# Patient Record
Sex: MALE | Race: BLACK OR AFRICAN AMERICAN | NOT HISPANIC OR LATINO | Employment: OTHER | URBAN - NONMETROPOLITAN AREA
[De-identification: names, ages, dates, MRNs, and addresses within clinical notes are randomized per-mention and may not be internally consistent; named-entity substitution may affect disease eponyms.]

---

## 2017-01-09 DIAGNOSIS — M16.12 PRIMARY OSTEOARTHRITIS OF LEFT HIP: ICD-10-CM

## 2017-01-09 DIAGNOSIS — Z96.642 PRESENCE OF LEFT HIP IMPLANT: Primary | ICD-10-CM

## 2017-01-09 DIAGNOSIS — M25.552 LEFT HIP PAIN: ICD-10-CM

## 2017-01-31 ENCOUNTER — OFFICE VISIT (OUTPATIENT)
Dept: ORTHOPEDIC SURGERY | Facility: CLINIC | Age: 72
End: 2017-01-31

## 2017-01-31 VITALS — BODY MASS INDEX: 32.9 KG/M2 | HEIGHT: 71 IN | WEIGHT: 235 LBS

## 2017-01-31 DIAGNOSIS — M16.12 PRIMARY OSTEOARTHRITIS OF LEFT HIP: ICD-10-CM

## 2017-01-31 DIAGNOSIS — Z96.642 PRESENCE OF LEFT HIP IMPLANT: Primary | ICD-10-CM

## 2017-01-31 PROCEDURE — 99024 POSTOP FOLLOW-UP VISIT: CPT | Performed by: ORTHOPAEDIC SURGERY

## 2017-01-31 RX ORDER — CLONIDINE HYDROCHLORIDE 0.1 MG/1
TABLET ORAL
COMMUNITY
Start: 2017-01-30 | End: 2017-05-10 | Stop reason: ALTCHOICE

## 2017-01-31 RX ORDER — AMLODIPINE BESYLATE 10 MG/1
TABLET ORAL
COMMUNITY
Start: 2017-01-30 | End: 2017-03-28

## 2017-01-31 RX ORDER — AMLODIPINE BESYLATE 5 MG/1
TABLET ORAL
COMMUNITY
Start: 2017-01-23 | End: 2017-03-28

## 2017-02-27 ENCOUNTER — LAB (OUTPATIENT)
Dept: LAB | Facility: HOSPITAL | Age: 72
End: 2017-02-27

## 2017-02-27 ENCOUNTER — TRANSCRIBE ORDERS (OUTPATIENT)
Dept: LAB | Facility: HOSPITAL | Age: 72
End: 2017-02-27

## 2017-02-27 DIAGNOSIS — I10 ESSENTIAL HYPERTENSION, BENIGN: Primary | ICD-10-CM

## 2017-02-27 DIAGNOSIS — I10 ESSENTIAL HYPERTENSION, BENIGN: ICD-10-CM

## 2017-02-27 DIAGNOSIS — E78.2 MIXED HYPERLIPIDEMIA: ICD-10-CM

## 2017-02-27 DIAGNOSIS — M1A.00X0 IDIOPATHIC CHRONIC GOUT WITHOUT TOPHUS, UNSPECIFIED SITE: ICD-10-CM

## 2017-02-27 DIAGNOSIS — Z12.5 ENCOUNTER FOR PROSTATE CANCER SCREENING: ICD-10-CM

## 2017-02-27 DIAGNOSIS — I10 ESSENTIAL HYPERTENSION: ICD-10-CM

## 2017-02-27 DIAGNOSIS — Z11.59 NEED FOR HEPATITIS C SCREENING TEST: ICD-10-CM

## 2017-02-27 PROCEDURE — 82088 ASSAY OF ALDOSTERONE: CPT | Performed by: INTERNAL MEDICINE

## 2017-02-27 PROCEDURE — 82570 ASSAY OF URINE CREATININE: CPT | Performed by: INTERNAL MEDICINE

## 2017-02-27 PROCEDURE — 84156 ASSAY OF PROTEIN URINE: CPT | Performed by: INTERNAL MEDICINE

## 2017-02-27 PROCEDURE — 84244 ASSAY OF RENIN: CPT | Performed by: INTERNAL MEDICINE

## 2017-02-27 PROCEDURE — 36415 COLL VENOUS BLD VENIPUNCTURE: CPT

## 2017-03-01 ENCOUNTER — LAB (OUTPATIENT)
Dept: LAB | Facility: HOSPITAL | Age: 72
End: 2017-03-01

## 2017-03-01 DIAGNOSIS — I10 ESSENTIAL HYPERTENSION, BENIGN: ICD-10-CM

## 2017-03-01 LAB
CREAT 24H UR-MCNC: 113.6 MG/DL
PROT UR-MCNC: 8.9 MG/DL
PROT/CREAT UR: 78 MG/G CREAT (ref 0–200)

## 2017-03-01 PROCEDURE — 82384 ASSAY THREE CATECHOLAMINES: CPT | Performed by: INTERNAL MEDICINE

## 2017-03-01 PROCEDURE — 83835 ASSAY OF METANEPHRINES: CPT | Performed by: INTERNAL MEDICINE

## 2017-03-01 PROCEDURE — 81050 URINALYSIS VOLUME MEASURE: CPT | Performed by: INTERNAL MEDICINE

## 2017-03-01 PROCEDURE — 82570 ASSAY OF URINE CREATININE: CPT | Performed by: INTERNAL MEDICINE

## 2017-03-01 PROCEDURE — 84585 ASSAY OF URINE VMA: CPT | Performed by: INTERNAL MEDICINE

## 2017-03-03 LAB
METANEPHS 24H UR-MRATE: 166 UG/24 HR (ref 45–290)
METANEPHS UR-MCNC: 107 UG/L
NORMETANEPHRINE 24H UR-MCNC: 373 UG/L
NORMETANEPHRINE 24H UR-MRATE: 578 UG/24 HR (ref 82–500)
VMA 24H UR-MRATE: 3.7 MG/24 HR (ref 0–7.5)
VMA UR-MCNC: 2.4 MG/L

## 2017-03-05 LAB
DOPAMINE 24H UR-MRATE: 226 UG/24 HR (ref 0–510)
DOPAMINE UR-MCNC: 146 UG/L
EPINEPH 24H UR-MRATE: 3 UG/24 HR (ref 0–20)
EPINEPH UR-MCNC: 2 UG/L
NOREPINEPH 24H UR-MRATE: 56 UG/24 HR (ref 0–135)
NOREPINEPH UR-MCNC: 36 UG/L

## 2017-03-09 LAB
ALDOST SERPL-MCNC: 3.6 NG/DL (ref 0–30)
ALDOST/RENIN PLAS-RTO: 9.6 {RATIO} (ref 0–30)
RENIN PLAS-CCNC: 0.38 NG/ML/HR (ref 0.17–5.38)

## 2017-03-28 ENCOUNTER — OFFICE VISIT (OUTPATIENT)
Dept: ORTHOPEDIC SURGERY | Facility: CLINIC | Age: 72
End: 2017-03-28

## 2017-03-28 VITALS — WEIGHT: 230 LBS | BODY MASS INDEX: 32.2 KG/M2 | HEIGHT: 71 IN

## 2017-03-28 DIAGNOSIS — M16.12 PRIMARY OSTEOARTHRITIS OF LEFT HIP: ICD-10-CM

## 2017-03-28 DIAGNOSIS — Z96.642 PRESENCE OF LEFT HIP IMPLANT: Primary | ICD-10-CM

## 2017-03-28 DIAGNOSIS — M25.552 LEFT HIP PAIN: ICD-10-CM

## 2017-03-28 PROCEDURE — 99213 OFFICE O/P EST LOW 20 MIN: CPT | Performed by: ORTHOPAEDIC SURGERY

## 2017-03-28 RX ORDER — CLINDAMYCIN PHOSPHATE 10 MG/G
GEL TOPICAL 2 TIMES DAILY
COMMUNITY
End: 2018-01-27 | Stop reason: SDUPTHER

## 2017-03-28 NOTE — PROGRESS NOTES
Jerald Bonilla is a 71 y.o. male returns for     Chief Complaint   Patient presents with   • Left Hip - Follow-up     Xray done today in the office       HISTORY OF PRESENT ILLNESS:    Therapy completed at [facility].  Doing well. Pain level 1,  Stiff and sore but not much pain.  No problems.  He has some occasional pain with prolonged sitting.  He is progressively getting better and stronger.       CONCURRENT MEDICAL HISTORY:    Past Medical History:   Diagnosis Date   • Acne conglobata    • Acute maxillary sinusitis    • Benign prostate hyperplasia    • Blurring of visual image    • Carbuncle     of infected sebaceous cyst on back   • Dizziness and giddiness    • Erectile dysfunction    • Essential hypertension     seeing nephro   • Gastroesophageal reflux disease    • Gout    • Hip pain    • Hyperlipidemia    • Hypertension    • Hypertensive disorder    • Low back pain    • Malaise and fatigue    • Need for prophylactic vaccination against Streptococcus pneumoniae (pneumococcus)    • Nuclear senile cataract    • Osteoarthritis    • Otalgia    • Otitis externa    • Pain in lower limb    • Rheumatoid arthritis    • Skin lesion     right calf   • Swelling of knee joint    • Tinnitus    • Upper respiratory infection        No Known Allergies      Current Outpatient Prescriptions:   •  allopurinol (ZYLOPRIM) 100 MG tablet, Take 1 tablet by mouth Daily., Disp: 90 tablet, Rfl: 3  •  B Complex Vitamins (VITAMIN B COMPLEX PO), Take 1 tablet by mouth Daily., Disp: , Rfl:   •  B Complex-C (SUPER B COMPLEX PO), Take  by mouth., Disp: , Rfl:   •  carvedilol (COREG) 25 MG tablet, Take 1 tablet by mouth 2 (Two) Times a Day With Meals., Disp: 180 tablet, Rfl: 3  •  cholecalciferol (VITAMIN D3) 1000 UNITS tablet, Take 1,000 Units by mouth Daily., Disp: , Rfl:   •  clindamycin (CLINDAGEL) 1 % gel, Apply  topically 2 (Two) Times a Day., Disp: , Rfl:   •  CloNIDine (CATAPRES) 0.1 MG tablet, , Disp: , Rfl:   •  fluticasone  "(FLONASE) 50 MCG/ACT nasal spray, 2 sprays into each nostril Daily As Needed for allergies., Disp: 1 each, Rfl: 11  •  hydrALAZINE (APRESOLINE) 100 MG tablet, Take 1 tablet by mouth 3 (Three) Times a Day., Disp: 270 tablet, Rfl: 3  •  Loratadine 10 MG capsule, Take  by mouth., Disp: , Rfl:   •  pravastatin (PRAVACHOL) 80 MG tablet, Take 1 tablet by mouth Daily., Disp: 90 tablet, Rfl: 3  •  valsartan-hydrochlorothiazide (DIOVAN-HCT) 320-12.5 MG per tablet, Take 1 tablet by mouth Daily., Disp: 90 tablet, Rfl: 3  •  Zinc 50 MG tablet, Take 1 tablet by mouth Daily., Disp: , Rfl:   •  Zinc Sulfate (ZINC 15 PO), Take  by mouth., Disp: , Rfl:     Past Surgical History:   Procedure Laterality Date   • COLONOSCOPY  05/18/2009    Colonoscopy, diagnostic (screening) 91307 (1)    diverticuli, roids    • HIP SURGERY Left 12/05/2016   • INJECTION OF MEDICATION  05/01/2015    B12 (3)     • INJECTION OF MEDICATION  10/31/2011    Rocephin (1)      • OTHER SURGICAL HISTORY  04/18/2016    &D, Complicated or Multiple 43189 (3)      • OTHER SURGICAL HISTORY  08/11/2014    Remove Impacted Cerumen 99097 (1)          ROS  No fevers or chills.  No chest pain or shortness of air.  No GI or  disturbances.    PHYSICAL EXAMINATION:       Ht 71\" (180.3 cm)  Wt 230 lb (104 kg)  BMI 32.08 kg/m2    Physical Exam   Constitutional: He is oriented to person, place, and time. He appears well-developed and well-nourished.   Neurological: He is alert and oriented to person, place, and time.   Psychiatric: He has a normal mood and affect. His behavior is normal. Judgment and thought content normal.       GAIT:     [x]  Normal  []  Antalgic    Assistive device: [x]  None  []  Walker     []  Crutches  []  Cane     []  Wheelchair  []  Stretcher    Left Hip Exam     Tenderness   The patient is experiencing no tenderness.         Range of Motion   Flexion: 130   Internal Rotation: normal   External Rotation: normal     Muscle Strength   The patient has " normal left hip strength.     Tests   EPHRAIM: negative  Fadir:  Negative FADIR test    Other   Erythema: absent  Sensation: normal  Pulse: present    Comments:  Stable joint exam              Xr Hip With Or Without Pelvis 2 - 3 View Left    Result Date: 3/28/2017  Narrative: AP of the pelvis with AP and lateral of the left hip shows acceptable position and alignment of a left total hip arthroplasty with no evidence of acute bony abnormality.  No sign of implant loosening or failure.  There does appear to be a slight pelvic obliquity with no identifiable source.  He may have some lumbar degenerative changes or his left leg may be slightly long.  Please correlate with clinical situation.  Comparison views not currently available. 03/28/17 at 6:04 PM by Mitchell Beltrán MD             ASSESSMENT:    Diagnoses and all orders for this visit:    Presence of left hip implant    Left hip pain    Primary osteoarthritis of left hip    Other orders  -     clindamycin (CLINDAGEL) 1 % gel; Apply  topically 2 (Two) Times a Day.          PLAN    Continue with range of motion and strengthening.  Continue conditioning as tolerated.  We discussed slowly progressing his mobility and activity as tolerated.  There are no restrictions.    Return in about 1 year (around 3/28/2018) for Recheck with repeat xrays.    Mitchell Beltrán MD

## 2017-04-19 ENCOUNTER — TRANSCRIBE ORDERS (OUTPATIENT)
Dept: LAB | Facility: HOSPITAL | Age: 72
End: 2017-04-19

## 2017-04-19 DIAGNOSIS — E55.9 UNSPECIFIED VITAMIN D DEFICIENCY: ICD-10-CM

## 2017-04-19 DIAGNOSIS — I10 ESSENTIAL (PRIMARY) HYPERTENSION: Primary | ICD-10-CM

## 2017-05-10 ENCOUNTER — HOSPITAL ENCOUNTER (INPATIENT)
Facility: HOSPITAL | Age: 72
LOS: 2 days | Discharge: HOME | End: 2017-05-12
Attending: EMERGENCY MEDICINE | Admitting: INTERNAL MEDICINE

## 2017-05-10 DIAGNOSIS — Z78.9 IMPAIRED MOBILITY AND ADLS: ICD-10-CM

## 2017-05-10 DIAGNOSIS — Z74.09 IMPAIRED MOBILITY AND ADLS: ICD-10-CM

## 2017-05-10 DIAGNOSIS — J18.9 PNEUMONIA OF RIGHT UPPER LOBE DUE TO INFECTIOUS ORGANISM: Primary | ICD-10-CM

## 2017-05-10 PROBLEM — N17.9 ACUTE KIDNEY INJURY: Status: ACTIVE | Noted: 2017-05-10

## 2017-05-10 LAB
ALBUMIN SERPL-MCNC: 3.9 G/DL (ref 3.4–4.8)
ALBUMIN/GLOB SERPL: 1.2 G/DL (ref 1.1–1.8)
ALP SERPL-CCNC: 49 U/L (ref 38–126)
ALT SERPL W P-5'-P-CCNC: 26 U/L (ref 21–72)
ANION GAP SERPL CALCULATED.3IONS-SCNC: 12 MMOL/L (ref 5–15)
AST SERPL-CCNC: 21 U/L (ref 17–59)
BASOPHILS # BLD AUTO: 0.02 10*3/MM3 (ref 0–0.2)
BASOPHILS NFR BLD AUTO: 0.2 % (ref 0–2)
BILIRUB SERPL-MCNC: 1.2 MG/DL (ref 0.2–1.3)
BUN BLD-MCNC: 37 MG/DL (ref 7–21)
BUN/CREAT SERPL: 23.7 (ref 7–25)
CALCIUM SPEC-SCNC: 9 MG/DL (ref 8.4–10.2)
CHLORIDE SERPL-SCNC: 103 MMOL/L (ref 95–110)
CO2 SERPL-SCNC: 22 MMOL/L (ref 22–31)
CREAT BLD-MCNC: 1.56 MG/DL (ref 0.7–1.3)
DEPRECATED RDW RBC AUTO: 40.3 FL (ref 35.1–43.9)
EOSINOPHIL # BLD AUTO: 0.02 10*3/MM3 (ref 0–0.7)
EOSINOPHIL NFR BLD AUTO: 0.2 % (ref 0–7)
ERYTHROCYTE [DISTWIDTH] IN BLOOD BY AUTOMATED COUNT: 13.6 % (ref 11.5–14.5)
GFR SERPL CREATININE-BSD FRML MDRD: 54 ML/MIN/1.73 (ref 49–113)
GLOBULIN UR ELPH-MCNC: 3.2 GM/DL (ref 2.3–3.5)
GLUCOSE BLD-MCNC: 101 MG/DL (ref 60–100)
HCT VFR BLD AUTO: 41.5 % (ref 39–49)
HGB BLD-MCNC: 14.5 G/DL (ref 13.7–17.3)
HOLD SPECIMEN: NORMAL
HOLD SPECIMEN: NORMAL
IMM GRANULOCYTES # BLD: 0.04 10*3/MM3 (ref 0–0.02)
IMM GRANULOCYTES NFR BLD: 0.5 % (ref 0–0.5)
LYMPHOCYTES # BLD AUTO: 0.85 10*3/MM3 (ref 0.6–4.2)
LYMPHOCYTES NFR BLD AUTO: 10.5 % (ref 10–50)
MCH RBC QN AUTO: 28.2 PG (ref 26.5–34)
MCHC RBC AUTO-ENTMCNC: 34.9 G/DL (ref 31.5–36.3)
MCV RBC AUTO: 80.6 FL (ref 80–98)
MONOCYTES # BLD AUTO: 0.96 10*3/MM3 (ref 0–0.9)
MONOCYTES NFR BLD AUTO: 11.8 % (ref 0–12)
NEUTROPHILS # BLD AUTO: 6.24 10*3/MM3 (ref 2–8.6)
NEUTROPHILS NFR BLD AUTO: 76.8 % (ref 37–80)
PLATELET # BLD AUTO: 174 10*3/MM3 (ref 150–450)
PMV BLD AUTO: 9.5 FL (ref 8–12)
POTASSIUM BLD-SCNC: 3.6 MMOL/L (ref 3.5–5.1)
PROT SERPL-MCNC: 7.1 G/DL (ref 6.3–8.6)
RBC # BLD AUTO: 5.15 10*6/MM3 (ref 4.37–5.74)
SODIUM BLD-SCNC: 137 MMOL/L (ref 137–145)
WBC NRBC COR # BLD: 8.13 10*3/MM3 (ref 3.2–9.8)
WHOLE BLOOD HOLD SPECIMEN: NORMAL
WHOLE BLOOD HOLD SPECIMEN: NORMAL

## 2017-05-10 PROCEDURE — 87040 BLOOD CULTURE FOR BACTERIA: CPT | Performed by: EMERGENCY MEDICINE

## 2017-05-10 PROCEDURE — 80053 COMPREHEN METABOLIC PANEL: CPT | Performed by: EMERGENCY MEDICINE

## 2017-05-10 PROCEDURE — 87205 SMEAR GRAM STAIN: CPT | Performed by: INTERNAL MEDICINE

## 2017-05-10 PROCEDURE — 25010000002 HEPARIN (PORCINE) PER 1000 UNITS: Performed by: INTERNAL MEDICINE

## 2017-05-10 PROCEDURE — 99284 EMERGENCY DEPT VISIT MOD MDM: CPT

## 2017-05-10 PROCEDURE — 25010000002 AZITHROMYCIN: Performed by: EMERGENCY MEDICINE

## 2017-05-10 PROCEDURE — 87070 CULTURE OTHR SPECIMN AEROBIC: CPT | Performed by: INTERNAL MEDICINE

## 2017-05-10 PROCEDURE — 94760 N-INVAS EAR/PLS OXIMETRY 1: CPT

## 2017-05-10 PROCEDURE — 94640 AIRWAY INHALATION TREATMENT: CPT

## 2017-05-10 PROCEDURE — 25010000002 CEFTRIAXONE: Performed by: EMERGENCY MEDICINE

## 2017-05-10 PROCEDURE — 85025 COMPLETE CBC W/AUTO DIFF WBC: CPT | Performed by: EMERGENCY MEDICINE

## 2017-05-10 PROCEDURE — 94799 UNLISTED PULMONARY SVC/PX: CPT

## 2017-05-10 RX ORDER — IPRATROPIUM BROMIDE AND ALBUTEROL SULFATE 2.5; .5 MG/3ML; MG/3ML
3 SOLUTION RESPIRATORY (INHALATION) ONCE
Status: COMPLETED | OUTPATIENT
Start: 2017-05-10 | End: 2017-05-10

## 2017-05-10 RX ORDER — VALSARTAN 160 MG/1
160 TABLET ORAL
Status: DISCONTINUED | OUTPATIENT
Start: 2017-05-10 | End: 2017-05-12 | Stop reason: HOSPADM

## 2017-05-10 RX ORDER — SODIUM CHLORIDE 0.9 % (FLUSH) 0.9 %
10 SYRINGE (ML) INJECTION AS NEEDED
Status: DISCONTINUED | OUTPATIENT
Start: 2017-05-10 | End: 2017-05-10

## 2017-05-10 RX ORDER — ACETAMINOPHEN 325 MG/1
650 TABLET ORAL ONCE
Status: COMPLETED | OUTPATIENT
Start: 2017-05-10 | End: 2017-05-10

## 2017-05-10 RX ORDER — FAMOTIDINE 40 MG/1
40 TABLET, FILM COATED ORAL DAILY
Status: DISCONTINUED | OUTPATIENT
Start: 2017-05-10 | End: 2017-05-12 | Stop reason: HOSPADM

## 2017-05-10 RX ORDER — SODIUM CHLORIDE 0.9 % (FLUSH) 0.9 %
1-10 SYRINGE (ML) INJECTION AS NEEDED
Status: DISCONTINUED | OUTPATIENT
Start: 2017-05-10 | End: 2017-05-12 | Stop reason: HOSPADM

## 2017-05-10 RX ORDER — ALLOPURINOL 100 MG/1
100 TABLET ORAL DAILY
Status: DISCONTINUED | OUTPATIENT
Start: 2017-05-10 | End: 2017-05-12 | Stop reason: HOSPADM

## 2017-05-10 RX ORDER — ZINC SULFATE 50(220)MG
220 CAPSULE ORAL DAILY
Status: DISCONTINUED | OUTPATIENT
Start: 2017-05-10 | End: 2017-05-12 | Stop reason: HOSPADM

## 2017-05-10 RX ORDER — PRAVASTATIN SODIUM 40 MG
80 TABLET ORAL NIGHTLY
Status: DISCONTINUED | OUTPATIENT
Start: 2017-05-10 | End: 2017-05-12 | Stop reason: HOSPADM

## 2017-05-10 RX ORDER — CARVEDILOL 25 MG/1
25 TABLET ORAL 2 TIMES DAILY WITH MEALS
Status: DISCONTINUED | OUTPATIENT
Start: 2017-05-10 | End: 2017-05-12 | Stop reason: HOSPADM

## 2017-05-10 RX ORDER — SODIUM CHLORIDE 9 MG/ML
125 INJECTION, SOLUTION INTRAVENOUS CONTINUOUS
Status: DISCONTINUED | OUTPATIENT
Start: 2017-05-10 | End: 2017-05-10

## 2017-05-10 RX ORDER — MELATONIN
2000 DAILY
Status: DISCONTINUED | OUTPATIENT
Start: 2017-05-10 | End: 2017-05-12 | Stop reason: HOSPADM

## 2017-05-10 RX ORDER — NIFEDIPINE 60 MG/1
120 TABLET, EXTENDED RELEASE ORAL NIGHTLY
Status: DISCONTINUED | OUTPATIENT
Start: 2017-05-10 | End: 2017-05-12 | Stop reason: HOSPADM

## 2017-05-10 RX ORDER — VALSARTAN AND HYDROCHLOROTHIAZIDE 160; 25 MG/1; MG/1
1 TABLET ORAL DAILY
COMMUNITY
End: 2017-05-16 | Stop reason: DRUGHIGH

## 2017-05-10 RX ORDER — SODIUM CHLORIDE 9 MG/ML
100 INJECTION, SOLUTION INTRAVENOUS CONTINUOUS
Status: DISCONTINUED | OUTPATIENT
Start: 2017-05-10 | End: 2017-05-11

## 2017-05-10 RX ORDER — HEPARIN SODIUM 5000 [USP'U]/ML
5000 INJECTION, SOLUTION INTRAVENOUS; SUBCUTANEOUS EVERY 12 HOURS SCHEDULED
Status: DISCONTINUED | OUTPATIENT
Start: 2017-05-10 | End: 2017-05-12 | Stop reason: HOSPADM

## 2017-05-10 RX ADMIN — CEFTRIAXONE 1 G: 1 INJECTION, POWDER, FOR SOLUTION INTRAMUSCULAR; INTRAVENOUS at 15:15

## 2017-05-10 RX ADMIN — AZITHROMYCIN 500 MG: 500 INJECTION, POWDER, LYOPHILIZED, FOR SOLUTION INTRAVENOUS at 16:09

## 2017-05-10 RX ADMIN — HEPARIN SODIUM 5000 UNITS: 5000 INJECTION, SOLUTION INTRAVENOUS; SUBCUTANEOUS at 21:58

## 2017-05-10 RX ADMIN — SODIUM CHLORIDE 125 ML/HR: 900 INJECTION, SOLUTION INTRAVENOUS at 14:30

## 2017-05-10 RX ADMIN — IPRATROPIUM BROMIDE AND ALBUTEROL SULFATE 3 ML: .5; 3 SOLUTION RESPIRATORY (INHALATION) at 14:47

## 2017-05-10 RX ADMIN — ACETAMINOPHEN 650 MG: 325 TABLET ORAL at 14:47

## 2017-05-10 RX ADMIN — CARVEDILOL 25 MG: 25 TABLET, FILM COATED ORAL at 17:51

## 2017-05-10 RX ADMIN — ACETAMINOPHEN 650 MG: 325 TABLET ORAL at 17:51

## 2017-05-10 RX ADMIN — FAMOTIDINE 40 MG: 40 TABLET ORAL at 17:51

## 2017-05-10 RX ADMIN — SODIUM CHLORIDE 100 ML/HR: 9 INJECTION, SOLUTION INTRAVENOUS at 22:36

## 2017-05-10 RX ADMIN — SODIUM CHLORIDE 500 ML: 9 INJECTION, SOLUTION INTRAVENOUS at 16:19

## 2017-05-10 RX ADMIN — SODIUM CHLORIDE 500 ML: 9 INJECTION, SOLUTION INTRAVENOUS at 18:24

## 2017-05-10 RX ADMIN — ZINC SULFATE CAP 220 MG (50 MG ELEMENTAL ZN) 220 MG: 220 (50 ZN) CAP at 17:51

## 2017-05-10 RX ADMIN — VITAMIN D, TAB 1000IU (100/BT) 2000 UNITS: 25 TAB at 17:51

## 2017-05-10 RX ADMIN — IPRATROPIUM BROMIDE AND ALBUTEROL SULFATE 3 ML: 2.5; .5 SOLUTION RESPIRATORY (INHALATION) at 17:26

## 2017-05-10 RX ADMIN — PRAVASTATIN SODIUM 80 MG: 40 TABLET ORAL at 21:59

## 2017-05-11 LAB
ANION GAP SERPL CALCULATED.3IONS-SCNC: 9 MMOL/L (ref 5–15)
BUN BLD-MCNC: 30 MG/DL (ref 7–21)
BUN/CREAT SERPL: 23.1 (ref 7–25)
CALCIUM SPEC-SCNC: 8.1 MG/DL (ref 8.4–10.2)
CHLORIDE SERPL-SCNC: 105 MMOL/L (ref 95–110)
CO2 SERPL-SCNC: 25 MMOL/L (ref 22–31)
CREAT BLD-MCNC: 1.3 MG/DL (ref 0.7–1.3)
DEPRECATED RDW RBC AUTO: 41 FL (ref 35.1–43.9)
ERYTHROCYTE [DISTWIDTH] IN BLOOD BY AUTOMATED COUNT: 13.9 % (ref 11.5–14.5)
GFR SERPL CREATININE-BSD FRML MDRD: 66 ML/MIN/1.73 (ref 49–113)
GLUCOSE BLD-MCNC: 91 MG/DL (ref 60–100)
HCT VFR BLD AUTO: 37.2 % (ref 39–49)
HGB BLD-MCNC: 12.8 G/DL (ref 13.7–17.3)
MCH RBC QN AUTO: 27.7 PG (ref 26.5–34)
MCHC RBC AUTO-ENTMCNC: 34.4 G/DL (ref 31.5–36.3)
MCV RBC AUTO: 80.5 FL (ref 80–98)
PLATELET # BLD AUTO: 160 10*3/MM3 (ref 150–450)
PMV BLD AUTO: 9.5 FL (ref 8–12)
POTASSIUM BLD-SCNC: 3.5 MMOL/L (ref 3.5–5.1)
RBC # BLD AUTO: 4.62 10*6/MM3 (ref 4.37–5.74)
SODIUM BLD-SCNC: 139 MMOL/L (ref 137–145)
WBC NRBC COR # BLD: 8.96 10*3/MM3 (ref 3.2–9.8)

## 2017-05-11 PROCEDURE — G8988 SELF CARE GOAL STATUS: HCPCS

## 2017-05-11 PROCEDURE — 63710000001 PREDNISONE PER 1 MG: Performed by: PHYSICIAN ASSISTANT

## 2017-05-11 PROCEDURE — 97165 OT EVAL LOW COMPLEX 30 MIN: CPT

## 2017-05-11 PROCEDURE — 97535 SELF CARE MNGMENT TRAINING: CPT

## 2017-05-11 PROCEDURE — G8987 SELF CARE CURRENT STATUS: HCPCS

## 2017-05-11 PROCEDURE — 25010000002 HEPARIN (PORCINE) PER 1000 UNITS: Performed by: INTERNAL MEDICINE

## 2017-05-11 PROCEDURE — 25010000002 CEFTRIAXONE: Performed by: INTERNAL MEDICINE

## 2017-05-11 PROCEDURE — 80048 BASIC METABOLIC PNL TOTAL CA: CPT | Performed by: INTERNAL MEDICINE

## 2017-05-11 PROCEDURE — 85027 COMPLETE CBC AUTOMATED: CPT | Performed by: INTERNAL MEDICINE

## 2017-05-11 PROCEDURE — 25010000002 AZITHROMYCIN: Performed by: INTERNAL MEDICINE

## 2017-05-11 RX ORDER — PREDNISONE 20 MG/1
40 TABLET ORAL
Status: DISCONTINUED | OUTPATIENT
Start: 2017-05-11 | End: 2017-05-12 | Stop reason: HOSPADM

## 2017-05-11 RX ADMIN — Medication 10 ML: at 19:10

## 2017-05-11 RX ADMIN — ALLOPURINOL 100 MG: 100 TABLET ORAL at 09:03

## 2017-05-11 RX ADMIN — PRAVASTATIN SODIUM 80 MG: 40 TABLET ORAL at 20:55

## 2017-05-11 RX ADMIN — NIFEDIPINE 120 MG: 60 TABLET, FILM COATED, EXTENDED RELEASE ORAL at 20:55

## 2017-05-11 RX ADMIN — HEPARIN SODIUM 5000 UNITS: 5000 INJECTION, SOLUTION INTRAVENOUS; SUBCUTANEOUS at 09:04

## 2017-05-11 RX ADMIN — HEPARIN SODIUM 5000 UNITS: 5000 INJECTION, SOLUTION INTRAVENOUS; SUBCUTANEOUS at 20:54

## 2017-05-11 RX ADMIN — VALSARTAN 160 MG: 160 TABLET, FILM COATED ORAL at 09:03

## 2017-05-11 RX ADMIN — Medication 10 ML: at 09:06

## 2017-05-11 RX ADMIN — SODIUM CHLORIDE 100 ML/HR: 9 INJECTION, SOLUTION INTRAVENOUS at 09:02

## 2017-05-11 RX ADMIN — ZINC SULFATE CAP 220 MG (50 MG ELEMENTAL ZN) 220 MG: 220 (50 ZN) CAP at 09:04

## 2017-05-11 RX ADMIN — CARVEDILOL 25 MG: 25 TABLET, FILM COATED ORAL at 09:05

## 2017-05-11 RX ADMIN — AZITHROMYCIN 500 MG: 500 INJECTION, POWDER, LYOPHILIZED, FOR SOLUTION INTRAVENOUS at 19:05

## 2017-05-11 RX ADMIN — PREDNISONE 40 MG: 20 TABLET ORAL at 19:06

## 2017-05-11 RX ADMIN — VITAMIN D, TAB 1000IU (100/BT) 2000 UNITS: 25 TAB at 09:05

## 2017-05-11 RX ADMIN — CARVEDILOL 25 MG: 25 TABLET, FILM COATED ORAL at 19:06

## 2017-05-11 RX ADMIN — FAMOTIDINE 40 MG: 40 TABLET ORAL at 09:05

## 2017-05-11 RX ADMIN — CEFTRIAXONE 1 G: 1 INJECTION, POWDER, FOR SOLUTION INTRAMUSCULAR; INTRAVENOUS at 09:04

## 2017-05-12 VITALS
WEIGHT: 239 LBS | OXYGEN SATURATION: 97 % | HEART RATE: 76 BPM | TEMPERATURE: 98 F | SYSTOLIC BLOOD PRESSURE: 138 MMHG | BODY MASS INDEX: 33.46 KG/M2 | RESPIRATION RATE: 18 BRPM | DIASTOLIC BLOOD PRESSURE: 64 MMHG | HEIGHT: 71 IN

## 2017-05-12 PROBLEM — N17.9 ACUTE KIDNEY INJURY: Status: RESOLVED | Noted: 2017-05-10 | Resolved: 2017-05-12

## 2017-05-12 LAB
BACTERIA SPEC RESP CULT: NORMAL
GRAM STN SPEC: NORMAL

## 2017-05-12 PROCEDURE — 97535 SELF CARE MNGMENT TRAINING: CPT

## 2017-05-12 PROCEDURE — 25010000002 HEPARIN (PORCINE) PER 1000 UNITS: Performed by: INTERNAL MEDICINE

## 2017-05-12 PROCEDURE — G8980 MOBILITY D/C STATUS: HCPCS

## 2017-05-12 PROCEDURE — 63710000001 PREDNISONE PER 1 MG: Performed by: PHYSICIAN ASSISTANT

## 2017-05-12 PROCEDURE — G8978 MOBILITY CURRENT STATUS: HCPCS

## 2017-05-12 PROCEDURE — 97162 PT EVAL MOD COMPLEX 30 MIN: CPT

## 2017-05-12 RX ORDER — CEFDINIR 300 MG/1
300 CAPSULE ORAL 2 TIMES DAILY
Qty: 20 CAPSULE | Refills: 0 | Status: SHIPPED | OUTPATIENT
Start: 2017-05-12 | End: 2017-06-16

## 2017-05-12 RX ORDER — PREDNISONE 20 MG/1
TABLET ORAL
Qty: 7 TABLET | Refills: 0 | Status: SHIPPED | OUTPATIENT
Start: 2017-05-12 | End: 2017-06-16

## 2017-05-12 RX ORDER — ALBUTEROL SULFATE 2.5 MG/3ML
2.5 SOLUTION RESPIRATORY (INHALATION) EVERY 4 HOURS PRN
Qty: 360 ML | Refills: 12 | Status: SHIPPED | OUTPATIENT
Start: 2017-05-12 | End: 2017-12-15

## 2017-05-12 RX ORDER — AZITHROMYCIN 500 MG/1
500 TABLET, FILM COATED ORAL DAILY
Qty: 3 TABLET | Refills: 0 | Status: SHIPPED | OUTPATIENT
Start: 2017-05-12 | End: 2017-06-16

## 2017-05-12 RX ADMIN — ZINC SULFATE CAP 220 MG (50 MG ELEMENTAL ZN) 220 MG: 220 (50 ZN) CAP at 09:45

## 2017-05-12 RX ADMIN — CARVEDILOL 25 MG: 25 TABLET, FILM COATED ORAL at 09:47

## 2017-05-12 RX ADMIN — PREDNISONE 40 MG: 20 TABLET ORAL at 09:45

## 2017-05-12 RX ADMIN — VALSARTAN 160 MG: 160 TABLET, FILM COATED ORAL at 09:45

## 2017-05-12 RX ADMIN — Medication 10 ML: at 09:47

## 2017-05-12 RX ADMIN — VITAMIN D, TAB 1000IU (100/BT) 2000 UNITS: 25 TAB at 09:45

## 2017-05-12 RX ADMIN — ALLOPURINOL 100 MG: 100 TABLET ORAL at 09:46

## 2017-05-12 RX ADMIN — HEPARIN SODIUM 5000 UNITS: 5000 INJECTION, SOLUTION INTRAVENOUS; SUBCUTANEOUS at 09:47

## 2017-05-12 RX ADMIN — FAMOTIDINE 40 MG: 40 TABLET ORAL at 09:45

## 2017-05-15 LAB
BACTERIA SPEC AEROBE CULT: NORMAL
BACTERIA SPEC AEROBE CULT: NORMAL

## 2017-05-16 ENCOUNTER — OFFICE VISIT (OUTPATIENT)
Dept: FAMILY MEDICINE CLINIC | Facility: CLINIC | Age: 72
End: 2017-05-16

## 2017-05-16 VITALS
DIASTOLIC BLOOD PRESSURE: 60 MMHG | OXYGEN SATURATION: 99 % | WEIGHT: 237.3 LBS | SYSTOLIC BLOOD PRESSURE: 126 MMHG | HEART RATE: 52 BPM | HEIGHT: 71 IN | BODY MASS INDEX: 33.22 KG/M2

## 2017-05-16 DIAGNOSIS — J18.9 PNEUMONIA OF RIGHT UPPER LOBE DUE TO INFECTIOUS ORGANISM: Primary | ICD-10-CM

## 2017-05-16 DIAGNOSIS — I10 ESSENTIAL HYPERTENSION: Chronic | ICD-10-CM

## 2017-05-16 PROCEDURE — 99213 OFFICE O/P EST LOW 20 MIN: CPT | Performed by: GENERAL PRACTICE

## 2017-05-16 RX ORDER — VALSARTAN AND HYDROCHLOROTHIAZIDE 320; 25 MG/1; MG/1
1 TABLET, FILM COATED ORAL DAILY
COMMUNITY
End: 2019-05-02 | Stop reason: SDUPTHER

## 2017-05-31 ENCOUNTER — TELEPHONE (OUTPATIENT)
Dept: FAMILY MEDICINE CLINIC | Facility: CLINIC | Age: 72
End: 2017-05-31

## 2017-06-01 RX ORDER — CARVEDILOL 25 MG/1
50 TABLET ORAL 2 TIMES DAILY WITH MEALS
Qty: 120 TABLET | Refills: 1 | Status: SHIPPED | OUTPATIENT
Start: 2017-06-01 | End: 2017-10-27 | Stop reason: SDUPTHER

## 2017-06-01 NOTE — TELEPHONE ENCOUNTER
Requested Refills Sent  Patient has been called and ask to contact his Cardiologist in [place] and have his provider send us a copy of the Office Visit.

## 2017-06-13 ENCOUNTER — APPOINTMENT (OUTPATIENT)
Dept: LAB | Facility: HOSPITAL | Age: 72
End: 2017-06-13

## 2017-06-13 LAB
25(OH)D3 SERPL-MCNC: 65.6 NG/ML (ref 30–100)
ALBUMIN SERPL-MCNC: 4 G/DL (ref 3.4–4.8)
ALBUMIN/GLOB SERPL: 1.3 G/DL (ref 1.1–1.8)
ALP SERPL-CCNC: 50 U/L (ref 38–126)
ALT SERPL W P-5'-P-CCNC: 36 U/L (ref 21–72)
ANION GAP SERPL CALCULATED.3IONS-SCNC: 11 MMOL/L (ref 5–15)
ARTICHOKE IGE QN: 73 MG/DL (ref 1–129)
AST SERPL-CCNC: 27 U/L (ref 17–59)
BACTERIA UR QL AUTO: ABNORMAL /HPF
BILIRUB SERPL-MCNC: 0.5 MG/DL (ref 0.2–1.3)
BILIRUB UR QL STRIP: NEGATIVE
BUN BLD-MCNC: 29 MG/DL (ref 7–21)
BUN/CREAT SERPL: 23 (ref 7–25)
CALCIUM SPEC-SCNC: 9 MG/DL (ref 8.4–10.2)
CHLORIDE SERPL-SCNC: 106 MMOL/L (ref 95–110)
CHOLEST SERPL-MCNC: 149 MG/DL (ref 0–199)
CLARITY UR: CLEAR
CO2 SERPL-SCNC: 26 MMOL/L (ref 22–31)
COLOR UR: YELLOW
CREAT BLD-MCNC: 1.26 MG/DL (ref 0.7–1.3)
CREAT UR-MCNC: 111.5 MG/DL
GFR SERPL CREATININE-BSD FRML MDRD: 69 ML/MIN/1.73 (ref 49–113)
GLOBULIN UR ELPH-MCNC: 3.2 GM/DL (ref 2.3–3.5)
GLUCOSE BLD-MCNC: 102 MG/DL (ref 60–100)
GLUCOSE UR STRIP-MCNC: NEGATIVE MG/DL
HDLC SERPL-MCNC: 45 MG/DL (ref 60–200)
HGB UR QL STRIP.AUTO: ABNORMAL
HYALINE CASTS UR QL AUTO: ABNORMAL /LPF
KETONES UR QL STRIP: NEGATIVE
LDLC/HDLC SERPL: 1.92 {RATIO} (ref 0–3.55)
LEUKOCYTE ESTERASE UR QL STRIP.AUTO: NEGATIVE
NITRITE UR QL STRIP: NEGATIVE
PH UR STRIP.AUTO: <=5 [PH] (ref 5–9)
PHOSPHATE SERPL-MCNC: 3.3 MG/DL (ref 2.4–4.4)
POTASSIUM BLD-SCNC: 3.9 MMOL/L (ref 3.5–5.1)
PROT SERPL-MCNC: 7.2 G/DL (ref 6.3–8.6)
PROT UR QL STRIP: NEGATIVE
PROT UR-MCNC: 14.2 MG/DL
PROT/CREAT UR: 127.4 MG/G CREA (ref 0–200)
PSA SERPL-MCNC: 1.99 NG/ML (ref 0–4)
RBC # UR: ABNORMAL /HPF
REF LAB TEST METHOD: ABNORMAL
SODIUM BLD-SCNC: 143 MMOL/L (ref 137–145)
SP GR UR STRIP: 1.02 (ref 1–1.03)
SQUAMOUS #/AREA URNS HPF: ABNORMAL /HPF
TRIGL SERPL-MCNC: 87 MG/DL (ref 20–199)
URATE SERPL-MCNC: 7.3 MG/DL (ref 2.5–8.5)
UROBILINOGEN UR QL STRIP: ABNORMAL
WBC UR QL AUTO: ABNORMAL /HPF

## 2017-06-13 PROCEDURE — 84100 ASSAY OF PHOSPHORUS: CPT | Performed by: INTERNAL MEDICINE

## 2017-06-13 PROCEDURE — 80053 COMPREHEN METABOLIC PANEL: CPT | Performed by: INTERNAL MEDICINE

## 2017-06-13 PROCEDURE — 84156 ASSAY OF PROTEIN URINE: CPT | Performed by: INTERNAL MEDICINE

## 2017-06-13 PROCEDURE — 36415 COLL VENOUS BLD VENIPUNCTURE: CPT

## 2017-06-13 PROCEDURE — 80061 LIPID PANEL: CPT | Performed by: INTERNAL MEDICINE

## 2017-06-13 PROCEDURE — G0103 PSA SCREENING: HCPCS | Performed by: GENERAL PRACTICE

## 2017-06-13 PROCEDURE — 82570 ASSAY OF URINE CREATININE: CPT | Performed by: INTERNAL MEDICINE

## 2017-06-13 PROCEDURE — 84550 ASSAY OF BLOOD/URIC ACID: CPT | Performed by: INTERNAL MEDICINE

## 2017-06-13 PROCEDURE — 82306 VITAMIN D 25 HYDROXY: CPT | Performed by: INTERNAL MEDICINE

## 2017-06-13 PROCEDURE — 86803 HEPATITIS C AB TEST: CPT | Performed by: GENERAL PRACTICE

## 2017-06-13 PROCEDURE — 81001 URINALYSIS AUTO W/SCOPE: CPT | Performed by: INTERNAL MEDICINE

## 2017-06-14 LAB — HCV AB SER DONR QL: NEGATIVE

## 2017-06-16 ENCOUNTER — OFFICE VISIT (OUTPATIENT)
Dept: FAMILY MEDICINE CLINIC | Facility: CLINIC | Age: 72
End: 2017-06-16

## 2017-06-16 ENCOUNTER — TELEPHONE (OUTPATIENT)
Dept: FAMILY MEDICINE CLINIC | Facility: CLINIC | Age: 72
End: 2017-06-16

## 2017-06-16 VITALS
SYSTOLIC BLOOD PRESSURE: 118 MMHG | BODY MASS INDEX: 34.47 KG/M2 | DIASTOLIC BLOOD PRESSURE: 70 MMHG | HEIGHT: 71 IN | WEIGHT: 246.2 LBS | OXYGEN SATURATION: 98 % | HEART RATE: 54 BPM

## 2017-06-16 DIAGNOSIS — E55.9 VITAMIN D DEFICIENCY: Chronic | ICD-10-CM

## 2017-06-16 DIAGNOSIS — E78.2 MIXED HYPERLIPIDEMIA: Chronic | ICD-10-CM

## 2017-06-16 DIAGNOSIS — I10 ESSENTIAL HYPERTENSION: Primary | Chronic | ICD-10-CM

## 2017-06-16 PROCEDURE — G0438 PPPS, INITIAL VISIT: HCPCS | Performed by: GENERAL PRACTICE

## 2017-06-16 PROCEDURE — 99213 OFFICE O/P EST LOW 20 MIN: CPT | Performed by: GENERAL PRACTICE

## 2017-06-16 NOTE — TELEPHONE ENCOUNTER
MARIAM CINTRON SAID HE WAS SEEN THIS MORNING BUT DR GRAHAM DID NOT TELL HIM ANYTHING ABOUT HIS XRYS ON THE PNEUMONIA..PLEASE CALL AND LEAVE MESSAGE  [phone] ANSWERING MACHINE

## 2017-06-16 NOTE — PROGRESS NOTES
QUICK REFERENCE INFORMATION:  The ABCs of the Annual Wellness Visit    Initial Medicare Wellness Visit    HEALTH RISK ASSESSMENT    1945    Recent Hospitalizations:  Recently treated at the following:  [facility].    Current Medical Providers:  Patient Care Team:  Annie Penny MD as PCP - General    Smoking Status:  History   Smoking Status   • Never Smoker   Smokeless Tobacco   • Never Used     Alcohol Consumption:  History   Alcohol Use   • Yes     Comment: 1-13 per month     Depression Screen:   PHQ-9 Depression Screening 6/16/2017   Little interest or pleasure in doing things 0   Feeling down, depressed, or hopeless 0   Trouble falling or staying asleep, or sleeping too much 0   Feeling tired or having little energy 0   Poor appetite or overeating 0   Feeling bad about yourself - or that you are a failure or have let yourself or your family down 0   Trouble concentrating on things, such as reading the newspaper or watching television 0   Moving or speaking so slowly that other people could have noticed. Or the opposite - being so fidgety or restless that you have been moving around a lot more than usual 0   Thoughts that you would be better off dead, or of hurting yourself in some way 0   PHQ-9 Total Score 0   If you checked off any problems, how difficult have these problems made it for you to do your work, take care of things at home, or get along with other people? -       Health Habits and Functional and Cognitive Screening:  Functional & Cognitive Status 6/16/2017   Do you have difficulty preparing food and eating? No   Do you have difficulty bathing yourself? No   Do you have difficulty getting dressed? No   Do you have difficulty using the toilet? No   Do you have difficulty moving around from place to place? No   In the past year have you fallen or experienced a near fall? No   Do you need help using the phone?  No   Are you deaf or do you have serious difficulty hearing?  Yes    Do you need help with transportation? No   Do you need help shopping? No   Do you need help preparing meals?  No   Do you need help with housework?  No   Do you need help with laundry? No   Do you need help taking your medications? No   Do you need help managing money? No   Do you have difficulty concentrating, remembering or making decisions? No       Health Habits  Current Diet: Well Balanced Diet  Dental Exam: Up to date  Eye Exam: Up to date  Exercise (times per week): 0 times per week  Current Exercise Activities Include: None    Does the patient have evidence of cognitive impairment? No    Asiprin use counseling: Does not need ASA (and currently is not on it)    Recent Lab Results:    Visual Acuity:  No exam data present    Age-appropriate Screening Schedule:  Refer to the list below for future screening recommendations based on patient's age, sex and/or medical conditions. Orders for these recommended tests are listed in the plan section. The patient has been provided with a written plan.    Health Maintenance   Topic Date Due   • INFLUENZA VACCINE  08/01/2017   • LIPID PANEL  06/13/2018   • COLONOSCOPY  05/18/2019   • TDAP/TD VACCINES (2 - Td) 05/16/2027   • PNEUMOCOCCAL VACCINES (65+ LOW/MEDIUM RISK)  Completed   • ZOSTER VACCINE  Completed        Subjective   History of Present Illness    Jerald Bonilla is a 71 y.o. male who presents for an Annual Wellness Visit.    The following portions of the patient's history were reviewed and updated as appropriate: allergies, current medications, past family history, past medical history, past social history, past surgical history and problem list.    Outpatient Medications Prior to Visit   Medication Sig Dispense Refill   • albuterol (PROVENTIL) (2.5 MG/3ML) 0.083% nebulizer solution Take 2.5 mg by nebulization Every 4 (Four) Hours As Needed for Wheezing. 360 mL 12   • B Complex Vitamins (VITAMIN B COMPLEX PO) Take 1 tablet by mouth Daily.     • carvedilol  (COREG) 25 MG tablet Take 2 tablets by mouth 2 (Two) Times a Day With Meals. 120 tablet 1   • cholecalciferol (VITAMIN D3) 1000 UNITS tablet Take 2,000 Units by mouth Daily.     • clindamycin (CLINDAGEL) 1 % gel Apply  topically 2 (Two) Times a Day.     • pravastatin (PRAVACHOL) 80 MG tablet Take 1 tablet by mouth Daily. 90 tablet 3   • valsartan-hydrochlorothiazide (DIOVAN-HCT) 320-25 MG per tablet Take 1 tablet by mouth Daily.     • Zinc 50 MG tablet Take 1 tablet by mouth Every Night.     • allopurinol (ZYLOPRIM) 100 MG tablet Take 1 tablet by mouth Daily. 90 tablet 3   • NIFEdipine XL (PROCARDIA XL) 60 MG 24 hr tablet Take 120 mg by mouth Every Night.     • azithromycin (ZITHROMAX) 500 MG tablet Take 1 tablet by mouth Daily. 3 tablet 0   • carvedilol (COREG) 25 MG tablet Take 1 tablet by mouth 2 (Two) Times a Day With Meals. (Patient taking differently: Take 50 mg by mouth 2 (Two) Times a Day With Meals.) 180 tablet 3   • cefdinir (OMNICEF) 300 MG capsule Take 1 capsule by mouth 2 (Two) Times a Day. 20 capsule 0   • fluticasone (FLONASE) 50 MCG/ACT nasal spray 2 sprays into each nostril Daily As Needed for allergies. 1 each 11   • predniSONE (DELTASONE) 20 MG tablet Take 2 tabs PO daily x2 days; take 1 tab PO daily x2 days; take 1/2 (half) tab PO daily x2 days, then STOP. 7 tablet 0     No facility-administered medications prior to visit.        Patient Active Problem List   Diagnosis   • Hypertension   • Hypertension   • Rheumatoid arthritis   • Gout   • Left hip pain   • Primary osteoarthritis of left hip   • Presence of left hip implant   • Pneumonia of right upper lobe due to infectious organism       Advance Care Planning:  has NO advance directive - not interested in additional information    Identification of Risk Factors:  Risk factors include: weight , unhealthy diet, cardiovascular risk and hearing limitations.    Review of Systems   Constitutional: Negative.  Negative for activity change, appetite  change, chills, fatigue, fever and unexpected weight change.   HENT: Negative.  Negative for congestion, ear pain, hearing loss, nosebleeds, rhinorrhea, sinus pressure, sneezing, sore throat, tinnitus and trouble swallowing.    Eyes: Negative.  Negative for pain, discharge, redness, itching and visual disturbance.   Respiratory: Negative.  Negative for apnea, cough, chest tightness, shortness of breath and wheezing.    Cardiovascular: Negative.  Negative for chest pain, palpitations and leg swelling.   Gastrointestinal: Negative.  Negative for abdominal distention, abdominal pain, constipation, diarrhea, nausea and vomiting.   Endocrine: Negative.    Genitourinary: Negative.  Negative for dysuria, frequency and urgency.   Musculoskeletal: Negative.  Negative for arthralgias, back pain, gait problem, joint swelling, myalgias, neck pain and neck stiffness.   Skin: Negative.  Negative for color change and rash.   Allergic/Immunologic: Negative.    Neurological: Negative.  Negative for dizziness, weakness, light-headedness, numbness and headaches.   Hematological: Negative.  Negative for adenopathy.   Psychiatric/Behavioral: Negative.  Negative for dysphoric mood and sleep disturbance. The patient is not nervous/anxious.        Compared to one year ago, the patient feels his physical health is better.  Compared to one year ago, the patient feels his mental health is the same.    Objective     Physical Exam   Constitutional: He is oriented to person, place, and time. He appears well-developed and well-nourished. No distress.   HENT:   Head: Normocephalic.   Nose: Nose normal.   Mouth/Throat: Oropharynx is clear and moist.   Eyes: Conjunctivae and EOM are normal. Pupils are equal, round, and reactive to light. Right eye exhibits no discharge. Left eye exhibits no discharge.   Neck: No thyromegaly present.   Cardiovascular: Normal rate, regular rhythm, normal heart sounds and intact distal pulses.    No murmur  "heard.  Pulmonary/Chest: Effort normal and breath sounds normal.   Musculoskeletal: He exhibits no edema.   Lymphadenopathy:     He has no cervical adenopathy.   Neurological: He is alert and oriented to person, place, and time.   Skin: Skin is warm and dry.   Psychiatric: He has a normal mood and affect.   Nursing note and vitals reviewed.      Vitals:    06/16/17 1125   BP: 118/70   Pulse: 54   SpO2: 98%   Weight: 246 lb 3.2 oz (112 kg)   Height: 70.5\" (179.1 cm)   PainSc: 0-No pain       Body mass index is 34.83 kg/(m^2).  Discussed the patient's BMI with him. The BMI is above average; BMI management plan is completed.    Assessment/Plan   Patient Self-Management and Personalized Health Advice  The patient has been provided with information about: diet, exercise, weight management, prevention of cardiac or vascular disease and fall prevention and preventive services including:   · Exercise counseling provided, Fall Risk assessment done.    Visit Diagnoses:    ICD-10-CM ICD-9-CM   1. Essential hypertension I10 401.9   2. Mixed hyperlipidemia E78.2 272.2   3. Vitamin D deficiency E55.9 268.9       No orders of the defined types were placed in this encounter.      Outpatient Encounter Prescriptions as of 6/16/2017   Medication Sig Dispense Refill   • albuterol (PROVENTIL) (2.5 MG/3ML) 0.083% nebulizer solution Take 2.5 mg by nebulization Every 4 (Four) Hours As Needed for Wheezing. 360 mL 12   • B Complex Vitamins (VITAMIN B COMPLEX PO) Take 1 tablet by mouth Daily.     • carvedilol (COREG) 25 MG tablet Take 2 tablets by mouth 2 (Two) Times a Day With Meals. 120 tablet 1   • cholecalciferol (VITAMIN D3) 1000 UNITS tablet Take 2,000 Units by mouth Daily.     • clindamycin (CLINDAGEL) 1 % gel Apply  topically 2 (Two) Times a Day.     • pravastatin (PRAVACHOL) 80 MG tablet Take 1 tablet by mouth Daily. 90 tablet 3   • valsartan-hydrochlorothiazide (DIOVAN-HCT) 320-25 MG per tablet Take 1 tablet by mouth Daily.     • " Zinc 50 MG tablet Take 1 tablet by mouth Every Night.     • allopurinol (ZYLOPRIM) 100 MG tablet Take 1 tablet by mouth Daily. 90 tablet 3   • NIFEdipine XL (PROCARDIA XL) 60 MG 24 hr tablet Take 120 mg by mouth Every Night.     • [DISCONTINUED] azithromycin (ZITHROMAX) 500 MG tablet Take 1 tablet by mouth Daily. 3 tablet 0   • [DISCONTINUED] carvedilol (COREG) 25 MG tablet Take 1 tablet by mouth 2 (Two) Times a Day With Meals. (Patient taking differently: Take 50 mg by mouth 2 (Two) Times a Day With Meals.) 180 tablet 3   • [DISCONTINUED] cefdinir (OMNICEF) 300 MG capsule Take 1 capsule by mouth 2 (Two) Times a Day. 20 capsule 0   • [DISCONTINUED] fluticasone (FLONASE) 50 MCG/ACT nasal spray 2 sprays into each nostril Daily As Needed for allergies. 1 each 11   • [DISCONTINUED] predniSONE (DELTASONE) 20 MG tablet Take 2 tabs PO daily x2 days; take 1 tab PO daily x2 days; take 1/2 (half) tab PO daily x2 days, then STOP. 7 tablet 0     No facility-administered encounter medications on file as of 6/16/2017.        Reviewed use of high risk medication in the elderly: not applicable  Reviewed for potential of harmful drug interactions in the elderly: not applicable    Follow Up:  Return in about 6 months (around 12/16/2017) for Recheck.     An After Visit Summary and PPPS with all of these plans were given to the patient.   Information has been scanned into chart.  Discussed importance of taking medications as prescribed. Encouraged healthy eating habits with low fat, low salt choices and working towards maintaining a healthy weight. Recommended regular exercise if able as well as care to prevent falls.

## 2017-06-16 NOTE — PATIENT INSTRUCTIONS
Medicare Wellness  Personal Prevention Plan of Service     Date of Office Visit:  2017  Encounter Provider:  Annie Penny MD  Place of Service:  [facility]  Patient Name: Jerald Bonilla  :  1945    As part of the Medicare Wellness portion of your visit today, we are providing you with this personalized preventive plan of services (PPPS). This plan is based upon recommendations of the [place] Preventive Services Task Force (USPSTF) and the Advisory Committee on Immunization Practices (ACIP).    This lists the preventive care services that should be considered, and provides dates of when you are due. Items listed as completed are up-to-date and do not require any further intervention.    Health Maintenance   Topic Date Due   • INFLUENZA VACCINE  2017   • LIPID PANEL  2018   • MEDICARE ANNUAL WELLNESS  2018   • COLONOSCOPY  2019   • TDAP/TD VACCINES (2 - Td) 2027   • HEPATITIS C SCREENING  Completed   • PNEUMOCOCCAL VACCINES (65+ LOW/MEDIUM RISK)  Completed   • ZOSTER VACCINE  Completed       No orders of the defined types were placed in this encounter.      Return in about 6 months (around 2017) for Recheck.

## 2017-06-16 NOTE — PROGRESS NOTES
Subjective   Jerald Bonilla is a 71 y.o. male.     Chief Complaint   Patient presents with   • Annual Exam     medicare wellness   • Hypertension   • Med Refill     For review and evaluation of management of chronic medical problems. Labs reviewed.   Hypertension   This is a chronic problem. The current episode started more than 1 year ago. The problem is unchanged. The problem is controlled. Pertinent negatives include no chest pain, headaches, neck pain, palpitations or shortness of breath. There are no associated agents to hypertension. Past treatments include beta blockers, angiotensin blockers, diuretics and calcium channel blockers. The current treatment provides significant improvement. There are no compliance problems.    Hyperlipidemia   This is a chronic problem. The current episode started more than 1 year ago. The problem is controlled. Recent lipid tests were reviewed and are normal. There are no known factors aggravating his hyperlipidemia. Pertinent negatives include no chest pain, myalgias or shortness of breath. Current antihyperlipidemic treatment includes statins. The current treatment provides significant improvement of lipids. There are no compliance problems.       The following portions of the patient's history were reviewed and updated as appropriate: allergies, current medications, past family and social history and problem list.      Current Outpatient Prescriptions:   •  albuterol (PROVENTIL) (2.5 MG/3ML) 0.083% nebulizer solution, Take 2.5 mg by nebulization Every 4 (Four) Hours As Needed for Wheezing., Disp: 360 mL, Rfl: 12  •  B Complex Vitamins (VITAMIN B COMPLEX PO), Take 1 tablet by mouth Daily., Disp: , Rfl:   •  carvedilol (COREG) 25 MG tablet, Take 2 tablets by mouth 2 (Two) Times a Day With Meals., Disp: 120 tablet, Rfl: 1  •  cholecalciferol (VITAMIN D3) 1000 UNITS tablet, Take 2,000 Units by mouth Daily., Disp: , Rfl:   •  clindamycin (CLINDAGEL) 1 % gel, Apply  topically  "2 (Two) Times a Day., Disp: , Rfl:   •  pravastatin (PRAVACHOL) 80 MG tablet, Take 1 tablet by mouth Daily., Disp: 90 tablet, Rfl: 3  •  valsartan-hydrochlorothiazide (DIOVAN-HCT) 320-25 MG per tablet, Take 1 tablet by mouth Daily., Disp: , Rfl:   •  Zinc 50 MG tablet, Take 1 tablet by mouth Every Night., Disp: , Rfl:   •  allopurinol (ZYLOPRIM) 100 MG tablet, Take 1 tablet by mouth Daily., Disp: 90 tablet, Rfl: 3  •  NIFEdipine XL (PROCARDIA XL) 60 MG 24 hr tablet, Take 120 mg by mouth Every Night., Disp: , Rfl:     Review of Systems   Constitutional: Negative.  Negative for activity change, appetite change, chills, fatigue, fever and unexpected weight change.   HENT: Negative.  Negative for congestion, ear pain, hearing loss, nosebleeds, rhinorrhea, sinus pressure, sneezing, sore throat, tinnitus and trouble swallowing.    Eyes: Negative.  Negative for pain, discharge, redness, itching and visual disturbance.   Respiratory: Negative.  Negative for apnea, cough, chest tightness, shortness of breath and wheezing.    Cardiovascular: Negative.  Negative for chest pain, palpitations and leg swelling.   Gastrointestinal: Negative.  Negative for abdominal distention, abdominal pain, constipation, diarrhea, nausea and vomiting.   Endocrine: Negative.    Genitourinary: Negative.  Negative for dysuria, frequency and urgency.   Musculoskeletal: Negative.  Negative for arthralgias, back pain, gait problem, joint swelling, myalgias, neck pain and neck stiffness.   Skin: Negative.  Negative for color change and rash.   Allergic/Immunologic: Negative.    Neurological: Negative.  Negative for dizziness, weakness, light-headedness, numbness and headaches.   Hematological: Negative.  Negative for adenopathy.   Psychiatric/Behavioral: Negative.  Negative for dysphoric mood and sleep disturbance. The patient is not nervous/anxious.      Objective     Visit Vitals   • /70   • Pulse 54   • Ht 70.5\" (179.1 cm)   • Wt 246 lb 3.2 oz " (112 kg)   • SpO2 98%   • BMI 34.83 kg/m2     Physical Exam   Constitutional: He is oriented to person, place, and time. He appears well-developed and well-nourished. No distress.   HENT:   Head: Normocephalic and atraumatic.   Nose: Nose normal.   Mouth/Throat: Oropharynx is clear and moist.   Eyes: Conjunctivae and EOM are normal. Pupils are equal, round, and reactive to light. Right eye exhibits no discharge. Left eye exhibits no discharge.   Neck: Normal range of motion. No thyromegaly present.   Cardiovascular: Normal rate, regular rhythm, normal heart sounds and intact distal pulses.    No murmur heard.  Pulmonary/Chest: Effort normal and breath sounds normal. No respiratory distress. He has no wheezes. He has no rales. He exhibits no tenderness.   Abdominal: Soft. Bowel sounds are normal. He exhibits no distension and no mass. There is no tenderness. No hernia.   Musculoskeletal: Normal range of motion. He exhibits no deformity.   Lymphadenopathy:     He has no cervical adenopathy.   Neurological: He is alert and oriented to person, place, and time. He has normal reflexes.   Skin: Skin is warm and dry. No rash noted. No pallor.   Psychiatric: He has a normal mood and affect. His behavior is normal. Judgment and thought content normal.     Assessment/Plan   Problem List Items Addressed This Visit        Cardiovascular and Mediastinum    Hypertension - Primary      Other Visit Diagnoses     Mixed hyperlipidemia  (Chronic)       Vitamin D deficiency  (Chronic)           Continue current treatment.     No orders of the defined types were placed in this encounter.    Return in about 6 months (around 12/16/2017) for Recheck.

## 2017-06-16 NOTE — TELEPHONE ENCOUNTER
His phone number is [phone]  Left [facility] for pt to call me back.    Answering Machine does not indicate who I have called    The last CXR in May was Negative.

## 2017-06-19 ENCOUNTER — TELEPHONE (OUTPATIENT)
Dept: FAMILY MEDICINE CLINIC | Facility: CLINIC | Age: 72
End: 2017-06-19

## 2017-07-03 RX ORDER — LORATADINE 10 MG/1
TABLET ORAL
Qty: 90 TABLET | Refills: 4 | Status: SHIPPED | OUTPATIENT
Start: 2017-07-03 | End: 2018-03-27

## 2017-07-19 ENCOUNTER — OFFICE VISIT (OUTPATIENT)
Dept: SLEEP MEDICINE | Facility: HOSPITAL | Age: 72
End: 2017-07-19

## 2017-07-19 VITALS
OXYGEN SATURATION: 98 % | HEIGHT: 71 IN | DIASTOLIC BLOOD PRESSURE: 60 MMHG | BODY MASS INDEX: 34.44 KG/M2 | SYSTOLIC BLOOD PRESSURE: 120 MMHG | WEIGHT: 246 LBS | HEART RATE: 61 BPM

## 2017-07-19 DIAGNOSIS — G47.33 OBSTRUCTIVE SLEEP APNEA, ADULT: Primary | ICD-10-CM

## 2017-07-19 PROBLEM — Z99.89 OSA ON CPAP: Status: ACTIVE | Noted: 2017-07-19

## 2017-07-19 PROCEDURE — 99213 OFFICE O/P EST LOW 20 MIN: CPT | Performed by: INTERNAL MEDICINE

## 2017-07-19 NOTE — PROGRESS NOTES
Sleep Clinic Follow Up    Date: 7/19/2017  Primary Care Physician: Annie Penny MD    CHIEF COMPLAINT: Follow up CHRIS    Interim History:  Since the last visit on 07/15/2016, patient has remained compliant with CPAP. His newest machine hummed,so he switched back to old machine    PAP Data:  Time frame: 05/11/2017 -07/18/2017   Compliance 100 %  PAP range : ? cm H2O  Average 90% pressure: ? cmH2O  Leak: ? hours  Average AHI ? events/hr  Mask type: nasal pillows  DME: [facility]    Bed time: 8610-2600  Sleep latency: 10 minutes  Number of times awakens during the night: none  Wake time: 0600  Estimated total sleep time at night: 6-8 hours  Caffeine intake:  2 cups of coffee, 1 soda or tea every 3 days  Alcohol intake: <  1 per day  Nap time: none  Driving: rare sleepiness  Mask and machine issues: none    He denies abnormal dreams, sleep paralysis, hypnagogic hallucinations, cataplexy symptoms, no RLS    PMHx, FH, SH reviewed and pertinent changes are none    REVIEW OF SYSTEMS:   Negative for chest pain, fever, chills, SOA, abdominal pain.      Exam:  Vitals:    07/19/17 1012   BP: 120/60   Pulse: 61   SpO2: 98%     Body mass index is 34.8 kg/(m^2).    Gen:  No distress, appears stated age, alert, oriented to person, place, and time  Heent:   NC/AT, PERRLA, EOMI, anicteric sclera, external ears normal, OP clear, Mallamati 4, nares patent, (+) torah  NECK:  Supple, midline trachea, no palpable goiter  LUNGS: Clear breath sounds bilaterally, nonlabored breathing  CV:  Normal S1/S2  ABD:  Non tender, non distended, bowel sounds not appreciated  EXT:  No cyanosis or clubbing    Past Medical History:   Diagnosis Date   • Acne conglobata    • Acute maxillary sinusitis    • Benign prostate hyperplasia    • Blurring of visual image    • Carbuncle     of infected sebaceous cyst on back   • Dizziness and giddiness    • Erectile dysfunction    • Essential hypertension     seeing nephro   • Gastroesophageal reflux disease     • Gout    • Hip pain    • Hyperlipidemia    • Hypertension    • Hypertensive disorder    • Low back pain    • Malaise and fatigue    • Need for prophylactic vaccination against Streptococcus pneumoniae (pneumococcus)    • Nuclear senile cataract    • Osteoarthritis    • Otalgia    • Otitis externa    • Pain in lower limb    • Rheumatoid arthritis    • Skin lesion     right calf   • Swelling of knee joint    • Tinnitus    • Upper respiratory infection        Current Outpatient Prescriptions:   •  albuterol (PROVENTIL) (2.5 MG/3ML) 0.083% nebulizer solution, Take 2.5 mg by nebulization Every 4 (Four) Hours As Needed for Wheezing., Disp: 360 mL, Rfl: 12  •  allopurinol (ZYLOPRIM) 100 MG tablet, Take 1 tablet by mouth Daily., Disp: 90 tablet, Rfl: 3  •  B Complex Vitamins (VITAMIN B COMPLEX PO), Take 1 tablet by mouth Daily., Disp: , Rfl:   •  carvedilol (COREG) 25 MG tablet, Take 2 tablets by mouth 2 (Two) Times a Day With Meals., Disp: 120 tablet, Rfl: 1  •  cholecalciferol (VITAMIN D3) 1000 UNITS tablet, Take 2,000 Units by mouth Daily., Disp: , Rfl:   •  clindamycin (CLINDAGEL) 1 % gel, Apply  topically 2 (Two) Times a Day., Disp: , Rfl:   •  loratadine (CLARITIN) 10 MG tablet, TAKE ONE TABLET BY MOUTH ONCE DAILY, Disp: 90 tablet, Rfl: 4  •  NIFEdipine XL (PROCARDIA XL) 60 MG 24 hr tablet, Take 120 mg by mouth Every Night., Disp: , Rfl:   •  pravastatin (PRAVACHOL) 80 MG tablet, Take 1 tablet by mouth Daily., Disp: 90 tablet, Rfl: 3  •  valsartan-hydrochlorothiazide (DIOVAN-HCT) 320-25 MG per tablet, Take 1 tablet by mouth Daily., Disp: , Rfl:   •  Zinc 50 MG tablet, Take 1 tablet by mouth Every Night., Disp: , Rfl:     ASSESSMENT:  1. Obstructive sleep apnea (PSG on 08/29/2007, AHI of 34, on CPAP of 11 cm H2O), supine AHI was 100    PLAN:  1. Continue CPAP as prescribed. RTC in 1 year with compliance check unless sx        Santo Nunes MD        CC: Annie Penny MD          No ref. provider found

## 2017-10-27 RX ORDER — CARVEDILOL 25 MG/1
TABLET ORAL
Qty: 120 TABLET | Refills: 1 | Status: SHIPPED | OUTPATIENT
Start: 2017-10-27 | End: 2019-05-02 | Stop reason: SDUPTHER

## 2017-12-04 DIAGNOSIS — Z96.642 PRESENCE OF LEFT HIP IMPLANT: Primary | ICD-10-CM

## 2017-12-05 ENCOUNTER — OFFICE VISIT (OUTPATIENT)
Dept: ORTHOPEDIC SURGERY | Facility: CLINIC | Age: 72
End: 2017-12-05

## 2017-12-05 VITALS — WEIGHT: 248 LBS | BODY MASS INDEX: 34.72 KG/M2 | HEIGHT: 71 IN

## 2017-12-05 DIAGNOSIS — Z96.642 PRESENCE OF LEFT HIP IMPLANT: Primary | ICD-10-CM

## 2017-12-05 DIAGNOSIS — M25.552 LEFT HIP PAIN: ICD-10-CM

## 2017-12-05 PROCEDURE — 99213 OFFICE O/P EST LOW 20 MIN: CPT | Performed by: ORTHOPAEDIC SURGERY

## 2017-12-05 RX ORDER — PREDNISOLONE ACETATE 10 MG/ML
SUSPENSION/ DROPS OPHTHALMIC
COMMUNITY
Start: 2017-11-20 | End: 2018-06-18

## 2017-12-05 NOTE — PROGRESS NOTES
Jerald Bonilla is a 71 y.o. male returns for     Chief Complaint   Patient presents with   • Left Hip - Follow-up   • Results     xrays done today       HISTORY OF PRESENT ILLNESS: Patient is one year S/P left total hip. He is doing well with no problems or complications.        CONCURRENT MEDICAL HISTORY:    Past Medical History:   Diagnosis Date   • Acne conglobata    • Acute maxillary sinusitis    • Benign prostate hyperplasia    • Blurring of visual image    • Carbuncle     of infected sebaceous cyst on back   • Dizziness and giddiness    • Erectile dysfunction    • Essential hypertension     seeing nephro   • Gastroesophageal reflux disease    • Gout    • Hip pain    • Hyperlipidemia    • Hypertension    • Hypertensive disorder    • Low back pain    • Malaise and fatigue    • Need for prophylactic vaccination against Streptococcus pneumoniae (pneumococcus)    • Nuclear senile cataract    • Osteoarthritis    • Otalgia    • Otitis externa    • Pain in lower limb    • Rheumatoid arthritis    • Skin lesion     right calf   • Swelling of knee joint    • Tinnitus    • Upper respiratory infection        Allergies   Allergen Reactions   • Lisinopril      Other reaction(s): cough/itching         Current Outpatient Prescriptions:   •  albuterol (PROVENTIL) (2.5 MG/3ML) 0.083% nebulizer solution, Take 2.5 mg by nebulization Every 4 (Four) Hours As Needed for Wheezing., Disp: 360 mL, Rfl: 12  •  allopurinol (ZYLOPRIM) 100 MG tablet, Take 1 tablet by mouth Daily., Disp: 90 tablet, Rfl: 3  •  B Complex Vitamins (VITAMIN B COMPLEX PO), Take 1 tablet by mouth Daily., Disp: , Rfl:   •  carvedilol (COREG) 25 MG tablet, TAKE TWO TABLETS BY MOUTH TWICE DAILY WITH MEALS, Disp: 120 tablet, Rfl: 1  •  cholecalciferol (VITAMIN D3) 1000 UNITS tablet, Take 2,000 Units by mouth Daily., Disp: , Rfl:   •  NIFEdipine XL (PROCARDIA XL) 60 MG 24 hr tablet, Take 120 mg by mouth Every Night., Disp: , Rfl:   •  pravastatin (PRAVACHOL) 80 MG  "tablet, Take 1 tablet by mouth Daily., Disp: 90 tablet, Rfl: 3  •  valsartan-hydrochlorothiazide (DIOVAN-HCT) 320-25 MG per tablet, Take 1 tablet by mouth Daily., Disp: , Rfl:   •  Zinc 50 MG tablet, Take 1 tablet by mouth Every Night., Disp: , Rfl:   •  clindamycin (CLINDAGEL) 1 % gel, Apply  topically 2 (Two) Times a Day., Disp: , Rfl:   •  loratadine (CLARITIN) 10 MG tablet, TAKE ONE TABLET BY MOUTH ONCE DAILY, Disp: 90 tablet, Rfl: 4  •  prednisoLONE acetate (PRED FORTE) 1 % ophthalmic suspension, , Disp: , Rfl:     Past Surgical History:   Procedure Laterality Date   • COLONOSCOPY  05/18/2009    Colonoscopy, diagnostic (screening) 25942 (1)    diverticuli, roids    • HIP SURGERY Left 12/05/2016   • INJECTION OF MEDICATION  05/01/2015    B12 (3)     • INJECTION OF MEDICATION  10/31/2011    Rocephin (1)      • OTHER SURGICAL HISTORY  04/18/2016    &D, Complicated or Multiple 11141 (3)      • OTHER SURGICAL HISTORY  08/11/2014    Remove Impacted Cerumen 46485 (1)          ROS  No fevers or chills.  No chest pain or shortness of air.  No GI or  disturbances.    PHYSICAL EXAMINATION:       Ht 179.1 cm (70.5\")  Wt 112 kg (248 lb)  BMI 35.08 kg/m2    Physical Exam   Constitutional: He is oriented to person, place, and time. He appears well-developed and well-nourished.   Neurological: He is alert and oriented to person, place, and time.   Psychiatric: He has a normal mood and affect. His behavior is normal. Judgment and thought content normal.       GAIT:     [x]  Normal  []  Antalgic    Assistive device: [x]  None  []  Walker     []  Crutches  []  Cane     []  Wheelchair  []  Stretcher    Left Hip Exam     Tenderness   The patient is experiencing no tenderness.         Range of Motion   Flexion: 110     Muscle Strength   Flexion: 4/5     Tests   EPHRAIM: negative  Fadir:  Negative FADIR test    Other   Erythema: absent  Scars: present  Sensation: normal              Xr Hip With Or Without Pelvis 2 - 3 View " Left    Result Date: 12/5/2017  Narrative: AP pelvis with AP and lateral of the left hip show acceptable position and alignment of the pelvis with no sign of acute bony abnormality.  Slight pelvic obliquity is noted suggesting either mild lumbar degenerative scoliosis or possible slightly lengthened left lower extremity.  The left hip shows a total hip arthroplasty that is in acceptable position and alignment with no sign of implant loosening or failure.  The right hip shows mild arthritic change with good maintenance of joint space and intact femoral head sphericity. 12/05/17 at 5:55 PM by Mitchell Beltrán MD              ASSESSMENT:    Diagnoses and all orders for this visit:    Presence of left hip implant    Left hip pain    Other orders  -     prednisoLONE acetate (PRED FORTE) 1 % ophthalmic suspension;           PLAN    He will continue with activity as tolerated.  Continue strengthening as tolerated.  No restrictions and follow up on an as-needed basis.    Return if symptoms worsen or fail to improve, for recheck.    Mitchell Beltrán MD

## 2017-12-15 ENCOUNTER — OFFICE VISIT (OUTPATIENT)
Dept: FAMILY MEDICINE CLINIC | Facility: CLINIC | Age: 72
End: 2017-12-15

## 2017-12-15 VITALS
HEART RATE: 58 BPM | WEIGHT: 246 LBS | SYSTOLIC BLOOD PRESSURE: 130 MMHG | HEIGHT: 71 IN | DIASTOLIC BLOOD PRESSURE: 70 MMHG | BODY MASS INDEX: 34.44 KG/M2

## 2017-12-15 DIAGNOSIS — E78.2 MIXED HYPERLIPIDEMIA: ICD-10-CM

## 2017-12-15 DIAGNOSIS — E79.0 ELEVATED URIC ACID IN BLOOD: ICD-10-CM

## 2017-12-15 DIAGNOSIS — I10 ESSENTIAL HYPERTENSION: Primary | Chronic | ICD-10-CM

## 2017-12-15 DIAGNOSIS — Z12.5 ENCOUNTER FOR PROSTATE CANCER SCREENING: ICD-10-CM

## 2017-12-15 PROCEDURE — 99212 OFFICE O/P EST SF 10 MIN: CPT | Performed by: GENERAL PRACTICE

## 2017-12-15 RX ORDER — NEOMYCIN SULFATE, POLYMYXIN B SULFATE, AND DEXAMETHASONE 3.5; 10000; 1 MG/G; [USP'U]/G; MG/G
1 OINTMENT OPHTHALMIC
COMMUNITY
End: 2018-06-18

## 2017-12-15 RX ORDER — NIFEDIPINE 60 MG/1
180 TABLET, EXTENDED RELEASE ORAL NIGHTLY
COMMUNITY
End: 2017-12-15 | Stop reason: DRUGHIGH

## 2017-12-15 RX ORDER — FLUTICASONE PROPIONATE 50 MCG
2 SPRAY, SUSPENSION (ML) NASAL DAILY
Qty: 1 BOTTLE | Refills: 5 | Status: SHIPPED | OUTPATIENT
Start: 2017-12-15 | End: 2018-12-18 | Stop reason: SDUPTHER

## 2017-12-15 NOTE — PROGRESS NOTES
Subjective   Jerald Bonilla is a 72 y.o. male.   Chief Complaint   Patient presents with   • Hypertension     For review and evaluation of management of chronic medical problems.   Hypertension   This is a chronic problem. The current episode started more than 1 year ago. The problem is unchanged. The problem is controlled. Pertinent negatives include no chest pain, headaches, neck pain, palpitations or shortness of breath. There are no associated agents to hypertension. Past treatments include beta blockers, angiotensin blockers, diuretics and calcium channel blockers. The current treatment provides significant improvement. There are no compliance problems.       The following portions of the patient's history were reviewed and updated as appropriate: allergies, current medications, past social history and problem list.    Outpatient Medications Prior to Visit   Medication Sig Dispense Refill   • allopurinol (ZYLOPRIM) 100 MG tablet Take 1 tablet by mouth Daily. 90 tablet 3   • B Complex Vitamins (VITAMIN B COMPLEX PO) Take 1 tablet by mouth Daily.     • carvedilol (COREG) 25 MG tablet TAKE TWO TABLETS BY MOUTH TWICE DAILY WITH MEALS 120 tablet 1   • cholecalciferol (VITAMIN D3) 1000 UNITS tablet Take 2,000 Units by mouth Daily.     • clindamycin (CLINDAGEL) 1 % gel Apply  topically 2 (Two) Times a Day.     • loratadine (CLARITIN) 10 MG tablet TAKE ONE TABLET BY MOUTH ONCE DAILY 90 tablet 4   • NIFEdipine XL (PROCARDIA XL) 60 MG 24 hr tablet Take 180 mg by mouth Every Night.     • pravastatin (PRAVACHOL) 80 MG tablet Take 1 tablet by mouth Daily. 90 tablet 3   • prednisoLONE acetate (PRED FORTE) 1 % ophthalmic suspension      • valsartan-hydrochlorothiazide (DIOVAN-HCT) 320-25 MG per tablet Take 1 tablet by mouth Daily.     • albuterol (PROVENTIL) (2.5 MG/3ML) 0.083% nebulizer solution Take 2.5 mg by nebulization Every 4 (Four) Hours As Needed for Wheezing. 360 mL 12   • Zinc 50 MG tablet Take 1 tablet by  "mouth Every Night.       No facility-administered medications prior to visit.        Review of Systems   Constitutional: Negative.  Negative for chills, fatigue, fever and unexpected weight change.   HENT: Negative.  Negative for congestion, ear pain, hearing loss, nosebleeds, rhinorrhea, sneezing, sore throat and tinnitus.    Eyes: Negative.  Negative for discharge.   Respiratory: Negative.  Negative for cough, shortness of breath and wheezing.    Cardiovascular: Negative.  Negative for chest pain and palpitations.   Gastrointestinal: Negative.  Negative for abdominal pain, constipation, diarrhea, nausea and vomiting.   Endocrine: Negative.    Genitourinary: Negative.  Negative for dysuria, frequency and urgency.   Musculoskeletal: Negative.  Negative for arthralgias, back pain, joint swelling, myalgias and neck pain.   Skin: Negative.  Negative for rash.   Allergic/Immunologic: Negative.    Neurological: Negative.  Negative for dizziness, weakness, numbness and headaches.   Hematological: Negative.  Negative for adenopathy.   Psychiatric/Behavioral: Negative.  Negative for dysphoric mood and sleep disturbance. The patient is not nervous/anxious.        Objective   Visit Vitals   • /70   • Pulse 58   • Ht 179.1 cm (70.5\")   • Wt 112 kg (246 lb)   • PF 98 L/min   • BMI 34.8 kg/m2     Physical Exam   Constitutional: He is oriented to person, place, and time. He appears well-developed and well-nourished. No distress.   HENT:   Head: Normocephalic.   Nose: Nose normal.   Mouth/Throat: Oropharynx is clear and moist.   Eyes: Conjunctivae and EOM are normal. Pupils are equal, round, and reactive to light. Right eye exhibits no discharge. Left eye exhibits no discharge.   Neck: No thyromegaly present.   Cardiovascular: Normal rate, regular rhythm, normal heart sounds and intact distal pulses.    No murmur heard.  Pulmonary/Chest: Effort normal and breath sounds normal.   Musculoskeletal: He exhibits no edema. "   Lymphadenopathy:     He has no cervical adenopathy.   Neurological: He is alert and oriented to person, place, and time.   Skin: Skin is warm and dry.   Psychiatric: He has a normal mood and affect.   Nursing note and vitals reviewed.    Assessment/Plan   Problem List Items Addressed This Visit        Cardiovascular and Mediastinum    Hypertension - Primary    Relevant Orders    Comprehensive Metabolic Panel    Lipid Panel    Urinalysis With Microscopic - Urine, Clean Catch      Other Visit Diagnoses     Elevated uric acid in blood        Relevant Orders    Uric acid    Mixed hyperlipidemia        Relevant Orders    Comprehensive Metabolic Panel    Lipid Panel    Urinalysis With Microscopic - Urine, Clean Catch    Encounter for prostate cancer screening        Relevant Orders    PSA Screen          Continue current treatment.     New Medications Ordered This Visit   Medications   • neomycin-polymyxin-dexamethamethasone (POLYDEX) 3.5-13587-8.1 ointment ophthalmic ointment     Sig: Administer 1 application into the left eye Every 2 (Two) Hours.   • sodium chloride (APARNA 128) 2 % ophthalmic solution     Sig: Administer 1 drop into the left eye 3 (Three) Times a Day.   • fluticasone (FLONASE) 50 MCG/ACT nasal spray     Si sprays into each nostril Daily.     Dispense:  1 bottle     Refill:  5     Return in about 6 months (around 6/15/2018) for Annual physical, medicare wellness visit.

## 2018-01-24 RX ORDER — ALLOPURINOL 100 MG/1
TABLET ORAL
Qty: 90 TABLET | Refills: 3 | Status: SHIPPED | OUTPATIENT
Start: 2018-01-24 | End: 2018-05-11 | Stop reason: SDUPTHER

## 2018-01-24 RX ORDER — PRAVASTATIN SODIUM 80 MG/1
TABLET ORAL
Qty: 90 TABLET | Refills: 3 | Status: SHIPPED | OUTPATIENT
Start: 2018-01-24 | End: 2018-05-11 | Stop reason: SDUPTHER

## 2018-01-29 ENCOUNTER — TELEPHONE (OUTPATIENT)
Dept: FAMILY MEDICINE CLINIC | Facility: CLINIC | Age: 73
End: 2018-01-29

## 2018-01-29 RX ORDER — CLINDAMYCIN PHOSPHATE 10 MG/G
GEL TOPICAL
Qty: 1 TUBE | Refills: 1 | Status: SHIPPED | OUTPATIENT
Start: 2018-01-29 | End: 2018-11-05 | Stop reason: ALTCHOICE

## 2018-01-31 ENCOUNTER — TRANSCRIBE ORDERS (OUTPATIENT)
Dept: LAB | Facility: HOSPITAL | Age: 73
End: 2018-01-31

## 2018-01-31 DIAGNOSIS — I10 ESSENTIAL HYPERTENSION: Primary | ICD-10-CM

## 2018-02-01 ENCOUNTER — LAB (OUTPATIENT)
Dept: LAB | Facility: HOSPITAL | Age: 73
End: 2018-02-01

## 2018-02-01 DIAGNOSIS — I10 ESSENTIAL HYPERTENSION: ICD-10-CM

## 2018-02-01 DIAGNOSIS — E79.0 ELEVATED URIC ACID IN BLOOD: ICD-10-CM

## 2018-02-01 DIAGNOSIS — E78.2 MIXED HYPERLIPIDEMIA: ICD-10-CM

## 2018-02-01 DIAGNOSIS — Z12.5 ENCOUNTER FOR PROSTATE CANCER SCREENING: ICD-10-CM

## 2018-02-01 LAB
ALBUMIN SERPL-MCNC: 4.3 G/DL (ref 3.4–4.8)
ALBUMIN/GLOB SERPL: 1.2 G/DL (ref 1.1–1.8)
ALP SERPL-CCNC: 45 U/L (ref 38–126)
ALT SERPL W P-5'-P-CCNC: 40 U/L (ref 21–72)
ANION GAP SERPL CALCULATED.3IONS-SCNC: 12 MMOL/L (ref 5–15)
ARTICHOKE IGE QN: 67 MG/DL (ref 1–129)
AST SERPL-CCNC: 35 U/L (ref 17–59)
BACTERIA UR QL AUTO: ABNORMAL /HPF
BILIRUB SERPL-MCNC: 0.6 MG/DL (ref 0.2–1.3)
BILIRUB UR QL STRIP: NEGATIVE
BUN BLD-MCNC: 25 MG/DL (ref 7–21)
BUN/CREAT SERPL: 16.8 (ref 7–25)
CALCIUM SPEC-SCNC: 9.4 MG/DL (ref 8.4–10.2)
CHLORIDE SERPL-SCNC: 103 MMOL/L (ref 95–110)
CHOLEST SERPL-MCNC: 149 MG/DL (ref 0–199)
CLARITY UR: CLEAR
CO2 SERPL-SCNC: 29 MMOL/L (ref 22–31)
COLOR UR: YELLOW
CREAT BLD-MCNC: 1.49 MG/DL (ref 0.7–1.3)
CREAT UR-MCNC: 104.8 MG/DL
GFR SERPL CREATININE-BSD FRML MDRD: 57 ML/MIN/1.73 (ref 42–98)
GLOBULIN UR ELPH-MCNC: 3.6 GM/DL (ref 2.3–3.5)
GLUCOSE BLD-MCNC: 110 MG/DL (ref 60–100)
GLUCOSE UR STRIP-MCNC: NEGATIVE MG/DL
HDLC SERPL-MCNC: 44 MG/DL (ref 60–200)
HGB UR QL STRIP.AUTO: NEGATIVE
HYALINE CASTS UR QL AUTO: ABNORMAL /LPF
KETONES UR QL STRIP: NEGATIVE
LDLC/HDLC SERPL: 2.06 {RATIO} (ref 0–3.55)
LEUKOCYTE ESTERASE UR QL STRIP.AUTO: NEGATIVE
NITRITE UR QL STRIP: NEGATIVE
PH UR STRIP.AUTO: 6 [PH] (ref 5–9)
PHOSPHATE SERPL-MCNC: 3.4 MG/DL (ref 2.4–4.4)
POTASSIUM BLD-SCNC: 3.8 MMOL/L (ref 3.5–5.1)
PROT SERPL-MCNC: 7.9 G/DL (ref 6.3–8.6)
PROT UR QL STRIP: NEGATIVE
PROT UR-MCNC: 14.5 MG/DL
PROT/CREAT UR: 138.4 MG/G CREA (ref 0–200)
PSA SERPL-MCNC: 1.49 NG/ML (ref 0–4)
RBC # UR: ABNORMAL /HPF
REF LAB TEST METHOD: ABNORMAL
SODIUM BLD-SCNC: 144 MMOL/L (ref 137–145)
SP GR UR STRIP: 1.02 (ref 1–1.03)
SQUAMOUS #/AREA URNS HPF: ABNORMAL /HPF
TRIGL SERPL-MCNC: 72 MG/DL (ref 20–199)
URATE SERPL-MCNC: 8.2 MG/DL (ref 2.5–8.5)
UROBILINOGEN UR QL STRIP: NORMAL
WBC UR QL AUTO: ABNORMAL /HPF

## 2018-02-01 PROCEDURE — 80053 COMPREHEN METABOLIC PANEL: CPT

## 2018-02-01 PROCEDURE — 82570 ASSAY OF URINE CREATININE: CPT

## 2018-02-01 PROCEDURE — 36415 COLL VENOUS BLD VENIPUNCTURE: CPT

## 2018-02-01 PROCEDURE — 84156 ASSAY OF PROTEIN URINE: CPT

## 2018-02-01 PROCEDURE — 84100 ASSAY OF PHOSPHORUS: CPT

## 2018-02-01 PROCEDURE — 84550 ASSAY OF BLOOD/URIC ACID: CPT

## 2018-02-01 PROCEDURE — 80061 LIPID PANEL: CPT

## 2018-02-01 PROCEDURE — 81001 URINALYSIS AUTO W/SCOPE: CPT

## 2018-02-01 PROCEDURE — G0103 PSA SCREENING: HCPCS

## 2018-02-05 ENCOUNTER — TRANSCRIBE ORDERS (OUTPATIENT)
Dept: LAB | Facility: HOSPITAL | Age: 73
End: 2018-02-05

## 2018-02-05 DIAGNOSIS — R94.4 ABNORMAL RESULTS OF KIDNEY FUNCTION STUDIES: Primary | ICD-10-CM

## 2018-02-09 ENCOUNTER — TELEPHONE (OUTPATIENT)
Dept: FAMILY MEDICINE CLINIC | Facility: CLINIC | Age: 73
End: 2018-02-09

## 2018-02-09 NOTE — TELEPHONE ENCOUNTER
Pr Dr. Penny, Mr. Bonilla has been called with his recent lab results & recommendations.  Continue his current medications and follow-up as planned or sooner if any problems.      ----- Message from Annie Penny MD sent at 2/7/2018  3:45 PM CST -----  Call and tell labs look good except creatinine is up a bit, Dr. Keyes discussed this with him I think and will follow it up

## 2018-02-09 NOTE — PROGRESS NOTES
Pr Dr. Penny, Mr. Bonilla has been called with his recent lab results & recommendations.  Continue his current medications and follow-up as planned or sooner if any problems.

## 2018-04-02 ENCOUNTER — LAB (OUTPATIENT)
Dept: LAB | Facility: HOSPITAL | Age: 73
End: 2018-04-02

## 2018-04-02 DIAGNOSIS — R94.4 ABNORMAL RESULTS OF KIDNEY FUNCTION STUDIES: ICD-10-CM

## 2018-04-02 LAB
ALBUMIN SERPL-MCNC: 4.3 G/DL (ref 3.4–4.8)
ANION GAP SERPL CALCULATED.3IONS-SCNC: 16 MMOL/L (ref 5–15)
BUN BLD-MCNC: 32 MG/DL (ref 7–21)
BUN/CREAT SERPL: 22.4 (ref 7–25)
CALCIUM SPEC-SCNC: 9.6 MG/DL (ref 8.4–10.2)
CHLORIDE SERPL-SCNC: 102 MMOL/L (ref 95–110)
CO2 SERPL-SCNC: 27 MMOL/L (ref 22–31)
CREAT BLD-MCNC: 1.43 MG/DL (ref 0.7–1.3)
CREAT UR-MCNC: 111.6 MG/DL
GFR SERPL CREATININE-BSD FRML MDRD: 59 ML/MIN/1.73 (ref 42–98)
GLUCOSE BLD-MCNC: 118 MG/DL (ref 60–100)
PHOSPHATE SERPL-MCNC: 4.3 MG/DL (ref 2.4–4.4)
POTASSIUM BLD-SCNC: 3.9 MMOL/L (ref 3.5–5.1)
PROT UR-MCNC: 12.3 MG/DL
PROT/CREAT UR: 110.2 MG/G CREA (ref 0–200)
SODIUM BLD-SCNC: 145 MMOL/L (ref 137–145)

## 2018-04-02 PROCEDURE — 80069 RENAL FUNCTION PANEL: CPT

## 2018-04-02 PROCEDURE — 82570 ASSAY OF URINE CREATININE: CPT

## 2018-04-02 PROCEDURE — 84156 ASSAY OF PROTEIN URINE: CPT

## 2018-04-02 PROCEDURE — 36415 COLL VENOUS BLD VENIPUNCTURE: CPT

## 2018-05-11 RX ORDER — ALLOPURINOL 100 MG/1
100 TABLET ORAL DAILY
Qty: 90 TABLET | Refills: 3 | Status: SHIPPED | OUTPATIENT
Start: 2018-05-11 | End: 2018-11-29 | Stop reason: SDUPTHER

## 2018-05-11 RX ORDER — PRAVASTATIN SODIUM 80 MG/1
80 TABLET ORAL NIGHTLY
Qty: 90 TABLET | Refills: 3 | Status: SHIPPED | OUTPATIENT
Start: 2018-05-11 | End: 2019-04-01 | Stop reason: SDUPTHER

## 2018-06-13 ENCOUNTER — TELEPHONE (OUTPATIENT)
Dept: FAMILY MEDICINE CLINIC | Facility: CLINIC | Age: 73
End: 2018-06-13

## 2018-06-14 ENCOUNTER — LAB (OUTPATIENT)
Dept: LAB | Facility: HOSPITAL | Age: 73
End: 2018-06-14

## 2018-06-14 ENCOUNTER — TELEPHONE (OUTPATIENT)
Dept: FAMILY MEDICINE CLINIC | Facility: CLINIC | Age: 73
End: 2018-06-14

## 2018-06-14 DIAGNOSIS — I10 ESSENTIAL HYPERTENSION: ICD-10-CM

## 2018-06-14 DIAGNOSIS — I10 ESSENTIAL HYPERTENSION: Primary | ICD-10-CM

## 2018-06-14 LAB
ALBUMIN SERPL-MCNC: 4.4 G/DL (ref 3.4–4.8)
ALBUMIN/GLOB SERPL: 1.3 G/DL (ref 1.1–1.8)
ALP SERPL-CCNC: 41 U/L (ref 38–126)
ALT SERPL W P-5'-P-CCNC: 26 U/L (ref 21–72)
ANION GAP SERPL CALCULATED.3IONS-SCNC: 11 MMOL/L (ref 5–15)
AST SERPL-CCNC: 39 U/L (ref 17–59)
BACTERIA UR QL AUTO: NORMAL /HPF
BASOPHILS # BLD AUTO: 0.04 10*3/MM3 (ref 0–0.2)
BASOPHILS NFR BLD AUTO: 0.9 % (ref 0–2)
BILIRUB SERPL-MCNC: 0.6 MG/DL (ref 0.2–1.3)
BILIRUB UR QL STRIP: NEGATIVE
BUN BLD-MCNC: 25 MG/DL (ref 7–21)
BUN/CREAT SERPL: 18.7 (ref 7–25)
CALCIUM SPEC-SCNC: 8.9 MG/DL (ref 8.4–10.2)
CHLORIDE SERPL-SCNC: 105 MMOL/L (ref 95–110)
CLARITY UR: CLEAR
CO2 SERPL-SCNC: 26 MMOL/L (ref 22–31)
COLOR UR: YELLOW
CREAT BLD-MCNC: 1.34 MG/DL (ref 0.7–1.3)
DEPRECATED RDW RBC AUTO: 43 FL (ref 35.1–43.9)
EOSINOPHIL # BLD AUTO: 0.18 10*3/MM3 (ref 0–0.7)
EOSINOPHIL NFR BLD AUTO: 4.1 % (ref 0–7)
ERYTHROCYTE [DISTWIDTH] IN BLOOD BY AUTOMATED COUNT: 14.1 % (ref 11.5–14.5)
GFR SERPL CREATININE-BSD FRML MDRD: 64 ML/MIN/1.73 (ref 42–98)
GLOBULIN UR ELPH-MCNC: 3.5 GM/DL (ref 2.3–3.5)
GLUCOSE BLD-MCNC: 111 MG/DL (ref 60–100)
GLUCOSE UR STRIP-MCNC: NEGATIVE MG/DL
HCT VFR BLD AUTO: 45.7 % (ref 39–49)
HGB BLD-MCNC: 15.9 G/DL (ref 13.7–17.3)
HGB UR QL STRIP.AUTO: NEGATIVE
HYALINE CASTS UR QL AUTO: NORMAL /LPF
IMM GRANULOCYTES # BLD: 0.01 10*3/MM3 (ref 0–0.02)
IMM GRANULOCYTES NFR BLD: 0.2 % (ref 0–0.5)
KETONES UR QL STRIP: NEGATIVE
LEUKOCYTE ESTERASE UR QL STRIP.AUTO: NEGATIVE
LYMPHOCYTES # BLD AUTO: 1.4 10*3/MM3 (ref 0.6–4.2)
LYMPHOCYTES NFR BLD AUTO: 32.1 % (ref 10–50)
MCH RBC QN AUTO: 29 PG (ref 26.5–34)
MCHC RBC AUTO-ENTMCNC: 34.8 G/DL (ref 31.5–36.3)
MCV RBC AUTO: 83.4 FL (ref 80–98)
MONOCYTES # BLD AUTO: 0.46 10*3/MM3 (ref 0–0.9)
MONOCYTES NFR BLD AUTO: 10.6 % (ref 0–12)
NEUTROPHILS # BLD AUTO: 2.27 10*3/MM3 (ref 2–8.6)
NEUTROPHILS NFR BLD AUTO: 52.1 % (ref 37–80)
NITRITE UR QL STRIP: NEGATIVE
PH UR STRIP.AUTO: 6 [PH] (ref 5–9)
PLATELET # BLD AUTO: 177 10*3/MM3 (ref 150–450)
PMV BLD AUTO: 9.8 FL (ref 8–12)
POTASSIUM BLD-SCNC: 3.7 MMOL/L (ref 3.5–5.1)
PROT SERPL-MCNC: 7.9 G/DL (ref 6.3–8.6)
PROT UR QL STRIP: ABNORMAL
RBC # BLD AUTO: 5.48 10*6/MM3 (ref 4.37–5.74)
RBC # UR: NORMAL /HPF
REF LAB TEST METHOD: NORMAL
SODIUM BLD-SCNC: 142 MMOL/L (ref 137–145)
SP GR UR STRIP: 1.02 (ref 1–1.03)
SQUAMOUS #/AREA URNS HPF: NORMAL /HPF
UROBILINOGEN UR QL STRIP: ABNORMAL
WBC NRBC COR # BLD: 4.36 10*3/MM3 (ref 3.2–9.8)
WBC UR QL AUTO: NORMAL /HPF

## 2018-06-14 PROCEDURE — 80053 COMPREHEN METABOLIC PANEL: CPT

## 2018-06-14 PROCEDURE — 81001 URINALYSIS AUTO W/SCOPE: CPT

## 2018-06-14 PROCEDURE — 36415 COLL VENOUS BLD VENIPUNCTURE: CPT

## 2018-06-14 PROCEDURE — 85025 COMPLETE CBC W/AUTO DIFF WBC: CPT

## 2018-06-18 ENCOUNTER — OFFICE VISIT (OUTPATIENT)
Dept: FAMILY MEDICINE CLINIC | Facility: CLINIC | Age: 73
End: 2018-06-18

## 2018-06-18 VITALS
HEIGHT: 70 IN | WEIGHT: 257.9 LBS | SYSTOLIC BLOOD PRESSURE: 136 MMHG | OXYGEN SATURATION: 95 % | HEART RATE: 57 BPM | BODY MASS INDEX: 36.92 KG/M2 | DIASTOLIC BLOOD PRESSURE: 74 MMHG

## 2018-06-18 DIAGNOSIS — I10 ESSENTIAL HYPERTENSION: Chronic | ICD-10-CM

## 2018-06-18 DIAGNOSIS — J30.1 CHRONIC SEASONAL ALLERGIC RHINITIS DUE TO POLLEN: ICD-10-CM

## 2018-06-18 DIAGNOSIS — Z00.00 MEDICARE ANNUAL WELLNESS VISIT, SUBSEQUENT: Primary | ICD-10-CM

## 2018-06-18 DIAGNOSIS — E78.2 MIXED HYPERLIPIDEMIA: Chronic | ICD-10-CM

## 2018-06-18 DIAGNOSIS — N28.9 RENAL INSUFFICIENCY, MILD: Chronic | ICD-10-CM

## 2018-06-18 PROCEDURE — G0439 PPPS, SUBSEQ VISIT: HCPCS | Performed by: GENERAL PRACTICE

## 2018-06-18 PROCEDURE — 99214 OFFICE O/P EST MOD 30 MIN: CPT | Performed by: GENERAL PRACTICE

## 2018-06-18 RX ORDER — LORATADINE 10 MG/1
10 TABLET ORAL DAILY
Qty: 90 TABLET | Refills: 3 | Status: SHIPPED | OUTPATIENT
Start: 2018-06-18 | End: 2018-10-09 | Stop reason: SDUPTHER

## 2018-06-18 NOTE — PROGRESS NOTES
Subjective   Jerald Bonilla is a 72 y.o. male.     Chief Complaint   Patient presents with   • Annual Exam     medicare wellness labs   • Hypertension     For review and evaluation of management of chronic medical problems. Labs reviewed. Having a little more acid reflux, has a history of esophageal stricture. He has not had any actual regurgitation.  Usually if he takes Tums his symptoms are better.  Hypertension   This is a chronic problem. The current episode started more than 1 year ago. The problem is unchanged. The problem is controlled. Pertinent negatives include no chest pain, headaches, neck pain, palpitations or shortness of breath. There are no associated agents to hypertension. Current antihypertension treatment includes beta blockers, angiotensin blockers, diuretics and calcium channel blockers. The current treatment provides significant improvement. There are no compliance problems.    Hyperlipidemia   This is a chronic problem. The current episode started more than 1 year ago. The problem is controlled. Recent lipid tests were reviewed and are normal. There are no known factors aggravating his hyperlipidemia. Pertinent negatives include no chest pain, myalgias or shortness of breath. Current antihyperlipidemic treatment includes statins. The current treatment provides significant improvement of lipids. There are no compliance problems.       The following portions of the patient's history were reviewed and updated as appropriate: allergies, current medications, past family and social history and problem list.    Outpatient Medications Prior to Visit   Medication Sig Dispense Refill   • allopurinol (ZYLOPRIM) 100 MG tablet Take 1 tablet by mouth Daily. 90 tablet 3   • B Complex Vitamins (VITAMIN B COMPLEX PO) Take 1 tablet by mouth Daily.     • carvedilol (COREG) 25 MG tablet TAKE TWO TABLETS BY MOUTH TWICE DAILY WITH MEALS 120 tablet 1   • cholecalciferol (VITAMIN D3) 1000 UNITS tablet Take  2,000 Units by mouth Daily.     • clindamycin (CLINDAGEL) 1 % gel APPLY   TOPICALLY TO AFFECTED AREA ONCE DAILY AS NEEDED 1 tube 1   • fluticasone (FLONASE) 50 MCG/ACT nasal spray 2 sprays into each nostril Daily. 1 bottle 5   • NIFEdipine XL (PROCARDIA XL) 60 MG 24 hr tablet Take 180 mg by mouth Every Night.     • pravastatin (PRAVACHOL) 80 MG tablet Take 1 tablet by mouth Every Night. 90 tablet 3   • valsartan-hydrochlorothiazide (DIOVAN-HCT) 320-25 MG per tablet Take 1 tablet by mouth Daily.     • neomycin-polymyxin-dexamethamethasone (POLYDEX) 3.5-82856-8.1 ointment ophthalmic ointment Administer 1 application into the left eye Every 2 (Two) Hours.     • prednisoLONE acetate (PRED FORTE) 1 % ophthalmic suspension      • sodium chloride (APARNA 128) 2 % ophthalmic solution Administer 1 drop into the left eye 3 (Three) Times a Day.       No facility-administered medications prior to visit.        Review of Systems   Constitutional: Negative.  Negative for chills, fatigue, fever and unexpected weight change.   HENT: Negative.  Negative for congestion, ear pain, hearing loss, nosebleeds, rhinorrhea, sneezing, sore throat and tinnitus.    Eyes: Negative.  Negative for discharge.   Respiratory: Negative.  Negative for cough, shortness of breath and wheezing.    Cardiovascular: Negative.  Negative for chest pain and palpitations.   Gastrointestinal: Negative.  Negative for abdominal pain, constipation, diarrhea, nausea and vomiting.   Endocrine: Negative.    Genitourinary: Negative.  Negative for dysuria, frequency and urgency.   Musculoskeletal: Negative.  Negative for arthralgias, back pain, joint swelling, myalgias and neck pain.   Skin: Negative.  Negative for rash.   Allergic/Immunologic: Negative.    Neurological: Negative.  Negative for dizziness, weakness, numbness and headaches.   Hematological: Negative.  Negative for adenopathy.   Psychiatric/Behavioral: Negative.  Negative for dysphoric mood and sleep  "disturbance. The patient is not nervous/anxious.      Objective     Visit Vitals  /74   Pulse 57   Ht 177.8 cm (70\")   Wt 117 kg (257 lb 14.4 oz)   SpO2 95%   BMI 37.00 kg/m²     Physical Exam   Constitutional: He is oriented to person, place, and time. He appears well-developed and well-nourished. No distress.   HENT:   Head: Normocephalic and atraumatic.   Nose: Nose normal.   Mouth/Throat: Oropharynx is clear and moist.   Eyes: Conjunctivae and EOM are normal. Pupils are equal, round, and reactive to light. Right eye exhibits no discharge. Left eye exhibits no discharge.   Neck: Normal range of motion. No thyromegaly present.   Cardiovascular: Normal rate, regular rhythm, normal heart sounds and intact distal pulses.    No murmur heard.  Pulmonary/Chest: Effort normal and breath sounds normal. No respiratory distress. He has no wheezes. He has no rales. He exhibits no tenderness.   Abdominal: Soft. Bowel sounds are normal. He exhibits no distension and no mass. There is no tenderness. No hernia.   Musculoskeletal: Normal range of motion. He exhibits no deformity.   Lymphadenopathy:     He has no cervical adenopathy.   Neurological: He is alert and oriented to person, place, and time. He has normal reflexes.   Skin: Skin is warm and dry. No rash noted. No pallor.   Psychiatric: He has a normal mood and affect. His behavior is normal. Judgment and thought content normal.     Results for orders placed or performed in visit on 06/14/18   Comprehensive Metabolic Panel   Result Value Ref Range    Glucose 111 (H) 60 - 100 mg/dL    BUN 25 (H) 7 - 21 mg/dL    Creatinine 1.34 (H) 0.70 - 1.30 mg/dL    Sodium 142 137 - 145 mmol/L    Potassium 3.7 3.5 - 5.1 mmol/L    Chloride 105 95 - 110 mmol/L    CO2 26.0 22.0 - 31.0 mmol/L    Calcium 8.9 8.4 - 10.2 mg/dL    Total Protein 7.9 6.3 - 8.6 g/dL    Albumin 4.40 3.40 - 4.80 g/dL    ALT (SGPT) 26 21 - 72 U/L    AST (SGOT) 39 17 - 59 U/L    Alkaline Phosphatase 41 38 - 126 " U/L    Total Bilirubin 0.6 0.2 - 1.3 mg/dL    eGFR  African Amer 64 42 - 98 mL/min/1.73    Globulin 3.5 2.3 - 3.5 gm/dL    A/G Ratio 1.3 1.1 - 1.8 g/dL    BUN/Creatinine Ratio 18.7 7.0 - 25.0    Anion Gap 11.0 5.0 - 15.0 mmol/L   CBC Auto Differential   Result Value Ref Range    WBC 4.36 3.20 - 9.80 10*3/mm3    RBC 5.48 4.37 - 5.74 10*6/mm3    Hemoglobin 15.9 13.7 - 17.3 g/dL    Hematocrit 45.7 39.0 - 49.0 %    MCV 83.4 80.0 - 98.0 fL    MCH 29.0 26.5 - 34.0 pg    MCHC 34.8 31.5 - 36.3 g/dL    RDW 14.1 11.5 - 14.5 %    RDW-SD 43.0 35.1 - 43.9 fl    MPV 9.8 8.0 - 12.0 fL    Platelets 177 150 - 450 10*3/mm3    Neutrophil % 52.1 37.0 - 80.0 %    Lymphocyte % 32.1 10.0 - 50.0 %    Monocyte % 10.6 0.0 - 12.0 %    Eosinophil % 4.1 0.0 - 7.0 %    Basophil % 0.9 0.0 - 2.0 %    Immature Grans % 0.2 0.0 - 0.5 %    Neutrophils, Absolute 2.27 2.00 - 8.60 10*3/mm3    Lymphocytes, Absolute 1.40 0.60 - 4.20 10*3/mm3    Monocytes, Absolute 0.46 0.00 - 0.90 10*3/mm3    Eosinophils, Absolute 0.18 0.00 - 0.70 10*3/mm3    Basophils, Absolute 0.04 0.00 - 0.20 10*3/mm3    Immature Grans, Absolute 0.01 0.00 - 0.02 10*3/mm3   Urinalysis - Urine, Clean Catch   Result Value Ref Range    Color, UA Yellow Yellow, Straw, Dark Yellow, Dolores    Appearance, UA Clear Clear    pH, UA 6.0 5.0 - 9.0    Specific Gravity, UA 1.017 1.003 - 1.030    Glucose, UA Negative Negative    Ketones, UA Negative Negative    Bilirubin, UA Negative Negative    Blood, UA Negative Negative    Protein, UA Trace (A) Negative    Leuk Esterase, UA Negative Negative    Nitrite, UA Negative Negative    Urobilinogen, UA 1.0 E.U./dL 0.2 - 1.0 E.U./dL   Urinalysis, Microscopic Only - Urine, Clean Catch   Result Value Ref Range    RBC, UA None Seen None Seen /HPF    WBC, UA 0-2 None Seen, 0-2, 3-5 /HPF    Bacteria, UA None Seen None Seen /HPF    Squamous Epithelial Cells, UA 0-2 None Seen, 0-2 /HPF    Hyaline Casts, UA None Seen None Seen /LPF    Methodology Manual Light  Microscopy       Assessment/Plan   Problem List Items Addressed This Visit        Cardiovascular and Mediastinum    Essential hypertension    Mixed hyperlipidemia      Other Visit Diagnoses     Medicare annual wellness visit, subsequent    -  Primary    Chronic seasonal allergic rhinitis due to pollen        Renal insufficiency, mild  (Chronic)            Continue current treatment. Check at pharmacy on Shingrix shingles vaccine     New Medications Ordered This Visit   Medications   • loratadine (CLARITIN) 10 MG tablet     Sig: Take 1 tablet by mouth Daily.     Dispense:  90 tablet     Refill:  3     Return in about 6 months (around 12/18/2018) for Recheck.

## 2018-06-18 NOTE — PROGRESS NOTES
QUICK REFERENCE INFORMATION:  The ABCs of the Annual Wellness Visit    Subsequent Medicare Wellness Visit    HEALTH RISK ASSESSMENT    1945    Recent Hospitalizations:  No hospitalization(s) within the last year..        Current Medical Providers:  Patient Care Team:  Annie Penny MD as PCP - General  Wali Servin MD as PCP - Claims Attributed        Smoking Status:  History   Smoking Status   • Never Smoker   Smokeless Tobacco   • Never Used       Alcohol Consumption:  History   Alcohol Use   • Yes     Comment: 1-13 per month       Depression Screen:   PHQ-2/PHQ-9 Depression Screening 6/18/2018   Little interest or pleasure in doing things 0   Feeling down, depressed, or hopeless 0   Trouble falling or staying asleep, or sleeping too much 0   Feeling tired or having little energy 1   Poor appetite or overeating 0   Feeling bad about yourself - or that you are a failure or have let yourself or your family down 0   Trouble concentrating on things, such as reading the newspaper or watching television 0   Moving or speaking so slowly that other people could have noticed. Or the opposite - being so fidgety or restless that you have been moving around a lot more than usual 0   Thoughts that you would be better off dead, or of hurting yourself in some way 0   Total Score 1   If you checked off any problems, how difficult have these problems made it for you to do your work, take care of things at home, or get along with other people? -       Health Habits and Functional and Cognitive Screening:  Functional & Cognitive Status 6/18/2018   Do you have difficulty preparing food and eating? No   Do you have difficulty bathing yourself, getting dressed or grooming yourself? No   Do you have difficulty using the toilet? No   Do you have difficulty moving around from place to place? No   Do you have trouble with steps or getting out of a bed or a chair? No   In the past year have you fallen or experienced a near  fall? Yes   Current Diet Well Balanced Diet   Dental Exam Up to date   Eye Exam Up to date   Exercise (times per week) 0 times per week   Current Exercise Activities Include None   Do you need help using the phone?  No   Are you deaf or do you have serious difficulty hearing?  Yes   Do you need help with transportation? No   Do you need help shopping? No   Do you need help preparing meals?  No   Do you need help with housework?  No   Do you need help with laundry? No   Do you need help taking your medications? No   Do you need help managing money? No   Do you ever drive or ride in a car without wearing a seat belt? No   Have you felt unusual stress, anger or loneliness in the last month? No   Who do you live with? Spouse   If you need help, do you have trouble finding someone available to you? No   Have you been bothered in the last four weeks by sexual problems? Yes   Do you have difficulty concentrating, remembering or making decisions? No           Does the patient have evidence of cognitive impairment? No    Aspirin use counseling: Taking ASA appropriately as indicated      Recent Lab Results:  CMP:  Lab Results   Component Value Date    BUN 25 (H) 06/14/2018    CREATININE 1.34 (H) 06/14/2018    EGFRIFAFRI 64 06/14/2018    BCR 18.7 06/14/2018     06/14/2018    K 3.7 06/14/2018    CO2 26.0 06/14/2018    CALCIUM 8.9 06/14/2018    ALBUMIN 4.40 06/14/2018    LABIL2 1.3 06/14/2018    BILITOT 0.6 06/14/2018    ALKPHOS 41 06/14/2018    AST 39 06/14/2018    ALT 26 06/14/2018     Lipid Panel:  Lab Results   Component Value Date    CHOL 149 02/01/2018    TRIG 72 02/01/2018    HDL 44 (L) 02/01/2018    LDLHDL 2.06 02/01/2018     HbA1c:       Visual Acuity:  No exam data present    Age-appropriate Screening Schedule:  Refer to the list below for future screening recommendations based on patient's age, sex and/or medical conditions. Orders for these recommended tests are listed in the plan section. The patient has been  provided with a written plan.    Health Maintenance   Topic Date Due   • ZOSTER VACCINE (2 of 3) 02/24/2015   • INFLUENZA VACCINE  08/01/2018   • LIPID PANEL  02/01/2019   • COLONOSCOPY  05/18/2019   • TDAP/TD VACCINES (2 - Td) 05/16/2027   • PNEUMOCOCCAL VACCINES (65+ LOW/MEDIUM RISK)  Completed        Subjective   History of Present Illness    Jerald Bonilla is a 72 y.o. male who presents for an Subsequent Wellness Visit.    The following portions of the patient's history were reviewed and updated as appropriate: allergies, current medications, past family history, past medical history, past social history, past surgical history and problem list.    Outpatient Medications Prior to Visit   Medication Sig Dispense Refill   • allopurinol (ZYLOPRIM) 100 MG tablet Take 1 tablet by mouth Daily. 90 tablet 3   • B Complex Vitamins (VITAMIN B COMPLEX PO) Take 1 tablet by mouth Daily.     • carvedilol (COREG) 25 MG tablet TAKE TWO TABLETS BY MOUTH TWICE DAILY WITH MEALS 120 tablet 1   • cholecalciferol (VITAMIN D3) 1000 UNITS tablet Take 2,000 Units by mouth Daily.     • clindamycin (CLINDAGEL) 1 % gel APPLY   TOPICALLY TO AFFECTED AREA ONCE DAILY AS NEEDED 1 tube 1   • fluticasone (FLONASE) 50 MCG/ACT nasal spray 2 sprays into each nostril Daily. 1 bottle 5   • NIFEdipine XL (PROCARDIA XL) 60 MG 24 hr tablet Take 180 mg by mouth Every Night.     • pravastatin (PRAVACHOL) 80 MG tablet Take 1 tablet by mouth Every Night. 90 tablet 3   • valsartan-hydrochlorothiazide (DIOVAN-HCT) 320-25 MG per tablet Take 1 tablet by mouth Daily.     • neomycin-polymyxin-dexamethamethasone (POLYDEX) 3.5-81838-5.1 ointment ophthalmic ointment Administer 1 application into the left eye Every 2 (Two) Hours.     • prednisoLONE acetate (PRED FORTE) 1 % ophthalmic suspension      • sodium chloride (APARNA 128) 2 % ophthalmic solution Administer 1 drop into the left eye 3 (Three) Times a Day.       No facility-administered medications prior to  "visit.        Patient Active Problem List   Diagnosis   • Hypertension   • Essential hypertension   • Rheumatoid arthritis   • Gout   • Left hip pain   • Presence of left hip implant   • Pneumonia of right upper lobe due to infectious organism   • CHRIS on CPAP   • Mixed hyperlipidemia       Advance Care Planning:  has NO advance directive - information provided to the patient today    Identification of Risk Factors:  Risk factors include: weight , unhealthy diet and increased fall risk.    Review of Systems    Compared to one year ago, the patient feels his physical health is the same.  Compared to one year ago, the patient feels his mental health is the same.    Objective     Physical Exam   Constitutional: He is oriented to person, place, and time. He appears well-developed and well-nourished. No distress.   HENT:   Head: Normocephalic.   Nose: Nose normal.   Mouth/Throat: Oropharynx is clear and moist.   Eyes: Conjunctivae and EOM are normal. Pupils are equal, round, and reactive to light. Right eye exhibits no discharge. Left eye exhibits no discharge.   Neck: No thyromegaly present.   Cardiovascular: Normal rate, regular rhythm, normal heart sounds and intact distal pulses.    No murmur heard.  Pulmonary/Chest: Effort normal and breath sounds normal.   Musculoskeletal: He exhibits no edema.   Lymphadenopathy:     He has no cervical adenopathy.   Neurological: He is alert and oriented to person, place, and time.   Skin: Skin is warm and dry.   Psychiatric: He has a normal mood and affect.   Nursing note and vitals reviewed.      Vitals:    06/18/18 1007   BP: 136/74   Pulse: 57   SpO2: 95%   Weight: 117 kg (257 lb 14.4 oz)   Height: 177.8 cm (70\")   PainSc: 0-No pain       Patient's Body mass index is 37 kg/m². BMI is above normal parameters. Recommendations include: exercise counseling and nutrition counseling.      Assessment/Plan   Patient Self-Management and Personalized Health Advice  The patient has been " provided with information about: diet, exercise, weight management and fall prevention and preventive services including:   · Advance directive, Exercise counseling provided, Fall Risk assessment done, Fall Risk plan of care done, Zostavax vaccine (Herpes Zoster).    Visit Diagnoses:    ICD-10-CM ICD-9-CM   1. Medicare annual wellness visit, subsequent Z00.00 V70.0   2. Essential hypertension I10 401.9   3. Mixed hyperlipidemia E78.2 272.2   4. Chronic seasonal allergic rhinitis due to pollen J30.1 477.0   5. Renal insufficiency, mild N28.9 593.9       No orders of the defined types were placed in this encounter.      Outpatient Encounter Prescriptions as of 6/18/2018   Medication Sig Dispense Refill   • allopurinol (ZYLOPRIM) 100 MG tablet Take 1 tablet by mouth Daily. 90 tablet 3   • B Complex Vitamins (VITAMIN B COMPLEX PO) Take 1 tablet by mouth Daily.     • carvedilol (COREG) 25 MG tablet TAKE TWO TABLETS BY MOUTH TWICE DAILY WITH MEALS 120 tablet 1   • cholecalciferol (VITAMIN D3) 1000 UNITS tablet Take 2,000 Units by mouth Daily.     • clindamycin (CLINDAGEL) 1 % gel APPLY   TOPICALLY TO AFFECTED AREA ONCE DAILY AS NEEDED 1 tube 1   • fluticasone (FLONASE) 50 MCG/ACT nasal spray 2 sprays into each nostril Daily. 1 bottle 5   • NIFEdipine XL (PROCARDIA XL) 60 MG 24 hr tablet Take 180 mg by mouth Every Night.     • pravastatin (PRAVACHOL) 80 MG tablet Take 1 tablet by mouth Every Night. 90 tablet 3   • valsartan-hydrochlorothiazide (DIOVAN-HCT) 320-25 MG per tablet Take 1 tablet by mouth Daily.     • loratadine (CLARITIN) 10 MG tablet Take 1 tablet by mouth Daily. 90 tablet 3   • [DISCONTINUED] neomycin-polymyxin-dexamethamethasone (POLYDEX) 3.5-93979-0.1 ointment ophthalmic ointment Administer 1 application into the left eye Every 2 (Two) Hours.     • [DISCONTINUED] prednisoLONE acetate (PRED FORTE) 1 % ophthalmic suspension      • [DISCONTINUED] sodium chloride (APARNA 128) 2 % ophthalmic solution Administer 1  drop into the left eye 3 (Three) Times a Day.       No facility-administered encounter medications on file as of 6/18/2018.        Reviewed use of high risk medication in the elderly: yes  Reviewed for potential of harmful drug interactions in the elderly: not applicable    Follow Up:  Return in about 6 months (around 12/18/2018) for Recheck.     An After Visit Summary and PPPS with all of these plans were given to the patient.    Information has been scanned into chart.  Discussed importance of taking medications as prescribed. Encouraged healthy eating habits with low fat, low salt choices and working towards maintaining a healthy weight. Recommended regular exercise if able as well as care to prevent falls.

## 2018-06-18 NOTE — PATIENT INSTRUCTIONS
Check at pharmacy on Shingrix shingles vaccine    Medicare Wellness  Personal Prevention Plan of Service     Date of Office Visit:  2018  Encounter Provider:  Annie Penny MD  Place of Service:  [facility]  Patient Name: Jerald Bonilla  :  1945    As part of the Medicare Wellness portion of your visit today, we are providing you with this personalized preventive plan of services (PPPS). This plan is based upon recommendations of the [place] Preventive Services Task Force (USPSTF) and the Advisory Committee on Immunization Practices (ACIP).    This lists the preventive care services that should be considered, and provides dates of when you are due. Items listed as completed are up-to-date and do not require any further intervention.    Health Maintenance   Topic Date Due   • ZOSTER VACCINE (2 of 3) 2015   • INFLUENZA VACCINE  2018   • LIPID PANEL  2019   • COLONOSCOPY  2019   • MEDICARE ANNUAL WELLNESS  2019   • TDAP/TD VACCINES (2 - Td) 2027   • HEPATITIS C SCREENING  Completed   • PNEUMOCOCCAL VACCINES (65+ LOW/MEDIUM RISK)  Completed       No orders of the defined types were placed in this encounter.      Return in about 6 months (around 2018) for Recheck.

## 2018-07-10 RX ORDER — LORATADINE 10 MG/1
TABLET ORAL
Qty: 90 TABLET | Refills: 4 | Status: SHIPPED | OUTPATIENT
Start: 2018-07-10 | End: 2018-10-09 | Stop reason: SDUPTHER

## 2018-07-26 ENCOUNTER — OFFICE VISIT (OUTPATIENT)
Dept: SLEEP MEDICINE | Facility: HOSPITAL | Age: 73
End: 2018-07-26

## 2018-07-26 VITALS
WEIGHT: 255 LBS | DIASTOLIC BLOOD PRESSURE: 74 MMHG | OXYGEN SATURATION: 98 % | HEART RATE: 55 BPM | SYSTOLIC BLOOD PRESSURE: 136 MMHG | HEIGHT: 70 IN | BODY MASS INDEX: 36.51 KG/M2

## 2018-07-26 DIAGNOSIS — G47.33 OBSTRUCTIVE SLEEP APNEA, ADULT: Primary | ICD-10-CM

## 2018-07-26 DIAGNOSIS — G47.33 OSA (OBSTRUCTIVE SLEEP APNEA): ICD-10-CM

## 2018-07-26 PROCEDURE — 99213 OFFICE O/P EST LOW 20 MIN: CPT | Performed by: INTERNAL MEDICINE

## 2018-07-26 NOTE — PROGRESS NOTES
Sleep Clinic Follow Up    Date: 7/26/2018  Primary Care Physician: Annie Penny MD      Interim History (1/3):  Since the last visit on 7/19/2017, patient has:      1)  CHRIS - Has remained compliant with CPAP. He denies mask issues, dry mouth, headaches, pressures intolerance, or non-compliance. He denies abnormal dreams, sleep paralysis, nasal congestion, URI sx. He admits that his machine is malfunctioning and he is having trouble finding parts to keep it running. It has tape and is positional dependent to work properly.    PAP Data:    Time frame: 7/28/2017 - 7/25/2018  Compliance 99.4 %  Average use on days used: 7hrs 6 min  Percent of days with usage greater than or equal to 4 hours: 97.5%  Mask type: Nasal Pillows  DME: [facility]      Bed time: 2300  Sleep latency: < 5 minutes  Number of times awakens during the night: 1  Wake time: 0800  Estimated total sleep time at night: 8 hours  Caffeine intake: 16oz of coffee, 0oz of tea, and 0oz of soda  Alcohol intake: 1-2 drinks per week  Nap time: Occasional nap around 2-3 PM   Sleepiness with Driving: None      [place] - 5    2) Patient denies RLS symptoms.     PMHx, FH, SH reviewed and pertinent changes are: unchanged from last office visit on 7/19/2017      REVIEW OF SYSTEMS:   Negative for chest pain, fever, chills, SOA, abdominal pain. Smoking: Former SMoker      Exam (6-11/12):    Vitals:    07/26/18 1146   BP: 136/74   Pulse: 55   SpO2: 98%           Body mass index is 36.59 kg/m². Patient's Body mass index is 36.59 kg/m². BMI is above normal parameters. Recommendations include: referral to primary care.      Gen:  No distress, conversant, pleasant, appears stated age, alert, oriented  Eyes:   Anicteric sclera, moist conjunctiva, no lid lag     PERRLA, EOMI   Heent:   NC/AT    Oropharynx clear, Mallampati IV    normal hearing  Lungs:  Normal effort, non-labored breathing    Clear to auscultation    CV:  Normal S1/S2, no murmur    no lower extremity  edema  ABD:  Soft, normal bowel sounds    Psych:  Appropriate affect  Neuro:  CN 2-12 intact    Past Medical History:   Diagnosis Date   • Acne conglobata    • Acute maxillary sinusitis    • Benign prostate hyperplasia    • Blurring of visual image    • Carbuncle     of infected sebaceous cyst on back   • Dizziness and giddiness    • Erectile dysfunction    • Essential hypertension     seeing nephro   • Gastroesophageal reflux disease    • Gout    • Hip pain    • Hyperlipidemia    • Hypertension    • Hypertensive disorder    • Low back pain    • Malaise and fatigue    • Need for prophylactic vaccination against Streptococcus pneumoniae (pneumococcus)    • Nuclear senile cataract    • Osteoarthritis    • Otalgia    • Otitis externa    • Pain in lower limb    • Rheumatoid arthritis (CMS/HCC)    • Skin lesion     right calf   • Swelling of knee joint    • Tinnitus    • Upper respiratory infection        Current Outpatient Prescriptions:   •  allopurinol (ZYLOPRIM) 100 MG tablet, Take 1 tablet by mouth Daily., Disp: 90 tablet, Rfl: 3  •  B Complex Vitamins (VITAMIN B COMPLEX PO), Take 1 tablet by mouth Daily., Disp: , Rfl:   •  carvedilol (COREG) 25 MG tablet, TAKE TWO TABLETS BY MOUTH TWICE DAILY WITH MEALS, Disp: 120 tablet, Rfl: 1  •  cholecalciferol (VITAMIN D3) 1000 UNITS tablet, Take 2,000 Units by mouth Daily., Disp: , Rfl:   •  clindamycin (CLINDAGEL) 1 % gel, APPLY   TOPICALLY TO AFFECTED AREA ONCE DAILY AS NEEDED, Disp: 1 tube, Rfl: 1  •  fluticasone (FLONASE) 50 MCG/ACT nasal spray, 2 sprays into each nostril Daily., Disp: 1 bottle, Rfl: 5  •  loratadine (CLARITIN) 10 MG tablet, Take 1 tablet by mouth Daily., Disp: 90 tablet, Rfl: 3  •  loratadine (CLARITIN) 10 MG tablet, TAKE ONE TABLET BY MOUTH ONCE DAILY, Disp: 90 tablet, Rfl: 4  •  NIFEdipine XL (PROCARDIA XL) 60 MG 24 hr tablet, Take 180 mg by mouth Every Night., Disp: , Rfl:   •  pravastatin (PRAVACHOL) 80 MG tablet, Take 1 tablet by mouth Every Night.,  Disp: 90 tablet, Rfl: 3  •  valsartan-hydrochlorothiazide (DIOVAN-HCT) 320-25 MG per tablet, Take 1 tablet by mouth Daily., Disp: , Rfl:       ASSESSMENT / PLAN:     1. Obstructive sleep apnea  1. PSG on 08/29/2007, AHI of 34  2. Currently on 11 cm H2O  3. Continue PAP as prescribed.   4. Script for PAP supplies and new autocpa 10-20 cm H2O  5. Return to clinic in 31 -90 days with compliance check unless sx change in the interim period.    Total time 15 min, more than half spent in face to face counseling and coordination of care.    Patient to follow up in 31-90 days with compliance report     This document has been electronically signed by Jerald Rucker Jr, MD on July 26, 2018         CC: Annie Penny MD          No ref. provider found

## 2018-08-07 ENCOUNTER — TRANSCRIBE ORDERS (OUTPATIENT)
Dept: LAB | Facility: HOSPITAL | Age: 73
End: 2018-08-07

## 2018-08-07 DIAGNOSIS — I10 ESSENTIAL (PRIMARY) HYPERTENSION: Primary | ICD-10-CM

## 2018-08-10 ENCOUNTER — LAB (OUTPATIENT)
Dept: LAB | Facility: HOSPITAL | Age: 73
End: 2018-08-10

## 2018-08-10 DIAGNOSIS — I10 ESSENTIAL (PRIMARY) HYPERTENSION: ICD-10-CM

## 2018-08-10 LAB
ALBUMIN SERPL-MCNC: 4 G/DL (ref 3.4–4.8)
ANION GAP SERPL CALCULATED.3IONS-SCNC: 6 MMOL/L (ref 5–15)
BUN BLD-MCNC: 25 MG/DL (ref 7–21)
BUN/CREAT SERPL: 18.1 (ref 7–25)
CALCIUM SPEC-SCNC: 9.1 MG/DL (ref 8.4–10.2)
CHLORIDE SERPL-SCNC: 106 MMOL/L (ref 95–110)
CO2 SERPL-SCNC: 29 MMOL/L (ref 22–31)
CREAT BLD-MCNC: 1.38 MG/DL (ref 0.7–1.3)
CREAT UR-MCNC: 152 MG/DL
GFR SERPL CREATININE-BSD FRML MDRD: 62 ML/MIN/1.73 (ref 42–98)
GLUCOSE BLD-MCNC: 84 MG/DL (ref 60–100)
PHOSPHATE SERPL-MCNC: 3 MG/DL (ref 2.4–4.4)
POTASSIUM BLD-SCNC: 3.8 MMOL/L (ref 3.5–5.1)
PROT UR-MCNC: 6.9 MG/DL
PROT/CREAT UR: 45.4 MG/G CREA (ref 0–200)
SODIUM BLD-SCNC: 141 MMOL/L (ref 137–145)

## 2018-08-10 PROCEDURE — 82570 ASSAY OF URINE CREATININE: CPT

## 2018-08-10 PROCEDURE — 36415 COLL VENOUS BLD VENIPUNCTURE: CPT

## 2018-08-10 PROCEDURE — 84156 ASSAY OF PROTEIN URINE: CPT

## 2018-08-10 PROCEDURE — 80069 RENAL FUNCTION PANEL: CPT

## 2018-09-11 ENCOUNTER — LAB (OUTPATIENT)
Dept: LAB | Facility: HOSPITAL | Age: 73
End: 2018-09-11

## 2018-09-11 ENCOUNTER — OFFICE VISIT (OUTPATIENT)
Dept: FAMILY MEDICINE CLINIC | Facility: CLINIC | Age: 73
End: 2018-09-11

## 2018-09-11 VITALS
HEIGHT: 70 IN | DIASTOLIC BLOOD PRESSURE: 70 MMHG | HEART RATE: 53 BPM | WEIGHT: 254.1 LBS | OXYGEN SATURATION: 96 % | BODY MASS INDEX: 36.38 KG/M2 | SYSTOLIC BLOOD PRESSURE: 120 MMHG

## 2018-09-11 DIAGNOSIS — M79.671 RIGHT FOOT PAIN: ICD-10-CM

## 2018-09-11 DIAGNOSIS — M10.371 ACUTE GOUT DUE TO RENAL IMPAIRMENT INVOLVING TOE OF RIGHT FOOT: Primary | ICD-10-CM

## 2018-09-11 LAB
ANION GAP SERPL CALCULATED.3IONS-SCNC: 10 MMOL/L (ref 5–15)
BASOPHILS # BLD AUTO: 0.03 10*3/MM3 (ref 0–0.2)
BASOPHILS NFR BLD AUTO: 0.7 % (ref 0–2)
BUN BLD-MCNC: 25 MG/DL (ref 7–21)
BUN/CREAT SERPL: 16.6 (ref 7–25)
CALCIUM SPEC-SCNC: 9.1 MG/DL (ref 8.4–10.2)
CHLORIDE SERPL-SCNC: 105 MMOL/L (ref 95–110)
CO2 SERPL-SCNC: 24 MMOL/L (ref 22–31)
CREAT BLD-MCNC: 1.51 MG/DL (ref 0.7–1.3)
DEPRECATED RDW RBC AUTO: 43.6 FL (ref 35.1–43.9)
EOSINOPHIL # BLD AUTO: 0.13 10*3/MM3 (ref 0–0.7)
EOSINOPHIL NFR BLD AUTO: 3 % (ref 0–7)
ERYTHROCYTE [DISTWIDTH] IN BLOOD BY AUTOMATED COUNT: 14.1 % (ref 11.5–14.5)
ERYTHROCYTE [SEDIMENTATION RATE] IN BLOOD: 18 MM/HR (ref 0–15)
GFR SERPL CREATININE-BSD FRML MDRD: 56 ML/MIN/1.73 (ref 42–98)
GLUCOSE BLD-MCNC: 114 MG/DL (ref 60–100)
HCT VFR BLD AUTO: 45.6 % (ref 39–49)
HGB BLD-MCNC: 15.7 G/DL (ref 13.7–17.3)
IMM GRANULOCYTES # BLD: 0.02 10*3/MM3 (ref 0–0.02)
IMM GRANULOCYTES NFR BLD: 0.5 % (ref 0–0.5)
LYMPHOCYTES # BLD AUTO: 1.5 10*3/MM3 (ref 0.6–4.2)
LYMPHOCYTES NFR BLD AUTO: 34.6 % (ref 10–50)
MCH RBC QN AUTO: 28.9 PG (ref 26.5–34)
MCHC RBC AUTO-ENTMCNC: 34.4 G/DL (ref 31.5–36.3)
MCV RBC AUTO: 83.8 FL (ref 80–98)
MONOCYTES # BLD AUTO: 0.52 10*3/MM3 (ref 0–0.9)
MONOCYTES NFR BLD AUTO: 12 % (ref 0–12)
NEUTROPHILS # BLD AUTO: 2.14 10*3/MM3 (ref 2–8.6)
NEUTROPHILS NFR BLD AUTO: 49.2 % (ref 37–80)
NRBC BLD MANUAL-RTO: 0 /100 WBC (ref 0–0)
PLATELET # BLD AUTO: 190 10*3/MM3 (ref 150–450)
PMV BLD AUTO: 9.5 FL (ref 8–12)
POTASSIUM BLD-SCNC: 3.8 MMOL/L (ref 3.5–5.1)
RBC # BLD AUTO: 5.44 10*6/MM3 (ref 4.37–5.74)
SODIUM BLD-SCNC: 139 MMOL/L (ref 137–145)
URATE SERPL-MCNC: 7.5 MG/DL (ref 2.5–8.5)
WBC NRBC COR # BLD: 4.34 10*3/MM3 (ref 3.2–9.8)

## 2018-09-11 PROCEDURE — 85651 RBC SED RATE NONAUTOMATED: CPT | Performed by: GENERAL PRACTICE

## 2018-09-11 PROCEDURE — 84550 ASSAY OF BLOOD/URIC ACID: CPT

## 2018-09-11 PROCEDURE — 99213 OFFICE O/P EST LOW 20 MIN: CPT | Performed by: GENERAL PRACTICE

## 2018-09-11 PROCEDURE — 85025 COMPLETE CBC W/AUTO DIFF WBC: CPT | Performed by: GENERAL PRACTICE

## 2018-09-11 PROCEDURE — 96372 THER/PROPH/DIAG INJ SC/IM: CPT | Performed by: GENERAL PRACTICE

## 2018-09-11 PROCEDURE — 36415 COLL VENOUS BLD VENIPUNCTURE: CPT | Performed by: GENERAL PRACTICE

## 2018-09-11 PROCEDURE — 80048 BASIC METABOLIC PNL TOTAL CA: CPT | Performed by: GENERAL PRACTICE

## 2018-09-11 RX ORDER — TRIAMCINOLONE ACETONIDE 40 MG/ML
80 INJECTION, SUSPENSION INTRA-ARTICULAR; INTRAMUSCULAR ONCE
Status: COMPLETED | OUTPATIENT
Start: 2018-09-11 | End: 2018-09-11

## 2018-09-11 RX ADMIN — TRIAMCINOLONE ACETONIDE 80 MG: 40 INJECTION, SUSPENSION INTRA-ARTICULAR; INTRAMUSCULAR at 08:51

## 2018-09-11 NOTE — PROGRESS NOTES
Subjective   Jerald Bonilla is a 72 y.o. male.   Chief Complaint   Patient presents with   • Foot Pain     right     Lower Extremity Issue   This is a new problem. The current episode started 1 to 4 weeks ago. The problem occurs intermittently. The problem has been gradually improving. Associated symptoms include arthralgias. Pertinent negatives include no abdominal pain, chest pain, chills, congestion, coughing, fatigue, fever, headaches, joint swelling, myalgias, nausea, neck pain, numbness, rash, sore throat, vomiting or weakness. The symptoms are aggravated by walking. He has tried ice for the symptoms. The treatment provided moderate relief.   Has a history of gout, is on allopurinol 100 mg daily.      The following portions of the patient's history were reviewed and updated as appropriate: allergies, current medications, past social history and problem list.    Outpatient Medications Prior to Visit   Medication Sig Dispense Refill   • allopurinol (ZYLOPRIM) 100 MG tablet Take 1 tablet by mouth Daily. 90 tablet 3   • B Complex Vitamins (VITAMIN B COMPLEX PO) Take 1 tablet by mouth Daily.     • carvedilol (COREG) 25 MG tablet TAKE TWO TABLETS BY MOUTH TWICE DAILY WITH MEALS 120 tablet 1   • cholecalciferol (VITAMIN D3) 1000 UNITS tablet Take 2,000 Units by mouth Daily.     • clindamycin (CLINDAGEL) 1 % gel APPLY   TOPICALLY TO AFFECTED AREA ONCE DAILY AS NEEDED 1 tube 1   • fluticasone (FLONASE) 50 MCG/ACT nasal spray 2 sprays into each nostril Daily. 1 bottle 5   • loratadine (CLARITIN) 10 MG tablet Take 1 tablet by mouth Daily. 90 tablet 3   • loratadine (CLARITIN) 10 MG tablet TAKE ONE TABLET BY MOUTH ONCE DAILY 90 tablet 4   • NIFEdipine XL (PROCARDIA XL) 60 MG 24 hr tablet Take 180 mg by mouth Every Night.     • pravastatin (PRAVACHOL) 80 MG tablet Take 1 tablet by mouth Every Night. 90 tablet 3   • valsartan-hydrochlorothiazide (DIOVAN-HCT) 320-25 MG per tablet Take 1 tablet by mouth Daily.       No  "facility-administered medications prior to visit.        Review of Systems   Constitutional: Negative.  Negative for chills, fatigue, fever and unexpected weight change.   HENT: Negative.  Negative for congestion, ear pain, hearing loss, nosebleeds, rhinorrhea, sneezing, sore throat and tinnitus.    Eyes: Negative.  Negative for discharge.   Respiratory: Negative.  Negative for cough, shortness of breath and wheezing.    Cardiovascular: Negative.  Negative for chest pain and palpitations.   Gastrointestinal: Negative.  Negative for abdominal pain, constipation, diarrhea, nausea and vomiting.   Endocrine: Negative.    Genitourinary: Negative.  Negative for dysuria, frequency and urgency.   Musculoskeletal: Positive for arthralgias. Negative for back pain, joint swelling, myalgias and neck pain.   Skin: Negative.  Negative for rash.   Allergic/Immunologic: Negative.    Neurological: Negative.  Negative for dizziness, weakness, numbness and headaches.   Hematological: Negative.  Negative for adenopathy.   Psychiatric/Behavioral: Negative.  Negative for dysphoric mood and sleep disturbance. The patient is not nervous/anxious.        Objective   Visit Vitals  /70 (BP Location: Left arm, Patient Position: Sitting, Cuff Size: Adult)   Pulse 53   Ht 177.8 cm (70\")   Wt 115 kg (254 lb 1.6 oz)   SpO2 96%   BMI 36.46 kg/m²     Physical Exam   Constitutional: He is oriented to person, place, and time. He appears well-developed and well-nourished. No distress.   HENT:   Head: Normocephalic.   Nose: Nose normal.   Mouth/Throat: Oropharynx is clear and moist.   Eyes: Pupils are equal, round, and reactive to light. Conjunctivae and EOM are normal. Right eye exhibits no discharge. Left eye exhibits no discharge.   Neck: No thyromegaly present.   Cardiovascular: Normal rate, regular rhythm, normal heart sounds and intact distal pulses.    No murmur heard.  Pulmonary/Chest: Effort normal and breath sounds normal. "   Musculoskeletal: He exhibits no edema.        Lymphadenopathy:     He has no cervical adenopathy.   Neurological: He is alert and oriented to person, place, and time.   Skin: Skin is warm and dry.   Psychiatric: He has a normal mood and affect.   Nursing note and vitals reviewed.      Assessment/Plan   Problem List Items Addressed This Visit        Other    Gout - Primary    Relevant Medications    triamcinolone acetonide (KENALOG-40) injection 80 mg (Completed)      Other Visit Diagnoses     Right foot pain        Relevant Medications    triamcinolone acetonide (KENALOG-40) injection 80 mg (Completed)    Other Relevant Orders    Uric acid    Sedimentation Rate    CBC & Differential    Basic Metabolic Panel    CBC Auto Differential          Will notify regarding results. Recheck if not improving.      New Medications Ordered This Visit   Medications   • triamcinolone acetonide (KENALOG-40) injection 80 mg     Return for Next scheduled follow up.

## 2018-10-09 RX ORDER — LORATADINE 10 MG/1
10 TABLET ORAL DAILY
Qty: 90 TABLET | Refills: 3 | Status: SHIPPED | OUTPATIENT
Start: 2018-10-09 | End: 2019-01-24

## 2018-10-31 ENCOUNTER — TELEPHONE (OUTPATIENT)
Dept: FAMILY MEDICINE CLINIC | Facility: CLINIC | Age: 73
End: 2018-10-31

## 2018-11-05 RX ORDER — CLINDAMYCIN PHOSPHATE 10 UG/ML
LOTION TOPICAL EVERY 12 HOURS SCHEDULED
Qty: 60 ML | Refills: 1 | Status: SHIPPED | OUTPATIENT
Start: 2018-11-05 | End: 2021-05-04 | Stop reason: SDUPTHER

## 2018-11-29 ENCOUNTER — TELEPHONE (OUTPATIENT)
Dept: FAMILY MEDICINE CLINIC | Facility: CLINIC | Age: 73
End: 2018-11-29

## 2018-11-29 RX ORDER — ALLOPURINOL 100 MG/1
200 TABLET ORAL DAILY
Qty: 180 TABLET | Refills: 3 | Status: SHIPPED | OUTPATIENT
Start: 2018-11-29 | End: 2019-09-07 | Stop reason: SDUPTHER

## 2018-12-18 ENCOUNTER — OFFICE VISIT (OUTPATIENT)
Dept: FAMILY MEDICINE CLINIC | Facility: CLINIC | Age: 73
End: 2018-12-18

## 2018-12-18 VITALS
DIASTOLIC BLOOD PRESSURE: 64 MMHG | SYSTOLIC BLOOD PRESSURE: 132 MMHG | WEIGHT: 256 LBS | HEART RATE: 55 BPM | BODY MASS INDEX: 36.65 KG/M2 | OXYGEN SATURATION: 98 % | HEIGHT: 70 IN

## 2018-12-18 DIAGNOSIS — J30.1 SEASONAL ALLERGIC RHINITIS DUE TO POLLEN: ICD-10-CM

## 2018-12-18 DIAGNOSIS — Z12.5 ENCOUNTER FOR PROSTATE CANCER SCREENING: ICD-10-CM

## 2018-12-18 DIAGNOSIS — E78.2 MIXED HYPERLIPIDEMIA: Chronic | ICD-10-CM

## 2018-12-18 DIAGNOSIS — R13.19 ESOPHAGEAL DYSPHAGIA: ICD-10-CM

## 2018-12-18 DIAGNOSIS — I10 ESSENTIAL HYPERTENSION: Primary | Chronic | ICD-10-CM

## 2018-12-18 DIAGNOSIS — M10.371 ACUTE GOUT DUE TO RENAL IMPAIRMENT INVOLVING TOE OF RIGHT FOOT: ICD-10-CM

## 2018-12-18 PROCEDURE — 99214 OFFICE O/P EST MOD 30 MIN: CPT | Performed by: GENERAL PRACTICE

## 2018-12-18 RX ORDER — FLUTICASONE PROPIONATE 50 MCG
2 SPRAY, SUSPENSION (ML) NASAL DAILY
Qty: 1 BOTTLE | Refills: 5 | Status: SHIPPED | OUTPATIENT
Start: 2018-12-18 | End: 2020-06-01 | Stop reason: SDUPTHER

## 2018-12-18 NOTE — PROGRESS NOTES
Subjective   Jerald Bonilla is a 73 y.o. male.   Chief Complaint   Patient presents with   • Follow-up   • Hypertension     For review and evaluation of management of chronic medical problems. Uses CPAP and feels like there is a lump in his right throat in the morning and feels like is smothering, takes mask off and if he drinks something warm. Feels like having sinus drainage. Also having some trouble swallowing, food feels slow to go down. Has had to have an esophageal dilatation in the past.   Hypertension   This is a chronic problem. The current episode started more than 1 year ago. The problem is unchanged. The problem is controlled. Pertinent negatives include no chest pain, headaches, neck pain, palpitations or shortness of breath. There are no associated agents to hypertension. Current antihypertension treatment includes beta blockers, angiotensin blockers, diuretics and calcium channel blockers. The current treatment provides significant improvement. There are no compliance problems.       The following portions of the patient's history were reviewed and updated as appropriate: allergies, current medications, past social history and problem list.    Outpatient Medications Prior to Visit   Medication Sig Dispense Refill   • allopurinol (ZYLOPRIM) 100 MG tablet Take 2 tablets by mouth Daily. 180 tablet 3   • B Complex Vitamins (VITAMIN B COMPLEX PO) Take 1 tablet by mouth Daily.     • carvedilol (COREG) 25 MG tablet TAKE TWO TABLETS BY MOUTH TWICE DAILY WITH MEALS 120 tablet 1   • cholecalciferol (VITAMIN D3) 1000 UNITS tablet Take 2,000 Units by mouth Daily.     • clindamycin (CLEOCIN T) 1 % lotion Apply  topically to the appropriate area as directed Every 12 (Twelve) Hours. 60 mL 1   • loratadine (CLARITIN) 10 MG tablet Take 1 tablet by mouth Daily. 90 tablet 3   • NIFEdipine XL (PROCARDIA XL) 60 MG 24 hr tablet Take 180 mg by mouth Every Night.     • pravastatin (PRAVACHOL) 80 MG tablet Take 1 tablet  "by mouth Every Night. 90 tablet 3   • valsartan-hydrochlorothiazide (DIOVAN-HCT) 320-25 MG per tablet Take 1 tablet by mouth Daily.     • fluticasone (FLONASE) 50 MCG/ACT nasal spray 2 sprays into each nostril Daily. 1 bottle 5   • benzonatate (TESSALON) 200 MG capsule Take 1 capsule by mouth 3 (Three) Times a Day As Needed for Cough. 30 capsule 0   • MethylPREDNISolone (MEDROL, ARIANNA,) 4 MG tablet Dose pack. Take as directed on package instructions. 1 each 0   • promethazine-dextromethorphan (PROMETHAZINE-DM) 6.25-15 MG/5ML syrup Take 5 mL by mouth At Night As Needed for Cough. 120 mL 0     No facility-administered medications prior to visit.        Review of Systems   Constitutional: Negative.  Negative for chills, fatigue, fever and unexpected weight change.   HENT: Negative.  Negative for congestion, ear pain, hearing loss, nosebleeds, rhinorrhea, sneezing, sore throat and tinnitus.    Eyes: Negative.  Negative for discharge.   Respiratory: Negative.  Negative for cough, shortness of breath and wheezing.    Cardiovascular: Negative.  Negative for chest pain and palpitations.   Gastrointestinal: Negative.  Negative for abdominal pain, constipation, diarrhea, nausea and vomiting.   Endocrine: Negative.    Genitourinary: Negative.  Negative for dysuria, frequency and urgency.   Musculoskeletal: Negative.  Negative for arthralgias, back pain, joint swelling, myalgias and neck pain.   Skin: Negative.  Negative for rash.   Allergic/Immunologic: Negative.    Neurological: Negative.  Negative for dizziness, weakness, numbness and headaches.   Hematological: Negative.  Negative for adenopathy.   Psychiatric/Behavioral: Negative.  Negative for dysphoric mood and sleep disturbance. The patient is not nervous/anxious.        Objective   Visit Vitals  /64 (BP Location: Left arm, Patient Position: Sitting, Cuff Size: Adult)   Pulse 55   Ht 177.8 cm (70\")   Wt 116 kg (256 lb)   SpO2 98%   BMI 36.73 kg/m²     Physical Exam "   Constitutional: He is oriented to person, place, and time. He appears well-developed and well-nourished. No distress.   HENT:   Head: Normocephalic.   Nose: Nose normal.   Mouth/Throat: Oropharynx is clear and moist.   Eyes: Conjunctivae and EOM are normal. Pupils are equal, round, and reactive to light. Right eye exhibits no discharge. Left eye exhibits no discharge.   Neck: No thyromegaly present.   Cardiovascular: Normal rate, regular rhythm, normal heart sounds and intact distal pulses.   No murmur heard.  Pulmonary/Chest: Effort normal and breath sounds normal.   Musculoskeletal: He exhibits no edema.   Lymphadenopathy:     He has no cervical adenopathy.   Neurological: He is alert and oriented to person, place, and time.   Skin: Skin is warm and dry.   Psychiatric: He has a normal mood and affect.   Nursing note and vitals reviewed.    Assessment/Plan   Problem List Items Addressed This Visit        Cardiovascular and Mediastinum    Essential hypertension - Primary (Chronic)    Relevant Orders    CBC & Differential    Comprehensive Metabolic Panel    Lipid Panel    Urinalysis With Culture If Indicated - Urine, Clean Catch    Mixed hyperlipidemia (Chronic)    Relevant Orders    CBC & Differential    Comprehensive Metabolic Panel    Lipid Panel    Urinalysis With Culture If Indicated - Urine, Clean Catch       Digestive    Dysphagia    Relevant Orders    Ambulatory Referral to Gastroenterology       Other    Gout    Relevant Orders    Uric acid      Other Visit Diagnoses     Seasonal allergic rhinitis due to pollen        Encounter for prostate cancer screening        Relevant Orders    PSA Screen          Continue current treatment.     New Medications Ordered This Visit   Medications   • fluticasone (FLONASE) 50 MCG/ACT nasal spray     Si sprays into the nostril(s) as directed by provider Daily.     Dispense:  1 bottle     Refill:  5     Return in about 6 months (around 2019) for Annual physical,  medicare wellness visit.

## 2018-12-27 ENCOUNTER — OFFICE VISIT (OUTPATIENT)
Dept: GASTROENTEROLOGY | Facility: CLINIC | Age: 73
End: 2018-12-27

## 2018-12-27 VITALS
WEIGHT: 260.4 LBS | BODY MASS INDEX: 37.28 KG/M2 | HEIGHT: 70 IN | SYSTOLIC BLOOD PRESSURE: 142 MMHG | HEART RATE: 62 BPM | DIASTOLIC BLOOD PRESSURE: 84 MMHG | OXYGEN SATURATION: 95 %

## 2018-12-27 DIAGNOSIS — R68.81 EARLY SATIETY: ICD-10-CM

## 2018-12-27 DIAGNOSIS — R13.10 DYSPHAGIA, UNSPECIFIED TYPE: Primary | ICD-10-CM

## 2018-12-27 DIAGNOSIS — K21.9 GASTROESOPHAGEAL REFLUX DISEASE, ESOPHAGITIS PRESENCE NOT SPECIFIED: ICD-10-CM

## 2018-12-27 PROCEDURE — 99213 OFFICE O/P EST LOW 20 MIN: CPT | Performed by: NURSE PRACTITIONER

## 2018-12-27 RX ORDER — DEXTROSE AND SODIUM CHLORIDE 5; .45 G/100ML; G/100ML
30 INJECTION, SOLUTION INTRAVENOUS CONTINUOUS PRN
Status: CANCELLED | OUTPATIENT
Start: 2019-01-16

## 2018-12-27 NOTE — PROGRESS NOTES
Chief Complaint   Patient presents with   • Difficulty Swallowing       Subjective    Jerald Bonilla is a 73 y.o. male. he is being seen for consultation today at the request of Dr. Penny  73-year-old male presents to discuss dysphagia and frequent throat clearing.  Reports he had a recent upper respiratory infection was seen in urgent care and given antibiotic is followed up with Dr. Penny who has given him antihistamines which don't seem to be improving symptoms.  Reports constant postnasal drip cough and drainage though states he doesn't always get expectorate up  and sometimes mucus is usually white.   Difficulty Swallowing   This is a new problem. The current episode started 1 to 4 weeks ago. The problem occurs intermittently. The problem has been waxing and waning. Associated symptoms include congestion (excessive drainage ). Pertinent negatives include no abdominal pain, anorexia, arthralgias, change in bowel habit, chest pain, chills, coughing, diaphoresis, fatigue, fever, headaches, joint swelling, myalgias, nausea, neck pain, numbness, sore throat or vomiting. The symptoms are aggravated by eating (certain foods, chicken, white bread ). He has tried drinking (warm liquids tums helps ) for the symptoms. The treatment provided moderate relief.   Plan; recommend patient continue current medical regimen of antihistamine and Flonase.  We'll order an esophagram due to reflux and dysphagia and schedule patient for EGD after esophagram results are available.         The following portions of the patient's history were reviewed and updated as appropriate:   Past Medical History:   Diagnosis Date   • Acne conglobata    • Acute maxillary sinusitis    • Benign prostate hyperplasia    • Blurring of visual image    • Carbuncle     of infected sebaceous cyst on back   • Dizziness and giddiness    • Erectile dysfunction    • Essential hypertension     seeing nephro   • Gastroesophageal reflux disease    • Gout     • Hip pain    • Hyperlipidemia    • Hypertension    • Hypertensive disorder    • Low back pain    • Malaise and fatigue    • Need for prophylactic vaccination against Streptococcus pneumoniae (pneumococcus)    • Nuclear senile cataract    • Osteoarthritis    • Otalgia    • Otitis externa    • Pain in lower limb    • Rheumatoid arthritis (CMS/HCC)    • Skin lesion     right calf   • Swelling of knee joint    • Tinnitus    • Upper respiratory infection      Past Surgical History:   Procedure Laterality Date   • COLONOSCOPY  05/18/2009    Colonoscopy, diagnostic (screening) 42148 (1)    diverticuli, roids    • HIP SURGERY Left 12/05/2016   • INJECTION OF MEDICATION  05/01/2015    B12 (3)     • INJECTION OF MEDICATION  10/31/2011    Rocephin (1)      • OTHER SURGICAL HISTORY  04/18/2016    &D, Complicated or Multiple 40362 (3)      • OTHER SURGICAL HISTORY  08/11/2014    Remove Impacted Cerumen 23952 (1)        Family History   Problem Relation Age of Onset   • Hypertension Father    • Heart disease Father    • Cancer Father    • Diabetes Father    • Kidney disease Father    • Hyperlipidemia Other    • Hypertension Other    • Cancer Other         prostate   • Arthritis Mother    • Hypertension Mother    • Obesity Mother    • Thyroid disease Mother    • Mental illness Mother    • Arthritis Maternal Grandmother        Current Outpatient Medications   Medication Sig Dispense Refill   • allopurinol (ZYLOPRIM) 100 MG tablet Take 2 tablets by mouth Daily. 180 tablet 3   • B Complex Vitamins (VITAMIN B COMPLEX PO) Take 1 tablet by mouth Daily.     • carvedilol (COREG) 25 MG tablet TAKE TWO TABLETS BY MOUTH TWICE DAILY WITH MEALS 120 tablet 1   • cholecalciferol (VITAMIN D3) 1000 UNITS tablet Take 2,000 Units by mouth Daily.     • clindamycin (CLEOCIN T) 1 % lotion Apply  topically to the appropriate area as directed Every 12 (Twelve) Hours. 60 mL 1   • fluticasone (FLONASE) 50 MCG/ACT nasal spray 2 sprays into the nostril(s)  "as directed by provider Daily. 1 bottle 5   • loratadine (CLARITIN) 10 MG tablet Take 1 tablet by mouth Daily. 90 tablet 3   • NIFEdipine XL (PROCARDIA XL) 60 MG 24 hr tablet Take 180 mg by mouth Every Night.     • pravastatin (PRAVACHOL) 80 MG tablet Take 1 tablet by mouth Every Night. 90 tablet 3   • valsartan-hydrochlorothiazide (DIOVAN-HCT) 320-25 MG per tablet Take 1 tablet by mouth Daily.       No current facility-administered medications for this visit.      Allergies   Allergen Reactions   • Lisinopril      Other reaction(s): cough/itching     Social History     Socioeconomic History   • Marital status:      Spouse name: Not on file   • Number of children: Not on file   • Years of education: Not on file   • Highest education level: Not on file   Tobacco Use   • Smoking status: Never Smoker   • Smokeless tobacco: Never Used   Substance and Sexual Activity   • Alcohol use: Yes     Comment: 1-13 per month   • Drug use: No   • Sexual activity: Defer       Review of Systems  Review of Systems   Constitutional: Positive for appetite change (feels full after a few bites ). Negative for activity change, chills, diaphoresis, fatigue, fever and unexpected weight change.   HENT: Positive for congestion (excessive drainage ), postnasal drip, sinus pressure (feels like head cold ), sinus pain and trouble swallowing. Negative for sore throat.    Respiratory: Negative for cough and shortness of breath.    Cardiovascular: Negative for chest pain.   Gastrointestinal: Negative for abdominal distention, abdominal pain, anal bleeding, anorexia, blood in stool, change in bowel habit, constipation, diarrhea, nausea, rectal pain and vomiting.   Musculoskeletal: Negative for arthralgias, joint swelling, myalgias and neck pain.   Skin: Negative for pallor.   Neurological: Negative for light-headedness, numbness and headaches.        /84 (BP Location: Left arm)   Pulse 62   Ht 177.8 cm (70\")   Wt 118 kg (260 lb 6.4 " oz)   SpO2 95%   BMI 37.36 kg/m²     Objective    Physical Exam   Constitutional: He is oriented to person, place, and time. He appears well-developed and well-nourished. He is cooperative. No distress.   HENT:   Head: Normocephalic and atraumatic.   Neck: Normal range of motion. Neck supple. No thyromegaly present.   Cardiovascular: Normal rate, regular rhythm and normal heart sounds.   Pulmonary/Chest: Effort normal and breath sounds normal. He has no wheezes. He has no rhonchi. He has no rales.   Abdominal: Soft. Normal appearance and bowel sounds are normal. He exhibits no distension. There is no hepatosplenomegaly. There is tenderness in the epigastric area. There is no rigidity and no guarding. No hernia.   Lymphadenopathy:     He has no cervical adenopathy.   Neurological: He is alert and oriented to person, place, and time.   Skin: Skin is warm, dry and intact. No rash noted. No pallor.   Psychiatric: He has a normal mood and affect. His speech is normal.     Lab on 09/11/2018   Component Date Value Ref Range Status   • Uric Acid 09/11/2018 7.5  2.5 - 8.5 mg/dL Final     Assessment/Plan      1. Dysphagia, unspecified type    2. Early satiety    3. Gastroesophageal reflux disease, esophagitis presence not specified    .       Orders placed during this encounter include:  Orders Placed This Encounter   Procedures   • FL Esophagram Complete     Standing Status:   Future     Standing Expiration Date:   12/27/2019     Order Specific Question:   Reason for Exam:     Answer:   dysphagia   • Follow Anesthesia Guidelines / Standing Orders     Standing Status:   Future       ESOPHAGOGASTRODUODENOSCOPY possible dilation (N/A)    Review and/or summary of lab tests, radiology, procedures, medications. Review and summary of old records and obtaining of history. The risks and benefits of my recommendations, as well as other treatment options were discussed with the patient today. Questions were answered.    No orders of  the defined types were placed in this encounter.      Follow-up: Return in about 4 weeks (around 1/24/2019).          This document has been electronically signed by BEATRIS Cheng on December 28, 2018 7:52 AM             Results for orders placed or performed in visit on 09/11/18   Uric acid   Result Value Ref Range    Uric Acid 7.5 2.5 - 8.5 mg/dL   Results for orders placed or performed in visit on 09/11/18   CBC Auto Differential   Result Value Ref Range    WBC 4.34 3.20 - 9.80 10*3/mm3    RBC 5.44 4.37 - 5.74 10*6/mm3    Hemoglobin 15.7 13.7 - 17.3 g/dL    Hematocrit 45.6 39.0 - 49.0 %    MCV 83.8 80.0 - 98.0 fL    MCH 28.9 26.5 - 34.0 pg    MCHC 34.4 31.5 - 36.3 g/dL    RDW 14.1 11.5 - 14.5 %    RDW-SD 43.6 35.1 - 43.9 fl    MPV 9.5 8.0 - 12.0 fL    Platelets 190 150 - 450 10*3/mm3    Neutrophil % 49.2 37.0 - 80.0 %    Lymphocyte % 34.6 10.0 - 50.0 %    Monocyte % 12.0 0.0 - 12.0 %    Eosinophil % 3.0 0.0 - 7.0 %    Basophil % 0.7 0.0 - 2.0 %    Immature Grans % 0.5 0.0 - 0.5 %    Neutrophils, Absolute 2.14 2.00 - 8.60 10*3/mm3    Lymphocytes, Absolute 1.50 0.60 - 4.20 10*3/mm3    Monocytes, Absolute 0.52 0.00 - 0.90 10*3/mm3    Eosinophils, Absolute 0.13 0.00 - 0.70 10*3/mm3    Basophils, Absolute 0.03 0.00 - 0.20 10*3/mm3    Immature Grans, Absolute 0.02 0.00 - 0.02 10*3/mm3    nRBC 0.0 0.0 - 0.0 /100 WBC   Sedimentation Rate   Result Value Ref Range    Sed Rate 18 (H) 0 - 15 mm/hr   Basic Metabolic Panel   Result Value Ref Range    Glucose 114 (H) 60 - 100 mg/dL    BUN 25 (H) 7 - 21 mg/dL    Creatinine 1.51 (H) 0.70 - 1.30 mg/dL    Sodium 139 137 - 145 mmol/L    Potassium 3.8 3.5 - 5.1 mmol/L    Chloride 105 95 - 110 mmol/L    CO2 24.0 22.0 - 31.0 mmol/L    Calcium 9.1 8.4 - 10.2 mg/dL    eGFR  African Amer 56 42 - 98 mL/min/1.73    BUN/Creatinine Ratio 16.6 7.0 - 25.0    Anion Gap 10.0 5.0 - 15.0 mmol/L   Results for orders placed or performed in visit on 08/10/18   Protein / Creatinine Ratio, Urine -  Urine, Clean Catch   Result Value Ref Range    Protein/Creatinine Ratio, Urine 45.4 0.0 - 200.0 mg/G Crea    Creatinine, Urine 152.0 mg/dL    Total Protein, Urine 6.9 mg/dL   Renal Function Panel   Result Value Ref Range    Glucose 84 60 - 100 mg/dL    BUN 25 (H) 7 - 21 mg/dL    Creatinine 1.38 (H) 0.70 - 1.30 mg/dL    Sodium 141 137 - 145 mmol/L    Potassium 3.8 3.5 - 5.1 mmol/L    Chloride 106 95 - 110 mmol/L    CO2 29.0 22.0 - 31.0 mmol/L    Calcium 9.1 8.4 - 10.2 mg/dL    Albumin 4.00 3.40 - 4.80 g/dL    Phosphorus 3.0 2.4 - 4.4 mg/dL    Anion Gap 6.0 5.0 - 15.0 mmol/L    BUN/Creatinine Ratio 18.1 7.0 - 25.0    eGFR  African Amer 62 42 - 98 mL/min/1.73   Results for orders placed or performed in visit on 06/14/18   Urinalysis, Microscopic Only - Urine, Clean Catch   Result Value Ref Range    RBC, UA None Seen None Seen /HPF    WBC, UA 0-2 None Seen, 0-2, 3-5 /HPF    Bacteria, UA None Seen None Seen /HPF    Squamous Epithelial Cells, UA 0-2 None Seen, 0-2 /HPF    Hyaline Casts, UA None Seen None Seen /LPF    Methodology Manual Light Microscopy    CBC Auto Differential   Result Value Ref Range    WBC 4.36 3.20 - 9.80 10*3/mm3    RBC 5.48 4.37 - 5.74 10*6/mm3    Hemoglobin 15.9 13.7 - 17.3 g/dL    Hematocrit 45.7 39.0 - 49.0 %    MCV 83.4 80.0 - 98.0 fL    MCH 29.0 26.5 - 34.0 pg    MCHC 34.8 31.5 - 36.3 g/dL    RDW 14.1 11.5 - 14.5 %    RDW-SD 43.0 35.1 - 43.9 fl    MPV 9.8 8.0 - 12.0 fL    Platelets 177 150 - 450 10*3/mm3    Neutrophil % 52.1 37.0 - 80.0 %    Lymphocyte % 32.1 10.0 - 50.0 %    Monocyte % 10.6 0.0 - 12.0 %    Eosinophil % 4.1 0.0 - 7.0 %    Basophil % 0.9 0.0 - 2.0 %    Immature Grans % 0.2 0.0 - 0.5 %    Neutrophils, Absolute 2.27 2.00 - 8.60 10*3/mm3    Lymphocytes, Absolute 1.40 0.60 - 4.20 10*3/mm3    Monocytes, Absolute 0.46 0.00 - 0.90 10*3/mm3    Eosinophils, Absolute 0.18 0.00 - 0.70 10*3/mm3    Basophils, Absolute 0.04 0.00 - 0.20 10*3/mm3    Immature Grans, Absolute 0.01 0.00 - 0.02  10*3/mm3   Urinalysis - Urine, Clean Catch   Result Value Ref Range    Color, UA Yellow Yellow, Straw, Dark Yellow, Dolores    Appearance, UA Clear Clear    pH, UA 6.0 5.0 - 9.0    Specific Gravity, UA 1.017 1.003 - 1.030    Glucose, UA Negative Negative    Ketones, UA Negative Negative    Bilirubin, UA Negative Negative    Blood, UA Negative Negative    Protein, UA Trace (A) Negative    Leuk Esterase, UA Negative Negative    Nitrite, UA Negative Negative    Urobilinogen, UA 1.0 E.U./dL 0.2 - 1.0 E.U./dL   Comprehensive Metabolic Panel   Result Value Ref Range    Glucose 111 (H) 60 - 100 mg/dL    BUN 25 (H) 7 - 21 mg/dL    Creatinine 1.34 (H) 0.70 - 1.30 mg/dL    Sodium 142 137 - 145 mmol/L    Potassium 3.7 3.5 - 5.1 mmol/L    Chloride 105 95 - 110 mmol/L    CO2 26.0 22.0 - 31.0 mmol/L    Calcium 8.9 8.4 - 10.2 mg/dL    Total Protein 7.9 6.3 - 8.6 g/dL    Albumin 4.40 3.40 - 4.80 g/dL    ALT (SGPT) 26 21 - 72 U/L    AST (SGOT) 39 17 - 59 U/L    Alkaline Phosphatase 41 38 - 126 U/L    Total Bilirubin 0.6 0.2 - 1.3 mg/dL    eGFR  African Amer 64 42 - 98 mL/min/1.73    Globulin 3.5 2.3 - 3.5 gm/dL    A/G Ratio 1.3 1.1 - 1.8 g/dL    BUN/Creatinine Ratio 18.7 7.0 - 25.0    Anion Gap 11.0 5.0 - 15.0 mmol/L     *Note: Due to a large number of results and/or encounters for the requested time period, some results have not been displayed. A complete set of results can be found in Results Review.

## 2018-12-27 NOTE — PATIENT INSTRUCTIONS
Abdominal Pain, Adult  Many things can cause belly (abdominal) pain. Most times, belly pain is not dangerous. Many cases of belly pain can be watched and treated at home. Sometimes belly pain is serious, though. Your doctor will try to find the cause of your belly pain.  Follow these instructions at home:  · Take over-the-counter and prescription medicines only as told by your doctor. Do not take medicines that help you poop (laxatives) unless told to by your doctor.  · Drink enough fluid to keep your pee (urine) clear or pale yellow.  · Watch your belly pain for any changes.  · Keep all follow-up visits as told by your doctor. This is important.  Contact a doctor if:  · Your belly pain changes or gets worse.  · You are not hungry, or you lose weight without trying.  · You are having trouble pooping (constipated) or have watery poop (diarrhea) for more than 2-3 days.  · You have pain when you pee or poop.  · Your belly pain wakes you up at night.  · Your pain gets worse with meals, after eating, or with certain foods.  · You are throwing up and cannot keep anything down.  · You have a fever.  Get help right away if:  · Your pain does not go away as soon as your doctor says it should.  · You cannot stop throwing up.  · Your pain is only in areas of your belly, such as the right side or the left lower part of the belly.  · You have bloody or black poop, or poop that looks like tar.  · You have very bad pain, cramping, or bloating in your belly.  · You have signs of not having enough fluid or water in your body (dehydration), such as:  ? Dark pee, very little pee, or no pee.  ? Cracked lips.  ? Dry mouth.  ? Sunken eyes.  ? Sleepiness.  ? Weakness.  This information is not intended to replace advice given to you by your health care provider. Make sure you discuss any questions you have with your health care provider.  Document Released: 06/05/2009 Document Revised: 07/07/2017 Document Reviewed: 05/31/2017  [employer]  Interactive Patient Education © 2018 [employer]

## 2018-12-28 PROBLEM — R13.10 DYSPHAGIA: Status: ACTIVE | Noted: 2018-12-28

## 2018-12-28 PROBLEM — R68.81 EARLY SATIETY: Status: ACTIVE | Noted: 2018-12-28

## 2018-12-28 PROBLEM — K21.9 GASTROESOPHAGEAL REFLUX DISEASE: Status: ACTIVE | Noted: 2018-12-28

## 2019-01-03 ENCOUNTER — HOSPITAL ENCOUNTER (OUTPATIENT)
Dept: GENERAL RADIOLOGY | Facility: HOSPITAL | Age: 74
Discharge: HOME | End: 2019-01-03
Admitting: NURSE PRACTITIONER

## 2019-01-03 ENCOUNTER — TELEPHONE (OUTPATIENT)
Dept: GASTROENTEROLOGY | Facility: CLINIC | Age: 74
End: 2019-01-03

## 2019-01-03 DIAGNOSIS — K21.9 GASTROESOPHAGEAL REFLUX DISEASE, ESOPHAGITIS PRESENCE NOT SPECIFIED: ICD-10-CM

## 2019-01-03 DIAGNOSIS — R13.10 DYSPHAGIA, UNSPECIFIED TYPE: ICD-10-CM

## 2019-01-03 PROCEDURE — 74220 X-RAY XM ESOPHAGUS 1CNTRST: CPT

## 2019-01-03 PROCEDURE — 63710000001 BARIUM SULFATE 96 % RECONSTITUTED SUSPENSION: Performed by: RADIOLOGY

## 2019-01-03 PROCEDURE — A9270 NON-COVERED ITEM OR SERVICE: HCPCS | Performed by: RADIOLOGY

## 2019-01-03 RX ADMIN — BARIUM SULFATE 120 ML: 960 POWDER, FOR SUSPENSION ORAL at 08:07

## 2019-01-03 NOTE — TELEPHONE ENCOUNTER
Contacted patient regarding results of esophagram. Relayed results and recommendations to patient and patient voiced understanding.

## 2019-01-10 RX ORDER — GUAIFENESIN, PSEUDOEPHEDRINE HYDROCHLORIDE 600; 60 MG/1; MG/1
1 TABLET, EXTENDED RELEASE ORAL AS NEEDED
COMMUNITY
End: 2021-07-22

## 2019-01-14 ENCOUNTER — OFFICE VISIT (OUTPATIENT)
Dept: SLEEP MEDICINE | Facility: HOSPITAL | Age: 74
End: 2019-01-14

## 2019-01-14 VITALS
DIASTOLIC BLOOD PRESSURE: 60 MMHG | HEART RATE: 57 BPM | WEIGHT: 261.8 LBS | BODY MASS INDEX: 37.48 KG/M2 | HEIGHT: 70 IN | SYSTOLIC BLOOD PRESSURE: 141 MMHG | OXYGEN SATURATION: 93 %

## 2019-01-14 DIAGNOSIS — G47.33 OBSTRUCTIVE SLEEP APNEA, ADULT: Primary | ICD-10-CM

## 2019-01-14 PROCEDURE — 99213 OFFICE O/P EST LOW 20 MIN: CPT | Performed by: INTERNAL MEDICINE

## 2019-01-14 NOTE — PATIENT INSTRUCTIONS
1. Obstructive sleep apnea Established, stable (1)  1. Compliant and improved with PAP therapy  2. Continue PAP as prescribed.   3. Script for PAP supplies  2. Chronic bronchitis  1. Continue claritin, flonase, nasal saline  2. Consider steroids and/or antibiotics as he has been symptomatic on /off for ~ 3 months  3. GERD  1. Endoscopy scheduled for 01/16/2019

## 2019-01-14 NOTE — PROGRESS NOTES
Sleep Clinic Follow Up    Date: 2019  Primary Care Physician: Annie Penny MD    Last office visit: 2017 (I reviewed this note)    CC: Follow up: CHRIS, insomnia    Sleep Testin. PSG on 2007, AHI of 34, on CPAP of 11 cm H2O, supine AHI of > 1000      Assessment and Plan:    1. Obstructive sleep apnea Established, stable (1)  1. Compliant and improved with PAP therapy  2. Script for new machine and supplies   3. RTC in 31 - 90 days  2. Chronic bronchitis  1. Continue claritin, flonase, nasal saline  2. Consider steroids and/or antibiotics as he has been symptomatic on /off for ~ 3 months  3. GERD  1. Endoscopy scheduled for 2019      Interim History (1-3/4):  Since the last visit:    1) severe CHRIS -  Jerald Bonilla has remained compliant with CPAP. He denies mask issues, dry mouth, headaches, or pressures intolerance. He denies abnormal dreams, sleep paralysis, but has nasal congestion and mucus production. His machine make a constant whistle, and has power issues.    PAP Data:    Time frame: 2018 - 2019   Compliance 98.3 %  Average use on days used: 7hrs 17 min  Percent of days with usage greater than or equal to 4 hours: 97.1%  Mask type: Full face mask  DME: [facility]    Bed time: 2300  Sleep latency: 5-20 minutes  Number of times awakens during the night: 2  Wake time: 0700  Estimated total sleep time at night: 7.5-8 hours  Caffeine intake: 16oz of coffee, 0oz of tea, and 0oz of soda  Alcohol intake: 0 drinks per week  Nap time: some   Sleepiness with Driving: none    [place] - 4  2) Patient denies RLS symptoms.     PMHx, FH, SH reviewed and pertinent changes are: continues with upper airway drainage and mucus production      REVIEW OF SYSTEMS:   Negative for chest pain, fever, cough, SOA, abdominal pain. Smoking:none      Exam ():  Vitals:    19 1139   BP: 141/60   Pulse: 57   SpO2: 93%     Body mass index is 37.56 kg/m². Patient's Body mass index  is 37.56 kg/m². BMI is above normal parameters. Recommendations include: referral to primary care.      Gen:  No acute distress, alert, oriented  Lungs:  CTA with normal effort   CV:  RRR, no M/R/G  GI:  soft, non-tender  Psych:  Appropriate affect      Past Medical History:   Diagnosis Date   • Acne conglobata    • Acute maxillary sinusitis    • Benign prostate hyperplasia    • Blurring of visual image    • Carbuncle     of infected sebaceous cyst on back   • Dizziness and giddiness    • Erectile dysfunction    • Essential hypertension     seeing nephro   • Gastroesophageal reflux disease    • Gout    • Hip pain    • Hyperlipidemia    • Hypertension    • Hypertensive disorder    • Low back pain    • Malaise and fatigue    • Need for prophylactic vaccination against Streptococcus pneumoniae (pneumococcus)    • Nuclear senile cataract    • Osteoarthritis    • Otalgia    • Otitis externa    • Pain in lower limb    • Rheumatoid arthritis (CMS/HCC)    • Skin lesion     right calf   • Swelling of knee joint    • Tinnitus    • Upper respiratory infection        Current Outpatient Medications:   •  allopurinol (ZYLOPRIM) 100 MG tablet, Take 2 tablets by mouth Daily., Disp: 180 tablet, Rfl: 3  •  aspirin 81 MG tablet, Take 81 mg by mouth Daily., Disp: , Rfl:   •  B Complex Vitamins (VITAMIN B COMPLEX PO), Take 1 tablet by mouth Daily., Disp: , Rfl:   •  carvedilol (COREG) 25 MG tablet, TAKE TWO TABLETS BY MOUTH TWICE DAILY WITH MEALS, Disp: 120 tablet, Rfl: 1  •  cholecalciferol (VITAMIN D3) 1000 UNITS tablet, Take 2,000 Units by mouth Daily., Disp: , Rfl:   •  clindamycin (CLEOCIN T) 1 % lotion, Apply  topically to the appropriate area as directed Every 12 (Twelve) Hours., Disp: 60 mL, Rfl: 1  •  fluticasone (FLONASE) 50 MCG/ACT nasal spray, 2 sprays into the nostril(s) as directed by provider Daily., Disp: 1 bottle, Rfl: 5  •  NIFEdipine XL (PROCARDIA XL) 60 MG 24 hr tablet, Take 180 mg by mouth Every Night., Disp: , Rfl:    •  pravastatin (PRAVACHOL) 80 MG tablet, Take 1 tablet by mouth Every Night., Disp: 90 tablet, Rfl: 3  •  pseudoephedrine-guaifenesin (MUCINEX D)  MG per 12 hr tablet, Take 1 tablet by mouth Every 12 (Twelve) Hours., Disp: , Rfl:   •  valsartan-hydrochlorothiazide (DIOVAN-HCT) 320-25 MG per tablet, Take 1 tablet by mouth Daily., Disp: , Rfl:   •  loratadine (CLARITIN) 10 MG tablet, Take 1 tablet by mouth Daily., Disp: 90 tablet, Rfl: 3    Total time 25 min, more than half spent in face to face counseling and coordination of care.    Patient to follow up in 31-90 days with compliance report     This document has been electronically signed by Santo Nunes MD on January 14, 2019         CC: Annie Penny MD          No ref. provider found

## 2019-01-16 ENCOUNTER — ANESTHESIA EVENT (OUTPATIENT)
Dept: GASTROENTEROLOGY | Facility: HOSPITAL | Age: 74
End: 2019-01-16

## 2019-01-16 ENCOUNTER — HOSPITAL ENCOUNTER (OUTPATIENT)
Facility: HOSPITAL | Age: 74
Setting detail: HOSPITAL OUTPATIENT SURGERY
Discharge: HOME | End: 2019-01-16
Attending: INTERNAL MEDICINE | Admitting: INTERNAL MEDICINE

## 2019-01-16 ENCOUNTER — ANESTHESIA (OUTPATIENT)
Dept: GASTROENTEROLOGY | Facility: HOSPITAL | Age: 74
End: 2019-01-16

## 2019-01-16 VITALS
WEIGHT: 257.13 LBS | TEMPERATURE: 97.8 F | OXYGEN SATURATION: 95 % | HEART RATE: 59 BPM | SYSTOLIC BLOOD PRESSURE: 141 MMHG | HEIGHT: 70 IN | DIASTOLIC BLOOD PRESSURE: 65 MMHG | RESPIRATION RATE: 18 BRPM | BODY MASS INDEX: 36.81 KG/M2

## 2019-01-16 DIAGNOSIS — R13.10 DYSPHAGIA, UNSPECIFIED TYPE: ICD-10-CM

## 2019-01-16 DIAGNOSIS — K21.9 GASTROESOPHAGEAL REFLUX DISEASE, ESOPHAGITIS PRESENCE NOT SPECIFIED: ICD-10-CM

## 2019-01-16 DIAGNOSIS — R68.81 EARLY SATIETY: ICD-10-CM

## 2019-01-16 PROCEDURE — 43248 EGD GUIDE WIRE INSERTION: CPT | Performed by: INTERNAL MEDICINE

## 2019-01-16 PROCEDURE — 25010000002 PROPOFOL 10 MG/ML EMULSION: Performed by: NURSE ANESTHETIST, CERTIFIED REGISTERED

## 2019-01-16 PROCEDURE — 43239 EGD BIOPSY SINGLE/MULTIPLE: CPT | Performed by: INTERNAL MEDICINE

## 2019-01-16 PROCEDURE — 88305 TISSUE EXAM BY PATHOLOGIST: CPT | Performed by: INTERNAL MEDICINE

## 2019-01-16 PROCEDURE — 88305 TISSUE EXAM BY PATHOLOGIST: CPT | Performed by: PATHOLOGY

## 2019-01-16 RX ORDER — PROMETHAZINE HYDROCHLORIDE 25 MG/ML
12.5 INJECTION, SOLUTION INTRAMUSCULAR; INTRAVENOUS ONCE AS NEEDED
Status: DISCONTINUED | OUTPATIENT
Start: 2019-01-16 | End: 2019-01-16 | Stop reason: HOSPADM

## 2019-01-16 RX ORDER — DEXTROSE AND SODIUM CHLORIDE 5; .45 G/100ML; G/100ML
30 INJECTION, SOLUTION INTRAVENOUS CONTINUOUS PRN
Status: DISCONTINUED | OUTPATIENT
Start: 2019-01-16 | End: 2019-01-16 | Stop reason: HOSPADM

## 2019-01-16 RX ORDER — PROMETHAZINE HYDROCHLORIDE 25 MG/1
25 SUPPOSITORY RECTAL ONCE AS NEEDED
Status: DISCONTINUED | OUTPATIENT
Start: 2019-01-16 | End: 2019-01-16 | Stop reason: HOSPADM

## 2019-01-16 RX ORDER — PROMETHAZINE HYDROCHLORIDE 25 MG/1
25 TABLET ORAL ONCE AS NEEDED
Status: DISCONTINUED | OUTPATIENT
Start: 2019-01-16 | End: 2019-01-16 | Stop reason: HOSPADM

## 2019-01-16 RX ORDER — PROPOFOL 10 MG/ML
VIAL (ML) INTRAVENOUS AS NEEDED
Status: DISCONTINUED | OUTPATIENT
Start: 2019-01-16 | End: 2019-01-16 | Stop reason: SURG

## 2019-01-16 RX ORDER — ONDANSETRON 2 MG/ML
4 INJECTION INTRAMUSCULAR; INTRAVENOUS ONCE AS NEEDED
Status: DISCONTINUED | OUTPATIENT
Start: 2019-01-16 | End: 2019-01-16 | Stop reason: HOSPADM

## 2019-01-16 RX ORDER — DEXAMETHASONE SODIUM PHOSPHATE 4 MG/ML
8 INJECTION, SOLUTION INTRA-ARTICULAR; INTRALESIONAL; INTRAMUSCULAR; INTRAVENOUS; SOFT TISSUE ONCE AS NEEDED
Status: DISCONTINUED | OUTPATIENT
Start: 2019-01-16 | End: 2019-01-16 | Stop reason: HOSPADM

## 2019-01-16 RX ORDER — LIDOCAINE HYDROCHLORIDE 10 MG/ML
INJECTION, SOLUTION INFILTRATION; PERINEURAL AS NEEDED
Status: DISCONTINUED | OUTPATIENT
Start: 2019-01-16 | End: 2019-01-16 | Stop reason: SURG

## 2019-01-16 RX ADMIN — PROPOFOL 50 MG: 10 INJECTION, EMULSION INTRAVENOUS at 13:43

## 2019-01-16 RX ADMIN — PROPOFOL 50 MG: 10 INJECTION, EMULSION INTRAVENOUS at 13:48

## 2019-01-16 RX ADMIN — PROPOFOL 50 MG: 10 INJECTION, EMULSION INTRAVENOUS at 13:54

## 2019-01-16 RX ADMIN — DEXTROSE AND SODIUM CHLORIDE 30 ML/HR: 5; 450 INJECTION, SOLUTION INTRAVENOUS at 12:07

## 2019-01-16 RX ADMIN — LIDOCAINE HYDROCHLORIDE 50 MG: 10 INJECTION, SOLUTION INFILTRATION; PERINEURAL at 13:51

## 2019-01-16 RX ADMIN — PROPOFOL 50 MG: 10 INJECTION, EMULSION INTRAVENOUS at 13:51

## 2019-01-16 NOTE — ANESTHESIA POSTPROCEDURE EVALUATION
Patient: Jerald Bonilla    Procedure Summary     Date:  01/16/19 Room / Location:  [facility] ENDOSCOPY 3 / [facility] ENDOSCOPY    Anesthesia Start:  1338 Anesthesia Stop:  1358    Procedure:  ESOPHAGOGASTRODUODENOSCOPY possible dilation (N/A ) Diagnosis:       Dysphagia, unspecified type      Early satiety      Gastroesophageal reflux disease, esophagitis presence not specified      (Dysphagia, unspecified type [R13.10])      (Early satiety [R68.81])      (Gastroesophageal reflux disease, esophagitis presence not specified [K21.9])    Surgeon:  Jerald Nation MD Provider:  Tony Espinosa CRNA    Anesthesia Type:  MAC ASA Status:  3          Anesthesia Type: MAC  Last vitals  BP   159/72 (01/16/19 1155)   Temp   97.4 °F (36.3 °C) (01/16/19 1155)   Pulse   56 (01/16/19 1155)   Resp   16 (01/16/19 1155)     SpO2   97 % (01/16/19 1155)     Post Anesthesia Care and Evaluation    Patient location during evaluation: bedside  Patient participation: complete - patient cannot participate  Level of consciousness: awake  Pain score: 0  Pain management: adequate  Airway patency: patent  Anesthetic complications: No anesthetic complications  PONV Status: none  Cardiovascular status: acceptable  Respiratory status: acceptable  Hydration status: acceptable

## 2019-01-17 LAB
LAB AP CASE REPORT: NORMAL
PATH REPORT.FINAL DX SPEC: NORMAL
PATH REPORT.GROSS SPEC: NORMAL

## 2019-01-21 ENCOUNTER — TELEPHONE (OUTPATIENT)
Dept: GASTROENTEROLOGY | Facility: CLINIC | Age: 74
End: 2019-01-21

## 2019-01-21 NOTE — TELEPHONE ENCOUNTER
01/21/2019, 1600 - Patient telephone call returned per this staff member [phone] with recommendation per Dr. Jerald Mcfarlane M.D.regarding patient statement he is experiencing lump sensation in throat with clara of mouth tender to hot/cold/spicy liquids/foods in general since completion of EGD performed 01/16/2019 per JULIET Mcfarlane.  Per JULIET Mcfarlane - Patient to gargle with salt water, up clinical appointment with BEATRIS Miguel, or schedule next available with JULIET Mcfarlane.  Patient opted to up clinical appointment with BEATRIS Merino from Tuesday, January 29, 2019 at 3:00 p.m. to Tuesday, January 22, 2019 with patient opting for 2:15 p.m.

## 2019-01-22 ENCOUNTER — OFFICE VISIT (OUTPATIENT)
Dept: GASTROENTEROLOGY | Facility: CLINIC | Age: 74
End: 2019-01-22

## 2019-01-22 VITALS
HEART RATE: 61 BPM | SYSTOLIC BLOOD PRESSURE: 142 MMHG | HEIGHT: 70 IN | DIASTOLIC BLOOD PRESSURE: 64 MMHG | BODY MASS INDEX: 37.34 KG/M2 | WEIGHT: 260.8 LBS

## 2019-01-22 DIAGNOSIS — K22.2 STRICTURE AND STENOSIS OF ESOPHAGUS: Primary | ICD-10-CM

## 2019-01-22 DIAGNOSIS — R07.0 THROAT PAIN IN ADULT: ICD-10-CM

## 2019-01-22 DIAGNOSIS — K21.00 GASTROESOPHAGEAL REFLUX DISEASE WITH ESOPHAGITIS: ICD-10-CM

## 2019-01-22 PROCEDURE — 99214 OFFICE O/P EST MOD 30 MIN: CPT | Performed by: NURSE PRACTITIONER

## 2019-01-22 RX ORDER — PANTOPRAZOLE SODIUM 40 MG/1
40 TABLET, DELAYED RELEASE ORAL DAILY
Qty: 30 TABLET | Refills: 11 | Status: SHIPPED | OUTPATIENT
Start: 2019-01-22 | End: 2019-02-11 | Stop reason: SDUPTHER

## 2019-01-22 NOTE — PROGRESS NOTES
Chief Complaint   Patient presents with   • Difficulty Swallowing       Subjective    Jerald Bonilla is a 73 y.o. male. he is here today for follow-up.    History of Present Illness  73-year-old male presents to discuss EGD results.  Reports he has still had frequent throat clearing and throat pain.  States dysphagia has been somewhat better since EGD but still feels globus sensation when eating certain foods.  He has tried antihistamines decongestants which have not improved symptoms.  EGD completed/16/19 noted benign-appearing stenosis dilated to 54 Belgian, mildly severe esophagitis, gastritis and normal duodenum.  Biopsy of antrum noted reactive gastropathy.  Distal esophagus biopsy noted reactive changes of squamous mucosa.  Plan; have recommended patient continue antihistamines as prescribed per her primary care provider as symptoms started with an upper respiratory infection.  He would like referral to ENT physician.  Referral place consultation is appreciated.  Recommend he start PPI daily as this may be exacerbating symptoms.  Patient does not think reflux is exacerbating symptoms and is unsure as to whether he will take medication I have sent to pharmacy.  Follow-up if dysphagia recurs or worsen recheck in 1 month.     The following portions of the patient's history were reviewed and updated as appropriate:   Past Medical History:   Diagnosis Date   • Acne conglobata    • Acute maxillary sinusitis    • Benign prostate hyperplasia    • Blurring of visual image    • Carbuncle     of infected sebaceous cyst on back   • Dizziness and giddiness    • Erectile dysfunction    • Essential hypertension     seeing nephro   • Gastroesophageal reflux disease    • Gout    • Hip pain    • Hyperlipidemia    • Hypertension    • Hypertensive disorder    • Low back pain    • Malaise and fatigue    • Need for prophylactic vaccination against Streptococcus pneumoniae (pneumococcus)    • Nuclear senile cataract    •  Osteoarthritis    • Otalgia    • Otitis externa    • Pain in lower limb    • Rheumatoid arthritis (CMS/HCC)    • Skin lesion     right calf   • Swelling of knee joint    • Tinnitus    • Upper respiratory infection      Past Surgical History:   Procedure Laterality Date   • COLONOSCOPY  05/18/2009    Colonoscopy, diagnostic (screening) 56702 (1)    diverticuli, roids    • ENDOSCOPY N/A 1/16/2019    Procedure: ESOPHAGOGASTRODUODENOSCOPY possible dilation;  Surgeon: Jerald Nation MD;  Location: [facility] ENDOSCOPY;  Service: Gastroenterology   • HIP SURGERY Left 12/05/2016   • INJECTION OF MEDICATION  05/01/2015    B12 (3)     • INJECTION OF MEDICATION  10/31/2011    Rocephin (1)      • JOINT REPLACEMENT     • OTHER SURGICAL HISTORY  04/18/2016    &D, Complicated or Multiple 12315 (3)      • OTHER SURGICAL HISTORY  08/11/2014    Remove Impacted Cerumen 95428 (1)        Family History   Problem Relation Age of Onset   • Hypertension Father    • Heart disease Father    • Cancer Father    • Diabetes Father    • Kidney disease Father    • Hyperlipidemia Other    • Hypertension Other    • Cancer Other         prostate   • Arthritis Mother    • Hypertension Mother    • Obesity Mother    • Thyroid disease Mother    • Mental illness Mother    • Arthritis Maternal Grandmother        Current Outpatient Medications   Medication Sig Dispense Refill   • allopurinol (ZYLOPRIM) 100 MG tablet Take 2 tablets by mouth Daily. 180 tablet 3   • aspirin 81 MG tablet Take 81 mg by mouth Daily.     • B Complex Vitamins (VITAMIN B COMPLEX PO) Take 1 tablet by mouth Daily.     • carvedilol (COREG) 25 MG tablet TAKE TWO TABLETS BY MOUTH TWICE DAILY WITH MEALS 120 tablet 1   • cholecalciferol (VITAMIN D3) 1000 UNITS tablet Take 2,000 Units by mouth Daily.     • clindamycin (CLEOCIN T) 1 % lotion Apply  topically to the appropriate area as directed Every 12 (Twelve) Hours. 60 mL 1   • fluticasone (FLONASE) 50 MCG/ACT nasal spray 2 sprays into the  "nostril(s) as directed by provider Daily. 1 bottle 5   • NIFEdipine XL (PROCARDIA XL) 60 MG 24 hr tablet Take 180 mg by mouth Every Night.     • pravastatin (PRAVACHOL) 80 MG tablet Take 1 tablet by mouth Every Night. 90 tablet 3   • pseudoephedrine-guaifenesin (MUCINEX D)  MG per 12 hr tablet Take 1 tablet by mouth Every 12 (Twelve) Hours.     • valsartan-hydrochlorothiazide (DIOVAN-HCT) 320-25 MG per tablet Take 1 tablet by mouth Daily.     • pantoprazole (PROTONIX) 40 MG EC tablet Take 1 tablet by mouth Daily. 30 tablet 11     No current facility-administered medications for this visit.      Allergies   Allergen Reactions   • Lisinopril      Other reaction(s): cough/itching     Social History     Socioeconomic History   • Marital status:      Spouse name: Not on file   • Number of children: Not on file   • Years of education: Not on file   • Highest education level: Not on file   Tobacco Use   • Smoking status: Never Smoker   • Smokeless tobacco: Never Used   Substance and Sexual Activity   • Alcohol use: Yes     Comment: 1-13 per month   • Drug use: No   • Sexual activity: Defer       Review of Systems  Review of Systems   Constitutional: Negative for activity change, appetite change, chills, diaphoresis, fatigue, fever and unexpected weight change.   HENT: Negative for sore throat and trouble swallowing (much better in esophagus but burning in mouth ).    Respiratory: Negative for shortness of breath.    Gastrointestinal: Negative for abdominal distention, abdominal pain, anal bleeding, blood in stool, constipation, diarrhea, nausea, rectal pain and vomiting.   Musculoskeletal: Negative for arthralgias.   Skin: Negative for pallor.   Neurological: Negative for light-headedness.        /64 (BP Location: Left arm)   Pulse 61   Ht 177.8 cm (70\")   Wt 118 kg (260 lb 12.8 oz)   BMI 37.42 kg/m²     Objective    Physical Exam   Constitutional: He is oriented to person, place, and time. He " appears well-developed and well-nourished. He is cooperative. No distress.   HENT:   Head: Normocephalic and atraumatic.   Mouth/Throat: Oropharynx is clear and moist and mucous membranes are normal. Normal dentition.       Neck: Normal range of motion. Neck supple. No thyromegaly present.   Cardiovascular: Normal rate, regular rhythm and normal heart sounds.   Pulmonary/Chest: Effort normal and breath sounds normal. He has no wheezes. He has no rhonchi. He has no rales.   Abdominal: Soft. Normal appearance and bowel sounds are normal. He exhibits no distension, no fluid wave and no ascites. There is no hepatosplenomegaly. There is tenderness in the epigastric area. There is no rigidity and no guarding. No hernia.   Lymphadenopathy:     He has no cervical adenopathy.   Neurological: He is alert and oriented to person, place, and time.   Skin: Skin is warm, dry and intact. No rash noted. No pallor.   Psychiatric: He has a normal mood and affect. His speech is normal.     Admission on 01/16/2019, Discharged on 01/16/2019   Component Date Value Ref Range Status   • Case Report 01/16/2019    Final                    Value:Surgical Pathology Report                         Case: [id number]                                  Authorizing Provider:  Jerald Nation MD        Collected:           01/16/2019 01:55 PM          Ordering Location:     [facility]             Received:            01/16/2019 02:21 PM                                 [place] [facility]                                                     Pathologist:           Aaron Shields MD                                                         Specimens:   1) - Gastric, Antrum                                                                                2) - Esophagus, Distal                                                                    • Final Diagnosis 01/16/2019    Final                    Value:This result contains rich text formatting which  cannot be displayed here.   • Gross Description 01/16/2019    Final                    Value:This result contains rich text formatting which cannot be displayed here.     Assessment/Plan      1. Stricture and stenosis of esophagus    2. Gastroesophageal reflux disease with esophagitis    3. Throat pain in adult    .       Orders placed during this encounter include:  Orders Placed This Encounter   Procedures   • Ambulatory Referral to ENT (Otolaryngology)     Referral Priority:   Routine     Referral Type:   Consultation     Referral Reason:   Specialty Services Required     Referred to Provider:   Gianfranco Cheney MD     Requested Specialty:   Otolaryngology     Number of Visits Requested:   1       * Surgery not found *    Review and/or summary of lab tests, radiology, procedures, medications. Review and summary of old records and obtaining of history. The risks and benefits of my recommendations, as well as other treatment options were discussed with the patient today. Questions were answered.    New Medications Ordered This Visit   Medications   • pantoprazole (PROTONIX) 40 MG EC tablet     Sig: Take 1 tablet by mouth Daily.     Dispense:  30 tablet     Refill:  11       Follow-up: Return in about 4 weeks (around 2/19/2019).          This document has been electronically signed by BEATRIS Cheng on January 30, 2019 8:32 AM             Results for orders placed or performed during the hospital encounter of 01/24/19   POCT Rapid Strep A   Result Value Ref Range    Rapid Strep A Screen Negative Negative, VALID, INVALID, Not Performed    Internal Control Passed Passed    Lot Number [id number]     Expiration Date 05/31/20    Throat / Upper Respiratory Culture - Swab, Throat   Result Value Ref Range    Throat Culture Normal Throat Christine    Results for orders placed or performed during the hospital encounter of 01/16/19   Tissue Pathology Exam   Result Value Ref Range    Case Report       Surgical Pathology  "Report                         Case: [id number]                                  Authorizing Provider:  Jerald Nation MD        Collected:           01/16/2019 01:55 PM          Ordering Location:     [facility]             Received:            01/16/2019 02:21 PM                                 [place] [facility]                                                     Pathologist:           Aaron Shields MD                                                         Specimens:   1) - Gastric, Antrum                                                                                2) - Esophagus, Distal                                                                     Final Diagnosis       1.  MUCOSA, ANTRUM OF STOMACH:   REACTIVE GASTROPATHY.     2.  MUCOSA, DISTAL ESOPHAGUS:   REACTIVE CHANGE OF SQUAMOUS MUCOSA.       Gross Description       1.  The first container is labeled \"antrum of stomach\" and has a nodular fragment of white soft tissue measuring 0.4 cm in greatest dimension.  The entire specimen is embedded as 1A.     2.  The second container is labeled \"distal esophagus\" and has a nodular fragment of white soft tissue measuring 0.3 cm in greatest dimension.  The entire specimen is embedded as 2A.      Results for orders placed or performed in visit on 09/11/18   Uric acid   Result Value Ref Range    Uric Acid 7.5 2.5 - 8.5 mg/dL   Results for orders placed or performed in visit on 09/11/18   CBC Auto Differential   Result Value Ref Range    WBC 4.34 3.20 - 9.80 10*3/mm3    RBC 5.44 4.37 - 5.74 10*6/mm3    Hemoglobin 15.7 13.7 - 17.3 g/dL    Hematocrit 45.6 39.0 - 49.0 %    MCV 83.8 80.0 - 98.0 fL    MCH 28.9 26.5 - 34.0 pg    MCHC 34.4 31.5 - 36.3 g/dL    RDW 14.1 11.5 - 14.5 %    RDW-SD 43.6 35.1 - 43.9 fl    MPV 9.5 8.0 - 12.0 fL    Platelets 190 150 - 450 10*3/mm3    Neutrophil % 49.2 37.0 - 80.0 %    Lymphocyte % 34.6 10.0 - 50.0 %    Monocyte % 12.0 0.0 - 12.0 %    Eosinophil % 3.0 0.0 - 7.0 % "    Basophil % 0.7 0.0 - 2.0 %    Immature Grans % 0.5 0.0 - 0.5 %    Neutrophils, Absolute 2.14 2.00 - 8.60 10*3/mm3    Lymphocytes, Absolute 1.50 0.60 - 4.20 10*3/mm3    Monocytes, Absolute 0.52 0.00 - 0.90 10*3/mm3    Eosinophils, Absolute 0.13 0.00 - 0.70 10*3/mm3    Basophils, Absolute 0.03 0.00 - 0.20 10*3/mm3    Immature Grans, Absolute 0.02 0.00 - 0.02 10*3/mm3    nRBC 0.0 0.0 - 0.0 /100 WBC   Sedimentation Rate   Result Value Ref Range    Sed Rate 18 (H) 0 - 15 mm/hr   Basic Metabolic Panel   Result Value Ref Range    Glucose 114 (H) 60 - 100 mg/dL    BUN 25 (H) 7 - 21 mg/dL    Creatinine 1.51 (H) 0.70 - 1.30 mg/dL    Sodium 139 137 - 145 mmol/L    Potassium 3.8 3.5 - 5.1 mmol/L    Chloride 105 95 - 110 mmol/L    CO2 24.0 22.0 - 31.0 mmol/L    Calcium 9.1 8.4 - 10.2 mg/dL    eGFR  African Amer 56 42 - 98 mL/min/1.73    BUN/Creatinine Ratio 16.6 7.0 - 25.0    Anion Gap 10.0 5.0 - 15.0 mmol/L   Results for orders placed or performed in visit on 08/10/18   Protein / Creatinine Ratio, Urine - Urine, Clean Catch   Result Value Ref Range    Protein/Creatinine Ratio, Urine 45.4 0.0 - 200.0 mg/G Crea    Creatinine, Urine 152.0 mg/dL    Total Protein, Urine 6.9 mg/dL   Renal Function Panel   Result Value Ref Range    Glucose 84 60 - 100 mg/dL    BUN 25 (H) 7 - 21 mg/dL    Creatinine 1.38 (H) 0.70 - 1.30 mg/dL    Sodium 141 137 - 145 mmol/L    Potassium 3.8 3.5 - 5.1 mmol/L    Chloride 106 95 - 110 mmol/L    CO2 29.0 22.0 - 31.0 mmol/L    Calcium 9.1 8.4 - 10.2 mg/dL    Albumin 4.00 3.40 - 4.80 g/dL    Phosphorus 3.0 2.4 - 4.4 mg/dL    Anion Gap 6.0 5.0 - 15.0 mmol/L    BUN/Creatinine Ratio 18.1 7.0 - 25.0    eGFR  African Amer 62 42 - 98 mL/min/1.73   Results for orders placed or performed in visit on 06/14/18   Urinalysis, Microscopic Only - Urine, Clean Catch   Result Value Ref Range    RBC, UA None Seen None Seen /HPF    WBC, UA 0-2 None Seen, 0-2, 3-5 /HPF    Bacteria, UA None Seen None Seen /HPF    Squamous  Epithelial Cells, UA 0-2 None Seen, 0-2 /HPF    Hyaline Casts, UA None Seen None Seen /LPF    Methodology Manual Light Microscopy    CBC Auto Differential   Result Value Ref Range    WBC 4.36 3.20 - 9.80 10*3/mm3    RBC 5.48 4.37 - 5.74 10*6/mm3    Hemoglobin 15.9 13.7 - 17.3 g/dL    Hematocrit 45.7 39.0 - 49.0 %    MCV 83.4 80.0 - 98.0 fL    MCH 29.0 26.5 - 34.0 pg    MCHC 34.8 31.5 - 36.3 g/dL    RDW 14.1 11.5 - 14.5 %    RDW-SD 43.0 35.1 - 43.9 fl    MPV 9.8 8.0 - 12.0 fL    Platelets 177 150 - 450 10*3/mm3    Neutrophil % 52.1 37.0 - 80.0 %    Lymphocyte % 32.1 10.0 - 50.0 %    Monocyte % 10.6 0.0 - 12.0 %    Eosinophil % 4.1 0.0 - 7.0 %    Basophil % 0.9 0.0 - 2.0 %    Immature Grans % 0.2 0.0 - 0.5 %    Neutrophils, Absolute 2.27 2.00 - 8.60 10*3/mm3    Lymphocytes, Absolute 1.40 0.60 - 4.20 10*3/mm3    Monocytes, Absolute 0.46 0.00 - 0.90 10*3/mm3    Eosinophils, Absolute 0.18 0.00 - 0.70 10*3/mm3    Basophils, Absolute 0.04 0.00 - 0.20 10*3/mm3    Immature Grans, Absolute 0.01 0.00 - 0.02 10*3/mm3   Urinalysis - Urine, Clean Catch   Result Value Ref Range    Color, UA Yellow Yellow, Straw, Dark Yellow, Dol[place]    Appearance, UA Clear Clear    pH, UA 6.0 5.0 - 9.0    Specific Gravity, UA 1.017 1.003 - 1.030    Glucose, UA Negative Negative    Ketones, UA Negative Negative    Bilirubin, UA Negative Negative    Blood, UA Negative Negative    Protein, UA Trace (A) Negative    Leuk Esterase, UA Negative Negative    Nitrite, UA Negative Negative    Urobilinogen, UA 1.0 E.U./dL 0.2 - 1.0 E.U./dL     *Note: Due to a large number of results and/or encounters for the requested time period, some results have not been displayed. A complete set of results can be found in Results Review.

## 2019-01-24 PROCEDURE — 87070 CULTURE OTHR SPECIMN AEROBIC: CPT | Performed by: NURSE PRACTITIONER

## 2019-01-30 ENCOUNTER — OFFICE VISIT (OUTPATIENT)
Dept: OTOLARYNGOLOGY | Facility: CLINIC | Age: 74
End: 2019-01-30

## 2019-01-30 VITALS — RESPIRATION RATE: 17 BRPM | WEIGHT: 273.4 LBS | HEIGHT: 70 IN | BODY MASS INDEX: 39.14 KG/M2

## 2019-01-30 DIAGNOSIS — J37.0 CHRONIC LARYNGITIS: ICD-10-CM

## 2019-01-30 DIAGNOSIS — J31.0 CHRONIC RHINITIS: Primary | ICD-10-CM

## 2019-01-30 PROCEDURE — 99203 OFFICE O/P NEW LOW 30 MIN: CPT | Performed by: OTOLARYNGOLOGY

## 2019-01-30 PROCEDURE — 31575 DIAGNOSTIC LARYNGOSCOPY: CPT | Performed by: OTOLARYNGOLOGY

## 2019-01-30 RX ORDER — ECHINACEA PURPUREA EXTRACT 125 MG
1 TABLET ORAL AS NEEDED
COMMUNITY
End: 2020-12-27

## 2019-02-01 NOTE — PROGRESS NOTES
Subjective   Jerald Bonilla is a 73 y.o. male.   This is a consultation from Emilia SPEAR  History of Present Illness   Patient reports that he feels like he has a lot of mucus in his throat.  Has chronic nasal congestion.  Has been using Flonase with no improvement.  Uses CPAP nightly.  Does use a humidifier.  Underwent an EGD on 1/16/19 and for a while afterwards felt some pain in the roof of his mouth.  This is beginning to improve.  He tried using the generic for mucinous and says he thinks this helps some.  He does have acid reflux for which he is been placed on pantoprazole.  Denies any hemoptysis.      The following portions of the patient's history were reviewed and updated as appropriate: allergies, current medications, past family history, past medical history, past social history, past surgical history and problem list.      Jerald Bonilla reports that  has never smoked. he has never used smokeless tobacco. He reports that he drinks alcohol. He reports that he does not use drugs.  Patient is not a tobacco user and has not been counseled for use of tobacco products    Family History   Problem Relation Age of Onset   • Hypertension Father    • Heart disease Father    • Cancer Father    • Diabetes Father    • Kidney disease Father    • Hyperlipidemia Other    • Hypertension Other    • Cancer Other         prostate   • Arthritis Mother    • Hypertension Mother    • Obesity Mother    • Thyroid disease Mother    • Mental illness Mother    • Arthritis Maternal Grandmother        Allergies   Allergen Reactions   • Lisinopril      Other reaction(s): cough/itching         Current Outpatient Medications:   •  allopurinol (ZYLOPRIM) 100 MG tablet, Take 2 tablets by mouth Daily., Disp: 180 tablet, Rfl: 3  •  aspirin 81 MG tablet, Take 81 mg by mouth Daily., Disp: , Rfl:   •  B Complex Vitamins (VITAMIN B COMPLEX PO), Take 1 tablet by mouth Daily., Disp: , Rfl:   •  carvedilol (COREG) 25 MG tablet, TAKE  TWO TABLETS BY MOUTH TWICE DAILY WITH MEALS, Disp: 120 tablet, Rfl: 1  •  cholecalciferol (VITAMIN D3) 1000 UNITS tablet, Take 2,000 Units by mouth Daily., Disp: , Rfl:   •  clindamycin (CLEOCIN T) 1 % lotion, Apply  topically to the appropriate area as directed Every 12 (Twelve) Hours., Disp: 60 mL, Rfl: 1  •  fluticasone (FLONASE) 50 MCG/ACT nasal spray, 2 sprays into the nostril(s) as directed by provider Daily., Disp: 1 bottle, Rfl: 5  •  NIFEdipine XL (PROCARDIA XL) 60 MG 24 hr tablet, Take 180 mg by mouth Every Night., Disp: , Rfl:   •  pantoprazole (PROTONIX) 40 MG EC tablet, Take 1 tablet by mouth Daily., Disp: 30 tablet, Rfl: 11  •  pravastatin (PRAVACHOL) 80 MG tablet, Take 1 tablet by mouth Every Night., Disp: 90 tablet, Rfl: 3  •  pseudoephedrine-guaifenesin (MUCINEX D)  MG per 12 hr tablet, Take 1 tablet by mouth Every 12 (Twelve) Hours., Disp: , Rfl:   •  sodium chloride (OCEAN) 0.65 % nasal spray, 1 spray into the nostril(s) as directed by provider As Needed for Congestion., Disp: , Rfl:   •  valsartan-hydrochlorothiazide (DIOVAN-HCT) 320-25 MG per tablet, Take 1 tablet by mouth Daily., Disp: , Rfl:     Past Medical History:   Diagnosis Date   • Acne conglobata    • Acute maxillary sinusitis    • Benign prostate hyperplasia    • Blurring of visual image    • Carbuncle     of infected sebaceous cyst on back   • Dizziness and giddiness    • Erectile dysfunction    • Essential hypertension     seeing nephro   • Gastroesophageal reflux disease    • Gout    • Hip pain    • Hyperlipidemia    • Hypertension    • Hypertensive disorder    • Low back pain    • Malaise and fatigue    • Need for prophylactic vaccination against Streptococcus pneumoniae (pneumococcus)    • Nuclear senile cataract    • Osteoarthritis    • Otalgia    • Otitis externa    • Pain in lower limb    • Rheumatoid arthritis (CMS/HCC)    • Skin lesion     right calf   • Swelling of knee joint    • Tinnitus    • Upper respiratory  infection          Review of Systems   HENT: Positive for hearing loss, postnasal drip and sore throat.    Musculoskeletal: Positive for arthralgias and neck stiffness.   All other systems reviewed and are negative.          Objective   Physical Exam    General: Well-developed well-nourished male in no acute distress.  Alert and oriented ×3. Head: Normocephalic. Face: Symmetrical strength and appearance. PERRL. EOMI. Voice:Strong. Speech:Fluent  Ears: External ears no deformity, canals no discharge, tympanic membranes intact clear and mobile bilaterally.  Nose: Nares show no discharge mass polyp or purulence.  Boggy mucosa is present.  No gross external deformity.  Septum: Midline  Oral cavity: Lips and gums without lesions.  Tongue and floor of mouth without lesions.  Parotid and submandibular ducts unobstructed.  No mucosal lesions on the buccal mucosa or vestibule of the mouth.  Palate shows a torus palatinus that is not neoplastic and otherwise no mucosal lesions or abnormality  Pharynx: No erythema exudate mass or ulcer  Neck: No lymphadenopathy.  No thyromegaly.  Trachea and larynx midline.  No masses in the parotid or submandibular glands.  Procedure Note    Pre-operative Diagnosis:   Chief Complaint   Patient presents with   • Sore Throat       Post-operative Diagnosis: Chronic laryngitis; chronic rhinitis    Anesthesia: topical with xylocaine and neosynephrine    Endoscopy Type:  Flexible Laryngoscopy    Procedure Details:    The patient was placed in the sitting position.  After topical anesthesia and decongestion, the 4 mm laryngoscope was passed.  The nasal cavities, nasopharynx, oropharynx, hypopharynx, and larynx were all examined.  Vocal cords were examined during respiration and phonation.  The following findings were noted:    Findings: As the scope is passed through the nose some thick nonpurulent secretions are noted.  Nasopharynx is without mass.  Hypopharynx shows no evidence of neoplasm.   Endolarynx shows chronic appearing edema and erythema with no evidence of neoplasm.  Vocal cord mobility is intact.  There are some thick nonpurulent secretions present in the hypopharynx as well.    Condition:  Stable.  Patient tolerated procedure well.    Complications:  None    Assessment/Plan   Jerald was seen today for sore throat.    Diagnoses and all orders for this visit:    Chronic rhinitis    Chronic laryngitis        Plan: I suspect dryness and postnasal drainage are large portion of the patient's symptomatology.  Reflux is also playing a role but I will defer management of that to the gastroenterologists.  Advised to use Mucinex( or the generic) daily along with frequent use of nasal saline spray and ensuring that his CPAP is humidified properly.  Follow-up with me as needed for nonresolution.

## 2019-02-11 ENCOUNTER — TELEPHONE (OUTPATIENT)
Dept: FAMILY MEDICINE CLINIC | Facility: CLINIC | Age: 74
End: 2019-02-11

## 2019-02-11 ENCOUNTER — TRANSCRIBE ORDERS (OUTPATIENT)
Dept: LAB | Facility: HOSPITAL | Age: 74
End: 2019-02-11

## 2019-02-11 DIAGNOSIS — H91.93 HEARING PROBLEM OF BOTH EARS: Primary | ICD-10-CM

## 2019-02-11 DIAGNOSIS — I10 ESSENTIAL (PRIMARY) HYPERTENSION: Primary | ICD-10-CM

## 2019-02-11 RX ORDER — PANTOPRAZOLE SODIUM 40 MG/1
40 TABLET, DELAYED RELEASE ORAL DAILY
Qty: 30 TABLET | Refills: 11 | Status: SHIPPED | OUTPATIENT
Start: 2019-02-11 | End: 2020-03-03 | Stop reason: SDUPTHER

## 2019-02-11 NOTE — TELEPHONE ENCOUNTER
He called and wants to know if he can get referral sent to Dr Cheney for audio and hearing problem. He said he saw Dr Cheney recently but that was for throat problem. He can be reached at [phone]

## 2019-03-13 ENCOUNTER — APPOINTMENT (OUTPATIENT)
Dept: LAB | Facility: HOSPITAL | Age: 74
End: 2019-03-13

## 2019-03-13 LAB
ALBUMIN SERPL-MCNC: 4.3 G/DL (ref 3.4–4.8)
ANION GAP SERPL CALCULATED.3IONS-SCNC: 9 MMOL/L (ref 5–15)
BUN BLD-MCNC: 25 MG/DL (ref 7–21)
BUN/CREAT SERPL: 16.3 (ref 7–25)
CALCIUM SPEC-SCNC: 9.5 MG/DL (ref 8.4–10.2)
CHLORIDE SERPL-SCNC: 104 MMOL/L (ref 95–110)
CO2 SERPL-SCNC: 25 MMOL/L (ref 22–31)
CREAT BLD-MCNC: 1.53 MG/DL (ref 0.7–1.3)
CREAT UR-MCNC: 137.7 MG/DL
GFR SERPL CREATININE-BSD FRML MDRD: 55 ML/MIN/1.73 (ref 42–98)
GLUCOSE BLD-MCNC: 70 MG/DL (ref 60–100)
PHOSPHATE SERPL-MCNC: 2.9 MG/DL (ref 2.4–4.4)
POTASSIUM BLD-SCNC: 3.5 MMOL/L (ref 3.5–5.1)
PROT UR-MCNC: 7.2 MG/DL
PROT/CREAT UR: 52.3 MG/G CREA (ref 0–200)
SODIUM BLD-SCNC: 138 MMOL/L (ref 137–145)

## 2019-03-13 PROCEDURE — 84156 ASSAY OF PROTEIN URINE: CPT | Performed by: INTERNAL MEDICINE

## 2019-03-13 PROCEDURE — 80069 RENAL FUNCTION PANEL: CPT | Performed by: INTERNAL MEDICINE

## 2019-03-13 PROCEDURE — 82570 ASSAY OF URINE CREATININE: CPT | Performed by: INTERNAL MEDICINE

## 2019-03-13 PROCEDURE — 36415 COLL VENOUS BLD VENIPUNCTURE: CPT | Performed by: INTERNAL MEDICINE

## 2019-04-01 RX ORDER — PRAVASTATIN SODIUM 80 MG/1
80 TABLET ORAL NIGHTLY
Qty: 90 TABLET | Refills: 3 | Status: SHIPPED | OUTPATIENT
Start: 2019-04-01 | End: 2020-03-03

## 2019-04-02 ENCOUNTER — OFFICE VISIT (OUTPATIENT)
Dept: SLEEP MEDICINE | Facility: HOSPITAL | Age: 74
End: 2019-04-02

## 2019-04-02 VITALS
SYSTOLIC BLOOD PRESSURE: 136 MMHG | HEIGHT: 70 IN | WEIGHT: 267 LBS | OXYGEN SATURATION: 93 % | HEART RATE: 57 BPM | BODY MASS INDEX: 38.22 KG/M2 | DIASTOLIC BLOOD PRESSURE: 61 MMHG

## 2019-04-02 DIAGNOSIS — G47.33 OBSTRUCTIVE SLEEP APNEA, ADULT: Primary | ICD-10-CM

## 2019-04-02 PROCEDURE — 99213 OFFICE O/P EST LOW 20 MIN: CPT | Performed by: INTERNAL MEDICINE

## 2019-04-02 NOTE — PROGRESS NOTES
Sleep Clinic Follow Up    Date: 2019  Primary Care Physician: Annie Penny MD    Last office visit: 2019 (I reviewed this note)    CC: Follow up: CHRIS, insomnia    Sleep Testin. PSG on 2007, AHI of 34, on CPAP of 11 cm H2O, supine AHI of > 100    Assessment and Plan:    1. Obstructive sleep apnea Established, stable (1)  1. Compliant and improved with PAP therapy  2. Continue PAP as prescribed.   3. Script for PAP supplies  2. Chronic bronchitis with mucus production (at night) and post nasal drip  1. Continue claritin, flonase, nasal saline  2. Was seen and scoped by ENT  3. His sx seem to be positional (recumbent) and resistant to 1st line rx - consider sinus imaging  3. GERD  1. S/p endoscopy in  - negative      Interim History:  Since the last visit:    1) moderate CHRIS -  Jerald Bonilla has remained compliant with CPAP. He denies mask and machine issues, dry mouth, headaches, or pressures intolerance. He denies abnormal dreams, sleep paralysis, nasal congestion, URI sx.    PAP Data:    Time frame: 2019 -2019   Compliance 98.6 %  Average use on days used: 6hrs 48 min  Percent of days with usage greater than or equal to 4 hours: 97.1%  PAP range : 5-20 cm H2O  Average 90% pressure: 11.4 cmH2O  Leak: 4 minutes  Average AHI 6.6 events/hr  Mask type: Full face mask  DME: [facility]    Bed time: 5213-5976  Sleep latency: 5-10 minutes  Number of times awakens during the night: 2  Wake time: 4946-8833  Estimated total sleep time at night: 4-5 hours  Caffeine intake: 8oz of coffee, 0oz of tea, and 0oz of soda  Alcohol intake: 2 drinks per week  Nap time: daily especially if he does not sleep well the night prior  Sleepiness with Driving: none    [place] - 5    2) Patient denies RLS symptoms.     PMHx, FH, SH reviewed and pertinent changes are: unchanged from last office visit on 2019      REVIEW OF SYSTEMS:   Negative for chest pain, fever, cough, SOA, abdominal pain.  Smoking:none      Exam:  Vitals:    04/02/19 1038   BP: 136/61   Pulse: 57   SpO2: 93%           04/02/19  1038   Weight: 121 kg (267 lb)     Body mass index is 38.31 kg/m². Patient's Body mass index is 38.31 kg/m². BMI is above normal parameters. Recommendations include: referral to primary care.      Gen:  No acute distress, alert, oriented  Lungs:  CTA with normal effort   CV:  RRR, no M/R/G  GI:  soft, non-tender  Psych:  Appropriate affect    Past Medical History:   Diagnosis Date   • Acne conglobata    • Acute maxillary sinusitis    • Benign prostate hyperplasia    • Blurring of visual image    • Carbuncle     of infected sebaceous cyst on back   • Dizziness and giddiness    • Erectile dysfunction    • Essential hypertension     seeing nephro   • Gastroesophageal reflux disease    • Gout    • Hip pain    • Hyperlipidemia    • Hypertension    • Hypertensive disorder    • Low back pain    • Malaise and fatigue    • Need for prophylactic vaccination against Streptococcus pneumoniae (pneumococcus)    • Nuclear senile cataract    • Osteoarthritis    • Otalgia    • Otitis externa    • Pain in lower limb    • Rheumatoid arthritis (CMS/HCC)    • Skin lesion     right calf   • Swelling of knee joint    • Tinnitus    • Upper respiratory infection        Current Outpatient Medications:   •  allopurinol (ZYLOPRIM) 100 MG tablet, Take 2 tablets by mouth Daily., Disp: 180 tablet, Rfl: 3  •  aspirin 81 MG tablet, Take 81 mg by mouth Daily., Disp: , Rfl:   •  B Complex Vitamins (VITAMIN B COMPLEX PO), Take 1 tablet by mouth Daily., Disp: , Rfl:   •  carvedilol (COREG) 25 MG tablet, TAKE TWO TABLETS BY MOUTH TWICE DAILY WITH MEALS, Disp: 120 tablet, Rfl: 1  •  cholecalciferol (VITAMIN D3) 1000 UNITS tablet, Take 2,000 Units by mouth Daily., Disp: , Rfl:   •  clindamycin (CLEOCIN T) 1 % lotion, Apply  topically to the appropriate area as directed Every 12 (Twelve) Hours., Disp: 60 mL, Rfl: 1  •  fluticasone (FLONASE) 50 MCG/ACT  nasal spray, 2 sprays into the nostril(s) as directed by provider Daily., Disp: 1 bottle, Rfl: 5  •  NIFEdipine XL (PROCARDIA XL) 60 MG 24 hr tablet, Take 180 mg by mouth Every Night., Disp: , Rfl:   •  pantoprazole (PROTONIX) 40 MG EC tablet, Take 1 tablet by mouth Daily., Disp: 30 tablet, Rfl: 11  •  pravastatin (PRAVACHOL) 80 MG tablet, TAKE 1 TABLET BY MOUTH  EVERY NIGHT, Disp: 90 tablet, Rfl: 3  •  pseudoephedrine-guaifenesin (MUCINEX D)  MG per 12 hr tablet, Take 1 tablet by mouth Every 12 (Twelve) Hours., Disp: , Rfl:   •  sodium chloride (OCEAN) 0.65 % nasal spray, 1 spray into the nostril(s) as directed by provider As Needed for Congestion., Disp: , Rfl:   •  valsartan-hydrochlorothiazide (DIOVAN-HCT) 320-25 MG per tablet, Take 1 tablet by mouth Daily., Disp: , Rfl:     Total time 25 min, more than half spent in face to face counseling and coordination of care.    RTC in 6 months     This document has been electronically signed by Santo Nunes MD on April 2, 2019         CC: Annie Penny MD          No ref. provider found

## 2019-05-02 RX ORDER — CARVEDILOL 25 MG/1
50 TABLET ORAL 2 TIMES DAILY WITH MEALS
Qty: 360 TABLET | Refills: 1 | Status: SHIPPED | OUTPATIENT
Start: 2019-05-02 | End: 2019-11-19 | Stop reason: SDUPTHER

## 2019-05-02 RX ORDER — NIFEDIPINE 60 MG/1
180 TABLET, EXTENDED RELEASE ORAL NIGHTLY
Qty: 270 TABLET | Refills: 1 | Status: SHIPPED | OUTPATIENT
Start: 2019-05-02 | End: 2019-05-08

## 2019-05-02 RX ORDER — VALSARTAN AND HYDROCHLOROTHIAZIDE 320; 25 MG/1; MG/1
1 TABLET, FILM COATED ORAL DAILY
Qty: 90 TABLET | Refills: 1 | Status: SHIPPED | OUTPATIENT
Start: 2019-05-02 | End: 2019-09-07 | Stop reason: SDUPTHER

## 2019-05-08 RX ORDER — NIFEDIPINE 90 MG/1
180 TABLET, EXTENDED RELEASE ORAL NIGHTLY
Qty: 180 TABLET | Refills: 3 | Status: SHIPPED | OUTPATIENT
Start: 2019-05-08 | End: 2020-06-04

## 2019-06-12 ENCOUNTER — TELEPHONE (OUTPATIENT)
Dept: FAMILY MEDICINE CLINIC | Facility: CLINIC | Age: 74
End: 2019-06-12

## 2019-06-20 ENCOUNTER — LAB (OUTPATIENT)
Dept: LAB | Facility: HOSPITAL | Age: 74
End: 2019-06-20

## 2019-06-20 ENCOUNTER — OFFICE VISIT (OUTPATIENT)
Dept: FAMILY MEDICINE CLINIC | Facility: CLINIC | Age: 74
End: 2019-06-20

## 2019-06-20 VITALS
DIASTOLIC BLOOD PRESSURE: 60 MMHG | HEIGHT: 70 IN | OXYGEN SATURATION: 96 % | SYSTOLIC BLOOD PRESSURE: 124 MMHG | WEIGHT: 260.2 LBS | HEART RATE: 59 BPM | BODY MASS INDEX: 37.25 KG/M2

## 2019-06-20 DIAGNOSIS — I10 ESSENTIAL HYPERTENSION: Chronic | ICD-10-CM

## 2019-06-20 DIAGNOSIS — N18.30 STAGE 3 CHRONIC KIDNEY DISEASE (HCC): Chronic | ICD-10-CM

## 2019-06-20 DIAGNOSIS — Z12.11 SCREENING FOR COLON CANCER: ICD-10-CM

## 2019-06-20 DIAGNOSIS — I10 ESSENTIAL HYPERTENSION: ICD-10-CM

## 2019-06-20 DIAGNOSIS — Z12.5 ENCOUNTER FOR PROSTATE CANCER SCREENING: ICD-10-CM

## 2019-06-20 DIAGNOSIS — R09.81 NASAL CONGESTION: ICD-10-CM

## 2019-06-20 DIAGNOSIS — Z00.00 MEDICARE ANNUAL WELLNESS VISIT, SUBSEQUENT: Primary | ICD-10-CM

## 2019-06-20 DIAGNOSIS — E78.2 MIXED HYPERLIPIDEMIA: Chronic | ICD-10-CM

## 2019-06-20 DIAGNOSIS — M10.371 ACUTE GOUT DUE TO RENAL IMPAIRMENT INVOLVING TOE OF RIGHT FOOT: ICD-10-CM

## 2019-06-20 DIAGNOSIS — E78.2 MIXED HYPERLIPIDEMIA: ICD-10-CM

## 2019-06-20 LAB
ALBUMIN SERPL-MCNC: 4.4 G/DL (ref 3.5–5.2)
ALBUMIN/GLOB SERPL: 1.4 G/DL
ALP SERPL-CCNC: 45 U/L (ref 39–117)
ALT SERPL W P-5'-P-CCNC: 20 U/L (ref 1–41)
ANION GAP SERPL CALCULATED.3IONS-SCNC: 12 MMOL/L
AST SERPL-CCNC: 20 U/L (ref 1–40)
BASOPHILS # BLD AUTO: 0.04 10*3/MM3 (ref 0–0.2)
BASOPHILS NFR BLD AUTO: 1 % (ref 0–1.5)
BILIRUB SERPL-MCNC: 0.4 MG/DL (ref 0.2–1.2)
BILIRUB UR QL STRIP: NEGATIVE
BUN BLD-MCNC: 23 MG/DL (ref 8–23)
BUN/CREAT SERPL: 15.6 (ref 7–25)
CALCIUM SPEC-SCNC: 9.8 MG/DL (ref 8.6–10.5)
CHLORIDE SERPL-SCNC: 107 MMOL/L (ref 98–107)
CHOLEST SERPL-MCNC: 150 MG/DL (ref 0–200)
CLARITY UR: CLEAR
CO2 SERPL-SCNC: 25 MMOL/L (ref 22–29)
COLOR UR: YELLOW
CREAT BLD-MCNC: 1.47 MG/DL (ref 0.76–1.27)
DEPRECATED RDW RBC AUTO: 45.3 FL (ref 37–54)
EOSINOPHIL # BLD AUTO: 0.16 10*3/MM3 (ref 0–0.4)
EOSINOPHIL NFR BLD AUTO: 3.9 % (ref 0.3–6.2)
ERYTHROCYTE [DISTWIDTH] IN BLOOD BY AUTOMATED COUNT: 14.5 % (ref 12.3–15.4)
GFR SERPL CREATININE-BSD FRML MDRD: 57 ML/MIN/1.73
GLOBULIN UR ELPH-MCNC: 3.2 GM/DL
GLUCOSE BLD-MCNC: 103 MG/DL (ref 65–99)
GLUCOSE UR STRIP-MCNC: NEGATIVE MG/DL
HCT VFR BLD AUTO: 47.7 % (ref 37.5–51)
HDLC SERPL-MCNC: 42 MG/DL (ref 40–60)
HGB BLD-MCNC: 15.8 G/DL (ref 13–17.7)
HGB UR QL STRIP.AUTO: NEGATIVE
IMM GRANULOCYTES # BLD AUTO: 0.01 10*3/MM3 (ref 0–0.05)
IMM GRANULOCYTES NFR BLD AUTO: 0.2 % (ref 0–0.5)
KETONES UR QL STRIP: NEGATIVE
LDLC SERPL CALC-MCNC: 87 MG/DL (ref 0–100)
LDLC/HDLC SERPL: 2.06 {RATIO}
LEUKOCYTE ESTERASE UR QL STRIP.AUTO: NEGATIVE
LYMPHOCYTES # BLD AUTO: 1.19 10*3/MM3 (ref 0.7–3.1)
LYMPHOCYTES NFR BLD AUTO: 29.2 % (ref 19.6–45.3)
MCH RBC QN AUTO: 28.5 PG (ref 26.6–33)
MCHC RBC AUTO-ENTMCNC: 33.1 G/DL (ref 31.5–35.7)
MCV RBC AUTO: 86.1 FL (ref 79–97)
MONOCYTES # BLD AUTO: 0.46 10*3/MM3 (ref 0.1–0.9)
MONOCYTES NFR BLD AUTO: 11.3 % (ref 5–12)
NEUTROPHILS # BLD AUTO: 2.21 10*3/MM3 (ref 1.7–7)
NEUTROPHILS NFR BLD AUTO: 54.4 % (ref 42.7–76)
NITRITE UR QL STRIP: NEGATIVE
NRBC BLD AUTO-RTO: 0 /100 WBC (ref 0–0.2)
PH UR STRIP.AUTO: 5.5 [PH] (ref 5–8)
PLATELET # BLD AUTO: 204 10*3/MM3 (ref 140–450)
PMV BLD AUTO: 10 FL (ref 6–12)
POTASSIUM BLD-SCNC: 4 MMOL/L (ref 3.5–5.2)
PROT SERPL-MCNC: 7.6 G/DL (ref 6–8.5)
PROT UR QL STRIP: NEGATIVE
PSA SERPL-MCNC: 1.58 NG/ML (ref 0–4)
RBC # BLD AUTO: 5.54 10*6/MM3 (ref 4.14–5.8)
SODIUM BLD-SCNC: 144 MMOL/L (ref 136–145)
SP GR UR STRIP: 1.02 (ref 1–1.03)
TRIGL SERPL-MCNC: 107 MG/DL (ref 0–150)
URATE SERPL-MCNC: 6.8 MG/DL (ref 3.4–7)
UROBILINOGEN UR QL STRIP: NORMAL
VLDLC SERPL-MCNC: 21.4 MG/DL (ref 5–40)
WBC NRBC COR # BLD: 4.07 10*3/MM3 (ref 3.4–10.8)

## 2019-06-20 PROCEDURE — 84550 ASSAY OF BLOOD/URIC ACID: CPT

## 2019-06-20 PROCEDURE — 80053 COMPREHEN METABOLIC PANEL: CPT

## 2019-06-20 PROCEDURE — G0439 PPPS, SUBSEQ VISIT: HCPCS | Performed by: GENERAL PRACTICE

## 2019-06-20 PROCEDURE — 85025 COMPLETE CBC W/AUTO DIFF WBC: CPT

## 2019-06-20 PROCEDURE — 99214 OFFICE O/P EST MOD 30 MIN: CPT | Performed by: GENERAL PRACTICE

## 2019-06-20 PROCEDURE — 81003 URINALYSIS AUTO W/O SCOPE: CPT

## 2019-06-20 PROCEDURE — G0103 PSA SCREENING: HCPCS

## 2019-06-20 PROCEDURE — 80061 LIPID PANEL: CPT

## 2019-06-20 PROCEDURE — 36415 COLL VENOUS BLD VENIPUNCTURE: CPT

## 2019-06-20 RX ORDER — IPRATROPIUM BROMIDE 21 UG/1
2 SPRAY, METERED NASAL EVERY 12 HOURS
Qty: 30 ML | Refills: 2 | Status: SHIPPED | OUTPATIENT
Start: 2019-06-20 | End: 2020-07-06 | Stop reason: SDUPTHER

## 2019-07-02 ENCOUNTER — TELEPHONE (OUTPATIENT)
Dept: FAMILY MEDICINE CLINIC | Facility: CLINIC | Age: 74
End: 2019-07-02

## 2019-07-02 NOTE — TELEPHONE ENCOUNTER
I have called Mr. Bonilla and went over and explained his results and recommendations.    
Patient called wanting his lab results from 06-25-19 he said.  I told him that if the doctor reviewed and sent them with her recommendations that you could call him back, otherwise he may have to wait until Dr Penny returns.  [phone]  
No

## 2019-09-09 RX ORDER — VALSARTAN AND HYDROCHLOROTHIAZIDE 320; 25 MG/1; MG/1
1 TABLET, FILM COATED ORAL DAILY
Qty: 90 TABLET | Refills: 1 | Status: SHIPPED | OUTPATIENT
Start: 2019-09-09 | End: 2020-03-03 | Stop reason: SDUPTHER

## 2019-09-09 RX ORDER — ALLOPURINOL 100 MG/1
200 TABLET ORAL DAILY
Qty: 180 TABLET | Refills: 3 | Status: SHIPPED | OUTPATIENT
Start: 2019-09-09 | End: 2020-08-28

## 2019-09-12 ENCOUNTER — TRANSCRIBE ORDERS (OUTPATIENT)
Dept: LAB | Facility: HOSPITAL | Age: 74
End: 2019-09-12

## 2019-09-12 ENCOUNTER — APPOINTMENT (OUTPATIENT)
Dept: LAB | Facility: HOSPITAL | Age: 74
End: 2019-09-12

## 2019-09-12 DIAGNOSIS — N18.30 CHRONIC KIDNEY DISEASE, STAGE III (MODERATE) (HCC): Primary | ICD-10-CM

## 2019-09-12 LAB
25(OH)D3 SERPL-MCNC: 76.5 NG/ML (ref 30–100)
ALBUMIN SERPL-MCNC: 4.5 G/DL (ref 3.5–5.2)
ANION GAP SERPL CALCULATED.3IONS-SCNC: 14.4 MMOL/L (ref 5–15)
BASOPHILS # BLD AUTO: 0.04 10*3/MM3 (ref 0–0.2)
BASOPHILS NFR BLD AUTO: 0.9 % (ref 0–1.5)
BUN BLD-MCNC: 23 MG/DL (ref 8–23)
BUN/CREAT SERPL: 16.7 (ref 7–25)
CALCIUM SPEC-SCNC: 9.5 MG/DL (ref 8.6–10.5)
CHLORIDE SERPL-SCNC: 102 MMOL/L (ref 98–107)
CO2 SERPL-SCNC: 21.6 MMOL/L (ref 22–29)
CREAT BLD-MCNC: 1.38 MG/DL (ref 0.76–1.27)
CREAT UR-MCNC: 83 MG/DL
DEPRECATED RDW RBC AUTO: 43.1 FL (ref 37–54)
EOSINOPHIL # BLD AUTO: 0.2 10*3/MM3 (ref 0–0.4)
EOSINOPHIL NFR BLD AUTO: 4.7 % (ref 0.3–6.2)
ERYTHROCYTE [DISTWIDTH] IN BLOOD BY AUTOMATED COUNT: 13.9 % (ref 12.3–15.4)
GFR SERPL CREATININE-BSD FRML MDRD: 62 ML/MIN/1.73
GLUCOSE BLD-MCNC: 131 MG/DL (ref 65–99)
HCT VFR BLD AUTO: 45.1 % (ref 37.5–51)
HGB BLD-MCNC: 15.3 G/DL (ref 13–17.7)
IMM GRANULOCYTES # BLD AUTO: 0.01 10*3/MM3 (ref 0–0.05)
IMM GRANULOCYTES NFR BLD AUTO: 0.2 % (ref 0–0.5)
LYMPHOCYTES # BLD AUTO: 1.43 10*3/MM3 (ref 0.7–3.1)
LYMPHOCYTES NFR BLD AUTO: 33.8 % (ref 19.6–45.3)
MCH RBC QN AUTO: 28.8 PG (ref 26.6–33)
MCHC RBC AUTO-ENTMCNC: 33.9 G/DL (ref 31.5–35.7)
MCV RBC AUTO: 84.9 FL (ref 79–97)
MONOCYTES # BLD AUTO: 0.46 10*3/MM3 (ref 0.1–0.9)
MONOCYTES NFR BLD AUTO: 10.9 % (ref 5–12)
NEUTROPHILS # BLD AUTO: 2.09 10*3/MM3 (ref 1.7–7)
NEUTROPHILS NFR BLD AUTO: 49.5 % (ref 42.7–76)
NRBC BLD AUTO-RTO: 0 /100 WBC (ref 0–0.2)
PHOSPHATE SERPL-MCNC: 2.7 MG/DL (ref 2.5–4.5)
PLATELET # BLD AUTO: 200 10*3/MM3 (ref 140–450)
PMV BLD AUTO: 11.3 FL (ref 6–12)
POTASSIUM BLD-SCNC: 3.8 MMOL/L (ref 3.5–5.2)
PROT UR-MCNC: 8 MG/DL
PROT/CREAT UR: 96.4 MG/G CREA (ref 0–200)
RBC # BLD AUTO: 5.31 10*6/MM3 (ref 4.14–5.8)
SODIUM BLD-SCNC: 138 MMOL/L (ref 136–145)
WBC NRBC COR # BLD: 4.23 10*3/MM3 (ref 3.4–10.8)

## 2019-09-12 PROCEDURE — 82306 VITAMIN D 25 HYDROXY: CPT | Performed by: INTERNAL MEDICINE

## 2019-09-12 PROCEDURE — 82570 ASSAY OF URINE CREATININE: CPT | Performed by: INTERNAL MEDICINE

## 2019-09-12 PROCEDURE — 84156 ASSAY OF PROTEIN URINE: CPT | Performed by: INTERNAL MEDICINE

## 2019-09-12 PROCEDURE — 36415 COLL VENOUS BLD VENIPUNCTURE: CPT | Performed by: INTERNAL MEDICINE

## 2019-09-12 PROCEDURE — 85025 COMPLETE CBC W/AUTO DIFF WBC: CPT | Performed by: INTERNAL MEDICINE

## 2019-09-12 PROCEDURE — 80069 RENAL FUNCTION PANEL: CPT | Performed by: INTERNAL MEDICINE

## 2019-09-16 ENCOUNTER — TRANSCRIBE ORDERS (OUTPATIENT)
Dept: LAB | Facility: HOSPITAL | Age: 74
End: 2019-09-16

## 2019-09-16 DIAGNOSIS — N18.30 CHRONIC KIDNEY DISEASE, STAGE III (MODERATE) (HCC): Primary | ICD-10-CM

## 2019-11-19 RX ORDER — CARVEDILOL 25 MG/1
TABLET ORAL
Qty: 360 TABLET | Refills: 1 | Status: SHIPPED | OUTPATIENT
Start: 2019-11-19 | End: 2020-03-09 | Stop reason: SDUPTHER

## 2020-01-06 ENCOUNTER — OFFICE VISIT (OUTPATIENT)
Dept: FAMILY MEDICINE CLINIC | Facility: CLINIC | Age: 75
End: 2020-01-06

## 2020-01-06 VITALS
DIASTOLIC BLOOD PRESSURE: 60 MMHG | HEIGHT: 70 IN | OXYGEN SATURATION: 98 % | SYSTOLIC BLOOD PRESSURE: 128 MMHG | BODY MASS INDEX: 35.9 KG/M2 | HEART RATE: 62 BPM | WEIGHT: 250.8 LBS

## 2020-01-06 DIAGNOSIS — I10 ESSENTIAL HYPERTENSION: Primary | Chronic | ICD-10-CM

## 2020-01-06 DIAGNOSIS — M10.371 ACUTE GOUT DUE TO RENAL IMPAIRMENT INVOLVING TOE OF RIGHT FOOT: ICD-10-CM

## 2020-01-06 DIAGNOSIS — Z12.5 ENCOUNTER FOR PROSTATE CANCER SCREENING: ICD-10-CM

## 2020-01-06 DIAGNOSIS — Z12.11 SCREENING FOR COLON CANCER: Primary | ICD-10-CM

## 2020-01-06 DIAGNOSIS — E78.2 MIXED HYPERLIPIDEMIA: Chronic | ICD-10-CM

## 2020-01-06 DIAGNOSIS — I10 ESSENTIAL HYPERTENSION: Chronic | ICD-10-CM

## 2020-01-06 DIAGNOSIS — E66.01 MORBIDLY OBESE (HCC): Chronic | ICD-10-CM

## 2020-01-06 PROCEDURE — 99213 OFFICE O/P EST LOW 20 MIN: CPT | Performed by: GENERAL PRACTICE

## 2020-01-06 RX ORDER — CLINDAMYCIN PHOSPHATE 10 MG/G
GEL TOPICAL 2 TIMES DAILY
Qty: 60 G | Refills: 2 | Status: SHIPPED | OUTPATIENT
Start: 2020-01-06 | End: 2020-08-10

## 2020-01-06 NOTE — PROGRESS NOTES
Subjective   Jerald Bonilla is a 74 y.o. male.   Chief Complaint   Patient presents with   • Hypertension     For review and evaluation of management of chronic medical problems.   Hypertension   This is a chronic problem. The current episode started more than 1 year ago. The problem is unchanged. The problem is controlled. Pertinent negatives include no chest pain, headaches, neck pain, palpitations or shortness of breath. There are no associated agents to hypertension. Current antihypertension treatment includes beta blockers, angiotensin blockers, diuretics and calcium channel blockers. The current treatment provides significant improvement. There are no compliance problems.    Obesity   This is a chronic problem. The current episode started more than 1 year ago. The problem occurs constantly. The problem has been unchanged. Pertinent negatives include no abdominal pain, arthralgias, chest pain, chills, congestion, coughing, fatigue, fever, headaches, joint swelling, myalgias, nausea, neck pain, numbness, rash, sore throat, vomiting or weakness. The symptoms are aggravated by stress. Treatments tried: Diet and exercise. The treatment provided mild relief.   Has lost 10 pounds since June.    The following portions of the patient's history were reviewed and updated as appropriate: allergies, current medications, past social history and problem list.    Outpatient Medications Prior to Visit   Medication Sig Dispense Refill   • allopurinol (ZYLOPRIM) 100 MG tablet TAKE 2 TABLETS BY MOUTH  DAILY 180 tablet 3   • amLODIPine (NORVASC) 5 MG tablet amlodipine 5 mg tablet     • aspirin 81 MG tablet Take 81 mg by mouth Daily.     • B Complex Vitamins (VITAMIN B COMPLEX PO) Take 1 tablet by mouth Daily.     • carvedilol (COREG) 25 MG tablet TAKE 2 TABLETS BY MOUTH TWO TIMES DAILY WITH MEALS 360 tablet 1   • cholecalciferol (VITAMIN D3) 1000 UNITS tablet Take 2,000 Units by mouth Daily.     • clindamycin (CLEOCIN T) 1 %  lotion Apply  topically to the appropriate area as directed Every 12 (Twelve) Hours. 60 mL 1   • fluticasone (FLONASE) 50 MCG/ACT nasal spray 2 sprays into the nostril(s) as directed by provider Daily. 1 bottle 5   • hydrALAZINE (APRESOLINE) 50 MG tablet hydralazine 50 mg tablet     • ipratropium (ATROVENT) 0.03 % nasal spray 2 sprays into the nostril(s) as directed by provider Every 12 (Twelve) Hours. 30 mL 2   • loratadine (CLARITIN) 10 MG tablet Claritin 10 mg tablet   Take 1 tablet every day by oral route.     • NIFEdipine XL (PROCARDIA XL) 90 MG 24 hr tablet Take 2 tablets by mouth Every Night. 180 tablet 3   • pantoprazole (PROTONIX) 40 MG EC tablet Take 1 tablet by mouth Daily. 30 tablet 11   • pravastatin (PRAVACHOL) 80 MG tablet TAKE 1 TABLET BY MOUTH  EVERY NIGHT 90 tablet 3   • pseudoephedrine-guaifenesin (MUCINEX D)  MG per 12 hr tablet Take 1 tablet by mouth As Needed.     • sodium chloride (OCEAN) 0.65 % nasal spray 1 spray into the nostril(s) as directed by provider As Needed for Congestion.     • valsartan-hydrochlorothiazide (DIOVAN-HCT) 320-25 MG per tablet TAKE 1 TABLET BY MOUTH  DAILY 90 tablet 1   • clindamycin (CLEOCIN) 300 MG capsule clindamycin HCl 300 mg capsule     • doxazosin (CARDURA) 1 MG tablet doxazosin 1 mg tablet     • Zoster Vac Recomb Adjuvanted (SHINGRIX) 50 MCG/0.5ML reconstituted suspension Shingrix (PF) 50 mcg/0.5 mL intramuscular suspension, kit       No facility-administered medications prior to visit.        Review of Systems   Constitutional: Negative.  Negative for chills, fatigue, fever and unexpected weight change.   HENT: Negative.  Negative for congestion, ear pain, hearing loss, nosebleeds, rhinorrhea, sneezing, sore throat and tinnitus.    Eyes: Negative.  Negative for discharge.   Respiratory: Negative.  Negative for cough, shortness of breath and wheezing.    Cardiovascular: Negative.  Negative for chest pain and palpitations.   Gastrointestinal: Negative.   "Negative for abdominal pain, constipation, diarrhea, nausea and vomiting.   Endocrine: Negative.    Genitourinary: Negative.  Negative for dysuria, frequency and urgency.   Musculoskeletal: Negative.  Negative for arthralgias, back pain, joint swelling, myalgias and neck pain.   Skin: Negative.  Negative for rash.   Allergic/Immunologic: Negative.    Neurological: Negative.  Negative for dizziness, weakness, numbness and headaches.   Hematological: Negative.  Negative for adenopathy.   Psychiatric/Behavioral: Negative.  Negative for dysphoric mood and sleep disturbance. The patient is not nervous/anxious.        Objective   Visit Vitals  /60   Pulse 62   Ht 177.8 cm (70\")   Wt 114 kg (250 lb 12.8 oz)   SpO2 98%   BMI 35.99 kg/m²     Physical Exam   Constitutional: He is oriented to person, place, and time. He appears well-developed and well-nourished. No distress.   HENT:   Head: Normocephalic.   Nose: Nose normal.   Mouth/Throat: Oropharynx is clear and moist.   Eyes: Pupils are equal, round, and reactive to light. Conjunctivae and EOM are normal. Right eye exhibits no discharge. Left eye exhibits no discharge.   Neck: No thyromegaly present.   Cardiovascular: Normal rate, regular rhythm, normal heart sounds and intact distal pulses.   No murmur heard.  Pulmonary/Chest: Effort normal and breath sounds normal.   Musculoskeletal: He exhibits no edema.   Lymphadenopathy:     He has no cervical adenopathy.   Neurological: He is alert and oriented to person, place, and time.   Skin: Skin is warm and dry.   Psychiatric: He has a normal mood and affect.   Nursing note and vitals reviewed.    Assessment/Plan   Problem List Items Addressed This Visit        Cardiovascular and Mediastinum    Essential hypertension (Chronic)       Digestive    Morbidly obese (CMS/HCC)      Other Visit Diagnoses     Screening for colon cancer    -  Primary    Relevant Orders    Ambulatory Referral to Gastroenterology (Completed)          " Continue current treatment. Patient's Body mass index is 35.99 kg/m². BMI is above normal parameters. Recommendations include: exercise counseling and nutrition counseling.   New Medications Ordered This Visit   Medications   • clindamycin (CLINDAGEL) 1 % gel     Sig: Apply  topically to the appropriate area as directed 2 (Two) Times a Day.     Dispense:  60 g     Refill:  2     Return in about 6 months (around 7/6/2020) for Annual physical, medicare wellness visit.

## 2020-01-08 ENCOUNTER — TELEPHONE (OUTPATIENT)
Dept: FAMILY MEDICINE CLINIC | Facility: CLINIC | Age: 75
End: 2020-01-08

## 2020-01-08 NOTE — TELEPHONE ENCOUNTER
PT HAS CALLED TO SEE IF DR GRAHAM WILL CALL [facility] TO SEE ABOUT GETTING HIS RX COST LOWERED? HE STATES THAT HIS clindamycin IS $54 AND CAN GET FOR $10 IF SHE CALLED FOR HIM. HE STATES THAT THE PHONE -[phone] HIS ID # IS [id number]. CALL HIM [phone]

## 2020-02-05 ENCOUNTER — TELEPHONE (OUTPATIENT)
Dept: FAMILY MEDICINE CLINIC | Facility: CLINIC | Age: 75
End: 2020-02-05

## 2020-02-05 NOTE — TELEPHONE ENCOUNTER
Jerald said he received an appeal letter regarding  his Clindamycin, he said he wants to appeal to get his copay reduced but they told him that Dr Penny had to call them, they need more information.      He said call his insurance [phone] and ask for tier reduction.

## 2020-02-06 RX ORDER — PANTOPRAZOLE SODIUM 40 MG/1
40 TABLET, DELAYED RELEASE ORAL DAILY
Qty: 90 TABLET | OUTPATIENT
Start: 2020-02-06

## 2020-02-06 NOTE — TELEPHONE ENCOUNTER
What shall I tell him he was denied PA because he had not tried the different meds that they required first

## 2020-02-07 NOTE — TELEPHONE ENCOUNTER
We have done all we can do to try and get him to cover this.  I recommend that we try switching him to tetracycline 500 mg twice daily, send prescription for this if he would like to do this.

## 2020-03-03 ENCOUNTER — TELEPHONE (OUTPATIENT)
Dept: FAMILY MEDICINE CLINIC | Facility: CLINIC | Age: 75
End: 2020-03-03

## 2020-03-03 RX ORDER — VALSARTAN AND HYDROCHLOROTHIAZIDE 320; 25 MG/1; MG/1
1 TABLET, FILM COATED ORAL DAILY
Qty: 90 TABLET | Refills: 2 | Status: SHIPPED | OUTPATIENT
Start: 2020-03-03 | End: 2020-11-23

## 2020-03-03 RX ORDER — PRAVASTATIN SODIUM 80 MG/1
80 TABLET ORAL NIGHTLY
Qty: 90 TABLET | Refills: 3 | Status: SHIPPED | OUTPATIENT
Start: 2020-03-03 | End: 2021-01-15

## 2020-03-03 RX ORDER — PANTOPRAZOLE SODIUM 40 MG/1
40 TABLET, DELAYED RELEASE ORAL DAILY
Qty: 90 TABLET | Refills: 2 | Status: SHIPPED | OUTPATIENT
Start: 2020-03-03 | End: 2020-09-23

## 2020-03-03 NOTE — TELEPHONE ENCOUNTER
PT NEEDS REFILLS SENT TO [facility] FOR 90 DAY SUPPPLY  pantoprazole (PROTONIX) 40 MG EC tablet  valsartan-hydrochlorothiazide (DIOVAN-HCT) 320-25 MG per tablet

## 2020-03-09 RX ORDER — CARVEDILOL 25 MG/1
50 TABLET ORAL 2 TIMES DAILY WITH MEALS
Qty: 360 TABLET | Refills: 1 | Status: SHIPPED | OUTPATIENT
Start: 2020-03-09 | End: 2020-08-06

## 2020-03-16 ENCOUNTER — LAB (OUTPATIENT)
Dept: LAB | Facility: HOSPITAL | Age: 75
End: 2020-03-16

## 2020-03-16 DIAGNOSIS — N18.30 CHRONIC KIDNEY DISEASE, STAGE III (MODERATE) (HCC): ICD-10-CM

## 2020-03-16 LAB
25(OH)D3 SERPL-MCNC: 50.8 NG/ML (ref 30–100)
ALBUMIN SERPL-MCNC: 4.3 G/DL (ref 3.5–5.2)
ANION GAP SERPL CALCULATED.3IONS-SCNC: 14 MMOL/L (ref 5–15)
BASOPHILS # BLD AUTO: 0.05 10*3/MM3 (ref 0–0.2)
BASOPHILS NFR BLD AUTO: 1 % (ref 0–1.5)
BUN BLD-MCNC: 26 MG/DL (ref 8–23)
BUN/CREAT SERPL: 16.8 (ref 7–25)
CALCIUM SPEC-SCNC: 9.8 MG/DL (ref 8.6–10.5)
CHLORIDE SERPL-SCNC: 105 MMOL/L (ref 98–107)
CO2 SERPL-SCNC: 24 MMOL/L (ref 22–29)
CREAT BLD-MCNC: 1.55 MG/DL (ref 0.76–1.27)
CREAT UR-MCNC: 145 MG/DL
DEPRECATED RDW RBC AUTO: 43.4 FL (ref 37–54)
EOSINOPHIL # BLD AUTO: 0.32 10*3/MM3 (ref 0–0.4)
EOSINOPHIL NFR BLD AUTO: 6.7 % (ref 0.3–6.2)
ERYTHROCYTE [DISTWIDTH] IN BLOOD BY AUTOMATED COUNT: 14 % (ref 12.3–15.4)
GFR SERPL CREATININE-BSD FRML MDRD: 54 ML/MIN/1.73
GLUCOSE BLD-MCNC: 122 MG/DL (ref 65–99)
HCT VFR BLD AUTO: 46 % (ref 37.5–51)
HGB BLD-MCNC: 15.4 G/DL (ref 13–17.7)
IMM GRANULOCYTES # BLD AUTO: 0.02 10*3/MM3 (ref 0–0.05)
IMM GRANULOCYTES NFR BLD AUTO: 0.4 % (ref 0–0.5)
LYMPHOCYTES # BLD AUTO: 1.73 10*3/MM3 (ref 0.7–3.1)
LYMPHOCYTES NFR BLD AUTO: 36.1 % (ref 19.6–45.3)
MCH RBC QN AUTO: 28.6 PG (ref 26.6–33)
MCHC RBC AUTO-ENTMCNC: 33.5 G/DL (ref 31.5–35.7)
MCV RBC AUTO: 85.3 FL (ref 79–97)
MONOCYTES # BLD AUTO: 0.64 10*3/MM3 (ref 0.1–0.9)
MONOCYTES NFR BLD AUTO: 13.4 % (ref 5–12)
NEUTROPHILS # BLD AUTO: 2.03 10*3/MM3 (ref 1.7–7)
NEUTROPHILS NFR BLD AUTO: 42.4 % (ref 42.7–76)
NRBC BLD AUTO-RTO: 0 /100 WBC (ref 0–0.2)
PHOSPHATE SERPL-MCNC: 3.3 MG/DL (ref 2.5–4.5)
PLATELET # BLD AUTO: 189 10*3/MM3 (ref 140–450)
PMV BLD AUTO: 10 FL (ref 6–12)
POTASSIUM BLD-SCNC: 3.7 MMOL/L (ref 3.5–5.2)
PROT UR-MCNC: 13 MG/DL
PROT/CREAT UR: 89.7 MG/G CREA (ref 0–200)
RBC # BLD AUTO: 5.39 10*6/MM3 (ref 4.14–5.8)
SODIUM BLD-SCNC: 143 MMOL/L (ref 136–145)
WBC NRBC COR # BLD: 4.79 10*3/MM3 (ref 3.4–10.8)

## 2020-03-16 PROCEDURE — 82306 VITAMIN D 25 HYDROXY: CPT

## 2020-03-16 PROCEDURE — 82570 ASSAY OF URINE CREATININE: CPT

## 2020-03-16 PROCEDURE — 36415 COLL VENOUS BLD VENIPUNCTURE: CPT

## 2020-03-16 PROCEDURE — 80069 RENAL FUNCTION PANEL: CPT

## 2020-03-16 PROCEDURE — 84156 ASSAY OF PROTEIN URINE: CPT

## 2020-03-16 PROCEDURE — 85025 COMPLETE CBC W/AUTO DIFF WBC: CPT

## 2020-06-01 ENCOUNTER — OFFICE VISIT (OUTPATIENT)
Dept: SLEEP MEDICINE | Facility: HOSPITAL | Age: 75
End: 2020-06-01

## 2020-06-01 DIAGNOSIS — G47.33 OBSTRUCTIVE SLEEP APNEA, ADULT: Primary | ICD-10-CM

## 2020-06-01 DIAGNOSIS — J30.9 CHRONIC ALLERGIC RHINITIS: ICD-10-CM

## 2020-06-01 PROCEDURE — 99442 PR PHYS/QHP TELEPHONE EVALUATION 11-20 MIN: CPT | Performed by: NURSE PRACTITIONER

## 2020-06-01 RX ORDER — FLUTICASONE PROPIONATE 50 MCG
2 SPRAY, SUSPENSION (ML) NASAL DAILY
Qty: 1 BOTTLE | Refills: 5 | Status: SHIPPED | OUTPATIENT
Start: 2020-06-01 | End: 2020-12-27

## 2020-06-01 NOTE — PROGRESS NOTES
Sleep Clinic Follow Up      You have chosen to receive care through a telephone visit. Do you consent to use a telephone visit for your medical care today? Yes    Date: 2020  Primary Care Physician: Annie Penny MD    Last office visit: 2019 (I reviewed this note)    CC: Follow up: CHRIS on CPAP      Interim History:  Since the last visit:    1) severe CHRIS -  Jerald Bonilla has remained compliant with CPAP. He denies mask and machine issues, dry mouth, headaches, or pressures intolerance. He denies abnormal dreams, sleep paralysis, nasal congestion, URI sx.    Sleep Testin. PSG on 2007, AHI of 34   2. CPAP titration recommended 11 cm H2O   3. Currently on 10-20 cm H2O    PAP Data:    Time frame: 2019-2020   Compliance 99.4 %  Average use on days used: 7 hrs 39 min  Percent of days with usage greater than or equal to 4 hours: 99.2%  PAP range : 10-20 cm H2O  Average 90% pressure: 12.5 cmH2O  Leak: 1 minutes  Average AHI 3.9 events/hr  Mask type: Full face mask  DME: [facility]    Bed time: 1942-1705  Sleep latency: 5-10 minutes  Number of times awakens during the night: 2  Wake time: 4332-1839  Estimated total sleep time at night: 5-7 hours  Caffeine intake: 1 cups of coffee, 0 cups of tea, and 0 sodas per day  Alcohol intake: 2 drinks per week  Nap time: rare   Sleepiness with Driving: none     [place] - 5    2) Patient denies RLS symptoms.     PMHx, FH, SH reviewed and pertinent changes are: unchanged from last office visit on 2019      REVIEW OF SYSTEMS:   Negative for chest pain, SOA, fever, chills, cough, N/V/D, abdominal pain.    Smoking:none      Exam:  Unable to perform physical exam due to conducting telephone visit    Past Medical History:   Diagnosis Date   • Acne conglobata    • Acute maxillary sinusitis    • Benign prostate hyperplasia    • Blurring of visual image    • Carbuncle     of infected sebaceous cyst on back   • Dizziness and giddiness    •  Erectile dysfunction    • Essential hypertension     seeing nephro   • Gastroesophageal reflux disease    • Gout    • Hip pain    • Hyperlipidemia    • Hypertension    • Hypertensive disorder    • Low back pain    • Malaise and fatigue    • Need for prophylactic vaccination against Streptococcus pneumoniae (pneumococcus)    • Nuclear senile cataract    • Osteoarthritis    • Otalgia    • Otitis externa    • Pain in lower limb    • Rheumatoid arthritis (CMS/HCC)    • Skin lesion     right calf   • Swelling of knee joint    • Tinnitus    • Upper respiratory infection        Current Outpatient Medications:   •  allopurinol (ZYLOPRIM) 100 MG tablet, TAKE 2 TABLETS BY MOUTH  DAILY, Disp: 180 tablet, Rfl: 3  •  amLODIPine (NORVASC) 5 MG tablet, amlodipine 5 mg tablet, Disp: , Rfl:   •  aspirin 81 MG tablet, Take 81 mg by mouth Daily., Disp: , Rfl:   •  B Complex Vitamins (VITAMIN B COMPLEX PO), Take 1 tablet by mouth Daily., Disp: , Rfl:   •  carvedilol (COREG) 25 MG tablet, Take 2 tablets by mouth 2 (Two) Times a Day With Meals., Disp: 360 tablet, Rfl: 1  •  cholecalciferol (VITAMIN D3) 1000 UNITS tablet, Take 2,000 Units by mouth Daily., Disp: , Rfl:   •  clindamycin (CLEOCIN T) 1 % lotion, Apply  topically to the appropriate area as directed Every 12 (Twelve) Hours., Disp: 60 mL, Rfl: 1  •  clindamycin (CLEOCIN) 300 MG capsule, clindamycin HCl 300 mg capsule, Disp: , Rfl:   •  clindamycin (CLINDAGEL) 1 % gel, Apply  topically to the appropriate area as directed 2 (Two) Times a Day., Disp: 60 g, Rfl: 2  •  fluticasone (FLONASE) 50 MCG/ACT nasal spray, 2 sprays into the nostril(s) as directed by provider Daily., Disp: 1 bottle, Rfl: 5  •  hydrALAZINE (APRESOLINE) 50 MG tablet, hydralazine 50 mg tablet, Disp: , Rfl:   •  ipratropium (ATROVENT) 0.03 % nasal spray, 2 sprays into the nostril(s) as directed by provider Every 12 (Twelve) Hours., Disp: 30 mL, Rfl: 2  •  loratadine (CLARITIN) 10 MG tablet, Claritin 10 mg tablet   Take 1 tablet every day by oral route., Disp: , Rfl:   •  NIFEdipine XL (PROCARDIA XL) 90 MG 24 hr tablet, Take 2 tablets by mouth Every Night., Disp: 180 tablet, Rfl: 3  •  pantoprazole (PROTONIX) 40 MG EC tablet, Take 1 tablet by mouth Daily., Disp: 90 tablet, Rfl: 2  •  pravastatin (PRAVACHOL) 80 MG tablet, TAKE 1 TABLET BY MOUTH  EVERY NIGHT, Disp: 90 tablet, Rfl: 3  •  pseudoephedrine-guaifenesin (MUCINEX D)  MG per 12 hr tablet, Take 1 tablet by mouth As Needed., Disp: , Rfl:   •  sodium chloride (OCEAN) 0.65 % nasal spray, 1 spray into the nostril(s) as directed by provider As Needed for Congestion., Disp: , Rfl:   •  valsartan-hydrochlorothiazide (DIOVAN-HCT) 320-25 MG per tablet, Take 1 tablet by mouth Daily., Disp: 90 tablet, Rfl: 2      Assessment and Plan:    1. Obstructive sleep apnea Established, stable (1)  1. Compliant with PAP therapy  2. Continue PAP as prescribed  3. Script for PAP supplies  4. Return to clinic in 1 year with compliance report unless change in symptoms in interim period  2. Chronic allergic rhinitis - Established, not controlled  1. Continue Flonase (refill)  2. Discussed adjusting humidifier settings      This visit has been rescheduled as a phone visit to comply with patient safety concerns in accordance with CDC recommendations. Total time of discussion was 15 minutes.    8 of 15 minutes spent telephone counseling patient extensively regarding:   PAP therapy, PAP compliance, PAP maintenance and Medication changes      RTC in 12 months. Patient agrees to return sooner if changes in symptoms.        This document has been electronically signed by BEATRIS Bond on June 1, 2020 14:09          CC: Annie Penny MD          No ref. provider found

## 2020-06-04 RX ORDER — NIFEDIPINE 90 MG/1
180 TABLET, EXTENDED RELEASE ORAL NIGHTLY
Qty: 180 TABLET | Refills: 3 | Status: SHIPPED | OUTPATIENT
Start: 2020-06-04 | End: 2021-04-22

## 2020-07-06 ENCOUNTER — LAB (OUTPATIENT)
Dept: LAB | Facility: HOSPITAL | Age: 75
End: 2020-07-06

## 2020-07-06 ENCOUNTER — OFFICE VISIT (OUTPATIENT)
Dept: FAMILY MEDICINE CLINIC | Facility: CLINIC | Age: 75
End: 2020-07-06

## 2020-07-06 VITALS
WEIGHT: 248 LBS | HEART RATE: 54 BPM | DIASTOLIC BLOOD PRESSURE: 62 MMHG | HEIGHT: 70 IN | BODY MASS INDEX: 35.5 KG/M2 | OXYGEN SATURATION: 98 % | SYSTOLIC BLOOD PRESSURE: 116 MMHG

## 2020-07-06 DIAGNOSIS — I10 ESSENTIAL HYPERTENSION: Chronic | ICD-10-CM

## 2020-07-06 DIAGNOSIS — Z12.5 ENCOUNTER FOR PROSTATE CANCER SCREENING: ICD-10-CM

## 2020-07-06 DIAGNOSIS — E78.2 MIXED HYPERLIPIDEMIA: ICD-10-CM

## 2020-07-06 DIAGNOSIS — I10 ESSENTIAL HYPERTENSION: ICD-10-CM

## 2020-07-06 DIAGNOSIS — F32.9 REACTIVE DEPRESSION: ICD-10-CM

## 2020-07-06 DIAGNOSIS — M10.371 ACUTE GOUT DUE TO RENAL IMPAIRMENT INVOLVING TOE OF RIGHT FOOT: ICD-10-CM

## 2020-07-06 DIAGNOSIS — R29.898 LEG WEAKNESS, BILATERAL: ICD-10-CM

## 2020-07-06 DIAGNOSIS — E78.2 MIXED HYPERLIPIDEMIA: Chronic | ICD-10-CM

## 2020-07-06 DIAGNOSIS — Z00.00 MEDICARE ANNUAL WELLNESS VISIT, SUBSEQUENT: Primary | ICD-10-CM

## 2020-07-06 LAB
ALBUMIN SERPL-MCNC: 4.2 G/DL (ref 3.5–5.2)
ALBUMIN/GLOB SERPL: 1.4 G/DL
ALP SERPL-CCNC: 38 U/L (ref 39–117)
ALT SERPL W P-5'-P-CCNC: 20 U/L (ref 1–41)
ANION GAP SERPL CALCULATED.3IONS-SCNC: 11.1 MMOL/L (ref 5–15)
AST SERPL-CCNC: 20 U/L (ref 1–40)
BASOPHILS # BLD AUTO: 0.04 10*3/MM3 (ref 0–0.2)
BASOPHILS NFR BLD AUTO: 0.8 % (ref 0–1.5)
BILIRUB SERPL-MCNC: 0.5 MG/DL (ref 0–1.2)
BILIRUB UR QL STRIP: NEGATIVE
BUN SERPL-MCNC: 32 MG/DL (ref 8–23)
BUN/CREAT SERPL: 20.3 (ref 7–25)
CALCIUM SPEC-SCNC: 9.6 MG/DL (ref 8.6–10.5)
CHLORIDE SERPL-SCNC: 106 MMOL/L (ref 98–107)
CHOLEST SERPL-MCNC: 157 MG/DL (ref 0–200)
CLARITY UR: CLEAR
CO2 SERPL-SCNC: 21.9 MMOL/L (ref 22–29)
COLOR UR: YELLOW
CREAT SERPL-MCNC: 1.58 MG/DL (ref 0.76–1.27)
DEPRECATED RDW RBC AUTO: 42 FL (ref 37–54)
EOSINOPHIL # BLD AUTO: 0.24 10*3/MM3 (ref 0–0.4)
EOSINOPHIL NFR BLD AUTO: 4.5 % (ref 0.3–6.2)
ERYTHROCYTE [DISTWIDTH] IN BLOOD BY AUTOMATED COUNT: 13.9 % (ref 12.3–15.4)
GFR SERPL CREATININE-BSD FRML MDRD: 53 ML/MIN/1.73
GLOBULIN UR ELPH-MCNC: 3.1 GM/DL
GLUCOSE SERPL-MCNC: 116 MG/DL (ref 65–99)
GLUCOSE UR STRIP-MCNC: NEGATIVE MG/DL
HCT VFR BLD AUTO: 44.4 % (ref 37.5–51)
HDLC SERPL-MCNC: 39 MG/DL (ref 40–60)
HGB BLD-MCNC: 15.3 G/DL (ref 13–17.7)
HGB UR QL STRIP.AUTO: NEGATIVE
IMM GRANULOCYTES # BLD AUTO: 0.02 10*3/MM3 (ref 0–0.05)
IMM GRANULOCYTES NFR BLD AUTO: 0.4 % (ref 0–0.5)
KETONES UR QL STRIP: NEGATIVE
LDLC SERPL CALC-MCNC: 91 MG/DL (ref 0–100)
LDLC/HDLC SERPL: 2.32 {RATIO}
LEUKOCYTE ESTERASE UR QL STRIP.AUTO: NEGATIVE
LYMPHOCYTES # BLD AUTO: 1.52 10*3/MM3 (ref 0.7–3.1)
LYMPHOCYTES NFR BLD AUTO: 28.6 % (ref 19.6–45.3)
MCH RBC QN AUTO: 28.7 PG (ref 26.6–33)
MCHC RBC AUTO-ENTMCNC: 34.5 G/DL (ref 31.5–35.7)
MCV RBC AUTO: 83.3 FL (ref 79–97)
MONOCYTES # BLD AUTO: 0.7 10*3/MM3 (ref 0.1–0.9)
MONOCYTES NFR BLD AUTO: 13.2 % (ref 5–12)
NEUTROPHILS NFR BLD AUTO: 2.8 10*3/MM3 (ref 1.7–7)
NEUTROPHILS NFR BLD AUTO: 52.5 % (ref 42.7–76)
NITRITE UR QL STRIP: NEGATIVE
NRBC BLD AUTO-RTO: 0 /100 WBC (ref 0–0.2)
PH UR STRIP.AUTO: <=5 [PH] (ref 5–8)
PLATELET # BLD AUTO: 189 10*3/MM3 (ref 140–450)
PMV BLD AUTO: 10.2 FL (ref 6–12)
POTASSIUM SERPL-SCNC: 3.6 MMOL/L (ref 3.5–5.2)
PROT SERPL-MCNC: 7.3 G/DL (ref 6–8.5)
PROT UR QL STRIP: NEGATIVE
PSA SERPL-MCNC: 1.95 NG/ML (ref 0–4)
RBC # BLD AUTO: 5.33 10*6/MM3 (ref 4.14–5.8)
SODIUM SERPL-SCNC: 139 MMOL/L (ref 136–145)
SP GR UR STRIP: 1.02 (ref 1–1.03)
TRIGL SERPL-MCNC: 137 MG/DL (ref 0–150)
URATE SERPL-MCNC: 6.7 MG/DL (ref 3.4–7)
UROBILINOGEN UR QL STRIP: NORMAL
VLDLC SERPL-MCNC: 27.4 MG/DL (ref 5–40)
WBC # BLD AUTO: 5.32 10*3/MM3 (ref 3.4–10.8)

## 2020-07-06 PROCEDURE — 99214 OFFICE O/P EST MOD 30 MIN: CPT | Performed by: GENERAL PRACTICE

## 2020-07-06 PROCEDURE — 80053 COMPREHEN METABOLIC PANEL: CPT

## 2020-07-06 PROCEDURE — 85025 COMPLETE CBC W/AUTO DIFF WBC: CPT

## 2020-07-06 PROCEDURE — G0103 PSA SCREENING: HCPCS

## 2020-07-06 PROCEDURE — 81003 URINALYSIS AUTO W/O SCOPE: CPT

## 2020-07-06 PROCEDURE — 80061 LIPID PANEL: CPT

## 2020-07-06 PROCEDURE — 84550 ASSAY OF BLOOD/URIC ACID: CPT

## 2020-07-06 PROCEDURE — 36415 COLL VENOUS BLD VENIPUNCTURE: CPT

## 2020-07-06 PROCEDURE — G0439 PPPS, SUBSEQ VISIT: HCPCS | Performed by: GENERAL PRACTICE

## 2020-07-06 RX ORDER — IPRATROPIUM BROMIDE 21 UG/1
2 SPRAY, METERED NASAL EVERY 12 HOURS
Qty: 30 ML | Refills: 2 | Status: SHIPPED | OUTPATIENT
Start: 2020-07-06 | End: 2020-12-27

## 2020-07-06 RX ORDER — SERTRALINE HYDROCHLORIDE 25 MG/1
25 TABLET, FILM COATED ORAL NIGHTLY
Qty: 30 TABLET | Refills: 1 | Status: SHIPPED | OUTPATIENT
Start: 2020-07-06 | End: 2020-12-27

## 2020-07-06 NOTE — PROGRESS NOTES
Subjective   Jerald Bonilla is a 74 y.o. male.     Chief Complaint   Patient presents with   • Medicare Wellness-subsequent   • Annual Exam   • Hypertension   • Hyperlipidemia     For review and evaluation of management of chronic medical problems. Labs pending.  Is due for colonoscopy and has an appointment with Dr. Gavin for this. Is having trouble with legs feeling weak, occasionally feels like he has pins stick and itching.  Is having some depression and irritability.  Has been under a lot of stress as his wife is very ill.  He has found that he is losing his temper which is unlike him.  Feels a bit down in the dumps.  No suicidal thoughts.  Hypertension   This is a chronic problem. The current episode started more than 1 year ago. The problem is unchanged. The problem is controlled. Pertinent negatives include no chest pain, headaches, neck pain, palpitations or shortness of breath. There are no associated agents to hypertension. Current antihypertension treatment includes beta blockers, angiotensin blockers, diuretics and calcium channel blockers. The current treatment provides significant improvement. There are no compliance problems.    Hyperlipidemia   This is a chronic problem. The current episode started more than 1 year ago. The problem is controlled. Recent lipid tests were reviewed and are normal. There are no known factors aggravating his hyperlipidemia. Pertinent negatives include no chest pain, myalgias or shortness of breath. Current antihyperlipidemic treatment includes statins. The current treatment provides significant improvement of lipids. There are no compliance problems.       The following portions of the patient's history were reviewed and updated as appropriate: allergies, current medications, past family and social history and problem list.    Outpatient Medications Prior to Visit   Medication Sig Dispense Refill   • allopurinol (ZYLOPRIM) 100 MG tablet TAKE 2 TABLETS BY MOUTH   DAILY 180 tablet 3   • amLODIPine (NORVASC) 5 MG tablet amlodipine 5 mg tablet     • aspirin 81 MG tablet Take 81 mg by mouth Daily.     • B Complex Vitamins (VITAMIN B COMPLEX PO) Take 1 tablet by mouth Daily.     • carvedilol (COREG) 25 MG tablet Take 2 tablets by mouth 2 (Two) Times a Day With Meals. 360 tablet 1   • cholecalciferol (VITAMIN D3) 1000 UNITS tablet Take 2,000 Units by mouth Daily.     • clindamycin (CLEOCIN T) 1 % lotion Apply  topically to the appropriate area as directed Every 12 (Twelve) Hours. 60 mL 1   • clindamycin (CLEOCIN) 300 MG capsule clindamycin HCl 300 mg capsule     • clindamycin (CLINDAGEL) 1 % gel Apply  topically to the appropriate area as directed 2 (Two) Times a Day. 60 g 2   • fluticasone (Flonase) 50 MCG/ACT nasal spray 2 sprays into the nostril(s) as directed by provider Daily. 1 bottle 5   • hydrALAZINE (APRESOLINE) 50 MG tablet hydralazine 50 mg tablet     • loratadine (CLARITIN) 10 MG tablet Claritin 10 mg tablet   Take 1 tablet every day by oral route.     • NIFEdipine XL (PROCARDIA XL) 90 MG 24 hr tablet TAKE 2 TABLETS BY MOUTH  EVERY NIGHT 180 tablet 3   • pantoprazole (PROTONIX) 40 MG EC tablet Take 1 tablet by mouth Daily. 90 tablet 2   • pravastatin (PRAVACHOL) 80 MG tablet TAKE 1 TABLET BY MOUTH  EVERY NIGHT 90 tablet 3   • pseudoephedrine-guaifenesin (MUCINEX D)  MG per 12 hr tablet Take 1 tablet by mouth As Needed.     • sodium chloride (OCEAN) 0.65 % nasal spray 1 spray into the nostril(s) as directed by provider As Needed for Congestion.     • valsartan-hydrochlorothiazide (DIOVAN-HCT) 320-25 MG per tablet Take 1 tablet by mouth Daily. 90 tablet 2   • ipratropium (ATROVENT) 0.03 % nasal spray 2 sprays into the nostril(s) as directed by provider Every 12 (Twelve) Hours. 30 mL 2     No facility-administered medications prior to visit.        Review of Systems   Constitutional: Negative for chills, fatigue, fever and unexpected weight change.   HENT: Negative.  " Negative for congestion, ear pain, hearing loss, nosebleeds, rhinorrhea, sneezing, sore throat and tinnitus.    Eyes: Negative.  Negative for discharge.   Respiratory: Negative.  Negative for cough, shortness of breath and wheezing.    Cardiovascular: Negative.  Negative for chest pain and palpitations.   Gastrointestinal: Negative.  Negative for abdominal pain, constipation, diarrhea, nausea and vomiting.   Endocrine: Negative.    Genitourinary: Negative.  Negative for dysuria, frequency and urgency.   Musculoskeletal: Negative.  Negative for arthralgias, back pain, joint swelling, myalgias and neck pain.   Skin: Negative.  Negative for rash.   Allergic/Immunologic: Negative.    Neurological: Positive for weakness. Negative for dizziness, numbness and headaches.   Hematological: Negative.  Negative for adenopathy.   Psychiatric/Behavioral: Positive for dysphoric mood. Negative for sleep disturbance. The patient is not nervous/anxious.        Objective     Visit Vitals  /62 (BP Location: Left arm)   Pulse 54   Ht 177.8 cm (70\")   Wt 112 kg (248 lb)   SpO2 98%   BMI 35.58 kg/m²     Physical Exam   Constitutional: He is oriented to person, place, and time. He appears well-developed and well-nourished. No distress.   HENT:   Head: Normocephalic and atraumatic.   Nose: Nose normal.   Mouth/Throat: Oropharynx is clear and moist.   Eyes: Pupils are equal, round, and reactive to light. Conjunctivae and EOM are normal. Right eye exhibits no discharge. Left eye exhibits no discharge.   Neck: Normal range of motion. No thyromegaly present.   Cardiovascular: Normal rate, regular rhythm, normal heart sounds and intact distal pulses.   No murmur heard.  Pulses:       Dorsalis pedis pulses are 2+ on the right side, and 2+ on the left side.   Pulmonary/Chest: Effort normal and breath sounds normal. No respiratory distress. He has no wheezes. He has no rales. He exhibits no tenderness.   Abdominal: Soft. Bowel sounds are " normal. He exhibits no distension and no mass. There is no tenderness. No hernia.   Musculoskeletal: Normal range of motion. He exhibits no deformity.   Lymphadenopathy:     He has no cervical adenopathy.   Neurological: He is alert and oriented to person, place, and time. He has normal strength and normal reflexes.   Skin: Skin is warm and dry. No rash noted. No pallor.   Psychiatric: He has a normal mood and affect. His behavior is normal. Judgment and thought content normal.      Notes brought forward are reviewed and updated if indicated.     Assessment/Plan   Problem List Items Addressed This Visit        Cardiovascular and Mediastinum    Essential hypertension (Chronic)    Mixed hyperlipidemia (Chronic)      Other Visit Diagnoses     Medicare annual wellness visit, subsequent    -  Primary    Reactive depression        Relevant Medications    sertraline (Zoloft) 25 MG tablet    Leg weakness, bilateral              Continue current treatment.  Start sertraline for his depression.  Advised to start exercising his legs to see if he can regain some strength.  Also make sure that he is drinking plenty of liquids and staying hydrated.    New Medications Ordered This Visit   Medications   • ipratropium (ATROVENT) 0.03 % nasal spray     Si sprays into the nostril(s) as directed by provider Every 12 (Twelve) Hours.     Dispense:  30 mL     Refill:  2   • sertraline (Zoloft) 25 MG tablet     Sig: Take 1 tablet by mouth Every Night.     Dispense:  30 tablet     Refill:  1     Return in about 2 months (around 2020) for Recheck.

## 2020-07-06 NOTE — PATIENT INSTRUCTIONS
Medicare Wellness  Personal Prevention Plan of Service     Date of Office Visit:  2020  Encounter Provider:  Annie Penny MD  Place of Service:  [facility]  Patient Name: Jerald Bonilla  :  1945    As part of the Medicare Wellness portion of your visit today, we are providing you with this personalized preventive plan of services (PPPS). This plan is based upon recommendations of the [place] Preventive Services Task Force (USPSTF) and the Advisory Committee on Immunization Practices (ACIP).    This lists the preventive care services that should be considered, and provides dates of when you are due. Items listed as completed are up-to-date and do not require any further intervention.    Health Maintenance   Topic Date Due   • COLONOSCOPY  2019   • MEDICARE ANNUAL WELLNESS  2020   • LIPID PANEL  2020   • INFLUENZA VACCINE  2020   • TDAP/TD VACCINES (3 - Td) 2027   • HEPATITIS C SCREENING  Completed   • Pneumococcal Vaccine Once at 65 Years Old  Completed   • ZOSTER VACCINE  Completed       No orders of the defined types were placed in this encounter.      Return in about 2 months (around 2020) for Recheck.

## 2020-07-06 NOTE — PROGRESS NOTES
The ABCs of the Annual Wellness Visit  Subsequent Medicare Wellness Visit    Chief Complaint   Patient presents with   • Medicare Wellness-subsequent   • Annual Exam   • Hypertension   • Hyperlipidemia       Subjective   History of Present Illness:  Jerald Bonilla is a 74 y.o. male who presents for a Subsequent Medicare Wellness Visit.    HEALTH RISK ASSESSMENT    Recent Hospitalizations:  No hospitalization(s) within the last year.    Current Medical Providers:  Patient Care Team:  Annie Penny MD as PCP - General (Family Medicine)  Wali Servin MD as PCP - Claims Attributed  Wali Servin MD as Consulting Physician (Nephrology)    Smoking Status:  Social History     Tobacco Use   Smoking Status Never Smoker   Smokeless Tobacco Never Used       Alcohol Consumption:  Social History     Substance and Sexual Activity   Alcohol Use Yes    Comment: 1-13 per month       Depression Screen:   PHQ-2/PHQ-9 Depression Screening 7/6/2020   Little interest or pleasure in doing things 0   Feeling down, depressed, or hopeless 1   Trouble falling or staying asleep, or sleeping too much 0   Feeling tired or having little energy 1   Poor appetite or overeating 0   Feeling bad about yourself - or that you are a failure or have let yourself or your family down 0   Trouble concentrating on things, such as reading the newspaper or watching television 0   Moving or speaking so slowly that other people could have noticed. Or the opposite - being so fidgety or restless that you have been moving around a lot more than usual 0   Thoughts that you would be better off dead, or of hurting yourself in some way 0   Total Score 2   If you checked off any problems, how difficult have these problems made it for you to do your work, take care of things at home, or get along with other people? Somewhat difficult       Fall Risk Screen:  STEADI Fall Risk Assessment was completed, and patient is at LOW risk for falls.Assessment  completed on:1/6/2020    Health Habits and Functional and Cognitive Screening:  Functional & Cognitive Status 7/6/2020   Do you have difficulty preparing food and eating? No   Do you have difficulty bathing yourself, getting dressed or grooming yourself? No   Do you have difficulty using the toilet? No   Do you have difficulty moving around from place to place? Yes   Do you have trouble with steps or getting out of a bed or a chair? Yes   Current Diet Well Balanced Diet   Dental Exam Not up to date   Eye Exam Up to date   Exercise (times per week) 1 times per week   Current Exercise Activities Include -   Do you need help using the phone?  No   Are you deaf or do you have serious difficulty hearing?  Yes   Do you need help with transportation? No   Do you need help shopping? No   Do you need help preparing meals?  No   Do you need help with housework?  No   Do you need help with laundry? No   Do you need help taking your medications? No   Do you need help managing money? No   Do you ever drive or ride in a car without wearing a seat belt? No   Have you felt unusual stress, anger or loneliness in the last month? Yes   Who do you live with? Spouse   If you need help, do you have trouble finding someone available to you? No   Have you been bothered in the last four weeks by sexual problems? No   Do you have difficulty concentrating, remembering or making decisions? No         Does the patient have evidence of cognitive impairment? No    Asprin use counseling:Taking ASA appropriately as indicated    Age-appropriate Screening Schedule:  Refer to the list below for future screening recommendations based on patient's age, sex and/or medical conditions. Orders for these recommended tests are listed in the plan section. The patient has been provided with a written plan.    Health Maintenance   Topic Date Due   • COLONOSCOPY  05/18/2019   • LIPID PANEL  06/20/2020   • INFLUENZA VACCINE  08/01/2020   • TDAP/TD VACCINES (3 -  Td) 05/16/2027   • ZOSTER VACCINE  Completed          The following portions of the patient's history were reviewed and updated as appropriate: allergies, current medications, past family history, past medical history, past social history, past surgical history and problem list.    Outpatient Medications Prior to Visit   Medication Sig Dispense Refill   • allopurinol (ZYLOPRIM) 100 MG tablet TAKE 2 TABLETS BY MOUTH  DAILY 180 tablet 3   • amLODIPine (NORVASC) 5 MG tablet amlodipine 5 mg tablet     • aspirin 81 MG tablet Take 81 mg by mouth Daily.     • B Complex Vitamins (VITAMIN B COMPLEX PO) Take 1 tablet by mouth Daily.     • carvedilol (COREG) 25 MG tablet Take 2 tablets by mouth 2 (Two) Times a Day With Meals. 360 tablet 1   • cholecalciferol (VITAMIN D3) 1000 UNITS tablet Take 2,000 Units by mouth Daily.     • clindamycin (CLEOCIN T) 1 % lotion Apply  topically to the appropriate area as directed Every 12 (Twelve) Hours. 60 mL 1   • clindamycin (CLEOCIN) 300 MG capsule clindamycin HCl 300 mg capsule     • clindamycin (CLINDAGEL) 1 % gel Apply  topically to the appropriate area as directed 2 (Two) Times a Day. 60 g 2   • fluticasone (Flonase) 50 MCG/ACT nasal spray 2 sprays into the nostril(s) as directed by provider Daily. 1 bottle 5   • hydrALAZINE (APRESOLINE) 50 MG tablet hydralazine 50 mg tablet     • loratadine (CLARITIN) 10 MG tablet Claritin 10 mg tablet   Take 1 tablet every day by oral route.     • NIFEdipine XL (PROCARDIA XL) 90 MG 24 hr tablet TAKE 2 TABLETS BY MOUTH  EVERY NIGHT 180 tablet 3   • pantoprazole (PROTONIX) 40 MG EC tablet Take 1 tablet by mouth Daily. 90 tablet 2   • pravastatin (PRAVACHOL) 80 MG tablet TAKE 1 TABLET BY MOUTH  EVERY NIGHT 90 tablet 3   • pseudoephedrine-guaifenesin (MUCINEX D)  MG per 12 hr tablet Take 1 tablet by mouth As Needed.     • sodium chloride (OCEAN) 0.65 % nasal spray 1 spray into the nostril(s) as directed by provider As Needed for Congestion.     •  valsartan-hydrochlorothiazide (DIOVAN-HCT) 320-25 MG per tablet Take 1 tablet by mouth Daily. 90 tablet 2   • ipratropium (ATROVENT) 0.03 % nasal spray 2 sprays into the nostril(s) as directed by provider Every 12 (Twelve) Hours. 30 mL 2     No facility-administered medications prior to visit.        Patient Active Problem List   Diagnosis   • Essential hypertension   • Gout   • Left hip pain   • Presence of left hip implant   • Pneumonia of right upper lobe due to infectious organism   • CHRIS on CPAP   • Mixed hyperlipidemia   • Dysphagia   • Early satiety   • Gastroesophageal reflux disease   • Morbidly obese (CMS/[facility])       Advanced Care Planning:  ACP discussion was held with the patient during this visit. Patient does not have an advance directive, information provided.    Review of Systems   Constitutional: Negative.  Negative for chills, fatigue, fever and unexpected weight change.   HENT: Negative.  Negative for congestion, ear pain, hearing loss, nosebleeds, rhinorrhea, sneezing, sore throat and tinnitus.    Eyes: Negative.  Negative for discharge.   Respiratory: Negative.  Negative for cough, shortness of breath and wheezing.    Cardiovascular: Negative.  Negative for chest pain and palpitations.   Gastrointestinal: Negative.  Negative for abdominal pain, constipation, diarrhea, nausea and vomiting.   Endocrine: Negative.    Genitourinary: Negative.  Negative for dysuria, frequency and urgency.   Musculoskeletal: Negative.  Negative for arthralgias, back pain, joint swelling, myalgias and neck pain.   Skin: Negative.  Negative for rash.   Allergic/Immunologic: Negative.    Neurological: Negative.  Negative for dizziness, weakness, numbness and headaches.   Hematological: Negative.  Negative for adenopathy.   Psychiatric/Behavioral: Negative.  Negative for dysphoric mood and sleep disturbance. The patient is not nervous/anxious.        Compared to one year ago, the patient feels his physical health is  "worse.  Compared to one year ago, the patient feels his mental health is worse.    Reviewed chart for potential of high risk medication in the elderly: yes  Reviewed chart for potential of harmful drug interactions in the elderly:not applicable    Objective         Vitals:    07/06/20 1103   BP: 116/62   BP Location: Left arm   Pulse: 54   SpO2: 98%   Weight: 112 kg (248 lb)   Height: 177.8 cm (70\")   PainSc: 0-No pain       Body mass index is 35.58 kg/m².  Discussed the patient's BMI with him. The BMI is above average; BMI management plan is completed.    Physical Exam   Constitutional: He is oriented to person, place, and time. He appears well-developed and well-nourished. No distress.   HENT:   Head: Normocephalic and atraumatic.   Nose: Nose normal.   Mouth/Throat: Oropharynx is clear and moist.   Eyes: Pupils are equal, round, and reactive to light. Conjunctivae and EOM are normal. Right eye exhibits no discharge. Left eye exhibits no discharge.   Neck: Normal range of motion. No thyromegaly present.   Cardiovascular: Normal rate, regular rhythm, normal heart sounds and intact distal pulses.   No murmur heard.  Pulmonary/Chest: Effort normal and breath sounds normal. No respiratory distress. He has no wheezes. He has no rales. He exhibits no tenderness.   Abdominal: Soft. Bowel sounds are normal. He exhibits no distension and no mass. There is no tenderness. No hernia.   Musculoskeletal: Normal range of motion. He exhibits no deformity.   Lymphadenopathy:     He has no cervical adenopathy.   Neurological: He is alert and oriented to person, place, and time. He has normal reflexes.   Skin: Skin is warm and dry. No rash noted. No pallor.   Psychiatric: He has a normal mood and affect. His behavior is normal. Judgment and thought content normal.             Assessment/Plan   Medicare Risks and Personalized Health Plan  CMS Preventative Services Quick Reference  Advance Directive " Discussion  Depression/Dysphoria  Fall Risk  Obesity/Overweight     The above risks/problems have been discussed with the patient.  Pertinent information has been shared with the patient in the After Visit Summary.  Follow up plans and orders are seen below in the Assessment/Plan Section.    Diagnoses and all orders for this visit:    1. Medicare annual wellness visit, subsequent (Primary)    2. Essential hypertension    3. Mixed hyperlipidemia    4. Reactive depression    5. Leg weakness, bilateral    Other orders  -     ipratropium (ATROVENT) 0.03 % nasal spray; 2 sprays into the nostril(s) as directed by provider Every 12 (Twelve) Hours.  Dispense: 30 mL; Refill: 2  -     sertraline (Zoloft) 25 MG tablet; Take 1 tablet by mouth Every Night.  Dispense: 30 tablet; Refill: 1      Follow Up:  Return in about 2 months (around 9/6/2020) for Recheck.     An After Visit Summary and PPPS were given to the patient.    Information has been scanned into chart. Discussed importance of taking medications as prescribed. Encouraged healthy eating habits with low fat, low salt choices and working towards maintaining a healthy weight. Recommended regular exercise if able as well as care to prevent falls including avoiding anything on the floor that they could slip or trip on such as throw rugs, making sure they have a bathmat to step onto when their feet are wet and having grab bars and railings where needed.

## 2020-08-06 RX ORDER — CARVEDILOL 25 MG/1
TABLET ORAL
Qty: 360 TABLET | Refills: 3 | Status: SHIPPED | OUTPATIENT
Start: 2020-08-06 | End: 2021-06-28

## 2020-08-10 ENCOUNTER — LAB (OUTPATIENT)
Dept: LAB | Facility: HOSPITAL | Age: 75
End: 2020-08-10

## 2020-08-10 PROCEDURE — U0003 INFECTIOUS AGENT DETECTION BY NUCLEIC ACID (DNA OR RNA); SEVERE ACUTE RESPIRATORY SYNDROME CORONAVIRUS 2 (SARS-COV-2) (CORONAVIRUS DISEASE [COVID-19]), AMPLIFIED PROBE TECHNIQUE, MAKING USE OF HIGH THROUGHPUT TECHNOLOGIES AS DESCRIBED BY CMS-2020-01-R: HCPCS | Performed by: INTERNAL MEDICINE

## 2020-08-10 PROCEDURE — C9803 HOPD COVID-19 SPEC COLLECT: HCPCS

## 2020-08-10 PROCEDURE — C9803 HOPD COVID-19 SPEC COLLECT: HCPCS | Performed by: INTERNAL MEDICINE

## 2020-08-11 LAB
COVID LABCORP PRIORITY: NORMAL
SARS-COV-2 RNA RESP QL NAA+PROBE: NOT DETECTED

## 2020-08-13 ENCOUNTER — HOSPITAL ENCOUNTER (OUTPATIENT)
Facility: HOSPITAL | Age: 75
Setting detail: HOSPITAL OUTPATIENT SURGERY
Discharge: HOME | End: 2020-08-13
Attending: INTERNAL MEDICINE | Admitting: INTERNAL MEDICINE

## 2020-08-13 ENCOUNTER — ANESTHESIA EVENT (OUTPATIENT)
Dept: GASTROENTEROLOGY | Facility: HOSPITAL | Age: 75
End: 2020-08-13

## 2020-08-13 ENCOUNTER — ANESTHESIA (OUTPATIENT)
Dept: GASTROENTEROLOGY | Facility: HOSPITAL | Age: 75
End: 2020-08-13

## 2020-08-13 VITALS
TEMPERATURE: 97.3 F | DIASTOLIC BLOOD PRESSURE: 60 MMHG | OXYGEN SATURATION: 93 % | BODY MASS INDEX: 34.36 KG/M2 | HEIGHT: 70 IN | WEIGHT: 240 LBS | HEART RATE: 53 BPM | SYSTOLIC BLOOD PRESSURE: 134 MMHG | RESPIRATION RATE: 18 BRPM

## 2020-08-13 DIAGNOSIS — Z12.11 SCREEN FOR COLON CANCER: ICD-10-CM

## 2020-08-13 PROCEDURE — 25010000002 PROPOFOL 10 MG/ML EMULSION: Performed by: NURSE ANESTHETIST, CERTIFIED REGISTERED

## 2020-08-13 PROCEDURE — 88305 TISSUE EXAM BY PATHOLOGIST: CPT

## 2020-08-13 DEVICE — DEV CLIP ENDO RESOLUTION360 CONTRL ROT 235CM: Type: IMPLANTABLE DEVICE | Site: ASCENDING COLON | Status: FUNCTIONAL

## 2020-08-13 RX ORDER — DEXTROSE AND SODIUM CHLORIDE 5; .45 G/100ML; G/100ML
30 INJECTION, SOLUTION INTRAVENOUS CONTINUOUS PRN
Status: DISCONTINUED | OUTPATIENT
Start: 2020-08-13 | End: 2020-08-13 | Stop reason: HOSPADM

## 2020-08-13 RX ORDER — PROPOFOL 10 MG/ML
VIAL (ML) INTRAVENOUS AS NEEDED
Status: DISCONTINUED | OUTPATIENT
Start: 2020-08-13 | End: 2020-08-13 | Stop reason: SURG

## 2020-08-13 RX ORDER — ONDANSETRON 2 MG/ML
4 INJECTION INTRAMUSCULAR; INTRAVENOUS ONCE AS NEEDED
Status: DISCONTINUED | OUTPATIENT
Start: 2020-08-13 | End: 2020-08-13 | Stop reason: HOSPADM

## 2020-08-13 RX ORDER — LIDOCAINE HYDROCHLORIDE 20 MG/ML
INJECTION, SOLUTION EPIDURAL; INFILTRATION; INTRACAUDAL; PERINEURAL AS NEEDED
Status: DISCONTINUED | OUTPATIENT
Start: 2020-08-13 | End: 2020-08-13 | Stop reason: SURG

## 2020-08-13 RX ADMIN — PROPOFOL 20 MG: 10 INJECTION, EMULSION INTRAVENOUS at 08:18

## 2020-08-13 RX ADMIN — PROPOFOL 20 MG: 10 INJECTION, EMULSION INTRAVENOUS at 08:08

## 2020-08-13 RX ADMIN — PROPOFOL 30 MG: 10 INJECTION, EMULSION INTRAVENOUS at 08:11

## 2020-08-13 RX ADMIN — PROPOFOL 30 MG: 10 INJECTION, EMULSION INTRAVENOUS at 08:03

## 2020-08-13 RX ADMIN — DEXTROSE AND SODIUM CHLORIDE 30 ML/HR: 5; 450 INJECTION, SOLUTION INTRAVENOUS at 07:20

## 2020-08-13 RX ADMIN — LIDOCAINE HYDROCHLORIDE 50 MG: 20 INJECTION, SOLUTION EPIDURAL; INFILTRATION; INTRACAUDAL; PERINEURAL at 08:00

## 2020-08-13 RX ADMIN — PROPOFOL 80 MG: 10 INJECTION, EMULSION INTRAVENOUS at 08:00

## 2020-08-13 RX ADMIN — PROPOFOL 20 MG: 10 INJECTION, EMULSION INTRAVENOUS at 08:14

## 2020-08-13 RX ADMIN — PROPOFOL 20 MG: 10 INJECTION, EMULSION INTRAVENOUS at 08:06

## 2020-08-13 NOTE — ANESTHESIA POSTPROCEDURE EVALUATION
Patient: Jerald Bonilla    Procedure Summary     Date:  08/13/20 Room / Location:  [facility] ENDOSCOPY 2 / [facility] ENDOSCOPY    Anesthesia Start:  0755 Anesthesia Stop:  0826    Procedure:  COLONOSCOPY (N/A ) Diagnosis:       Screen for colon cancer      (Screen for colon cancer [Z12.11])    Surgeon:  Dagoberto Gavin DO Provider:  Kenia Maria CRNA    Anesthesia Type:  MAC ASA Status:  3          Anesthesia Type: MAC    Vitals  No vitals data found for the desired time range.          Post Anesthesia Care and Evaluation    Patient location during evaluation: bedside  Patient participation: complete - patient participated  Level of consciousness: awake  Pain score: 0  Pain management: adequate  Airway patency: patent  Anesthetic complications: No anesthetic complications  PONV Status: none  Cardiovascular status: hemodynamically stable  Respiratory status: acceptable  Hydration status: acceptable

## 2020-08-13 NOTE — H&P
Gonzalo Cruz DO,[facility]  Gastroenterology  Hepatology  Endoscopy  Board Certified in Internal Medicine and gastroenterology  [address], [address]  [place]. [address]  - [phone]   F - [phone]     GASTROENTEROLOGY HISTORY AND PHYSICAL  NOTE   GONZALO CRUZ DO.         SUBJECTIVE:   8/13/2020    Name: Jerald Bonilla  DOD: 1945        Chief Complaint:       Subjective : Screen for colon cancer    Patient is 74 y.o. male presents with desire for elective colonoscopy.      ROS/HISTORY/ CURRENT MEDICATIONS/OBJECTIVE/VS/PE:   Review of Systems:  All systems unremarkable unless specified below.  Constitutional   HENT  Eyes   Respiratory    Cardiovascular  Gastrointestinal   Endocrine  Genitourinary    Musculoskeletal   Skin  Allergic/Immunologic    Neurological    Hematological  Psychiatric/Behavioral    History:     Past Medical History:   Diagnosis Date   • Acne conglobata    • Acute maxillary sinusitis    • Benign prostate hyperplasia    • Blurring of visual image    • Carbuncle     of infected sebaceous cyst on back   • Dizziness and giddiness    • Erectile dysfunction    • Essential hypertension     seeing nephro   • Gastroesophageal reflux disease    • Gout    • Hip pain    • Hyperlipidemia    • Hypertension    • Low back pain    • Malaise and fatigue    • Need for prophylactic vaccination against Streptococcus pneumoniae (pneumococcus)    • Nuclear senile cataract    • Osteoarthritis    • Otalgia    • Otitis externa    • Pain in lower limb    • Rheumatoid arthritis (CMS/HCC)    • Skin lesion     right calf   • Swelling of knee joint    • Tinnitus    • Upper respiratory infection      Past Surgical History:   Procedure Laterality Date   • COLONOSCOPY  05/18/2009    Colonoscopy, diagnostic (screening) 03033 (1)    diverticuli, roids    • ENDOSCOPY N/A 1/16/2019    Procedure: ESOPHAGOGASTRODUODENOSCOPY possible dilation;  Surgeon: Jerald Nation MD;  Location: [facility]  ENDOSCOPY;  Service: Gastroenterology   • HERNIA REPAIR      umbilical   • HIP SURGERY Left 2016   • INJECTION OF MEDICATION  2015    B12 (3)     • INJECTION OF MEDICATION  10/31/2011    Rocephin (1)      • JOINT REPLACEMENT     • OTHER SURGICAL HISTORY  2016    &D, Complicated or Multiple 08115 (3)      • OTHER SURGICAL HISTORY  2014    Remove Impacted Cerumen 05624 (1)        Family History   Problem Relation Age of Onset   • Hypertension Father    • Heart disease Father    • Cancer Father    • Diabetes Father    • Kidney disease Father    • Hyperlipidemia Other    • Hypertension Other    • Cancer Other         prostate   • Arthritis Mother    • Hypertension Mother    • Obesity Mother    • Thyroid disease Mother    • Mental illness Mother    • Arthritis Maternal Grandmother      Social History     Tobacco Use   • Smoking status: Former Smoker     Last attempt to quit: 2000     Years since quittin.6   • Smokeless tobacco: Never Used   Substance Use Topics   • Alcohol use: Yes     Comment: occassionally   • Drug use: Not Currently     Types: Marijuana     Prior to Admission medications    Medication Sig Start Date End Date Taking? Authorizing Provider   allopurinol (ZYLOPRIM) 100 MG tablet TAKE 2 TABLETS BY MOUTH  DAILY  Patient taking differently: Take 200 mg by mouth 2 (Two) Times a Day. 19  Yes Annie Penny MD   amLODIPine (NORVASC) 5 MG tablet Take 5 mg by mouth Daily.   Yes Emergency, Nurse Epic, RN   B Complex Vitamins (VITAMIN B COMPLEX PO) Take 1 tablet by mouth Daily.   Yes Amanda Trotter MD   carvedilol (COREG) 25 MG tablet TAKE 2 TABLETS BY MOUTH TWO TIMES DAILY WITH MEALS 20  Yes Annie Penny MD   cholecalciferol (VITAMIN D3) 1000 UNITS tablet Take 2,000 Units by mouth Daily.   Yes Amanda Trotter MD   NIFEdipine XL (PROCARDIA XL) 90 MG 24 hr tablet TAKE 2 TABLETS BY MOUTH  EVERY NIGHT 20  Yes Annie Penny MD   pantoprazole  (PROTONIX) 40 MG EC tablet Take 1 tablet by mouth Daily.  Patient taking differently: Take 40 mg by mouth Every Evening. 3/3/20  Yes Annie Penny MD   pravastatin (PRAVACHOL) 80 MG tablet TAKE 1 TABLET BY MOUTH  EVERY NIGHT 3/3/20  Yes Annie Penny MD   sertraline (Zoloft) 25 MG tablet Take 1 tablet by mouth Every Night. 7/6/20  Yes Annie Penny MD   valsartan-hydrochlorothiazide (DIOVAN-HCT) 320-25 MG per tablet Take 1 tablet by mouth Daily. 3/3/20  Yes Annie Penny MD   aspirin 81 MG tablet Take 81 mg by mouth Daily.    Provider, MD Amanda   clindamycin (CLEOCIN T) 1 % lotion Apply  topically to the appropriate area as directed Every 12 (Twelve) Hours.  Patient taking differently: Apply  topically to the appropriate area as directed Daily As Needed. 11/5/18   Annie Penny MD   fluticasone (Flonase) 50 MCG/ACT nasal spray 2 sprays into the nostril(s) as directed by provider Daily.  Patient taking differently: 2 sprays into the nostril(s) as directed by provider Daily As Needed. 6/1/20   Rebecca Servin APRN   hydrALAZINE (APRESOLINE) 50 MG tablet Take 50 mg by mouth 2 (Two) Times a Day As Needed.    Emergency, Nurse Laura, RN   ipratropium (ATROVENT) 0.03 % nasal spray 2 sprays into the nostril(s) as directed by provider Every 12 (Twelve) Hours. 7/6/20   Annie Penny MD   pseudoephedrine-guaifenesin (MUCINEX D)  MG per 12 hr tablet Take 1 tablet by mouth As Needed.    Provider, MD Amanda   sodium chloride (OCEAN) 0.65 % nasal spray 1 spray into the nostril(s) as directed by provider As Needed for Congestion.    Provider, MD Amanda   clindamycin (CLEOCIN) 300 MG capsule clindamycin HCl 300 mg capsule  8/13/20  Emergency, Nurse Laura, RN     Allergies:  Lisinopril and Minoxidil    I have reviewed the patients medical history, surgical history and family history in the available medical record system.     Current Medications:     Current Facility-Administered  Medications   Medication Dose Route Frequency Provider Last Rate Last Dose   • dextrose 5 % and sodium chloride 0.45 % infusion  30 mL/hr Intravenous Continuous PRN Dagoberto Gavin DO 30 mL/hr at 08/13/20 0720 30 mL/hr at 08/13/20 0720   • ondansetron (ZOFRAN) injection 4 mg  4 mg Intravenous Once PRN Kenia Maria CRNA           Objective     Physical Exam:   Temp:  [98.1 °F (36.7 °C)] 98.1 °F (36.7 °C)  Heart Rate:  [59] 59  Resp:  [20] 20  BP: (137)/(61) 137/61    Physical Exam:  General Appearance:    Alert, cooperative, in no acute distress   Head:    Normocephalic, without obvious abnormality, atraumatic   Eyes:            Lids and lashes normal, conjunctivae and sclerae normal, no   icterus, no pallor, corneas clear, PERRLA   Ears:    Ears appear intact with no abnormalities noted   Throat:   No oral lesions, no thrush, oral mucosa moist   Neck:   No adenopathy, supple, trachea midline, no thyromegaly, no     carotid bruit, no JVD   Back:     No kyphosis present, no scoliosis present, no skin lesions,       erythema or scars, no tenderness to percussion or                   palpation,   range of motion normal   Lungs:     Clear to auscultation,respirations regular, even and                   unlabored    Heart:    Regular rhythm and normal rate, normal S1 and S2, no            murmur, no gallop, no rub, no click   Breast Exam:    Deferred   Abdomen:     Normal bowel sounds, no masses, no organomegaly, soft        non-tender, non-distended, no guarding, no rebound                 tenderness   Genitalia:    Deferred   Extremities:   Moves all extremities well, no edema, no cyanosis, no              redness   Pulses:   Pulses palpable and equal bilaterally   Skin:   No bleeding, bruising or rash   Lymph nodes:   No palpable adenopathy   Neurologic:   Cranial nerves 2 - 12 grossly intact, sensation intact, DTR        present and equal bilaterally      Results Review:     Lab Results   Component Value Date     WBC 5.32 07/06/2020    WBC 4.79 03/16/2020    WBC 4.23 09/12/2019    HGB 15.3 07/06/2020    HGB 15.4 03/16/2020    HGB 15.3 09/12/2019    HCT 44.4 07/06/2020    HCT 46.0 03/16/2020    HCT 45.1 09/12/2019     07/06/2020     03/16/2020     09/12/2019             No results found for: LIPASE  Lab Results   Component Value Date    INR 2.20 (H) 01/05/2017    INR 1.3 (H) 12/07/2016    INR 1.3 (H) 12/06/2016     No results found for: CULTURE    Radiology Review:  Imaging Results (Last 72 Hours)     ** No results found for the last 72 hours. **           I reviewed the patient's new clinical results.  I reviewed the patient's new imaging results and agree with the interpretation.     ASSESSMENT/PLAN:   ASSESSMENT:  1.  Screen for colon cancer    PLAN:  1.  Colonoscopy    Risk and benefits associated with the procedure are reviewed with the patient.  The patient wished to proceed     Dagoberto Gavin DO  08/13/20  07:54

## 2020-08-13 NOTE — DISCHARGE INSTR - APPOINTMENTS
Dr. Dagoberto Gavin, DO  [address]  [place] [address]  [phone]    Appointment date and time:  November 13, 2020 at 9:30 am

## 2020-08-13 NOTE — ANESTHESIA PREPROCEDURE EVALUATION
Anesthesia Evaluation     Patient summary reviewed and Nursing notes reviewed   NPO Solid Status: > 8 hours  NPO Liquid Status: > 2 hours           Airway   Mallampati: II  TM distance: >3 FB  Neck ROM: full  No difficulty expected  Dental - normal exam     Pulmonary - normal exam   (+) a smoker Former, sleep apnea on CPAP,   Cardiovascular - normal exam  Exercise tolerance: good (4-7 METS)    Patient on routine beta blocker    (+) hypertension well controlled 2 medications or greater, hyperlipidemia,       Neuro/Psych  (+) dizziness/light headedness,     GI/Hepatic/Renal/Endo    (+) obesity,  GERD well controlled,      Musculoskeletal     (+) arthralgias,   Abdominal  - normal exam   Substance History - negative use     OB/GYN          Other   arthritis,                      Anesthesia Plan    ASA 3     MAC     intravenous induction     Anesthetic plan, all risks, benefits, and alternatives have been provided, discussed and informed consent has been obtained with: patient.

## 2020-08-18 LAB
LAB AP CASE REPORT: NORMAL
PATH REPORT.FINAL DX SPEC: NORMAL

## 2020-08-28 RX ORDER — ALLOPURINOL 100 MG/1
200 TABLET ORAL DAILY
Qty: 180 TABLET | Refills: 3 | Status: SHIPPED | OUTPATIENT
Start: 2020-08-28 | End: 2021-07-12

## 2020-09-08 ENCOUNTER — OFFICE VISIT (OUTPATIENT)
Dept: FAMILY MEDICINE CLINIC | Facility: CLINIC | Age: 75
End: 2020-09-08

## 2020-09-08 VITALS
WEIGHT: 248 LBS | DIASTOLIC BLOOD PRESSURE: 60 MMHG | HEART RATE: 57 BPM | HEIGHT: 70 IN | BODY MASS INDEX: 35.5 KG/M2 | SYSTOLIC BLOOD PRESSURE: 128 MMHG | OXYGEN SATURATION: 98 %

## 2020-09-08 DIAGNOSIS — E78.2 MIXED HYPERLIPIDEMIA: Chronic | ICD-10-CM

## 2020-09-08 DIAGNOSIS — R73.9 HYPERGLYCEMIA: ICD-10-CM

## 2020-09-08 DIAGNOSIS — I10 ESSENTIAL HYPERTENSION: Primary | Chronic | ICD-10-CM

## 2020-09-08 DIAGNOSIS — N28.9 RENAL INSUFFICIENCY, MILD: ICD-10-CM

## 2020-09-08 PROCEDURE — 99214 OFFICE O/P EST MOD 30 MIN: CPT | Performed by: GENERAL PRACTICE

## 2020-09-08 NOTE — PROGRESS NOTES
Subjective   Jerald Bonilla is a 74 y.o. male.   Chief Complaint   Patient presents with   • Hypertension     For review and evaluation of management of chronic medical problems. Labs pending.   Hypertension   This is a chronic problem. The current episode started more than 1 year ago. The problem is unchanged. The problem is controlled. Pertinent negatives include no chest pain, headaches, neck pain, palpitations or shortness of breath. There are no associated agents to hypertension. Current antihypertension treatment includes beta blockers, angiotensin blockers, diuretics and calcium channel blockers. The current treatment provides significant improvement. There are no compliance problems.    Hyperlipidemia   This is a chronic problem. The current episode started more than 1 year ago. The problem is controlled. Recent lipid tests were reviewed and are normal. There are no known factors aggravating his hyperlipidemia. Pertinent negatives include no chest pain, myalgias or shortness of breath. Current antihyperlipidemic treatment includes statins. The current treatment provides significant improvement of lipids. There are no compliance problems.       The following portions of the patient's history were reviewed and updated as appropriate: allergies, current medications, past social history and problem list.    Outpatient Medications Prior to Visit   Medication Sig Dispense Refill   • allopurinol (ZYLOPRIM) 100 MG tablet TAKE 2 TABLETS BY MOUTH  DAILY 180 tablet 3   • amLODIPine (NORVASC) 5 MG tablet Take 5 mg by mouth Daily.     • aspirin 81 MG tablet Take 81 mg by mouth Daily.     • B Complex Vitamins (VITAMIN B COMPLEX PO) Take 1 tablet by mouth Daily.     • carvedilol (COREG) 25 MG tablet TAKE 2 TABLETS BY MOUTH TWO TIMES DAILY WITH MEALS 360 tablet 3   • cholecalciferol (VITAMIN D3) 1000 UNITS tablet Take 2,000 Units by mouth Daily.     • clindamycin (CLEOCIN T) 1 % lotion Apply  topically to the  appropriate area as directed Every 12 (Twelve) Hours. (Patient taking differently: Apply  topically to the appropriate area as directed Daily As Needed.) 60 mL 1   • fluticasone (Flonase) 50 MCG/ACT nasal spray 2 sprays into the nostril(s) as directed by provider Daily. (Patient taking differently: 2 sprays into the nostril(s) as directed by provider Daily As Needed.) 1 bottle 5   • hydrALAZINE (APRESOLINE) 50 MG tablet Take 50 mg by mouth 2 (Two) Times a Day As Needed.     • ipratropium (ATROVENT) 0.03 % nasal spray 2 sprays into the nostril(s) as directed by provider Every 12 (Twelve) Hours. 30 mL 2   • NIFEdipine XL (PROCARDIA XL) 90 MG 24 hr tablet TAKE 2 TABLETS BY MOUTH  EVERY NIGHT 180 tablet 3   • pantoprazole (PROTONIX) 40 MG EC tablet Take 1 tablet by mouth Daily. (Patient taking differently: Take 40 mg by mouth Every Evening.) 90 tablet 2   • pravastatin (PRAVACHOL) 80 MG tablet TAKE 1 TABLET BY MOUTH  EVERY NIGHT 90 tablet 3   • pseudoephedrine-guaifenesin (MUCINEX D)  MG per 12 hr tablet Take 1 tablet by mouth As Needed.     • sodium chloride (OCEAN) 0.65 % nasal spray 1 spray into the nostril(s) as directed by provider As Needed for Congestion.     • valsartan-hydrochlorothiazide (DIOVAN-HCT) 320-25 MG per tablet Take 1 tablet by mouth Daily. 90 tablet 2   • sertraline (Zoloft) 25 MG tablet Take 1 tablet by mouth Every Night. 30 tablet 1     No facility-administered medications prior to visit.        Review of Systems   Constitutional: Negative.  Negative for chills, fatigue, fever and unexpected weight change.   HENT: Negative.  Negative for congestion, ear pain, hearing loss, nosebleeds, rhinorrhea, sneezing, sore throat and tinnitus.    Eyes: Negative.  Negative for discharge.   Respiratory: Negative.  Negative for cough, shortness of breath and wheezing.    Cardiovascular: Negative.  Negative for chest pain and palpitations.   Gastrointestinal: Negative.  Negative for abdominal pain,  "constipation, diarrhea, nausea and vomiting.   Endocrine: Negative.    Genitourinary: Negative.  Negative for dysuria, frequency and urgency.   Musculoskeletal: Negative.  Negative for arthralgias, back pain, joint swelling, myalgias and neck pain.   Skin: Negative.  Negative for rash.   Allergic/Immunologic: Negative.    Neurological: Negative.  Negative for dizziness, weakness, numbness and headaches.   Hematological: Negative.  Negative for adenopathy.   Psychiatric/Behavioral: Negative.  Negative for dysphoric mood and sleep disturbance. The patient is not nervous/anxious.        Objective   Visit Vitals  /60 (BP Location: Left arm)   Pulse 57   Ht 177.8 cm (70\")   Wt 112 kg (248 lb)   SpO2 98%   BMI 35.58 kg/m²     Physical Exam   Constitutional: He is oriented to person, place, and time. He appears well-developed and well-nourished. No distress.   HENT:   Head: Normocephalic.   Nose: Nose normal.   Mouth/Throat: Oropharynx is clear and moist.   Eyes: Pupils are equal, round, and reactive to light. Conjunctivae and EOM are normal. Right eye exhibits no discharge. Left eye exhibits no discharge.   Neck: No thyromegaly present.   Cardiovascular: Normal rate, regular rhythm, normal heart sounds and intact distal pulses.   No murmur heard.  Pulmonary/Chest: Effort normal and breath sounds normal.   Musculoskeletal: He exhibits no edema.   Lymphadenopathy:     He has no cervical adenopathy.   Neurological: He is alert and oriented to person, place, and time.   Skin: Skin is warm and dry.   Psychiatric: He has a normal mood and affect.   Nursing note and vitals reviewed.       Notes brought forward are reviewed and updated if indicated.     Assessment/Plan   Problem List Items Addressed This Visit        Cardiovascular and Mediastinum    Essential hypertension - Primary (Chronic)    Relevant Orders    Hemoglobin A1c    Basic Metabolic Panel    Mixed hyperlipidemia (Chronic)      Other Visit Diagnoses     Renal " insufficiency, mild        Relevant Orders    Hemoglobin A1c    Basic Metabolic Panel    Hyperglycemia        Relevant Orders    Hemoglobin A1c          Will notify regarding results. Continue current treatment. Push fluids.     No orders of the defined types were placed in this encounter.    Return in about 4 months (around 1/8/2021) for Recheck.

## 2020-09-10 ENCOUNTER — TRANSCRIBE ORDERS (OUTPATIENT)
Dept: LAB | Facility: HOSPITAL | Age: 75
End: 2020-09-10

## 2020-09-10 ENCOUNTER — LAB (OUTPATIENT)
Dept: LAB | Facility: HOSPITAL | Age: 75
End: 2020-09-10

## 2020-09-10 DIAGNOSIS — I10 ESSENTIAL (PRIMARY) HYPERTENSION: Primary | ICD-10-CM

## 2020-09-10 LAB
ALBUMIN SERPL-MCNC: 4.4 G/DL (ref 3.5–5.2)
ANION GAP SERPL CALCULATED.3IONS-SCNC: 10.2 MMOL/L (ref 5–15)
BUN SERPL-MCNC: 26 MG/DL (ref 8–23)
BUN/CREAT SERPL: 19.5 (ref 7–25)
CALCIUM SPEC-SCNC: 9.5 MG/DL (ref 8.6–10.5)
CHLORIDE SERPL-SCNC: 105 MMOL/L (ref 98–107)
CO2 SERPL-SCNC: 23.8 MMOL/L (ref 22–29)
CREAT SERPL-MCNC: 1.33 MG/DL (ref 0.76–1.27)
CREAT UR-MCNC: 114.9 MG/DL
GFR SERPL CREATININE-BSD FRML MDRD: 64 ML/MIN/1.73
GLUCOSE SERPL-MCNC: 108 MG/DL (ref 65–99)
PHOSPHATE SERPL-MCNC: 3.8 MG/DL (ref 2.5–4.5)
POTASSIUM SERPL-SCNC: 3.8 MMOL/L (ref 3.5–5.2)
PROT UR-MCNC: 13 MG/DL
PROT/CREAT UR: 113.1 MG/G CREA (ref 0–200)
SODIUM SERPL-SCNC: 139 MMOL/L (ref 136–145)

## 2020-09-10 PROCEDURE — 84156 ASSAY OF PROTEIN URINE: CPT | Performed by: INTERNAL MEDICINE

## 2020-09-10 PROCEDURE — 80069 RENAL FUNCTION PANEL: CPT | Performed by: INTERNAL MEDICINE

## 2020-09-10 PROCEDURE — 36415 COLL VENOUS BLD VENIPUNCTURE: CPT | Performed by: INTERNAL MEDICINE

## 2020-09-10 PROCEDURE — 82570 ASSAY OF URINE CREATININE: CPT | Performed by: INTERNAL MEDICINE

## 2020-09-21 ENCOUNTER — TRANSCRIBE ORDERS (OUTPATIENT)
Dept: LAB | Facility: HOSPITAL | Age: 75
End: 2020-09-21

## 2020-09-21 DIAGNOSIS — I10 ESSENTIAL (PRIMARY) HYPERTENSION: Primary | ICD-10-CM

## 2020-09-23 RX ORDER — PANTOPRAZOLE SODIUM 40 MG/1
40 TABLET, DELAYED RELEASE ORAL DAILY
Qty: 90 TABLET | Refills: 3 | Status: SHIPPED | OUTPATIENT
Start: 2020-09-23 | End: 2021-01-12

## 2020-11-11 ENCOUNTER — TELEPHONE (OUTPATIENT)
Dept: FAMILY MEDICINE CLINIC | Facility: CLINIC | Age: 75
End: 2020-11-11

## 2020-11-11 NOTE — TELEPHONE ENCOUNTER
PATIENT CALLED WANTING TO KNOW IF HE IS GOOD OR NOT WITH HIS PNEUMONIA SHOT. HE WAS TOLD BY HIS PHARMACY THAT HE NEEDS TWO BUT HE ONLY HAD ONE. HE WOULD LIKE TO SPEAK WITH DR GRAHAM OR ONE OF HER NURSES ABOUT THIS.    GOOD CALL BACK:  [phone]

## 2020-11-23 RX ORDER — VALSARTAN AND HYDROCHLOROTHIAZIDE 320; 25 MG/1; MG/1
1 TABLET, FILM COATED ORAL DAILY
Qty: 90 TABLET | Refills: 3 | Status: SHIPPED | OUTPATIENT
Start: 2020-11-23 | End: 2021-09-27

## 2020-12-27 PROCEDURE — U0003 INFECTIOUS AGENT DETECTION BY NUCLEIC ACID (DNA OR RNA); SEVERE ACUTE RESPIRATORY SYNDROME CORONAVIRUS 2 (SARS-COV-2) (CORONAVIRUS DISEASE [COVID-19]), AMPLIFIED PROBE TECHNIQUE, MAKING USE OF HIGH THROUGHPUT TECHNOLOGIES AS DESCRIBED BY CMS-2020-01-R: HCPCS | Performed by: NURSE PRACTITIONER

## 2021-01-12 ENCOUNTER — OFFICE VISIT (OUTPATIENT)
Dept: FAMILY MEDICINE CLINIC | Facility: CLINIC | Age: 76
End: 2021-01-12

## 2021-01-12 VITALS
OXYGEN SATURATION: 98 % | BODY MASS INDEX: 36.22 KG/M2 | HEART RATE: 56 BPM | DIASTOLIC BLOOD PRESSURE: 60 MMHG | WEIGHT: 253 LBS | HEIGHT: 70 IN | SYSTOLIC BLOOD PRESSURE: 120 MMHG

## 2021-01-12 DIAGNOSIS — E66.01 MORBIDLY OBESE (HCC): ICD-10-CM

## 2021-01-12 DIAGNOSIS — Z12.5 ENCOUNTER FOR PROSTATE CANCER SCREENING: ICD-10-CM

## 2021-01-12 DIAGNOSIS — J01.10 SUBACUTE FRONTAL SINUSITIS: ICD-10-CM

## 2021-01-12 DIAGNOSIS — E78.2 MIXED HYPERLIPIDEMIA: Chronic | ICD-10-CM

## 2021-01-12 DIAGNOSIS — M10.371 ACUTE GOUT DUE TO RENAL IMPAIRMENT INVOLVING TOE OF RIGHT FOOT: ICD-10-CM

## 2021-01-12 DIAGNOSIS — I10 ESSENTIAL HYPERTENSION: Primary | Chronic | ICD-10-CM

## 2021-01-12 PROCEDURE — 99213 OFFICE O/P EST LOW 20 MIN: CPT | Performed by: GENERAL PRACTICE

## 2021-01-12 RX ORDER — CEFPROZIL 500 MG/1
500 TABLET, FILM COATED ORAL 2 TIMES DAILY
Qty: 14 TABLET | Refills: 0 | Status: SHIPPED | OUTPATIENT
Start: 2021-01-12 | End: 2021-05-04

## 2021-01-12 RX ORDER — FAMOTIDINE 10 MG
10 TABLET ORAL NIGHTLY
COMMUNITY

## 2021-01-12 RX ORDER — LORATADINE 10 MG/1
10 TABLET ORAL DAILY
COMMUNITY
End: 2022-01-31

## 2021-01-12 RX ORDER — ASPIRIN 81 MG/1
81 TABLET ORAL DAILY
COMMUNITY
End: 2023-02-16

## 2021-01-12 NOTE — PROGRESS NOTES
Subjective   Jerald Bonilla is a 75 y.o. male.   Chief Complaint   Patient presents with   • Hypertension     For review and evaluation of management of chronic medical problems.   Hypertension  This is a chronic problem. The current episode started more than 1 year ago. The problem is unchanged. The problem is controlled. Associated symptoms include headaches. Pertinent negatives include no chest pain, neck pain, palpitations or shortness of breath. There are no associated agents to hypertension. Current antihypertension treatment includes beta blockers, angiotensin blockers, diuretics and calcium channel blockers. The current treatment provides significant improvement. There are no compliance problems.    Obesity  This is a chronic problem. The current episode started more than 1 year ago. The problem occurs constantly. The problem has been unchanged. Associated symptoms include headaches. Pertinent negatives include no abdominal pain, arthralgias, chest pain, chills, congestion, coughing, fatigue, fever, joint swelling, myalgias, nausea, neck pain, numbness, rash, sore throat, vomiting or weakness. The symptoms are aggravated by stress. Treatments tried: Diet and exercise. The treatment provided mild relief.   Sinus Problem  This is a recurrent problem. The current episode started 1 to 4 weeks ago. The problem is unchanged. There has been no fever. The pain is mild. Associated symptoms include headaches and sinus pressure. Pertinent negatives include no chills, congestion, coughing, neck pain, shortness of breath or sore throat. Treatments tried: antihistamine, flonase.      The following portions of the patient's history were reviewed and updated as appropriate: allergies, current medications, past social history and problem list.    Outpatient Medications Prior to Visit   Medication Sig Dispense Refill   • allopurinol (ZYLOPRIM) 100 MG tablet TAKE 2 TABLETS BY MOUTH  DAILY 180 tablet 3   • aspirin 81 MG EC  tablet Take 81 mg by mouth Daily.     • B Complex Vitamins (VITAMIN B COMPLEX PO) Take 1 tablet by mouth Daily.     • carvedilol (COREG) 25 MG tablet TAKE 2 TABLETS BY MOUTH TWO TIMES DAILY WITH MEALS 360 tablet 3   • cholecalciferol (VITAMIN D3) 1000 UNITS tablet Take 2,000 Units by mouth Daily.     • clindamycin (CLEOCIN T) 1 % lotion Apply  topically to the appropriate area as directed Every 12 (Twelve) Hours. (Patient taking differently: Apply  topically to the appropriate area as directed Daily As Needed.) 60 mL 1   • famotidine (PEPCID) 10 MG tablet Take 10 mg by mouth 2 (Two) Times a Day.     • fluticasone (FLONASE) 50 MCG/ACT nasal spray 1 spray into the nostril(s) as directed by provider Daily. 1 bottle 0   • loratadine (CLARITIN) 10 MG tablet Take 10 mg by mouth Daily.     • NIFEdipine XL (PROCARDIA XL) 90 MG 24 hr tablet TAKE 2 TABLETS BY MOUTH  EVERY NIGHT 180 tablet 3   • pseudoephedrine-guaifenesin (MUCINEX D)  MG per 12 hr tablet Take 1 tablet by mouth As Needed.     • SUPER B COMPLEX/C PO Super B Complex + C   one tablet daily     • valsartan-hydrochlorothiazide (DIOVAN-HCT) 320-25 MG per tablet TAKE 1 TABLET BY MOUTH  DAILY 90 tablet 3   • pravastatin (PRAVACHOL) 80 MG tablet TAKE 1 TABLET BY MOUTH  EVERY NIGHT 90 tablet 3   • hydrALAZINE (APRESOLINE) 50 MG tablet Take 50 mg by mouth 2 (Two) Times a Day As Needed.     • pantoprazole (PROTONIX) 40 MG EC tablet TAKE 1 TABLET BY MOUTH  DAILY 90 tablet 3     No facility-administered medications prior to visit.      I have reviewed 12 systems with patient. Findings were negative except what is noted below and/or in history of present illness.    Review of Systems   Constitutional: Negative for chills, fatigue and fever.   HENT: Positive for sinus pressure. Negative for congestion and sore throat.    Respiratory: Negative for cough and shortness of breath.    Cardiovascular: Negative for chest pain and palpitations.   Gastrointestinal: Negative for  "abdominal pain, nausea and vomiting.   Musculoskeletal: Negative for arthralgias, joint swelling, myalgias and neck pain.   Skin: Negative for rash.   Neurological: Positive for headaches. Negative for weakness and numbness.       Objective   Visit Vitals  /60   Pulse 56   Ht 177.8 cm (70\")   Wt 115 kg (253 lb)   SpO2 98%   BMI 36.30 kg/m²     Physical Exam  Vitals signs and nursing note reviewed.   Constitutional:       General: He is not in acute distress.     Appearance: He is well-developed.   HENT:      Head: Normocephalic.        Nose: Nose normal.   Eyes:      General:         Right eye: No discharge.         Left eye: No discharge.      Conjunctiva/sclera: Conjunctivae normal.      Pupils: Pupils are equal, round, and reactive to light.   Neck:      Thyroid: No thyromegaly.   Cardiovascular:      Rate and Rhythm: Normal rate and regular rhythm.      Heart sounds: Normal heart sounds. No murmur.   Pulmonary:      Effort: Pulmonary effort is normal.      Breath sounds: Normal breath sounds.   Lymphadenopathy:      Cervical: No cervical adenopathy.   Skin:     General: Skin is warm and dry.   Neurological:      Mental Status: He is alert and oriented to person, place, and time.       Notes brought forward are reviewed and updated if indicated.     Assessment/Plan   Problems Addressed this Visit        Cardiac and Vasculature    Essential hypertension - Primary (Chronic)    Relevant Orders    CBC & Differential    Comprehensive Metabolic Panel    Lipid Panel    PSA Screen    Urinalysis With Culture If Indicated -    Uric acid    Mixed hyperlipidemia (Chronic)    Relevant Orders    CBC & Differential    Comprehensive Metabolic Panel    Lipid Panel    PSA Screen    Urinalysis With Culture If Indicated -    Uric acid       Endocrine and Metabolic    Morbidly obese (CMS/HCC)       Musculoskeletal and Injuries    Gout    Relevant Orders    Uric acid      Other Visit Diagnoses     Subacute frontal sinusitis     "    Relevant Medications    cefprozil (CEFZIL) 500 MG tablet    Encounter for prostate cancer screening        Relevant Orders    PSA Screen      Diagnoses       Codes Comments    Essential hypertension    -  Primary ICD-10-CM: I10  ICD-9-CM: 401.9     Subacute frontal sinusitis     ICD-10-CM: J01.10  ICD-9-CM: 461.1     Morbidly obese (CMS/HCC)     ICD-10-CM: E66.01  ICD-9-CM: 278.01     Acute gout due to renal impairment involving toe of right foot     ICD-10-CM: M10.371  ICD-9-CM: 274.01, 588.89     Encounter for prostate cancer screening     ICD-10-CM: Z12.5  ICD-9-CM: V76.44     Mixed hyperlipidemia     ICD-10-CM: E78.2  ICD-9-CM: 272.2           Continue current treatment. Recheck if not improving.  Patient's Body mass index is 36.3 kg/m². BMI is above normal parameters. Recommendations include: exercise counseling and nutrition counseling.     New Medications Ordered This Visit   Medications   • cefprozil (CEFZIL) 500 MG tablet     Sig: Take 1 tablet by mouth 2 (Two) Times a Day.     Dispense:  14 tablet     Refill:  0     Return in about 6 months (around 7/12/2021) for Annual physical, medicare wellness visit.

## 2021-01-15 RX ORDER — PRAVASTATIN SODIUM 80 MG/1
80 TABLET ORAL NIGHTLY
Qty: 90 TABLET | Refills: 3 | Status: SHIPPED | OUTPATIENT
Start: 2021-01-15 | End: 2021-12-02

## 2021-01-25 ENCOUNTER — IMMUNIZATION (OUTPATIENT)
Dept: VACCINE CLINIC | Facility: HOSPITAL | Age: 76
End: 2021-01-25

## 2021-01-25 PROCEDURE — 91300 HC SARSCOV02 VAC 30MCG/0.3ML IM: CPT | Performed by: THORACIC SURGERY (CARDIOTHORACIC VASCULAR SURGERY)

## 2021-01-25 PROCEDURE — 0001A: CPT | Performed by: THORACIC SURGERY (CARDIOTHORACIC VASCULAR SURGERY)

## 2021-02-15 ENCOUNTER — IMMUNIZATION (OUTPATIENT)
Dept: VACCINE CLINIC | Facility: HOSPITAL | Age: 76
End: 2021-02-15

## 2021-02-15 ENCOUNTER — LAB (OUTPATIENT)
Dept: LAB | Facility: HOSPITAL | Age: 76
End: 2021-02-15

## 2021-02-15 DIAGNOSIS — Z01.818 PREOP TESTING: Primary | ICD-10-CM

## 2021-02-15 PROCEDURE — U0005 INFEC AGEN DETEC AMPLI PROBE: HCPCS

## 2021-02-15 PROCEDURE — U0004 COV-19 TEST NON-CDC HGH THRU: HCPCS

## 2021-02-15 PROCEDURE — 91300 HC SARSCOV02 VAC 30MCG/0.3ML IM: CPT | Performed by: THORACIC SURGERY (CARDIOTHORACIC VASCULAR SURGERY)

## 2021-02-15 PROCEDURE — C9803 HOPD COVID-19 SPEC COLLECT: HCPCS

## 2021-02-15 PROCEDURE — 0002A: CPT | Performed by: THORACIC SURGERY (CARDIOTHORACIC VASCULAR SURGERY)

## 2021-02-16 LAB — SARS-COV-2 ORF1AB RESP QL NAA+PROBE: NOT DETECTED

## 2021-02-18 ENCOUNTER — HOSPITAL ENCOUNTER (OUTPATIENT)
Facility: HOSPITAL | Age: 76
Setting detail: HOSPITAL OUTPATIENT SURGERY
Discharge: HOME | End: 2021-02-18
Attending: INTERNAL MEDICINE | Admitting: INTERNAL MEDICINE

## 2021-02-18 ENCOUNTER — ANESTHESIA EVENT (OUTPATIENT)
Dept: GASTROENTEROLOGY | Facility: HOSPITAL | Age: 76
End: 2021-02-18

## 2021-02-18 ENCOUNTER — ANESTHESIA (OUTPATIENT)
Dept: GASTROENTEROLOGY | Facility: HOSPITAL | Age: 76
End: 2021-02-18

## 2021-02-18 VITALS
SYSTOLIC BLOOD PRESSURE: 126 MMHG | OXYGEN SATURATION: 97 % | WEIGHT: 240 LBS | TEMPERATURE: 98 F | BODY MASS INDEX: 34.36 KG/M2 | HEART RATE: 63 BPM | HEIGHT: 70 IN | RESPIRATION RATE: 18 BRPM | DIASTOLIC BLOOD PRESSURE: 51 MMHG

## 2021-02-18 DIAGNOSIS — Z86.010 HISTORY OF COLONIC POLYPS: ICD-10-CM

## 2021-02-18 PROCEDURE — 88305 TISSUE EXAM BY PATHOLOGIST: CPT

## 2021-02-18 PROCEDURE — 25010000002 PROPOFOL 10 MG/ML EMULSION: Performed by: NURSE ANESTHETIST, CERTIFIED REGISTERED

## 2021-02-18 RX ORDER — DEXTROSE AND SODIUM CHLORIDE 5; .45 G/100ML; G/100ML
30 INJECTION, SOLUTION INTRAVENOUS CONTINUOUS PRN
Status: DISCONTINUED | OUTPATIENT
Start: 2021-02-18 | End: 2021-02-18 | Stop reason: HOSPADM

## 2021-02-18 RX ORDER — PROPOFOL 10 MG/ML
VIAL (ML) INTRAVENOUS AS NEEDED
Status: DISCONTINUED | OUTPATIENT
Start: 2021-02-18 | End: 2021-02-18 | Stop reason: SURG

## 2021-02-18 RX ADMIN — PROPOFOL 20 MG: 10 INJECTION, EMULSION INTRAVENOUS at 10:58

## 2021-02-18 RX ADMIN — PROPOFOL 20 MG: 10 INJECTION, EMULSION INTRAVENOUS at 11:08

## 2021-02-18 RX ADMIN — PROPOFOL 30 MG: 10 INJECTION, EMULSION INTRAVENOUS at 11:02

## 2021-02-18 RX ADMIN — PROPOFOL 20 MG: 10 INJECTION, EMULSION INTRAVENOUS at 11:10

## 2021-02-18 RX ADMIN — PROPOFOL 20 MG: 10 INJECTION, EMULSION INTRAVENOUS at 10:54

## 2021-02-18 RX ADMIN — PROPOFOL 20 MG: 10 INJECTION, EMULSION INTRAVENOUS at 11:18

## 2021-02-18 RX ADMIN — PROPOFOL 80 MG: 10 INJECTION, EMULSION INTRAVENOUS at 10:52

## 2021-02-18 RX ADMIN — PROPOFOL 20 MG: 10 INJECTION, EMULSION INTRAVENOUS at 10:56

## 2021-02-18 RX ADMIN — PROPOFOL 20 MG: 10 INJECTION, EMULSION INTRAVENOUS at 11:06

## 2021-02-18 RX ADMIN — PROPOFOL 30 MG: 10 INJECTION, EMULSION INTRAVENOUS at 11:00

## 2021-02-18 RX ADMIN — DEXTROSE AND SODIUM CHLORIDE 30 ML/HR: 5; 450 INJECTION, SOLUTION INTRAVENOUS at 10:10

## 2021-02-18 NOTE — ANESTHESIA POSTPROCEDURE EVALUATION
Patient: Jerald Bonilla    Procedure Summary     Date: 02/18/21 Room / Location: [facility] ENDOSCOPY 2 / [facility] ENDOSCOPY    Anesthesia Start: 1045 Anesthesia Stop: 1124    Procedure: COLONOSCOPY (N/A ) Diagnosis:       History of colonic polyps      (History of colonic polyps [Z86.010])    Surgeon: Dagoberto Gavin DO Provider: Marga Gonzalez CRNA    Anesthesia Type: MAC ASA Status: 3          Anesthesia Type: MAC    Vitals  No vitals data found for the desired time range.          Post Anesthesia Care and Evaluation    Patient location during evaluation: bedside  Patient participation: complete - patient participated  Pain score: 0  Pain management: adequate  Airway patency: patent  Anesthetic complications: No anesthetic complications  PONV Status: none  Cardiovascular status: acceptable  Respiratory status: acceptable  Hydration status: acceptable

## 2021-02-18 NOTE — ANESTHESIA PREPROCEDURE EVALUATION
Anesthesia Evaluation     Patient summary reviewed and Nursing notes reviewed   NPO Solid Status: > 8 hours  NPO Liquid Status: > 8 hours           Airway   Mallampati: II  TM distance: >3 FB  Neck ROM: full  Possible difficult intubation  Dental    (+) poor dentition    Pulmonary - normal exam   (+) recent URI resolved, sleep apnea,   (-) pneumonia  Cardiovascular - normal exam    (+) hypertension poorly controlled 2 medications or greater, hyperlipidemia,       Neuro/Psych  (+) dizziness/light headedness,     GI/Hepatic/Renal/Endo    (+) obesity,  GERD,    (-) morbid obesity    Musculoskeletal     (+) joint swelling,   Abdominal  - normal exam   Substance History - negative use     OB/GYN negative ob/gyn ROS         Other   arthritis,                      Anesthesia Plan    ASA 3     MAC     intravenous induction     Anesthetic plan, all risks, benefits, and alternatives have been provided, discussed and informed consent has been obtained with: patient.

## 2021-02-18 NOTE — H&P
Gonzalo Cruz DO,[facility]  Gastroenterology  Hepatology  Endoscopy  Board Certified in Internal Medicine and gastroenterology  [address]  [place]. [address]  - [phone]   F - [phone]     GASTROENTEROLOGY HISTORY AND PHYSICAL  NOTE   GONZALO CRUZ DO.         SUBJECTIVE:   2/18/2021    Name: Jerald Bonilla  DOD: 1945        Chief Complaint:       Subjective : History of right colon piecemeal polypectomy    Patient is 75 y.o. male presents with desire for elective colonoscopy for review of the right colon piecemeal polypectomy site.      ROS/HISTORY/ CURRENT MEDICATIONS/OBJECTIVE/VS/PE:   Review of Systems:  All systems unremarkable unless specified below.  Constitutional   HENT  Eyes   Respiratory    Cardiovascular  Gastrointestinal   Endocrine  Genitourinary    Musculoskeletal   Skin  Allergic/Immunologic    Neurological    Hematological  Psychiatric/Behavioral    History:     Past Medical History:   Diagnosis Date   • Acne conglobata    • Acute maxillary sinusitis    • Benign prostate hyperplasia    • Blurring of visual image    • Carbuncle     of infected sebaceous cyst on back   • Dizziness and giddiness    • Erectile dysfunction    • Essential hypertension     seeing nephro   • Gastroesophageal reflux disease    • Gout    • Hip pain    • Hyperlipidemia    • Hypertension    • Low back pain    • Malaise and fatigue    • Need for prophylactic vaccination against Streptococcus pneumoniae (pneumococcus)    • Nuclear senile cataract    • Osteoarthritis    • Otalgia    • Otitis externa    • Pain in lower limb    • Rheumatoid arthritis (CMS/HCC)    • Skin lesion     right calf   • Swelling of knee joint    • Tinnitus    • Upper respiratory infection      Past Surgical History:   Procedure Laterality Date   • COLONOSCOPY  05/18/2009    Colonoscopy, diagnostic (screening) 46655 (1)    diverticuli, roids    • COLONOSCOPY N/A 8/13/2020    Procedure: COLONOSCOPY;  Surgeon:  Dagoberto Gavin DO;  Location: [facility] ENDOSCOPY;  Service: Gastroenterology;  Laterality: N/A;  tattoo @ right colon    • ENDOSCOPY N/A 2019    Procedure: ESOPHAGOGASTRODUODENOSCOPY possible dilation;  Surgeon: Jerald Nation MD;  Location: [facility] ENDOSCOPY;  Service: Gastroenterology   • HERNIA REPAIR      umbilical   • HIP SURGERY Left 2016   • INJECTION OF MEDICATION  2015    B12 (3)     • INJECTION OF MEDICATION  10/31/2011    Rocephin (1)      • JOINT REPLACEMENT     • OTHER SURGICAL HISTORY  2016    &D, Complicated or Multiple 55848 (3)      • OTHER SURGICAL HISTORY  2014    Remove Impacted Cerumen 28708 (1)        Family History   Problem Relation Age of Onset   • Hypertension Father    • Heart disease Father    • Cancer Father    • Diabetes Father    • Kidney disease Father    • Hyperlipidemia Other    • Hypertension Other    • Cancer Other         prostate   • Arthritis Mother    • Hypertension Mother    • Obesity Mother    • Thyroid disease Mother    • Mental illness Mother    • Arthritis Maternal Grandmother      Social History     Tobacco Use   • Smoking status: Former Smoker     Quit date:      Years since quittin.   • Smokeless tobacco: Never Used   Substance Use Topics   • Alcohol use: Yes     Comment: occassionally   • Drug use: Not Currently     Types: Marijuana     Prior to Admission medications    Medication Sig Start Date End Date Taking? Authorizing Provider   allopurinol (ZYLOPRIM) 100 MG tablet TAKE 2 TABLETS BY MOUTH  DAILY 20  Yes Annie Penny MD   aspirin 81 MG EC tablet Take 81 mg by mouth Daily.   Yes ProviderAmanda MD   B Complex Vitamins (VITAMIN B COMPLEX PO) Take 1 tablet by mouth Daily.   Yes ProviderAmanda MD   carvedilol (COREG) 25 MG tablet TAKE 2 TABLETS BY MOUTH TWO TIMES DAILY WITH MEALS 20  Yes Annie Penny MD   cefprozil (CEFZIL) 500 MG tablet Take 1 tablet by mouth 2 (Two) Times a Day. 21   Yes Annie Penny MD   cholecalciferol (VITAMIN D3) 1000 UNITS tablet Take 2,000 Units by mouth Daily.   Yes ProviderAmanda MD   clindamycin (CLEOCIN T) 1 % lotion Apply  topically to the appropriate area as directed Every 12 (Twelve) Hours.  Patient taking differently: Apply  topically to the appropriate area as directed Daily As Needed. 11/5/18  Yes Annie Penny MD   famotidine (PEPCID) 10 MG tablet Take 10 mg by mouth 2 (Two) Times a Day.   Yes ProviderAmanda MD   fluticasone (FLONASE) 50 MCG/ACT nasal spray 1 spray into the nostril(s) as directed by provider Daily. 12/27/20  Yes Shanthi Stratton APRN   hydrALAZINE (APRESOLINE) 50 MG tablet Take 50 mg by mouth 2 (Two) Times a Day As Needed.   Yes Emergency, Nurse NICOLETTE Sanchez   loratadine (CLARITIN) 10 MG tablet Take 10 mg by mouth Daily.   Yes ProviderAmanda MD   NIFEdipine XL (PROCARDIA XL) 90 MG 24 hr tablet TAKE 2 TABLETS BY MOUTH  EVERY NIGHT 6/4/20  Yes Annie Penny MD   pravastatin (PRAVACHOL) 80 MG tablet TAKE 1 TABLET BY MOUTH  EVERY NIGHT 1/15/21  Yes Annie Penny MD   pseudoephedrine-guaifenesin (MUCINEX D)  MG per 12 hr tablet Take 1 tablet by mouth As Needed.   Yes ProviderAmnada MD   SUPER B COMPLEX/C PO Super B Complex + C   one tablet daily   Yes Emergency, Nurse Epic, RN   valsartan-hydrochlorothiazide (DIOVAN-HCT) 320-25 MG per tablet TAKE 1 TABLET BY MOUTH  DAILY 11/23/20  Yes Gonzales Sanders MD     Allergies:  Lisinopril and Minoxidil    I have reviewed the patients medical history, surgical history and family history in the available medical record system.     Current Medications:     Current Facility-Administered Medications   Medication Dose Route Frequency Provider Last Rate Last Admin   • dextrose 5 % and sodium chloride 0.45 % infusion  30 mL/hr Intravenous Continuous PRN Dagoberto Gavin DO 30 mL/hr at 02/18/21 1010 30 mL/hr at 02/18/21 1010       Objective     Physical Exam:   Temp:   [98.6 °F (37 °C)] 98.6 °F (37 °C)  Heart Rate:  [69] 69  Resp:  [18] 18  BP: (193)/(87) 193/87    Physical Exam:  General Appearance:    Alert, cooperative, in no acute distress   Head:    Normocephalic, without obvious abnormality, atraumatic   Eyes:            Lids and lashes normal, conjunctivae and sclerae normal, no   icterus, no pallor, corneas clear, PERRLA   Ears:    Ears appear intact with no abnormalities noted   Throat:   No oral lesions, no thrush, oral mucosa moist   Neck:   No adenopathy, supple, trachea midline, no thyromegaly, no     carotid bruit, no JVD   Back:     No kyphosis present, no scoliosis present, no skin lesions,       erythema or scars, no tenderness to percussion or                   palpation,   range of motion normal   Lungs:     Clear to auscultation,respirations regular, even and                   unlabored    Heart:    Regular rhythm and normal rate, normal S1 and S2, no            murmur, no gallop, no rub, no click   Breast Exam:    Deferred   Abdomen:     Normal bowel sounds, no masses, no organomegaly, soft        non-tender, non-distended, no guarding, no rebound                 tenderness   Genitalia:    Deferred   Extremities:   Moves all extremities well, no edema, no cyanosis, no              redness   Pulses:   Pulses palpable and equal bilaterally   Skin:   No bleeding, bruising or rash   Lymph nodes:   No palpable adenopathy   Neurologic:   Cranial nerves 2 - 12 grossly intact, sensation intact, DTR        present and equal bilaterally      Results Review:     Lab Results   Component Value Date    WBC 5.32 07/06/2020    WBC 4.79 03/16/2020    WBC 4.23 09/12/2019    HGB 15.3 07/06/2020    HGB 15.4 03/16/2020    HGB 15.3 09/12/2019    HCT 44.4 07/06/2020    HCT 46.0 03/16/2020    HCT 45.1 09/12/2019     07/06/2020     03/16/2020     09/12/2019             No results found for: LIPASE  Lab Results   Component Value Date    INR 2.20 (H) 01/05/2017     INR 1.3 (H) 12/07/2016    INR 1.3 (H) 12/06/2016     No results found for: CULTURE    Radiology Review:  Imaging Results (Last 72 Hours)     ** No results found for the last 72 hours. **           I reviewed the patient's new clinical results.  I reviewed the patient's new imaging results and agree with the interpretation.     ASSESSMENT/PLAN:   ASSESSMENT:  1.  Neoplasm, right colon, benign, piecemeal polypectomy    PLAN:  1.  Colonoscopy    Risk and benefits associated with the procedure are reviewed with the patient.  The patient wished to proceed     Dagoberto Gavin DO  02/18/21  10:27 CST

## 2021-02-22 LAB
LAB AP CASE REPORT: NORMAL
PATH REPORT.FINAL DX SPEC: NORMAL

## 2021-03-23 ENCOUNTER — LAB (OUTPATIENT)
Dept: LAB | Facility: HOSPITAL | Age: 76
End: 2021-03-23

## 2021-03-23 DIAGNOSIS — M10.371 ACUTE GOUT DUE TO RENAL IMPAIRMENT INVOLVING TOE OF RIGHT FOOT: ICD-10-CM

## 2021-03-23 DIAGNOSIS — N28.9 RENAL INSUFFICIENCY, MILD: ICD-10-CM

## 2021-03-23 DIAGNOSIS — E78.2 MIXED HYPERLIPIDEMIA: Chronic | ICD-10-CM

## 2021-03-23 DIAGNOSIS — I10 ESSENTIAL (PRIMARY) HYPERTENSION: ICD-10-CM

## 2021-03-23 DIAGNOSIS — I10 ESSENTIAL HYPERTENSION: Chronic | ICD-10-CM

## 2021-03-23 LAB
ALBUMIN SERPL-MCNC: 4.3 G/DL (ref 3.5–5.2)
ANION GAP SERPL CALCULATED.3IONS-SCNC: 9.9 MMOL/L (ref 5–15)
BUN SERPL-MCNC: 28 MG/DL (ref 8–23)
BUN/CREAT SERPL: 21.2 (ref 7–25)
CALCIUM SPEC-SCNC: 9.3 MG/DL (ref 8.6–10.5)
CHLORIDE SERPL-SCNC: 107 MMOL/L (ref 98–107)
CO2 SERPL-SCNC: 25.1 MMOL/L (ref 22–29)
CREAT SERPL-MCNC: 1.32 MG/DL (ref 0.76–1.27)
CREAT UR-MCNC: 104.4 MG/DL
GFR SERPL CREATININE-BSD FRML MDRD: 64 ML/MIN/1.73
GLUCOSE SERPL-MCNC: 121 MG/DL (ref 65–99)
PHOSPHATE SERPL-MCNC: 3.1 MG/DL (ref 2.5–4.5)
POTASSIUM SERPL-SCNC: 3.9 MMOL/L (ref 3.5–5.2)
PROT UR-MCNC: 15 MG/DL
PROT/CREAT UR: 143.7 MG/G CREA (ref 0–200)
SODIUM SERPL-SCNC: 142 MMOL/L (ref 136–145)
URATE SERPL-MCNC: 6.2 MG/DL (ref 3.4–7)

## 2021-03-23 PROCEDURE — 36415 COLL VENOUS BLD VENIPUNCTURE: CPT

## 2021-03-23 PROCEDURE — 84156 ASSAY OF PROTEIN URINE: CPT

## 2021-03-23 PROCEDURE — 84550 ASSAY OF BLOOD/URIC ACID: CPT

## 2021-03-23 PROCEDURE — 80069 RENAL FUNCTION PANEL: CPT

## 2021-03-23 PROCEDURE — 82570 ASSAY OF URINE CREATININE: CPT

## 2021-04-01 ENCOUNTER — OFFICE VISIT (OUTPATIENT)
Dept: PODIATRY | Facility: CLINIC | Age: 76
End: 2021-04-01

## 2021-04-01 VITALS — BODY MASS INDEX: 34.36 KG/M2 | WEIGHT: 240 LBS | HEIGHT: 70 IN | OXYGEN SATURATION: 99 % | HEART RATE: 65 BPM

## 2021-04-01 DIAGNOSIS — M79.674 PAIN IN TOES OF BOTH FEET: ICD-10-CM

## 2021-04-01 DIAGNOSIS — M79.675 PAIN IN TOES OF BOTH FEET: ICD-10-CM

## 2021-04-01 DIAGNOSIS — L60.0 INGROWN TOENAIL: ICD-10-CM

## 2021-04-01 DIAGNOSIS — B35.1 ONYCHOMYCOSIS: Primary | ICD-10-CM

## 2021-04-01 PROCEDURE — 11721 DEBRIDE NAIL 6 OR MORE: CPT | Performed by: PODIATRIST

## 2021-04-01 PROCEDURE — 99202 OFFICE O/P NEW SF 15 MIN: CPT | Performed by: PODIATRIST

## 2021-04-01 NOTE — PROGRESS NOTES
Jerald Bonilla  1945  75 y.o. male    Patient presents to clinic today for non-diabetic foot care.   04/01/2021  Chief Complaint   Patient presents with   • Left Foot - Non-diabetic foot care   • Right Foot - Non-diabetic foot care           History of Present Illness    Jerald Bonilla is a 75 y.o. male who presents for foot exam and nail care.  Patient states he has intermittent right hallux nail pain and increased discoloration.  He denies any known trauma or injuries.  Describes his pain as achy and worse in close toed shoe gear.  Pain is intermittently sharp.  He denies any previous treatments for this issue.    Past Medical History:   Diagnosis Date   • Acne conglobata    • Acute maxillary sinusitis    • Benign prostate hyperplasia    • Blurring of visual image    • Carbuncle     of infected sebaceous cyst on back   • Dizziness and giddiness    • Erectile dysfunction    • Essential hypertension     seeing nephro   • Gastroesophageal reflux disease    • Gout    • Hammer toe    • Hip pain    • Hyperlipidemia    • Hypertension    • Low back pain    • Malaise and fatigue    • Need for prophylactic vaccination against Streptococcus pneumoniae (pneumococcus)    • Nuclear senile cataract    • Osteoarthritis    • Otalgia    • Otitis externa    • Pain in lower limb    • Rheumatoid arthritis (CMS/HCC)    • Skin lesion     right calf   • Swelling of knee joint    • Tinnitus    • Upper respiratory infection          Past Surgical History:   Procedure Laterality Date   • COLONOSCOPY  05/18/2009    Colonoscopy, diagnostic (screening) 02858 (1)    diverticuli, roids    • COLONOSCOPY N/A 8/13/2020    Procedure: COLONOSCOPY;  Surgeon: Dagoberto Gavin DO;  Location: [facility] ENDOSCOPY;  Service: Gastroenterology;  Laterality: N/A;  tattoo @ right colon    • COLONOSCOPY N/A 2/18/2021    Procedure: COLONOSCOPY;  Surgeon: Dagoberto Gavin DO;  Location: [facility] ENDOSCOPY;  Service: Gastroenterology;  Laterality:  N/A;   • ENDOSCOPY N/A 2019    Procedure: ESOPHAGOGASTRODUODENOSCOPY possible dilation;  Surgeon: Jerald Nation MD;  Location: [facility] ENDOSCOPY;  Service: Gastroenterology   • HERNIA REPAIR      umbilical   • HIP SURGERY Left 2016   • INJECTION OF MEDICATION  2015    B12 (3)     • INJECTION OF MEDICATION  10/31/2011    Rocephin (1)      • JOINT REPLACEMENT     • OTHER SURGICAL HISTORY  2016    &D, Complicated or Multiple 77518 (3)      • OTHER SURGICAL HISTORY  2014    Remove Impacted Cerumen 37501 (1)            Family History   Problem Relation Age of Onset   • Hypertension Father    • Heart disease Father    • Cancer Father    • Diabetes Father    • Kidney disease Father    • Hyperlipidemia Other    • Hypertension Other    • Cancer Other         prostate   • Arthritis Mother    • Hypertension Mother    • Obesity Mother    • Thyroid disease Mother    • Mental illness Mother    • Arthritis Maternal Grandmother          Social History     Socioeconomic History   • Marital status:      Spouse name: Not on file   • Number of children: Not on file   • Years of education: Not on file   • Highest education level: Not on file   Tobacco Use   • Smoking status: Former Smoker     Quit date:      Years since quittin.2   • Smokeless tobacco: Never Used   Vaping Use   • Vaping Use: Never used   Substance and Sexual Activity   • Alcohol use: Yes     Comment: occassionally   • Drug use: Not Currently     Types: Marijuana   • Sexual activity: Defer         Current Outpatient Medications   Medication Sig Dispense Refill   • allopurinol (ZYLOPRIM) 100 MG tablet TAKE 2 TABLETS BY MOUTH  DAILY 180 tablet 3   • aspirin 81 MG EC tablet Take 81 mg by mouth Daily.     • B Complex Vitamins (VITAMIN B COMPLEX PO) Take 1 tablet by mouth Daily.     • carvedilol (COREG) 25 MG tablet TAKE 2 TABLETS BY MOUTH TWO TIMES DAILY WITH MEALS 360 tablet 3   • cefprozil (CEFZIL) 500 MG tablet Take 1 tablet  "by mouth 2 (Two) Times a Day. 14 tablet 0   • cholecalciferol (VITAMIN D3) 1000 UNITS tablet Take 2,000 Units by mouth Daily.     • clindamycin (CLEOCIN T) 1 % lotion Apply  topically to the appropriate area as directed Every 12 (Twelve) Hours. (Patient taking differently: Apply  topically to the appropriate area as directed Daily As Needed.) 60 mL 1   • famotidine (PEPCID) 10 MG tablet Take 10 mg by mouth 2 (Two) Times a Day.     • fluticasone (FLONASE) 50 MCG/ACT nasal spray 1 spray into the nostril(s) as directed by provider Daily. 1 bottle 0   • hydrALAZINE (APRESOLINE) 50 MG tablet Take 50 mg by mouth 2 (Two) Times a Day As Needed.     • loratadine (CLARITIN) 10 MG tablet Take 10 mg by mouth Daily.     • NIFEdipine XL (PROCARDIA XL) 90 MG 24 hr tablet TAKE 2 TABLETS BY MOUTH  EVERY NIGHT 180 tablet 3   • pravastatin (PRAVACHOL) 80 MG tablet TAKE 1 TABLET BY MOUTH  EVERY NIGHT 90 tablet 3   • pseudoephedrine-guaifenesin (MUCINEX D)  MG per 12 hr tablet Take 1 tablet by mouth As Needed.     • SUPER B COMPLEX/C PO Super B Complex + C   one tablet daily     • valsartan-hydrochlorothiazide (DIOVAN-HCT) 320-25 MG per tablet TAKE 1 TABLET BY MOUTH  DAILY 90 tablet 3     No current facility-administered medications for this visit.         OBJECTIVE    Pulse 65   Ht 177.8 cm (70\")   Wt 109 kg (240 lb)   SpO2 99%   BMI 34.44 kg/m²       Review of Systems   Constitutional: Positive for unexpected weight change.   HENT: Positive for hearing loss.    Eyes: Positive for visual disturbance.   Respiratory: Negative.    Cardiovascular: Positive for leg swelling.   Gastrointestinal: Negative.    Genitourinary: Positive for frequency.   Musculoskeletal: Positive for arthralgias and back pain.   Skin: Negative.    Allergic/Immunologic: Negative.    Neurological: Negative.    Hematological: Negative.    Psychiatric/Behavioral: Negative.          Physical Exam:       Constitutional: he appears well-developed and " well-nourished.   HEENT: Normocephalic. Atraumatic  CV: No tenderness. RRR  Resp: Non-labored respiration. No wheezes.   Psychiatric: he has a normal mood and affect. his   behavior is normal.      Lower Extremity Exam:  Vascular: DP/PT pulses palpable 2+.   PositiveTrace hair growth.   No perimalleolar edema  Neuro: Protective sensation intact, b/l.  Light touch sensation intact, b/l  DTRs intact  Integument: No open wounds or lesions.  Skin quality Normal  Nails 1-5 b/l thickened, elongated with subungual debris. +pain on palpation, mild ingrowing medial nail border right hallux no signs of infection  No masses  Webspaces c/d/i  Musculoskeletal: LE muscle strength 5/5  Gait Normal  No digital deformities  Ankle range of motion within normal limits          ASSESSMENT AND PLAN    Diagnoses and all orders for this visit:    1. Onychomycosis (Primary)    2. Ingrown toenail    3. Pain in toes of both feet      -Comprehensive  foot exam performed. Patient educated on importance ofdaily foot checks.   -Patient educated on proper extra depth  shoe gear.  Limit barefoot walking.  -Nails 1-5 b/l debrided in length and thickness with nail nipper to decrease pain, fungal load and risk of infection  -Follow up PRN          This document has been electronically signed by Dagoberto Goldman DPM on April 5, 2021 20:04 CDT       Much of this encounter note is an electronic transcription/translation of spoken language to printed text.   Dagoberto Goldman DPM  4/5/2021  20:04 CDT

## 2021-04-22 RX ORDER — NIFEDIPINE 90 MG/1
180 TABLET, EXTENDED RELEASE ORAL NIGHTLY
Qty: 180 TABLET | Refills: 0 | Status: SHIPPED | OUTPATIENT
Start: 2021-04-22 | End: 2021-07-12

## 2021-05-04 ENCOUNTER — OFFICE VISIT (OUTPATIENT)
Dept: FAMILY MEDICINE CLINIC | Facility: CLINIC | Age: 76
End: 2021-05-04

## 2021-05-04 ENCOUNTER — TELEPHONE (OUTPATIENT)
Dept: FAMILY MEDICINE CLINIC | Facility: CLINIC | Age: 76
End: 2021-05-04

## 2021-05-04 VITALS
DIASTOLIC BLOOD PRESSURE: 70 MMHG | HEART RATE: 56 BPM | BODY MASS INDEX: 34.36 KG/M2 | SYSTOLIC BLOOD PRESSURE: 134 MMHG | HEIGHT: 70 IN | WEIGHT: 240 LBS | OXYGEN SATURATION: 98 %

## 2021-05-04 DIAGNOSIS — G89.29 CHRONIC BILATERAL LOW BACK PAIN WITH RIGHT-SIDED SCIATICA: Primary | ICD-10-CM

## 2021-05-04 DIAGNOSIS — M54.41 CHRONIC BILATERAL LOW BACK PAIN WITH RIGHT-SIDED SCIATICA: Primary | ICD-10-CM

## 2021-05-04 PROCEDURE — 99213 OFFICE O/P EST LOW 20 MIN: CPT | Performed by: GENERAL PRACTICE

## 2021-05-04 RX ORDER — TRIAMCINOLONE ACETONIDE 40 MG/ML
80 INJECTION, SUSPENSION INTRA-ARTICULAR; INTRAMUSCULAR ONCE
Status: DISCONTINUED | OUTPATIENT
Start: 2021-05-04 | End: 2021-12-14

## 2021-05-04 RX ORDER — CLINDAMYCIN PHOSPHATE 10 UG/ML
LOTION TOPICAL EVERY 12 HOURS SCHEDULED
Qty: 60 ML | Refills: 1 | Status: SHIPPED | OUTPATIENT
Start: 2021-05-04 | End: 2021-05-05 | Stop reason: CLARIF

## 2021-05-04 NOTE — PROGRESS NOTES
Subjective   Jerald Bonilla is a 75 y.o. male.   Chief Complaint   Patient presents with   • Back Pain   • Groin Pain     Has degenerative disc disease. Having more lower back pain with some radiation to groins as well as a burning pain down right thigh.  Has known degenerative disc disease and history of mild spinal stenosis.  Back Pain  This is a new problem. The current episode started more than 1 month ago. The problem occurs intermittently. The problem has been waxing and waning since onset. The pain is present in the lumbar spine. The quality of the pain is described as aching. The pain is at a severity of 4/10. The pain is moderate. The symptoms are aggravated by bending, lying down and position. Associated symptoms include paresthesias (burning right thigh). He has tried nothing for the symptoms.      The following portions of the patient's history were reviewed and updated as appropriate: allergies, current medications, past social history and problem list.    Outpatient Medications Prior to Visit   Medication Sig Dispense Refill   • allopurinol (ZYLOPRIM) 100 MG tablet TAKE 2 TABLETS BY MOUTH  DAILY 180 tablet 3   • aspirin 81 MG EC tablet Take 81 mg by mouth Daily.     • B Complex Vitamins (VITAMIN B COMPLEX PO) Take 1 tablet by mouth Daily.     • carvedilol (COREG) 25 MG tablet TAKE 2 TABLETS BY MOUTH TWO TIMES DAILY WITH MEALS 360 tablet 3   • cholecalciferol (VITAMIN D3) 1000 UNITS tablet Take 2,000 Units by mouth Daily.     • famotidine (PEPCID) 10 MG tablet Take 10 mg by mouth 2 (Two) Times a Day.     • fluticasone (FLONASE) 50 MCG/ACT nasal spray 1 spray into the nostril(s) as directed by provider Daily. 1 bottle 0   • hydrALAZINE (APRESOLINE) 50 MG tablet Take 50 mg by mouth 2 (Two) Times a Day As Needed.     • loratadine (CLARITIN) 10 MG tablet Take 10 mg by mouth Daily.     • NIFEdipine XL (PROCARDIA XL) 90 MG 24 hr tablet TAKE 2 TABLETS BY MOUTH  EVERY NIGHT 180 tablet 0   • pravastatin  "(PRAVACHOL) 80 MG tablet TAKE 1 TABLET BY MOUTH  EVERY NIGHT 90 tablet 3   • pseudoephedrine-guaifenesin (MUCINEX D)  MG per 12 hr tablet Take 1 tablet by mouth As Needed.     • SUPER B COMPLEX/C PO Super B Complex + C   one tablet daily     • valsartan-hydrochlorothiazide (DIOVAN-HCT) 320-25 MG per tablet TAKE 1 TABLET BY MOUTH  DAILY 90 tablet 3   • clindamycin (CLEOCIN T) 1 % lotion Apply  topically to the appropriate area as directed Every 12 (Twelve) Hours. (Patient taking differently: Apply  topically to the appropriate area as directed Daily As Needed.) 60 mL 1   • cefprozil (CEFZIL) 500 MG tablet Take 1 tablet by mouth 2 (Two) Times a Day. 14 tablet 0     No facility-administered medications prior to visit.     I have reviewed 12 systems with patient. Findings were negative except what is noted below and/or in history of present illness.   Review of Systems   Musculoskeletal: Positive for back pain.   Neurological: Positive for paresthesias (burning right thigh).       Objective   Visit Vitals  /70 (BP Location: Left arm)   Pulse 56   Ht 177.8 cm (70\")   Wt 109 kg (240 lb)   SpO2 98%   BMI 34.44 kg/m²     Physical Exam  Vitals and nursing note reviewed.   Constitutional:       General: He is not in acute distress.     Appearance: He is well-developed.   HENT:      Head: Normocephalic.      Nose: Nose normal.   Eyes:      General:         Right eye: No discharge.         Left eye: No discharge.      Conjunctiva/sclera: Conjunctivae normal.      Pupils: Pupils are equal, round, and reactive to light.   Neck:      Thyroid: No thyromegaly.   Cardiovascular:      Rate and Rhythm: Normal rate and regular rhythm.      Heart sounds: Normal heart sounds. No murmur heard.     Pulmonary:      Effort: Pulmonary effort is normal.      Breath sounds: Normal breath sounds.   Musculoskeletal:      Lumbar back: Tenderness present. Decreased range of motion.      Comments: Straight leg raise 85 bilat   "   Lymphadenopathy:      Cervical: No cervical adenopathy.   Skin:     General: Skin is warm and dry.   Neurological:      Mental Status: He is alert and oriented to person, place, and time.      Deep Tendon Reflexes:      Reflex Scores:       Patellar reflexes are 2+ on the right side and 2+ on the left side.        Notes brought forward are reviewed and updated if indicated.     Assessment/Plan   Problems Addressed this Visit     None      Visit Diagnoses     Chronic bilateral low back pain with right-sided sciatica    -  Primary    Relevant Medications    triamcinolone acetonide (KENALOG-40) injection 80 mg    Other Relevant Orders    XR Spine Lumbar 4+ View (Completed)      Diagnoses       Codes Comments    Chronic bilateral low back pain with right-sided sciatica    -  Primary ICD-10-CM: M54.41, G89.29  ICD-9-CM: 724.2, 724.3, 338.29          Will notify regarding results. Recheck if not improving.      New Medications Ordered This Visit   Medications   • triamcinolone acetonide (KENALOG-40) injection 80 mg     Return if symptoms worsen or fail to improve, for Next scheduled follow up.        This document has been electronically signed by Annie Penny MD on May 4, 2021 17:43 CDT

## 2021-05-04 NOTE — TELEPHONE ENCOUNTER
Per Dr. Penny, Mr. Bonilla has been called with recent Lumbar Spine x-ray results & recommendations.  Continue current medications and follow-up as planned or sooner if any problems.       ----- Message from Annie Penny MD sent at 5/4/2021  3:22 PM CDT -----  Call and tell shows  degenerative disc disease as expected. Recheck if not improving with steroid shot

## 2021-05-05 RX ORDER — CLINDAMYCIN PHOSPHATE 10 MG/G
GEL TOPICAL 2 TIMES DAILY
Qty: 60 G | Refills: 2 | Status: SHIPPED | OUTPATIENT
Start: 2021-05-05

## 2021-05-05 NOTE — TELEPHONE ENCOUNTER
Dr. Penny,    Mr. Bonilla called back this morning stating he is requesting the Clindamycin Gel 1% be sent to [facility] in [place]  He said he did not  the lotion that was sent in yesterday.     Thank you  PAULIE Vieira

## 2021-06-28 RX ORDER — CARVEDILOL 25 MG/1
TABLET ORAL
Qty: 360 TABLET | Refills: 0 | Status: SHIPPED | OUTPATIENT
Start: 2021-06-28 | End: 2021-08-03 | Stop reason: SDUPTHER

## 2021-07-12 RX ORDER — NIFEDIPINE 90 MG/1
TABLET, EXTENDED RELEASE ORAL
Qty: 180 TABLET | Refills: 3 | Status: SHIPPED | OUTPATIENT
Start: 2021-07-12 | End: 2021-12-14

## 2021-07-12 RX ORDER — ALLOPURINOL 100 MG/1
200 TABLET ORAL DAILY
Qty: 180 TABLET | Refills: 0 | Status: SHIPPED | OUTPATIENT
Start: 2021-07-12 | End: 2021-08-03 | Stop reason: SDUPTHER

## 2021-07-14 ENCOUNTER — LAB (OUTPATIENT)
Dept: LAB | Facility: HOSPITAL | Age: 76
End: 2021-07-14

## 2021-07-14 DIAGNOSIS — I10 ESSENTIAL HYPERTENSION: ICD-10-CM

## 2021-07-14 DIAGNOSIS — Z12.5 ENCOUNTER FOR PROSTATE CANCER SCREENING: ICD-10-CM

## 2021-07-14 DIAGNOSIS — E78.2 MIXED HYPERLIPIDEMIA: ICD-10-CM

## 2021-07-14 LAB
ALBUMIN SERPL-MCNC: 3.9 G/DL (ref 3.5–5.2)
ALBUMIN/GLOB SERPL: 1.2 G/DL
ALP SERPL-CCNC: 41 U/L (ref 39–117)
ALT SERPL W P-5'-P-CCNC: 22 U/L (ref 1–41)
ANION GAP SERPL CALCULATED.3IONS-SCNC: 11.5 MMOL/L (ref 5–15)
AST SERPL-CCNC: 17 U/L (ref 1–40)
BACTERIA UR QL AUTO: NORMAL /HPF
BASOPHILS # BLD AUTO: 0.04 10*3/MM3 (ref 0–0.2)
BASOPHILS NFR BLD AUTO: 0.8 % (ref 0–1.5)
BILIRUB SERPL-MCNC: 0.3 MG/DL (ref 0–1.2)
BILIRUB UR QL STRIP: NEGATIVE
BUN SERPL-MCNC: 22 MG/DL (ref 8–23)
BUN/CREAT SERPL: 15.6 (ref 7–25)
CALCIUM SPEC-SCNC: 8.9 MG/DL (ref 8.6–10.5)
CHLORIDE SERPL-SCNC: 103 MMOL/L (ref 98–107)
CHOLEST SERPL-MCNC: 162 MG/DL (ref 0–200)
CLARITY UR: CLEAR
CO2 SERPL-SCNC: 24.5 MMOL/L (ref 22–29)
COLOR UR: YELLOW
CREAT SERPL-MCNC: 1.41 MG/DL (ref 0.76–1.27)
DEPRECATED RDW RBC AUTO: 45 FL (ref 37–54)
EOSINOPHIL # BLD AUTO: 0.13 10*3/MM3 (ref 0–0.4)
EOSINOPHIL NFR BLD AUTO: 2.5 % (ref 0.3–6.2)
ERYTHROCYTE [DISTWIDTH] IN BLOOD BY AUTOMATED COUNT: 14.5 % (ref 12.3–15.4)
GFR SERPL CREATININE-BSD FRML MDRD: 60 ML/MIN/1.73
GLOBULIN UR ELPH-MCNC: 3.3 GM/DL
GLUCOSE SERPL-MCNC: 107 MG/DL (ref 65–99)
GLUCOSE UR STRIP-MCNC: NEGATIVE MG/DL
HCT VFR BLD AUTO: 48.8 % (ref 37.5–51)
HDLC SERPL-MCNC: 45 MG/DL (ref 40–60)
HGB BLD-MCNC: 16.5 G/DL (ref 13–17.7)
HGB UR QL STRIP.AUTO: NEGATIVE
HYALINE CASTS UR QL AUTO: NORMAL /LPF
IMM GRANULOCYTES # BLD AUTO: 0.04 10*3/MM3 (ref 0–0.05)
IMM GRANULOCYTES NFR BLD AUTO: 0.8 % (ref 0–0.5)
KETONES UR QL STRIP: NEGATIVE
LDLC SERPL CALC-MCNC: 92 MG/DL (ref 0–100)
LDLC/HDLC SERPL: 1.98 {RATIO}
LEUKOCYTE ESTERASE UR QL STRIP.AUTO: NEGATIVE
LYMPHOCYTES # BLD AUTO: 1.57 10*3/MM3 (ref 0.7–3.1)
LYMPHOCYTES NFR BLD AUTO: 30.3 % (ref 19.6–45.3)
MCH RBC QN AUTO: 29 PG (ref 26.6–33)
MCHC RBC AUTO-ENTMCNC: 33.8 G/DL (ref 31.5–35.7)
MCV RBC AUTO: 85.9 FL (ref 79–97)
MONOCYTES # BLD AUTO: 0.55 10*3/MM3 (ref 0.1–0.9)
MONOCYTES NFR BLD AUTO: 10.6 % (ref 5–12)
NEUTROPHILS NFR BLD AUTO: 2.85 10*3/MM3 (ref 1.7–7)
NEUTROPHILS NFR BLD AUTO: 55 % (ref 42.7–76)
NITRITE UR QL STRIP: NEGATIVE
NRBC BLD AUTO-RTO: 0 /100 WBC (ref 0–0.2)
PH UR STRIP.AUTO: 6.5 [PH] (ref 5–8)
PLATELET # BLD AUTO: 214 10*3/MM3 (ref 140–450)
PMV BLD AUTO: 10.4 FL (ref 6–12)
POTASSIUM SERPL-SCNC: 3.7 MMOL/L (ref 3.5–5.2)
PROT SERPL-MCNC: 7.2 G/DL (ref 6–8.5)
PROT UR QL STRIP: ABNORMAL
PSA SERPL-MCNC: 1.91 NG/ML (ref 0–4)
RBC # BLD AUTO: 5.68 10*6/MM3 (ref 4.14–5.8)
RBC # UR: NORMAL /HPF
REF LAB TEST METHOD: NORMAL
SODIUM SERPL-SCNC: 139 MMOL/L (ref 136–145)
SP GR UR STRIP: 1.01 (ref 1–1.03)
SQUAMOUS #/AREA URNS HPF: NORMAL /HPF
TRIGL SERPL-MCNC: 139 MG/DL (ref 0–150)
UROBILINOGEN UR QL STRIP: ABNORMAL
VLDLC SERPL-MCNC: 25 MG/DL (ref 5–40)
WBC # BLD AUTO: 5.18 10*3/MM3 (ref 3.4–10.8)
WBC UR QL AUTO: NORMAL /HPF

## 2021-07-14 PROCEDURE — 81001 URINALYSIS AUTO W/SCOPE: CPT

## 2021-07-14 PROCEDURE — G0103 PSA SCREENING: HCPCS

## 2021-07-14 PROCEDURE — 85025 COMPLETE CBC W/AUTO DIFF WBC: CPT

## 2021-07-14 PROCEDURE — 36415 COLL VENOUS BLD VENIPUNCTURE: CPT

## 2021-07-14 PROCEDURE — 80061 LIPID PANEL: CPT

## 2021-07-14 PROCEDURE — 80053 COMPREHEN METABOLIC PANEL: CPT

## 2021-07-22 PROBLEM — M19.90 OSTEOARTHRITIS: Status: ACTIVE | Noted: 2021-07-22

## 2021-07-22 PROBLEM — L70.1 ACNE CONGLOBATA: Status: ACTIVE | Noted: 2021-07-22

## 2021-07-22 PROBLEM — I11.9 BENIGN HYPERTENSIVE HEART DISEASE WITHOUT CONGESTIVE HEART FAILURE: Status: ACTIVE | Noted: 2021-07-22

## 2021-07-22 PROBLEM — R60.9 EDEMA: Status: ACTIVE | Noted: 2021-07-22

## 2021-07-22 PROBLEM — N52.1 ERECTILE DISORDER DUE TO MEDICAL CONDITION IN MALE: Status: ACTIVE | Noted: 2017-11-10

## 2021-07-22 PROBLEM — M25.50 JOINT PAIN: Status: ACTIVE | Noted: 2021-07-22

## 2021-07-22 PROBLEM — I83.90 VARICOSE VEINS OF LOWER EXTREMITY: Status: ACTIVE | Noted: 2021-07-22

## 2021-07-22 PROBLEM — R31.29 MICROSCOPIC HEMATURIA: Status: ACTIVE | Noted: 2021-07-22

## 2021-08-03 ENCOUNTER — OFFICE VISIT (OUTPATIENT)
Dept: SLEEP MEDICINE | Facility: HOSPITAL | Age: 76
End: 2021-08-03

## 2021-08-03 ENCOUNTER — OFFICE VISIT (OUTPATIENT)
Dept: FAMILY MEDICINE CLINIC | Facility: CLINIC | Age: 76
End: 2021-08-03

## 2021-08-03 VITALS
SYSTOLIC BLOOD PRESSURE: 130 MMHG | WEIGHT: 255 LBS | HEIGHT: 70 IN | HEART RATE: 60 BPM | OXYGEN SATURATION: 98 % | DIASTOLIC BLOOD PRESSURE: 55 MMHG | BODY MASS INDEX: 36.51 KG/M2

## 2021-08-03 VITALS
HEIGHT: 70 IN | OXYGEN SATURATION: 96 % | BODY MASS INDEX: 36.22 KG/M2 | HEART RATE: 58 BPM | DIASTOLIC BLOOD PRESSURE: 62 MMHG | SYSTOLIC BLOOD PRESSURE: 143 MMHG | WEIGHT: 253 LBS

## 2021-08-03 DIAGNOSIS — G47.33 OBSTRUCTIVE SLEEP APNEA, ADULT: Primary | ICD-10-CM

## 2021-08-03 DIAGNOSIS — R07.89 OTHER CHEST PAIN: ICD-10-CM

## 2021-08-03 DIAGNOSIS — E66.01 MORBID OBESITY (HCC): ICD-10-CM

## 2021-08-03 DIAGNOSIS — M10.371 ACUTE GOUT DUE TO RENAL IMPAIRMENT INVOLVING TOE OF RIGHT FOOT: ICD-10-CM

## 2021-08-03 DIAGNOSIS — I10 ESSENTIAL HYPERTENSION: Chronic | ICD-10-CM

## 2021-08-03 DIAGNOSIS — Z00.00 MEDICARE ANNUAL WELLNESS VISIT, SUBSEQUENT: Primary | ICD-10-CM

## 2021-08-03 PROCEDURE — 99213 OFFICE O/P EST LOW 20 MIN: CPT | Performed by: NURSE PRACTITIONER

## 2021-08-03 PROCEDURE — 93010 ELECTROCARDIOGRAM REPORT: CPT | Performed by: INTERNAL MEDICINE

## 2021-08-03 PROCEDURE — G0439 PPPS, SUBSEQ VISIT: HCPCS | Performed by: GENERAL PRACTICE

## 2021-08-03 PROCEDURE — 93005 ELECTROCARDIOGRAM TRACING: CPT | Performed by: GENERAL PRACTICE

## 2021-08-03 RX ORDER — CARVEDILOL 25 MG/1
50 TABLET ORAL 2 TIMES DAILY WITH MEALS
Qty: 360 TABLET | Refills: 1 | Status: SHIPPED | OUTPATIENT
Start: 2021-08-03 | End: 2022-02-15

## 2021-08-03 RX ORDER — ALLOPURINOL 100 MG/1
200 TABLET ORAL DAILY
Qty: 180 TABLET | Refills: 3 | Status: SHIPPED | OUTPATIENT
Start: 2021-08-03 | End: 2021-12-14

## 2021-08-03 NOTE — PATIENT INSTRUCTIONS
Dr. Moe - [phone] for weight loss    Calorie Counting for Weight Loss  Calories are units of energy. Your body needs a certain number of calories from food to keep going throughout the day. When you eat or drink more calories than your body needs, your body stores the extra calories mostly as fat. When you eat or drink fewer calories than your body needs, your body burns fat to get the energy it needs.  Calorie counting means keeping track of how many calories you eat and drink each day. Calorie counting can be helpful if you need to lose weight. If you eat fewer calories than your body needs, you should lose weight. Ask your health care provider what a healthy weight is for you.  For calorie counting to work, you will need to eat the right number of calories each day to lose a healthy amount of weight per week. A dietitian can help you figure out how many calories you need in a day and will suggest ways to reach your calorie goal.  · A healthy amount of weight to lose each week is usually 1-2 lb (0.5-0.9 kg). This usually means that your daily calorie intake should be reduced by 500-750 calories.  · Eating 1,200-1,500 calories a day can help most women lose weight.  · Eating 1,500-1,800 calories a day can help most men lose weight.  What do I need to know about calorie counting?  Work with your health care provider or dietitian to determine how many calories you should get each day. To meet your daily calorie goal, you will need to:  · Find out how many calories are in each food that you would like to eat. Try to do this before you eat.  · Decide how much of the food you plan to eat.  · Keep a food log. Do this by writing down what you ate and how many calories it had.  To successfully lose weight, it is important to balance calorie counting with a healthy lifestyle that includes regular activity.  Where do I find calorie information?    The number of calories in a food can be found on a Nutrition Facts  label. If a food does not have a Nutrition Facts label, try to look up the calories online or ask your dietitian for help.  Remember that calories are listed per serving. If you choose to have more than one serving of a food, you will have to multiply the calories per serving by the number of servings you plan to eat. For example, the label on a package of bread might say that a serving size is 1 slice and that there are 90 calories in a serving. If you eat 1 slice, you will have eaten 90 calories. If you eat 2 slices, you will have eaten 180 calories.  How do I keep a food log?  After each time that you eat, record the following in your food log as soon as possible:  · What you ate. Be sure to include toppings, sauces, and other extras on the food.  · How much you ate. This can be measured in cups, ounces, or number of items.  · How many calories were in each food and drink.  · The total number of calories in the food you ate.  Keep your food log near you, such as in a pocket-sized notebook or on an lidia or website on your mobile phone. Some programs will calculate calories for you and show you how many calories you have left to meet your daily goal.  What are some portion-control tips?  · Know how many calories are in a serving. This will help you know how many servings you can have of a certain food.  · Use a measuring cup to measure serving sizes. You could also try weighing out portions on a kitchen scale. With time, you will be able to estimate serving sizes for some foods.  · Take time to put servings of different foods on your favorite plates or in your favorite bowls and cups so you know what a serving looks like.  · Try not to eat straight from a food's packaging, such as from a bag or box. Eating straight from the package makes it hard to see how much you are eating and can lead to overeating. Put the amount you would like to eat in a cup or on a plate to make sure you are eating the right portion.  · Use  smaller plates, glasses, and bowls for smaller portions and to prevent overeating.  · Try not to multitask. For example, avoid watching TV or using your computer while eating. If it is time to eat, sit down at a table and enjoy your food. This will help you recognize when you are full. It will also help you be more mindful of what and how much you are eating.  What are tips for following this plan?  Reading food labels  · Check the calorie count compared with the serving size. The serving size may be smaller than what you are used to eating.  · Check the source of the calories. Try to choose foods that are high in protein, fiber, and vitamins, and low in saturated fat, trans fat, and sodium.  Shopping  · Read nutrition labels while you shop. This will help you make healthy decisions about which foods to buy.  · Pay attention to nutrition labels for low-fat or fat-free foods. These foods sometimes have the same number of calories or more calories than the full-fat versions. They also often have added sugar, starch, or salt to make up for flavor that was removed with the fat.  · Make a grocery list of lower-calorie foods and stick to it.  Cooking  · Try to cook your favorite foods in a healthier way. For example, try baking instead of frying.  · Use low-fat dairy products.  Meal planning  · Use more fruits and vegetables. One-half of your plate should be fruits and vegetables.  · Include lean proteins, such as chicken, turkey, and fish.  Lifestyle  Each week, aim to do one of the following:  · 150 minutes of moderate exercise, such as walking.  · 75 minutes of vigorous exercise, such as running.  General information  · Know how many calories are in the foods you eat most often. This will help you calculate calorie counts faster.  · Find a way of tracking calories that works for you. Get creative. Try different apps or programs if writing down calories does not work for you.  What foods should I eat?    · Eat  nutritious foods. It is better to have a nutritious, high-calorie food, such as an avocado, than a food with few nutrients, such as a bag of potato chips.  · Use your calories on foods and drinks that will fill you up and will not leave you hungry soon after eating.  ? Examples of foods that fill you up are nuts and nut butters, vegetables, lean proteins, and high-fiber foods such as whole grains. High-fiber foods are foods with more than 5 g of fiber per serving.  · Pay attention to calories in drinks. Low-calorie drinks include water and unsweetened drinks.  The items listed above may not be a complete list of foods and beverages you can eat. Contact a dietitian for more information.  What foods should I limit?  Limit foods or drinks that are not good sources of vitamins, minerals, or protein or that are high in unhealthy fats. These include:  · Candy.  · Other sweets.  · Sodas, specialty coffee drinks, alcohol, and juice.  The items listed above may not be a complete list of foods and beverages you should avoid. Contact a dietitian for more information.  How do I count calories when eating out?  · Pay attention to portions. Often, portions are much larger when eating out. Try these tips to keep portions smaller:  ? Consider sharing a meal instead of getting your own.  ? If you get your own meal, eat only half of it. Before you start eating, ask for a container and put half of your meal into it.  ? When available, consider ordering smaller portions from the menu instead of full portions.  · Pay attention to your food and drink choices. Knowing the way food is cooked and what is included with the meal can help you eat fewer calories.  ? If calories are listed on the menu, choose the lower-calorie options.  ? Choose dishes that include vegetables, fruits, whole grains, low-fat dairy products, and lean proteins.  ? Choose items that are boiled, broiled, grilled, or steamed. Avoid items that are buttered, battered,  fried, or served with cream sauce. Items labeled as crispy are usually fried, unless stated otherwise.  ? Choose water, low-fat milk, unsweetened iced tea, or other drinks without added sugar. If you want an alcoholic beverage, choose a lower-calorie option, such as a glass of wine or light beer.  ? Ask for dressings, sauces, and syrups on the side. These are usually high in calories, so you should limit the amount you eat.  ? If you want a salad, choose a garden salad and ask for grilled meats. Avoid extra toppings such as pickett, cheese, or fried items. Ask for the dressing on the side, or ask for olive oil and vinegar or lemon to use as dressing.  · Estimate how many servings of a food you are given. Knowing serving sizes will help you be aware of how much food you are eating at restaurants.  Where to find more information  · Centers for Disease Control and Prevention: www.cdc.gov  · [place] Department of Agriculture: myplate.gov  Summary  · Calorie counting means keeping track of how many calories you eat and drink each day. If you eat fewer calories than your body needs, you should lose weight.  · A healthy amount of weight to lose per week is usually 1-2 lb (0.5-0.9 kg). This usually means reducing your daily calorie intake by 500-750 calories.  · The number of calories in a food can be found on a Nutrition Facts label. If a food does not have a Nutrition Facts label, try to look up the calories online or ask your dietitian for help.  · Use smaller plates, glasses, and bowls for smaller portions and to prevent overeating.  · Use your calories on foods and drinks that will fill you up and not leave you hungry shortly after a meal.  This information is not intended to replace advice given to you by your health care provider. Make sure you discuss any questions you have with your health care provider.  Document Revised: 01/28/2021 Document Reviewed: 01/28/2021  [employer] Patient Education © 2021 [employer]      Medicare Wellness  Personal Prevention Plan of Service     Date of Office Visit:  2021  Encounter Provider:  Annie Penny MD  Place of Service:  [facility]  Patient Name: Jerald Bonilla  :  1945    As part of the Medicare Wellness portion of your visit today, we are providing you with this personalized preventive plan of services (PPPS). This plan is based upon recommendations of the [place] Preventive Services Task Force (USPSTF) and the Advisory Committee on Immunization Practices (ACIP).    This lists the preventive care services that should be considered, and provides dates of when you are due. Items listed as completed are up-to-date and do not require any further intervention.    Health Maintenance   Topic Date Due   • COLORECTAL CANCER SCREENING  2021   • INFLUENZA VACCINE  10/01/2021   • LIPID PANEL  2022   • ANNUAL WELLNESS VISIT  2022   • TDAP/TD VACCINES (3 - Td or Tdap) 2027   • HEPATITIS C SCREENING  Completed   • COVID-19 Vaccine  Completed   • Pneumococcal Vaccine 65+  Completed   • AAA SCREEN (ONE-TIME)  Completed   • ZOSTER VACCINE  Completed       Orders Placed This Encounter   Procedures   • ECG 12 Lead     Order Specific Question:   Reason for Exam:     Answer:   chest pain     Order Specific Question:   Release to patient     Answer:   Immediate       Return in about 6 months (around 2/3/2022) for Recheck.

## 2021-08-03 NOTE — PATIENT INSTRUCTIONS
*Call [employer] to register machine's serial number: [phone]  *[employer]' website: [email or web address]

## 2021-08-03 NOTE — PROGRESS NOTES
The ABCs of the Annual Wellness Visit  Subsequent Medicare Wellness Visit    Chief Complaint   Patient presents with   • Medicare Wellness-subsequent       Subjective   History of Present Illness:  Jerald Bonilla is a 75 y.o. male who presents for a Subsequent Medicare Wellness Visit. Has had some shortness of breath, does have sleep apnea. Some fleeting chest pain not associated with exertion.     HEALTH RISK ASSESSMENT    Recent Hospitalizations:  No hospitalization(s) within the last year.    Current Medical Providers:  Patient Care Team:  Annie Penny MD as PCP - General (Family Medicine)  Wali Servin MD as Consulting Physician (Nephrology)  Rebecca Servin APRN as Nurse Practitioner (Nurse Practitioner)    Smoking Status:  Social History     Tobacco Use   Smoking Status Former Smoker   • Quit date:    • Years since quittin.6   Smokeless Tobacco Never Used       Alcohol Consumption:  Social History     Substance and Sexual Activity   Alcohol Use Yes    Comment: occassionally       Depression Screen:   PHQ-2/PHQ-9 Depression Screening 8/3/2021   Little interest or pleasure in doing things 0   Feeling down, depressed, or hopeless 1   Trouble falling or staying asleep, or sleeping too much 0   Feeling tired or having little energy 1   Poor appetite or overeating 1   Feeling bad about yourself - or that you are a failure or have let yourself or your family down 0   Trouble concentrating on things, such as reading the newspaper or watching television 1   Moving or speaking so slowly that other people could have noticed. Or the opposite - being so fidgety or restless that you have been moving around a lot more than usual 0   Thoughts that you would be better off dead, or of hurting yourself in some way 0   Total Score 4   If you checked off any problems, how difficult have these problems made it for you to do your work, take care of things at home, or get along with other people? Not  difficult at all       Fall Risk Screen:  SOM Fall Risk Assessment was completed, and patient is at LOW risk for falls.Assessment completed on:1/12/2021    Health Habits and Functional and Cognitive Screening:  Functional & Cognitive Status 8/3/2021   Do you have difficulty preparing food and eating? No   Do you have difficulty bathing yourself, getting dressed or grooming yourself? No   Do you have difficulty using the toilet? No   Do you have difficulty moving around from place to place? No   Do you have trouble with steps or getting out of a bed or a chair? No   Current Diet Well Balanced Diet   Dental Exam Not up to date   Eye Exam Not up to date   Exercise (times per week) 0 times per week   Current Exercise Activities Include -   Do you need help using the phone?  No   Are you deaf or do you have serious difficulty hearing?  Yes   Do you need help with transportation? No   Do you need help shopping? No   Do you need help preparing meals?  No   Do you need help with housework?  No   Do you need help with laundry? No   Do you need help taking your medications? No   Do you need help managing money? No   Do you ever drive or ride in a car without wearing a seat belt? No   Have you felt unusual stress, anger or loneliness in the last month? Yes   Who do you live with? Spouse   If you need help, do you have trouble finding someone available to you? Yes   Have you been bothered in the last four weeks by sexual problems? No   Do you have difficulty concentrating, remembering or making decisions? No         Does the patient have evidence of cognitive impairment? No    Asprin use counseling:Taking ASA appropriately as indicated    Age-appropriate Screening Schedule:  Refer to the list below for future screening recommendations based on patient's age, sex and/or medical conditions. Orders for these recommended tests are listed in the plan section. The patient has been provided with a written plan.    Health  Maintenance   Topic Date Due   • INFLUENZA VACCINE  10/01/2021   • LIPID PANEL  07/14/2022   • TDAP/TD VACCINES (3 - Td or Tdap) 05/16/2027   • ZOSTER VACCINE  Completed          The following portions of the patient's history were reviewed and updated as appropriate: allergies, current medications, past family history, past medical history, past social history, past surgical history and problem list.    Outpatient Medications Prior to Visit   Medication Sig Dispense Refill   • aspirin 81 MG EC tablet Take 81 mg by mouth Daily.     • B Complex Vitamins (VITAMIN B COMPLEX PO) Take 1 tablet by mouth Daily.     • cholecalciferol (VITAMIN D3) 1000 UNITS tablet Take 2,000 Units by mouth Daily.     • clindamycin (Clindagel) 1 % gel Apply  topically to the appropriate area as directed 2 (Two) Times a Day. 60 g 2   • famotidine (PEPCID) 10 MG tablet Take 10 mg by mouth 2 (Two) Times a Day.     • fluticasone (FLONASE) 50 MCG/ACT nasal spray 1 spray into the nostril(s) as directed by provider Daily. 1 bottle 0   • loratadine (CLARITIN) 10 MG tablet Take 10 mg by mouth Daily.     • NIFEdipine XL (PROCARDIA XL) 90 MG 24 hr tablet TAKE 2 TABLETS BY MOUTH AT  NIGHT 180 tablet 3   • pravastatin (PRAVACHOL) 80 MG tablet TAKE 1 TABLET BY MOUTH  EVERY NIGHT 90 tablet 3   • SUPER B COMPLEX/C PO Super B Complex + C   one tablet daily     • valsartan-hydrochlorothiazide (DIOVAN-HCT) 320-25 MG per tablet TAKE 1 TABLET BY MOUTH  DAILY 90 tablet 3   • allopurinol (ZYLOPRIM) 100 MG tablet TAKE 2 TABLETS BY MOUTH  DAILY 180 tablet 0   • carvedilol (COREG) 25 MG tablet TAKE 2 TABLETS BY MOUTH  TWICE DAILY WITH MEALS 360 tablet 0     Facility-Administered Medications Prior to Visit   Medication Dose Route Frequency Provider Last Rate Last Admin   • triamcinolone acetonide (KENALOG-40) injection 80 mg  80 mg Intramuscular Once Annie Penny MD           Patient Active Problem List   Diagnosis   • Essential hypertension   • Gout   • Left  hip pain   • Presence of left hip implant   • Pneumonia of right upper lobe due to infectious organism   • CHRIS on CPAP   • Mixed hyperlipidemia   • Dysphagia   • Early satiety   • Gastroesophageal reflux disease   • Morbidly obese (CMS/HCC)   • Acne conglobata   • Benign hypertensive heart disease without congestive heart failure   • Edema   • 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 Patient does not have an advance directive, information provided.    Review of Systems   Constitutional: Negative.  Negative for chills, fatigue, fever and unexpected weight change.   HENT: Negative.  Negative for congestion, ear pain, hearing loss, nosebleeds, rhinorrhea, sneezing, sore throat and tinnitus.    Eyes: Negative.  Negative for discharge.   Respiratory: Negative.  Negative for cough, shortness of breath and wheezing.    Cardiovascular: Negative.  Negative for chest pain and palpitations.   Gastrointestinal: Negative.  Negative for abdominal pain, constipation, diarrhea, nausea and vomiting.   Endocrine: Negative.    Genitourinary: Negative.  Negative for dysuria, frequency and urgency.   Musculoskeletal: Negative.  Negative for arthralgias, back pain, joint swelling, myalgias and neck pain.   Skin: Negative.  Negative for rash.   Allergic/Immunologic: Negative.    Neurological: Negative.  Negative for dizziness, weakness, numbness and headaches.   Hematological: Negative.  Negative for adenopathy.   Psychiatric/Behavioral: Negative.  Negative for dysphoric mood and sleep disturbance. The patient is not nervous/anxious.      Compared to one year ago, the patient feels his physical health is worse.  Compared to one year ago, the patient feels his mental health is the same.    Reviewed chart for potential of high risk medication in  "the elderly: yes  Reviewed chart for potential of harmful drug interactions in the elderly:not applicable    Objective         Vitals:    08/03/21 1445   BP: 130/55   BP Location: Left arm   Pulse: 60   SpO2: 98%   Weight: 116 kg (255 lb)   Height: 177.8 cm (70\")   PainSc:   4   PainLoc: Back       Body mass index is 36.59 kg/m².  Discussed the patient's BMI with him. The BMI is above average; BMI management plan is completed.    Physical Exam  Vitals and nursing note reviewed.   Constitutional:       General: He is not in acute distress.     Appearance: He is well-developed.   HENT:      Head: Normocephalic.      Nose: Nose normal.   Eyes:      General:         Right eye: No discharge.         Left eye: No discharge.      Conjunctiva/sclera: Conjunctivae normal.      Pupils: Pupils are equal, round, and reactive to light.   Neck:      Thyroid: No thyromegaly.   Cardiovascular:      Rate and Rhythm: Normal rate and regular rhythm.      Heart sounds: Normal heart sounds. No murmur heard.     Pulmonary:      Effort: Pulmonary effort is normal.      Breath sounds: Normal breath sounds.   Lymphadenopathy:      Cervical: No cervical adenopathy.   Skin:     General: Skin is warm and dry.   Neurological:      Mental Status: He is alert and oriented to person, place, and time.         Lab Results   Component Value Date    TRIG 139 07/14/2021    HDL 45 07/14/2021    LDL 92 07/14/2021    VLDL 25 07/14/2021        Assessment/Plan   Medicare Risks and Personalized Health Plan  CMS Preventative Services Quick Reference  Advance Directive Discussion  Depression/Dysphoria  Fall Risk  Obesity/Overweight     The above risks/problems have been discussed with the patient.  Pertinent information has been shared with the patient in the After Visit Summary.  Follow up plans and orders are seen below in the Assessment/Plan Section.    Diagnoses and all orders for this visit:    1. Medicare annual wellness visit, subsequent (Primary)    2. " Essential hypertension  -     carvedilol (COREG) 25 MG tablet; Take 2 tablets by mouth 2 (Two) Times a Day With Meals.  Dispense: 360 tablet; Refill: 1    3. Other chest pain  -     ECG 12 Lead    4. Acute gout due to renal impairment involving toe of right foot  -     allopurinol (ZYLOPRIM) 100 MG tablet; Take 2 tablets by mouth Daily.  Dispense: 180 tablet; Refill: 3      Follow Up:  Return in about 6 months (around 2/3/2022) for Recheck.     An After Visit Summary and PPPS were given to the patient.    Information has been scanned into chart. Discussed importance of taking medications as prescribed. Encouraged healthy eating habits with low fat, low salt choices and working towards maintaining a healthy weight. Recommended regular exercise if able as well as care to prevent falls including avoiding anything on the floor that they could slip or trip on such as throw rugs, making sure they have a bathmat to step onto when their feet are wet and having grab bars and railings where needed.

## 2021-08-03 NOTE — PROGRESS NOTES
Sleep Clinic Follow Up    Date: 8/3/2021  Primary Care Provider: Annie Penny MD    Last office visit: 2020 (telephone visit) (I reviewed this note)    CC: Follow up: CHRIS on CPAP      Interim History:  Since the last visit:    1) severe CHRIS -  Jerald Bonilla has remained compliant with CPAP. He denies mask and machine issues, dry mouth, headaches, or pressures intolerance. He denies abnormal dreams, sleep paralysis, nasal congestion, URI sx.    2) Patient denies RLS symptoms.     Sleep Testin. PSG on 2007, AHI of 34   2. CPAP titration recommended 11 cm H2O   3. Currently on 10-20 cm H2O    PAP Data:    Time frame: 2021-2021   Compliance: 96.7%  Average use on days used: 7 hrs 30 min  Percent of days with usage greater than or equal to 4 hours: 96.7%  PAP range: 10-20 cm H2O  Average 90% pressure: 13.3 cmH2O  Leak: 2 minutes  Average AHI: 3.8 events/hr  Mask type: Nasal cushion  DME: [facility]    Bed time: 5461-3633  Sleep latency: 5-20 minutes  Number of times awakens during the night: 1  Wake time: 2854-5460, depending on appointments  Estimated total sleep time at night: 6-7 hours  Nap time: occasional   Sleepiness with Driving: none     [place] - 6    PMHx, FH, SH reviewed and pertinent changes are: Reportedly unchanged from last office visit with us.      REVIEW OF SYSTEMS:   +Dyspnea on exertion  Negative for chest pain, SOA, fever, chills, cough, N/V/D, abdominal pain.    Smoking:none    Jerald Bonilla  reports that he quit smoking about 21 years ago. He has never used smokeless tobacco.      Exam:  Vitals:    21 1411   BP: 143/62   Pulse: 58   SpO2: 96%           21  1411   Weight: 115 kg (253 lb)     Body mass index is 36.3 kg/m². Patient's Body mass index is 36.3 kg/m². indicating that he is morbidly obese (BMI > 40 or > 35 with obesity - related health condition). Obesity-related health conditions include the following: obstructive sleep apnea,  hypertension, dyslipidemias and GERD. Obesity is unchanged. BMI is is above average; BMI management plan is completed. I recommend portion control and increasing exercise.      Gen:                No distress, conversant, pleasant, appears stated age, alert, oriented  Eyes:               Anicteric sclera, moist conjunctiva, no lid lag                           PERRL, EOMI   Heent:             NC/AT                          Oropharynx clear                          Normal hearing  Lungs:             Normal effort, non-labored breathing                          Clear to auscultation bilaterally          CV:                  Normal S1/S2, no murmur                          No lower extremity edema  ABD:               Soft, rounded, non-distended                          Normal bowel sounds                    Psych:             Appropriate affect  Neuro:             CN 2-12 appear intact    Past Medical History:   Diagnosis Date   • Acne conglobata    • Acute maxillary sinusitis    • Benign prostate hyperplasia    • Blurring of visual image    • Carbuncle     of infected sebaceous cyst on back   • Dizziness and giddiness    • Erectile dysfunction    • Essential hypertension     seeing nephro   • Gastroesophageal reflux disease    • Gout    • Hammer toe    • Hip pain    • Hyperlipidemia    • Hypertension    • Low back pain    • Malaise and fatigue    • Need for prophylactic vaccination against Streptococcus pneumoniae (pneumococcus)    • Nuclear senile cataract    • Osteoarthritis    • Otalgia    • Otitis externa    • Pain in lower limb    • Rheumatoid arthritis (CMS/HCC)    • Skin lesion     right calf   • Swelling of knee joint    • Tinnitus    • Upper respiratory infection        Current Outpatient Medications:   •  allopurinol (ZYLOPRIM) 100 MG tablet, TAKE 2 TABLETS BY MOUTH  DAILY, Disp: 180 tablet, Rfl: 0  •  aspirin 81 MG EC tablet, Take 81 mg by mouth Daily., Disp: , Rfl:   •  B Complex Vitamins (VITAMIN B  COMPLEX PO), Take 1 tablet by mouth Daily., Disp: , Rfl:   •  carvedilol (COREG) 25 MG tablet, TAKE 2 TABLETS BY MOUTH  TWICE DAILY WITH MEALS, Disp: 360 tablet, Rfl: 0  •  cholecalciferol (VITAMIN D3) 1000 UNITS tablet, Take 2,000 Units by mouth Daily., Disp: , Rfl:   •  clindamycin (Clindagel) 1 % gel, Apply  topically to the appropriate area as directed 2 (Two) Times a Day., Disp: 60 g, Rfl: 2  •  famotidine (PEPCID) 10 MG tablet, Take 10 mg by mouth 2 (Two) Times a Day., Disp: , Rfl:   •  fluticasone (FLONASE) 50 MCG/ACT nasal spray, 1 spray into the nostril(s) as directed by provider Daily., Disp: 1 bottle, Rfl: 0  •  loratadine (CLARITIN) 10 MG tablet, Take 10 mg by mouth Daily., Disp: , Rfl:   •  NIFEdipine XL (PROCARDIA XL) 90 MG 24 hr tablet, TAKE 2 TABLETS BY MOUTH AT  NIGHT, Disp: 180 tablet, Rfl: 3  •  pravastatin (PRAVACHOL) 80 MG tablet, TAKE 1 TABLET BY MOUTH  EVERY NIGHT, Disp: 90 tablet, Rfl: 3  •  SUPER B COMPLEX/C PO, Super B Complex + C  one tablet daily, Disp: , Rfl:   •  valsartan-hydrochlorothiazide (DIOVAN-HCT) 320-25 MG per tablet, TAKE 1 TABLET BY MOUTH  DAILY, Disp: 90 tablet, Rfl: 3    Current Facility-Administered Medications:   •  triamcinolone acetonide (KENALOG-40) injection 80 mg, 80 mg, Intramuscular, Once, Annie Penny MD    WBC   Date Value Ref Range Status   07/14/2021 5.18 3.40 - 10.80 10*3/mm3 Final     RBC   Date Value Ref Range Status   07/14/2021 5.68 4.14 - 5.80 10*6/mm3 Final     Hemoglobin   Date Value Ref Range Status   07/14/2021 16.5 13.0 - 17.7 g/dL Final     Hematocrit   Date Value Ref Range Status   07/14/2021 48.8 37.5 - 51.0 % Final     MCV   Date Value Ref Range Status   07/14/2021 85.9 79.0 - 97.0 fL Final     MCH   Date Value Ref Range Status   07/14/2021 29.0 26.6 - 33.0 pg Final     MCHC   Date Value Ref Range Status   07/14/2021 33.8 31.5 - 35.7 g/dL Final     RDW   Date Value Ref Range Status   07/14/2021 14.5 12.3 - 15.4 % Final     RDW-SD   Date  Value Ref Range Status   07/14/2021 45.0 37.0 - 54.0 fl Final     MPV   Date Value Ref Range Status   07/14/2021 10.4 6.0 - 12.0 fL Final     Platelets   Date Value Ref Range Status   07/14/2021 214 140 - 450 10*3/mm3 Final     Neutrophil %   Date Value Ref Range Status   07/14/2021 55.0 42.7 - 76.0 % Final     Lymphocyte %   Date Value Ref Range Status   07/14/2021 30.3 19.6 - 45.3 % Final     Monocyte %   Date Value Ref Range Status   07/14/2021 10.6 5.0 - 12.0 % Final     Eosinophil %   Date Value Ref Range Status   07/14/2021 2.5 0.3 - 6.2 % Final     Basophil %   Date Value Ref Range Status   07/14/2021 0.8 0.0 - 1.5 % Final     Immature Grans %   Date Value Ref Range Status   07/14/2021 0.8 (H) 0.0 - 0.5 % Final     Neutrophils, Absolute   Date Value Ref Range Status   07/14/2021 2.85 1.70 - 7.00 10*3/mm3 Final     Lymphocytes, Absolute   Date Value Ref Range Status   07/14/2021 1.57 0.70 - 3.10 10*3/mm3 Final     Monocytes, Absolute   Date Value Ref Range Status   07/14/2021 0.55 0.10 - 0.90 10*3/mm3 Final     Eosinophils, Absolute   Date Value Ref Range Status   07/14/2021 0.13 0.00 - 0.40 10*3/mm3 Final     Basophils, Absolute   Date Value Ref Range Status   07/14/2021 0.04 0.00 - 0.20 10*3/mm3 Final     Immature Grans, Absolute   Date Value Ref Range Status   07/14/2021 0.04 0.00 - 0.05 10*3/mm3 Final     nRBC   Date Value Ref Range Status   07/14/2021 0.0 0.0 - 0.2 /100 WBC Final       Lab Results   Component Value Date    GLUCOSE 107 (H) 07/14/2021    BUN 22 07/14/2021    CREATININE 1.41 (H) 07/14/2021    EGFRIFAFRI 60 (L) 07/14/2021    BCR 15.6 07/14/2021    K 3.7 07/14/2021    CO2 24.5 07/14/2021    CALCIUM 8.9 07/14/2021    ALBUMIN 3.90 07/14/2021    AST 17 07/14/2021    ALT 22 07/14/2021       Assessment and Plan:    1. Obstructive sleep apnea - Established, stable (1)  1. Compliant with PAP therapy  2. Advised patient of new safety recall of [employer] CPAP/BiPAP/AVAPS machines. We  discussed the risk versus benefit of continuing usage of PAP therapy. The company has recommended that patients stop usage of their current machines. Instructed patient to call [employer] ([phone]) to check if the machine is under warranty for repair or replacement. [place] machine with serial number on website: www.[employer]/src-updates. Follow up with DME company regarding any further questions, or for consideration for new machine once eligible. Advised patient to avoid unapproved ozone-type CPAP . Advised to call us if any further questions or problems. Offered an in-line filter for patient, however, he declined due to issues with chronic congestion/allergic rhinitis  3. Script for PAP supplies  4. Return to clinic in 1 year with compliance report unless change in symptoms in interim period  2. Morbid obesity - BMI 36.3 - stable chronic illness      I spent 20 minutes caring for Jerald on this date of service. This time includes time spent by me in the following activities: preparing for the visit, reviewing tests, obtaining and/or reviewing a separately obtained history, performing a medically appropriate examination and/or evaluation , counseling and educating the patient/family/caregiver and documenting information in the medical record; discussing PAP therapy, PAP compliance and PAP maintenance    RTC in 12 months. Patient agrees to return sooner if changes in symptoms.        This document has been electronically signed by BEATRIS Bond on August 3, 2021 14:14 CDT          CC: Annie Penny MD          No ref. provider found

## 2021-08-03 NOTE — PROGRESS NOTES
Subjective   Jerald Bonilla is a 75 y.o. male.   Chief Complaint   Patient presents with   • Hypertension   • Back Pain   • Shortness of Breath   • Medicare Wellness-subsequent     For review and evaluation of management of chronic medical problems. Records reviewed. Recent labs, xrays reviewed and medications reconciled.    Back Pain  This is a chronic problem. The current episode started more than 1 year ago. The problem occurs intermittently.      The following portions of the patient's history were reviewed and updated as appropriate: allergies, current medications, past social history and problem list.    Outpatient Medications Prior to Visit   Medication Sig Dispense Refill   • aspirin 81 MG EC tablet Take 81 mg by mouth Daily.     • B Complex Vitamins (VITAMIN B COMPLEX PO) Take 1 tablet by mouth Daily.     • cholecalciferol (VITAMIN D3) 1000 UNITS tablet Take 2,000 Units by mouth Daily.     • clindamycin (Clindagel) 1 % gel Apply  topically to the appropriate area as directed 2 (Two) Times a Day. 60 g 2   • famotidine (PEPCID) 10 MG tablet Take 10 mg by mouth 2 (Two) Times a Day.     • fluticasone (FLONASE) 50 MCG/ACT nasal spray 1 spray into the nostril(s) as directed by provider Daily. 1 bottle 0   • loratadine (CLARITIN) 10 MG tablet Take 10 mg by mouth Daily.     • NIFEdipine XL (PROCARDIA XL) 90 MG 24 hr tablet TAKE 2 TABLETS BY MOUTH AT  NIGHT 180 tablet 3   • pravastatin (PRAVACHOL) 80 MG tablet TAKE 1 TABLET BY MOUTH  EVERY NIGHT 90 tablet 3   • SUPER B COMPLEX/C PO Super B Complex + C   one tablet daily     • valsartan-hydrochlorothiazide (DIOVAN-HCT) 320-25 MG per tablet TAKE 1 TABLET BY MOUTH  DAILY 90 tablet 3   • allopurinol (ZYLOPRIM) 100 MG tablet TAKE 2 TABLETS BY MOUTH  DAILY 180 tablet 0   • carvedilol (COREG) 25 MG tablet TAKE 2 TABLETS BY MOUTH  TWICE DAILY WITH MEALS 360 tablet 0     Facility-Administered Medications Prior to Visit   Medication Dose Route Frequency Provider Last  "Rate Last Admin   • triamcinolone acetonide (KENALOG-40) injection 80 mg  80 mg Intramuscular Once Annie Penny MD         I have reviewed 12 systems with patient. Findings were negative except what is noted below and/or in history of present illness.   Review of Systems   Musculoskeletal: Positive for back pain.       Objective   Visit Vitals  /55 (BP Location: Left arm)   Pulse 60   Ht 177.8 cm (70\")   Wt 116 kg (255 lb)   SpO2 98%   BMI 36.59 kg/m²     Physical Exam    Notes brought forward are reviewed and updated if indicated.     Assessment/Plan   Problems Addressed this Visit        Cardiac and Vasculature    Essential hypertension (Chronic)    Relevant Medications    carvedilol (COREG) 25 MG tablet       Musculoskeletal and Injuries    Gout    Relevant Medications    allopurinol (ZYLOPRIM) 100 MG tablet      Other Visit Diagnoses     Medicare annual wellness visit, subsequent    -  Primary    Other chest pain        Relevant Orders    ECG 12 Lead      Diagnoses       Codes Comments    Medicare annual wellness visit, subsequent    -  Primary ICD-10-CM: Z00.00  ICD-9-CM: V70.0     Essential hypertension     ICD-10-CM: I10  ICD-9-CM: 401.9     Other chest pain     ICD-10-CM: R07.89  ICD-9-CM: 786.59     Acute gout due to renal impairment involving toe of right foot     ICD-10-CM: M10.371  ICD-9-CM: 274.01, 588.89            New Medications Ordered This Visit   Medications   • allopurinol (ZYLOPRIM) 100 MG tablet     Sig: Take 2 tablets by mouth Daily.     Dispense:  180 tablet     Refill:  3     Requesting 1 year supply   • carvedilol (COREG) 25 MG tablet     Sig: Take 2 tablets by mouth 2 (Two) Times a Day With Meals.     Dispense:  360 tablet     Refill:  1     Requesting 1 year supply     Return in about 6 months (around 2/3/2022) for Recheck.        This document has been electronically signed by Annie Penny MD on August 3, 2021 15:43 CDT    "

## 2021-08-06 LAB
QT INTERVAL: 462 MS
QTC INTERVAL: 429 MS

## 2021-08-23 ENCOUNTER — TRANSCRIBE ORDERS (OUTPATIENT)
Dept: LAB | Facility: HOSPITAL | Age: 76
End: 2021-08-23

## 2021-08-23 ENCOUNTER — LAB (OUTPATIENT)
Dept: LAB | Facility: HOSPITAL | Age: 76
End: 2021-08-23

## 2021-08-23 DIAGNOSIS — R19.4 CHANGE IN BOWEL HABITS: Primary | ICD-10-CM

## 2021-08-23 DIAGNOSIS — R19.4 CHANGE IN BOWEL HABITS: ICD-10-CM

## 2021-08-23 LAB
BASOPHILS # BLD AUTO: 0.05 10*3/MM3 (ref 0–0.2)
BASOPHILS NFR BLD AUTO: 1 % (ref 0–1.5)
DEPRECATED RDW RBC AUTO: 43.5 FL (ref 37–54)
EOSINOPHIL # BLD AUTO: 0.1 10*3/MM3 (ref 0–0.4)
EOSINOPHIL NFR BLD AUTO: 2 % (ref 0.3–6.2)
ERYTHROCYTE [DISTWIDTH] IN BLOOD BY AUTOMATED COUNT: 14.1 % (ref 12.3–15.4)
HCT VFR BLD AUTO: 47.5 % (ref 37.5–51)
HGB BLD-MCNC: 16.2 G/DL (ref 13–17.7)
IMM GRANULOCYTES # BLD AUTO: 0.03 10*3/MM3 (ref 0–0.05)
IMM GRANULOCYTES NFR BLD AUTO: 0.6 % (ref 0–0.5)
LYMPHOCYTES # BLD AUTO: 1.3 10*3/MM3 (ref 0.7–3.1)
LYMPHOCYTES NFR BLD AUTO: 26.1 % (ref 19.6–45.3)
MCH RBC QN AUTO: 29.3 PG (ref 26.6–33)
MCHC RBC AUTO-ENTMCNC: 34.1 G/DL (ref 31.5–35.7)
MCV RBC AUTO: 85.9 FL (ref 79–97)
MONOCYTES # BLD AUTO: 0.57 10*3/MM3 (ref 0.1–0.9)
MONOCYTES NFR BLD AUTO: 11.4 % (ref 5–12)
NEUTROPHILS NFR BLD AUTO: 2.94 10*3/MM3 (ref 1.7–7)
NEUTROPHILS NFR BLD AUTO: 58.9 % (ref 42.7–76)
NRBC BLD AUTO-RTO: 0 /100 WBC (ref 0–0.2)
PLATELET # BLD AUTO: 169 10*3/MM3 (ref 140–450)
PMV BLD AUTO: 10.4 FL (ref 6–12)
RBC # BLD AUTO: 5.53 10*6/MM3 (ref 4.14–5.8)
WBC # BLD AUTO: 4.99 10*3/MM3 (ref 3.4–10.8)

## 2021-08-23 PROCEDURE — 85025 COMPLETE CBC W/AUTO DIFF WBC: CPT

## 2021-08-23 PROCEDURE — 84439 ASSAY OF FREE THYROXINE: CPT

## 2021-08-23 PROCEDURE — 36415 COLL VENOUS BLD VENIPUNCTURE: CPT

## 2021-08-23 PROCEDURE — 80053 COMPREHEN METABOLIC PANEL: CPT

## 2021-08-23 PROCEDURE — 84443 ASSAY THYROID STIM HORMONE: CPT

## 2021-08-24 LAB
ALBUMIN SERPL-MCNC: 4.2 G/DL (ref 3.5–5.2)
ALBUMIN/GLOB SERPL: 1.4 G/DL
ALP SERPL-CCNC: 46 U/L (ref 39–117)
ALT SERPL W P-5'-P-CCNC: 17 U/L (ref 1–41)
ANION GAP SERPL CALCULATED.3IONS-SCNC: 11.6 MMOL/L (ref 5–15)
AST SERPL-CCNC: 15 U/L (ref 1–40)
BILIRUB SERPL-MCNC: 0.4 MG/DL (ref 0–1.2)
BUN SERPL-MCNC: 24 MG/DL (ref 8–23)
BUN/CREAT SERPL: 16.9 (ref 7–25)
CALCIUM SPEC-SCNC: 9.4 MG/DL (ref 8.6–10.5)
CHLORIDE SERPL-SCNC: 107 MMOL/L (ref 98–107)
CO2 SERPL-SCNC: 22.4 MMOL/L (ref 22–29)
CREAT SERPL-MCNC: 1.42 MG/DL (ref 0.76–1.27)
GFR SERPL CREATININE-BSD FRML MDRD: 59 ML/MIN/1.73
GLOBULIN UR ELPH-MCNC: 3 GM/DL
GLUCOSE SERPL-MCNC: 90 MG/DL (ref 65–99)
POTASSIUM SERPL-SCNC: 4 MMOL/L (ref 3.5–5.2)
PROT SERPL-MCNC: 7.2 G/DL (ref 6–8.5)
SODIUM SERPL-SCNC: 141 MMOL/L (ref 136–145)
T4 FREE SERPL-MCNC: 1.34 NG/DL (ref 0.93–1.7)
TSH SERPL DL<=0.05 MIU/L-ACNC: 2.08 UIU/ML (ref 0.27–4.2)

## 2021-08-31 ENCOUNTER — TRANSCRIBE ORDERS (OUTPATIENT)
Dept: LAB | Facility: HOSPITAL | Age: 76
End: 2021-08-31

## 2021-08-31 DIAGNOSIS — I71.40 ABDOMINAL AORTIC ANEURYSM WITHOUT RUPTURE (HCC): Primary | ICD-10-CM

## 2021-08-31 DIAGNOSIS — R10.84 ABDOMINAL PAIN, GENERALIZED: ICD-10-CM

## 2021-09-01 ENCOUNTER — LAB (OUTPATIENT)
Dept: LAB | Facility: HOSPITAL | Age: 76
End: 2021-09-01

## 2021-09-01 PROCEDURE — 86301 IMMUNOASSAY TUMOR CA 19-9: CPT | Performed by: INTERNAL MEDICINE

## 2021-09-01 PROCEDURE — 83690 ASSAY OF LIPASE: CPT | Performed by: INTERNAL MEDICINE

## 2021-09-01 PROCEDURE — 82150 ASSAY OF AMYLASE: CPT | Performed by: INTERNAL MEDICINE

## 2021-09-01 PROCEDURE — 36415 COLL VENOUS BLD VENIPUNCTURE: CPT | Performed by: INTERNAL MEDICINE

## 2021-09-02 LAB
AMYLASE SERPL-CCNC: 107 U/L (ref 28–100)
CANCER AG19-9 SERPL-ACNC: 26.9 U/ML
LIPASE SERPL-CCNC: 41 U/L (ref 13–60)

## 2021-09-23 ENCOUNTER — LAB (OUTPATIENT)
Dept: LAB | Facility: HOSPITAL | Age: 76
End: 2021-09-23

## 2021-09-23 ENCOUNTER — TRANSCRIBE ORDERS (OUTPATIENT)
Dept: LAB | Facility: HOSPITAL | Age: 76
End: 2021-09-23

## 2021-09-23 DIAGNOSIS — I10 ESSENTIAL (PRIMARY) HYPERTENSION: Primary | ICD-10-CM

## 2021-09-23 PROCEDURE — 36415 COLL VENOUS BLD VENIPUNCTURE: CPT | Performed by: INTERNAL MEDICINE

## 2021-09-23 PROCEDURE — 84156 ASSAY OF PROTEIN URINE: CPT | Performed by: INTERNAL MEDICINE

## 2021-09-23 PROCEDURE — 82570 ASSAY OF URINE CREATININE: CPT | Performed by: INTERNAL MEDICINE

## 2021-09-23 PROCEDURE — 80069 RENAL FUNCTION PANEL: CPT | Performed by: INTERNAL MEDICINE

## 2021-09-24 LAB
ALBUMIN SERPL-MCNC: 4.2 G/DL (ref 3.5–5.2)
ANION GAP SERPL CALCULATED.3IONS-SCNC: 9.9 MMOL/L (ref 5–15)
BUN SERPL-MCNC: 21 MG/DL (ref 8–23)
BUN/CREAT SERPL: 16.2 (ref 7–25)
CALCIUM SPEC-SCNC: 9.8 MG/DL (ref 8.6–10.5)
CHLORIDE SERPL-SCNC: 105 MMOL/L (ref 98–107)
CO2 SERPL-SCNC: 25.1 MMOL/L (ref 22–29)
CREAT SERPL-MCNC: 1.3 MG/DL (ref 0.76–1.27)
CREAT UR-MCNC: 76.2 MG/DL
GFR SERPL CREATININE-BSD FRML MDRD: 66 ML/MIN/1.73
GLUCOSE SERPL-MCNC: 83 MG/DL (ref 65–99)
PHOSPHATE SERPL-MCNC: 3.1 MG/DL (ref 2.5–4.5)
POTASSIUM SERPL-SCNC: 3.9 MMOL/L (ref 3.5–5.2)
PROT UR-MCNC: 10 MG/DL
PROT/CREAT UR: 131.2 MG/G CREA (ref 0–200)
SODIUM SERPL-SCNC: 140 MMOL/L (ref 136–145)

## 2021-09-27 ENCOUNTER — TRANSCRIBE ORDERS (OUTPATIENT)
Dept: LAB | Facility: HOSPITAL | Age: 76
End: 2021-09-27

## 2021-09-27 DIAGNOSIS — N18.9 CHRONIC KIDNEY DISEASE, UNSPECIFIED CKD STAGE: Primary | ICD-10-CM

## 2021-09-27 RX ORDER — VALSARTAN AND HYDROCHLOROTHIAZIDE 320; 25 MG/1; MG/1
1 TABLET, FILM COATED ORAL DAILY
Qty: 90 TABLET | Refills: 3 | Status: SHIPPED | OUTPATIENT
Start: 2021-09-27 | End: 2022-06-21 | Stop reason: SDUPTHER

## 2021-10-04 ENCOUNTER — OFFICE VISIT (OUTPATIENT)
Dept: CARDIAC SURGERY | Facility: CLINIC | Age: 76
End: 2021-10-04

## 2021-10-04 VITALS
HEIGHT: 70 IN | BODY MASS INDEX: 37.08 KG/M2 | SYSTOLIC BLOOD PRESSURE: 122 MMHG | TEMPERATURE: 97.8 F | HEART RATE: 57 BPM | OXYGEN SATURATION: 98 % | WEIGHT: 259 LBS | DIASTOLIC BLOOD PRESSURE: 60 MMHG

## 2021-10-04 DIAGNOSIS — M15.9 PRIMARY OSTEOARTHRITIS INVOLVING MULTIPLE JOINTS: ICD-10-CM

## 2021-10-04 DIAGNOSIS — G47.33 OSA ON CPAP: ICD-10-CM

## 2021-10-04 DIAGNOSIS — E78.2 MIXED HYPERLIPIDEMIA: Chronic | ICD-10-CM

## 2021-10-04 DIAGNOSIS — Z99.89 OSA ON CPAP: ICD-10-CM

## 2021-10-04 DIAGNOSIS — I71.40 ABDOMINAL AORTIC ANEURYSM (AAA) WITHOUT RUPTURE (HCC): ICD-10-CM

## 2021-10-04 DIAGNOSIS — M06.9 RHEUMATOID ARTHRITIS, INVOLVING UNSPECIFIED SITE, UNSPECIFIED WHETHER RHEUMATOID FACTOR PRESENT (HCC): Primary | ICD-10-CM

## 2021-10-04 DIAGNOSIS — I10 ESSENTIAL HYPERTENSION: Chronic | ICD-10-CM

## 2021-10-04 PROCEDURE — 99205 OFFICE O/P NEW HI 60 MIN: CPT | Performed by: THORACIC SURGERY (CARDIOTHORACIC VASCULAR SURGERY)

## 2021-10-04 RX ORDER — SODIUM CHLORIDE 9 MG/ML
100 INJECTION, SOLUTION INTRAVENOUS CONTINUOUS
Status: CANCELLED | OUTPATIENT
Start: 2022-01-07

## 2021-10-04 RX ORDER — BUPIVACAINE HCL/0.9 % NACL/PF 0.1 %
2 PLASTIC BAG, INJECTION (ML) EPIDURAL ONCE
Status: CANCELLED | OUTPATIENT
Start: 2022-01-07 | End: 2021-10-04

## 2021-10-04 NOTE — PROGRESS NOTES
10/4/2021    Jerald Bonilla  1945    Chief Complaint:    Chief Complaint   Patient presents with   • Aortic Aneurysm       HPI:      PCP:  Annie Penny MD  GI:  Dr Gavin    75 y.o. male with HTN(stable, increased risk stroke, rupture), Hyperlipidemia(stable, increased risk cardiovascular events) and Obesity(uncontrolled, increased risk cardiovascular events) , AAA(new, increase risk rupture).  former smoker.  Moderate abdominal pain, change in stool x 6 months.  Legs week after walking distance.  Asymptomatic AAA found on CT imaging for GI evaluation..  No TIA stroke amaurosis.  No MI claudication. No other associated signs, symptoms or modifying factors.    9/2014 US Aorta:  Infrarenal 29mm. Iliac R 14mm, L 17mm.  8/2021 CT Abdomen Pelvis:  Descending thoracic aorta 27mm, renals 23mm, 34mm focal saccular infrarenal aneurysm (appears expanded thru near circumferential calcium), iliac R 23mm, L 15mm, femoral R 10mm, L 10mm.    8/2021 ECG:  SB 52mm, QTc 429    The following portions of the patient's history were reviewed and updated as appropriate: allergies, current medications, past family history, past medical history, past social history, past surgical history and problem list.  Recent images independently reviewed.  Available laboratory values reviewed.    PMH:  Past Medical History:   Diagnosis Date   • Acne conglobata    • Acute maxillary sinusitis    • Benign prostate hyperplasia    • Blurring of visual image    • Carbuncle     of infected sebaceous cyst on back   • Dizziness and giddiness    • Erectile dysfunction    • Essential hypertension     seeing nephro   • Gastroesophageal reflux disease    • Gout    • Hammer toe    • Hip pain    • Low back pain    • Malaise and fatigue    • Need for prophylactic vaccination against Streptococcus pneumoniae (pneumococcus)    • Nuclear senile cataract    • Osteoarthritis    • Otalgia    • Otitis externa    • Pain in lower limb    • Rheumatoid arthritis  (HCC)    • Skin lesion     right calf   • Swelling of knee joint    • Tinnitus    • Upper respiratory infection      Past Surgical History:   Procedure Laterality Date   • COLONOSCOPY  2009    Colonoscopy, diagnostic (screening) 04609 (1)    diverticuli, roids    • COLONOSCOPY N/A 2020    Procedure: COLONOSCOPY;  Surgeon: Dagoberto Gavin DO;  Location: [facility] ENDOSCOPY;  Service: Gastroenterology;  Laterality: N/A;  tattoo @ right colon    • COLONOSCOPY N/A 2021    Procedure: COLONOSCOPY;  Surgeon: Dagoberto Gavin DO;  Location: [facility] ENDOSCOPY;  Service: Gastroenterology;  Laterality: N/A;   • ENDOSCOPY N/A 2019    Procedure: ESOPHAGOGASTRODUODENOSCOPY possible dilation;  Surgeon: Jerald Nation MD;  Location: [facility] ENDOSCOPY;  Service: Gastroenterology   • HERNIA REPAIR      umbilical   • HIP SURGERY Left 2016   • INJECTION OF MEDICATION  2015    B12 (3)     • INJECTION OF MEDICATION  10/31/2011    Rocephin (1)      • JOINT REPLACEMENT     • OTHER SURGICAL HISTORY  2016    &D, Complicated or Multiple 99960 (3)      • OTHER SURGICAL HISTORY  2014    Remove Impacted Cerumen 63967 (1)        Family History   Problem Relation Age of Onset   • Hypertension Father    • Heart disease Father    • Cancer Father    • Diabetes Father    • Kidney disease Father    • Hyperlipidemia Other    • Hypertension Other    • Cancer Other         prostate   • Arthritis Mother    • Hypertension Mother    • Obesity Mother    • Thyroid disease Mother    • Mental illness Mother    • Arthritis Maternal Grandmother      Social History     Tobacco Use   • Smoking status: Former Smoker     Quit date: 2000     Years since quittin.7   • Smokeless tobacco: Never Used   Vaping Use   • Vaping Use: Never used   Substance Use Topics   • Alcohol use: Yes     Comment: occassionally   • Drug use: Not Currently     Types: Marijuana       ALLERGIES:  Allergies   Allergen Reactions   • Lisinopril  Other (See Comments)     Other reaction(s): cough/itching   • Minoxidil Unknown (See Comments)         MEDICATIONS:    Current Outpatient Medications:   •  allopurinol (ZYLOPRIM) 100 MG tablet, Take 2 tablets by mouth Daily., Disp: 180 tablet, Rfl: 3  •  aspirin 81 MG EC tablet, Take 81 mg by mouth Daily., Disp: , Rfl:   •  B Complex Vitamins (VITAMIN B COMPLEX PO), Take 1 tablet by mouth Daily., Disp: , Rfl:   •  carvedilol (COREG) 25 MG tablet, Take 2 tablets by mouth 2 (Two) Times a Day With Meals., Disp: 360 tablet, Rfl: 1  •  cholecalciferol (VITAMIN D3) 1000 UNITS tablet, Take 2,000 Units by mouth Daily., Disp: , Rfl:   •  clindamycin (Clindagel) 1 % gel, Apply  topically to the appropriate area as directed 2 (Two) Times a Day., Disp: 60 g, Rfl: 2  •  famotidine (PEPCID) 10 MG tablet, Take 10 mg by mouth 2 (Two) Times a Day., Disp: , Rfl:   •  fluticasone (FLONASE) 50 MCG/ACT nasal spray, 1 spray into the nostril(s) as directed by provider Daily., Disp: 1 bottle, Rfl: 0  •  loratadine (CLARITIN) 10 MG tablet, Take 10 mg by mouth Daily., Disp: , Rfl:   •  NIFEdipine XL (PROCARDIA XL) 90 MG 24 hr tablet, TAKE 2 TABLETS BY MOUTH AT  NIGHT, Disp: 180 tablet, Rfl: 3  •  pravastatin (PRAVACHOL) 80 MG tablet, TAKE 1 TABLET BY MOUTH  EVERY NIGHT, Disp: 90 tablet, Rfl: 3  •  SUPER B COMPLEX/C PO, Super B Complex + C  one tablet daily, Disp: , Rfl:   •  valsartan-hydrochlorothiazide (DIOVAN-HCT) 320-25 MG per tablet, TAKE 1 TABLET BY MOUTH  DAILY, Disp: 90 tablet, Rfl: 3    Current Facility-Administered Medications:   •  triamcinolone acetonide (KENALOG-40) injection 80 mg, 80 mg, Intramuscular, Once, Annie Penny MD    Review of Systems   Review of Systems   Constitutional: Positive for malaise/fatigue. Negative for weight loss.   Cardiovascular: Negative for chest pain, claudication and dyspnea on exertion.   Respiratory: Negative for cough and shortness of breath.    Skin: Negative for color change and poor  "wound healing.   Musculoskeletal: Positive for muscle weakness and myalgias.   Gastrointestinal: Positive for abdominal pain, change in bowel habit and constipation.   Neurological: Negative for dizziness, numbness and weakness.       Physical Exam   Vitals:    10/04/21 0808   BP: 122/60   BP Location: Left arm   Patient Position: Sitting   Cuff Size: Adult   Pulse: 57   Temp: 97.8 °F (36.6 °C)   TempSrc: Temporal   SpO2: 98%   Weight: 117 kg (259 lb)   Height: 177.8 cm (70\")     Body surface area is 2.33 meters squared.  Body mass index is 37.16 kg/m².  Physical Exam  Constitutional:       General: He is not in acute distress.     Appearance: He is not ill-appearing.   HENT:      Right Ear: Hearing normal.      Left Ear: Hearing normal.      Nose: No nasal deformity.      Mouth/Throat:      Dentition: Normal dentition. Does not have dentures.   Cardiovascular:      Rate and Rhythm: Normal rate and regular rhythm.      Pulses:           Carotid pulses are 2+ on the right side and 2+ on the left side.       Radial pulses are 2+ on the right side and 2+ on the left side.        Dorsalis pedis pulses are 2+ on the right side and 2+ on the left side.        Posterior tibial pulses are 1+ on the right side and 1+ on the left side.      Heart sounds: No murmur heard.     Pulmonary:      Effort: Pulmonary effort is normal.      Breath sounds: Normal breath sounds.   Abdominal:      General: There is no distension.      Palpations: Abdomen is soft. There is no mass.      Tenderness: There is no abdominal tenderness.   Musculoskeletal:         General: No deformity.      Comments: Gait normal.    Skin:     General: Skin is warm and dry.      Coloration: Skin is not pale.      Findings: No erythema.      Comments: No venous staining   Neurological:      Mental Status: He is alert and oriented to person, place, and time.   Psychiatric:         Speech: Speech normal.         Behavior: Behavior is cooperative.         Thought " Content: Thought content normal.         Judgment: Judgment normal.         BUN   Date Value Ref Range Status   09/23/2021 21 8 - 23 mg/dL Final     Creatinine   Date Value Ref Range Status   09/23/2021 1.30 (H) 0.76 - 1.27 mg/dL Final     eGFR   Amer   Date Value Ref Range Status   09/23/2021 66 >60 mL/min/1.73 Final       ASSESSMENT:  Diagnoses and all orders for this visit:    1. Rheumatoid arthritis, involving unspecified site, unspecified whether rheumatoid factor present (HCC) (Primary)    2. Primary osteoarthritis involving multiple joints    3. CHRIS on CPAP    4. Mixed hyperlipidemia    5. Essential hypertension    6. Abdominal aortic aneurysm (AAA) without rupture (HCC)  -     Case Request; Standing  -     Type & Screen; Future  -     ceFAZolin (ANCEF) 2 g in sodium chloride 0.9 % 100 mL IVPB  -     sodium chloride 0.9 % infusion    Other orders  -     Provide NPO Instructions to Patient; Future  -     Chlorhexidine Skin Prep; Future  -     Inpatient Admission; Standing  -     Obtain informed consent; Standing  -     Clip bilateral groins; Standing  -     Insert Arterial Line; Standing      PLAN:  Detailed discussion with Jerald Bonilla regarding situation, options and plans.  Large abdominal aortic aneurysm, increase risk of rupture.  Repair of Abdominal Aortic Aneurysm is advisable.  Risks including but not limited to Mortality, Major Morbidity 2-3%, bleeding, transfusion, infection, pulmonary (prolonged mechanical ventilation, tracheostomy), renal dysfunction (dialysis), blood vessel injury, nerve injury, possible open procedure.  Understands and wishes to proceed.    Endovascular repair Abdominal Aortic Aneurysm, possible open, radial arterial line, , C-Arm fluoroscopy, C-Arm table, Ancef.  GEN.  SDS.  10/14/2021    Return after above studies complete  Obesity Class 2. Increased risk cardiovascular events, sleep and breathing disorders, joint issues, type 2 diabetes mellitus. Options for  weight management, heart healthy diet, exercise programs, and associated health risks of obesity discussed.  Advance Care Planning   ACP discussion was declined by the patient. Patient does not have an advance directive, declines further assistance.    Recommended regular physical activity, progressive walking program.  Continue current medications as directed.  Will Obtain relevant old records.    Thank you for the opportunity to participate in this patient's care.    Copy to primary care provider.  Discussed with Dr Gavin.

## 2021-12-02 RX ORDER — PRAVASTATIN SODIUM 80 MG/1
TABLET ORAL
Qty: 90 TABLET | Refills: 3 | Status: SHIPPED | OUTPATIENT
Start: 2021-12-02 | End: 2021-12-14

## 2021-12-14 ENCOUNTER — PRE-ADMISSION TESTING (OUTPATIENT)
Dept: PREADMISSION TESTING | Facility: HOSPITAL | Age: 76
End: 2021-12-14

## 2021-12-14 ENCOUNTER — LAB (OUTPATIENT)
Dept: LAB | Facility: HOSPITAL | Age: 76
End: 2021-12-14

## 2021-12-14 VITALS
BODY MASS INDEX: 37.37 KG/M2 | OXYGEN SATURATION: 97 % | WEIGHT: 261 LBS | SYSTOLIC BLOOD PRESSURE: 128 MMHG | DIASTOLIC BLOOD PRESSURE: 62 MMHG | HEIGHT: 70 IN | HEART RATE: 57 BPM | RESPIRATION RATE: 18 BRPM

## 2021-12-14 DIAGNOSIS — I71.40 ABDOMINAL AORTIC ANEURYSM (AAA) WITHOUT RUPTURE (HCC): ICD-10-CM

## 2021-12-14 DIAGNOSIS — Z01.818 PREOP TESTING: Primary | ICD-10-CM

## 2021-12-14 LAB
ABO GROUP BLD: NORMAL
ANION GAP SERPL CALCULATED.3IONS-SCNC: 8 MMOL/L (ref 5–15)
BLD GP AB SCN SERPL QL: NEGATIVE
BUN SERPL-MCNC: 33 MG/DL (ref 8–23)
BUN/CREAT SERPL: 19.2 (ref 7–25)
CALCIUM SPEC-SCNC: 9.4 MG/DL (ref 8.6–10.5)
CHLORIDE SERPL-SCNC: 104 MMOL/L (ref 98–107)
CO2 SERPL-SCNC: 25 MMOL/L (ref 22–29)
CREAT SERPL-MCNC: 1.72 MG/DL (ref 0.76–1.27)
DEPRECATED RDW RBC AUTO: 41.5 FL (ref 37–54)
ERYTHROCYTE [DISTWIDTH] IN BLOOD BY AUTOMATED COUNT: 14.1 % (ref 12.3–15.4)
GFR SERPL CREATININE-BSD FRML MDRD: 47 ML/MIN/1.73
GLUCOSE SERPL-MCNC: 113 MG/DL (ref 65–99)
HCT VFR BLD AUTO: 43.8 % (ref 37.5–51)
HGB BLD-MCNC: 15.1 G/DL (ref 13–17.7)
Lab: NORMAL
MCH RBC QN AUTO: 28.4 PG (ref 26.6–33)
MCHC RBC AUTO-ENTMCNC: 34.5 G/DL (ref 31.5–35.7)
MCV RBC AUTO: 82.3 FL (ref 79–97)
PLATELET # BLD AUTO: 177 10*3/MM3 (ref 140–450)
PMV BLD AUTO: 9.5 FL (ref 6–12)
POTASSIUM SERPL-SCNC: 3.8 MMOL/L (ref 3.5–5.2)
RBC # BLD AUTO: 5.32 10*6/MM3 (ref 4.14–5.8)
RH BLD: POSITIVE
SARS-COV-2 N GENE RESP QL NAA+PROBE: DETECTED
SODIUM SERPL-SCNC: 137 MMOL/L (ref 136–145)
T&S EXPIRATION DATE: NORMAL
WBC NRBC COR # BLD: 5 10*3/MM3 (ref 3.4–10.8)

## 2021-12-14 PROCEDURE — C9803 HOPD COVID-19 SPEC COLLECT: HCPCS

## 2021-12-14 PROCEDURE — 86850 RBC ANTIBODY SCREEN: CPT

## 2021-12-14 PROCEDURE — 87635 SARS-COV-2 COVID-19 AMP PRB: CPT

## 2021-12-14 PROCEDURE — 86901 BLOOD TYPING SEROLOGIC RH(D): CPT

## 2021-12-14 PROCEDURE — 86900 BLOOD TYPING SEROLOGIC ABO: CPT

## 2021-12-14 PROCEDURE — 80048 BASIC METABOLIC PNL TOTAL CA: CPT

## 2021-12-14 PROCEDURE — 36415 COLL VENOUS BLD VENIPUNCTURE: CPT

## 2021-12-14 PROCEDURE — 85027 COMPLETE CBC AUTOMATED: CPT

## 2021-12-14 RX ORDER — PRAVASTATIN SODIUM 80 MG/1
80 TABLET ORAL NIGHTLY
COMMUNITY
End: 2022-12-19

## 2021-12-14 RX ORDER — ALLOPURINOL 100 MG/1
100 TABLET ORAL 2 TIMES DAILY
COMMUNITY
End: 2022-04-29

## 2021-12-14 RX ORDER — NIFEDIPINE 90 MG/1
90 TABLET, EXTENDED RELEASE ORAL NIGHTLY
COMMUNITY
End: 2022-04-29

## 2021-12-14 NOTE — DISCHARGE INSTRUCTIONS
[facility]  Pre-op Information and Guidelines    You will be called after 2 p.m. the day before your surgery (Friday for Monday surgery) and notified of your time for arrival and approximate surgery time.  If you have not received a call by 4P.M., please contact Same Day Surgery at [phone] of if outside [facility] call [phone].    Please Follow these Important Safety Guidelines:    • The morning of your procedure, take only the medications listed below with   A sip of water:_____________________________________________       ______________________________________________    • DO NOT eat or drink anything after 12:00 midnight the night before surgery  Specific instructions concerning drinking clear liquids will be discussed during  the pre-surgery instruction call the day before your surgery.    • If you take a blood thinner (ex. Plavix, Coumadin, aspirin), ask your doctor when to stop it before surgery  STOP DATE: _________________    • Only 2 visitors are allowed in patient rooms at a time  Your visitors will be asked to wait in the lobby until the admission process is complete with the exception of a parent with a child and patients in need of special assistance.    • YOU CANNOT DRIVE YOURSELF HOME  You must be accompanied by someone who will be responsible for driving you home after surgery and for your care at home.    • DO NOT chew gum, use breath mints, hard candy, or smoke the day of surgery  • DO NOT drink alcohol for at least 24 hours before your surgery  • DO NOT wear any jewelry and remove all body piercing before coming to the hospital  • DO NOT wear make-up to the hospital  • If you are having surgery on an extremity (arm/leg/foot) remove nail polish/artificial nails on the surgical side  • Clothing, glasses, contacts, dentures, and hairpieces must be removed before surgery  • Bathe the night before or the morning of your surgery and do not use powders/lotions on  skin.

## 2021-12-15 PROCEDURE — 87635 SARS-COV-2 COVID-19 AMP PRB: CPT | Performed by: NURSE PRACTITIONER

## 2021-12-16 ENCOUNTER — TELEPHONE (OUTPATIENT)
Dept: FAMILY MEDICINE CLINIC | Facility: CLINIC | Age: 76
End: 2021-12-16

## 2021-12-16 DIAGNOSIS — J30.2 SEASONAL ALLERGIC RHINITIS, UNSPECIFIED TRIGGER: ICD-10-CM

## 2021-12-16 PROCEDURE — 87635 SARS-COV-2 COVID-19 AMP PRB: CPT | Performed by: EMERGENCY MEDICINE

## 2021-12-16 RX ORDER — FLUTICASONE PROPIONATE 50 MCG
1 SPRAY, SUSPENSION (ML) NASAL DAILY
Qty: 16 G | Refills: 3 | Status: SHIPPED | OUTPATIENT
Start: 2021-12-16 | End: 2023-02-14

## 2022-01-05 PROCEDURE — 87635 SARS-COV-2 COVID-19 AMP PRB: CPT | Performed by: NURSE PRACTITIONER

## 2022-01-06 ENCOUNTER — ANESTHESIA EVENT (OUTPATIENT)
Dept: PERIOP | Facility: HOSPITAL | Age: 77
End: 2022-01-06

## 2022-01-07 ENCOUNTER — ANESTHESIA (OUTPATIENT)
Dept: PERIOP | Facility: HOSPITAL | Age: 77
End: 2022-01-07

## 2022-01-07 ENCOUNTER — HOSPITAL ENCOUNTER (INPATIENT)
Facility: HOSPITAL | Age: 77
LOS: 1 days | Discharge: HOME | End: 2022-01-08
Attending: THORACIC SURGERY (CARDIOTHORACIC VASCULAR SURGERY) | Admitting: THORACIC SURGERY (CARDIOTHORACIC VASCULAR SURGERY)

## 2022-01-07 ENCOUNTER — APPOINTMENT (OUTPATIENT)
Dept: GENERAL RADIOLOGY | Facility: HOSPITAL | Age: 77
End: 2022-01-07

## 2022-01-07 DIAGNOSIS — I71.40 ABDOMINAL AORTIC ANEURYSM (AAA) WITHOUT RUPTURE: ICD-10-CM

## 2022-01-07 LAB
ABO GROUP BLD: NORMAL
AMPHET+METHAMPHET UR QL: NEGATIVE
AMPHETAMINES UR QL: NEGATIVE
BARBITURATES UR QL SCN: NEGATIVE
BENZODIAZ UR QL SCN: NEGATIVE
BLD GP AB SCN SERPL QL: NEGATIVE
BUPRENORPHINE SERPL-MCNC: NEGATIVE NG/ML
CANNABINOIDS SERPL QL: NEGATIVE
COCAINE UR QL: NEGATIVE
Lab: NORMAL
METHADONE UR QL SCN: NEGATIVE
OPIATES UR QL: NEGATIVE
OXYCODONE UR QL SCN: NEGATIVE
PCP UR QL SCN: NEGATIVE
PROPOXYPH UR QL: NEGATIVE
RH BLD: POSITIVE
T&S EXPIRATION DATE: NORMAL
TRICYCLICS UR QL SCN: NEGATIVE

## 2022-01-07 PROCEDURE — 04V03DZ RESTRICTION OF ABDOMINAL AORTA WITH INTRALUMINAL DEVICE, PERCUTANEOUS APPROACH: ICD-10-PCS | Performed by: THORACIC SURGERY (CARDIOTHORACIC VASCULAR SURGERY)

## 2022-01-07 PROCEDURE — C1894 INTRO/SHEATH, NON-LASER: HCPCS | Performed by: THORACIC SURGERY (CARDIOTHORACIC VASCULAR SURGERY)

## 2022-01-07 PROCEDURE — C1874 STENT, COATED/COV W/DEL SYS: HCPCS | Performed by: THORACIC SURGERY (CARDIOTHORACIC VASCULAR SURGERY)

## 2022-01-07 PROCEDURE — C1889 IMPLANT/INSERT DEVICE, NOC: HCPCS | Performed by: THORACIC SURGERY (CARDIOTHORACIC VASCULAR SURGERY)

## 2022-01-07 PROCEDURE — C1760 CLOSURE DEV, VASC: HCPCS | Performed by: THORACIC SURGERY (CARDIOTHORACIC VASCULAR SURGERY)

## 2022-01-07 PROCEDURE — C1887 CATHETER, GUIDING: HCPCS | Performed by: THORACIC SURGERY (CARDIOTHORACIC VASCULAR SURGERY)

## 2022-01-07 PROCEDURE — 25010000002 HYDROMORPHONE 1 MG/ML SOLUTION: Performed by: NURSE ANESTHETIST, CERTIFIED REGISTERED

## 2022-01-07 PROCEDURE — 94799 UNLISTED PULMONARY SVC/PX: CPT

## 2022-01-07 PROCEDURE — 25010000002 HEPARIN (PORCINE) PER 1000 UNITS: Performed by: NURSE ANESTHETIST, CERTIFIED REGISTERED

## 2022-01-07 PROCEDURE — C1769 GUIDE WIRE: HCPCS | Performed by: THORACIC SURGERY (CARDIOTHORACIC VASCULAR SURGERY)

## 2022-01-07 PROCEDURE — 35371 RECHANNELING OF ARTERY: CPT | Performed by: THORACIC SURGERY (CARDIOTHORACIC VASCULAR SURGERY)

## 2022-01-07 PROCEDURE — 76000 FLUOROSCOPY <1 HR PHYS/QHP: CPT

## 2022-01-07 PROCEDURE — 25010000002 CEFAZOLIN PER 500 MG: Performed by: THORACIC SURGERY (CARDIOTHORACIC VASCULAR SURGERY)

## 2022-01-07 PROCEDURE — 25010000002 IOPAMIDOL 61 % SOLUTION: Performed by: THORACIC SURGERY (CARDIOTHORACIC VASCULAR SURGERY)

## 2022-01-07 PROCEDURE — 25010000002 ONDANSETRON PER 1 MG: Performed by: NURSE ANESTHETIST, CERTIFIED REGISTERED

## 2022-01-07 PROCEDURE — C2628 CATHETER, OCCLUSION: HCPCS | Performed by: THORACIC SURGERY (CARDIOTHORACIC VASCULAR SURGERY)

## 2022-01-07 PROCEDURE — B41D1ZZ FLUOROSCOPY OF AORTA AND BILATERAL LOWER EXTREMITY ARTERIES USING LOW OSMOLAR CONTRAST: ICD-10-PCS | Performed by: THORACIC SURGERY (CARDIOTHORACIC VASCULAR SURGERY)

## 2022-01-07 PROCEDURE — 04UL0KZ SUPPLEMENT LEFT FEMORAL ARTERY WITH NONAUTOLOGOUS TISSUE SUBSTITUTE, OPEN APPROACH: ICD-10-PCS | Performed by: THORACIC SURGERY (CARDIOTHORACIC VASCULAR SURGERY)

## 2022-01-07 PROCEDURE — C1773 RET DEV, INSERTABLE: HCPCS | Performed by: THORACIC SURGERY (CARDIOTHORACIC VASCULAR SURGERY)

## 2022-01-07 PROCEDURE — 25010000002 HEPARIN (PORCINE) PER 1000 UNITS: Performed by: THORACIC SURGERY (CARDIOTHORACIC VASCULAR SURGERY)

## 2022-01-07 PROCEDURE — 86850 RBC ANTIBODY SCREEN: CPT | Performed by: ANESTHESIOLOGY

## 2022-01-07 PROCEDURE — 25010000002 MIDAZOLAM PER 1 MG: Performed by: NURSE ANESTHETIST, CERTIFIED REGISTERED

## 2022-01-07 PROCEDURE — C1768 GRAFT, VASCULAR: HCPCS | Performed by: THORACIC SURGERY (CARDIOTHORACIC VASCULAR SURGERY)

## 2022-01-07 PROCEDURE — 80306 DRUG TEST PRSMV INSTRMNT: CPT | Performed by: ANESTHESIOLOGY

## 2022-01-07 PROCEDURE — S0260 H&P FOR SURGERY: HCPCS | Performed by: THORACIC SURGERY (CARDIOTHORACIC VASCULAR SURGERY)

## 2022-01-07 PROCEDURE — 34705 EVAC RPR A-BIILIAC NDGFT: CPT | Performed by: THORACIC SURGERY (CARDIOTHORACIC VASCULAR SURGERY)

## 2022-01-07 PROCEDURE — 25010000002 HYDROMORPHONE 1 MG/ML SOLUTION: Performed by: THORACIC SURGERY (CARDIOTHORACIC VASCULAR SURGERY)

## 2022-01-07 PROCEDURE — 94760 N-INVAS EAR/PLS OXIMETRY 1: CPT

## 2022-01-07 PROCEDURE — 25010000002 PROTAMINE SULFATE PER 10 MG: Performed by: NURSE ANESTHETIST, CERTIFIED REGISTERED

## 2022-01-07 PROCEDURE — 25010000002 FENTANYL CITRATE (PF) 50 MCG/ML SOLUTION: Performed by: NURSE ANESTHETIST, CERTIFIED REGISTERED

## 2022-01-07 PROCEDURE — 04CL0ZZ EXTIRPATION OF MATTER FROM LEFT FEMORAL ARTERY, OPEN APPROACH: ICD-10-PCS | Performed by: THORACIC SURGERY (CARDIOTHORACIC VASCULAR SURGERY)

## 2022-01-07 PROCEDURE — 25010000002 PROPOFOL 10 MG/ML EMULSION: Performed by: NURSE ANESTHETIST, CERTIFIED REGISTERED

## 2022-01-07 PROCEDURE — 0 CEFAZOLIN PER 500 MG: Performed by: THORACIC SURGERY (CARDIOTHORACIC VASCULAR SURGERY)

## 2022-01-07 PROCEDURE — 34713 PERQ ACCESS & CLSR FEM ART: CPT | Performed by: THORACIC SURGERY (CARDIOTHORACIC VASCULAR SURGERY)

## 2022-01-07 PROCEDURE — A4648 IMPLANTABLE TISSUE MARKER: HCPCS | Performed by: THORACIC SURGERY (CARDIOTHORACIC VASCULAR SURGERY)

## 2022-01-07 PROCEDURE — 94640 AIRWAY INHALATION TREATMENT: CPT

## 2022-01-07 PROCEDURE — 86901 BLOOD TYPING SEROLOGIC RH(D): CPT | Performed by: ANESTHESIOLOGY

## 2022-01-07 PROCEDURE — 25010000002 NEOSTIGMINE 10 MG/10ML SOLUTION: Performed by: NURSE ANESTHETIST, CERTIFIED REGISTERED

## 2022-01-07 PROCEDURE — 86900 BLOOD TYPING SEROLOGIC ABO: CPT | Performed by: ANESTHESIOLOGY

## 2022-01-07 DEVICE — IMPLANTABLE DEVICE: Type: IMPLANTABLE DEVICE | Site: ABDOMEN | Status: FUNCTIONAL

## 2022-01-07 DEVICE — GRFT AAA AFX EXT VELA SUPR 17F 34X34X100X20: Type: IMPLANTABLE DEVICE | Site: AORTA | Status: FUNCTIONAL

## 2022-01-07 DEVICE — PTCH VASC XENOSURE BIOLOGIC 1X6CM: Type: IMPLANTABLE DEVICE | Site: ARTERY FEMORAL | Status: FUNCTIONAL

## 2022-01-07 DEVICE — HEMOST ABS SURGIFOAM 8X6.25CM 10MM: Type: IMPLANTABLE DEVICE | Site: GROIN | Status: FUNCTIONAL

## 2022-01-07 DEVICE — HEMOST ABS SURGICEL SNOW 1X2IN: Type: IMPLANTABLE DEVICE | Site: GROIN | Status: FUNCTIONAL

## 2022-01-07 RX ORDER — SODIUM CHLORIDE 9 MG/ML
INJECTION, SOLUTION INTRAVENOUS CONTINUOUS PRN
Status: DISCONTINUED | OUTPATIENT
Start: 2022-01-07 | End: 2022-01-07 | Stop reason: SURG

## 2022-01-07 RX ORDER — VECURONIUM BROMIDE 1 MG/ML
INJECTION, POWDER, LYOPHILIZED, FOR SOLUTION INTRAVENOUS AS NEEDED
Status: DISCONTINUED | OUTPATIENT
Start: 2022-01-07 | End: 2022-01-07

## 2022-01-07 RX ORDER — CLOPIDOGREL BISULFATE 75 MG/1
75 TABLET ORAL DAILY
Status: DISCONTINUED | OUTPATIENT
Start: 2022-01-07 | End: 2022-01-08 | Stop reason: HOSPADM

## 2022-01-07 RX ORDER — ACETAMINOPHEN 325 MG/1
650 TABLET ORAL EVERY 4 HOURS PRN
Status: DISCONTINUED | OUTPATIENT
Start: 2022-01-07 | End: 2022-01-08 | Stop reason: HOSPADM

## 2022-01-07 RX ORDER — HEPARIN SODIUM 5000 [USP'U]/ML
INJECTION, SOLUTION INTRAVENOUS; SUBCUTANEOUS AS NEEDED
Status: DISCONTINUED | OUTPATIENT
Start: 2022-01-07 | End: 2022-01-07 | Stop reason: HOSPADM

## 2022-01-07 RX ORDER — BUPIVACAINE HCL/0.9 % NACL/PF 0.1 %
2 PLASTIC BAG, INJECTION (ML) EPIDURAL ONCE
Status: DISCONTINUED | OUTPATIENT
Start: 2022-01-07 | End: 2022-01-07 | Stop reason: HOSPADM

## 2022-01-07 RX ORDER — PROTAMINE SULFATE 10 MG/ML
INJECTION, SOLUTION INTRAVENOUS AS NEEDED
Status: DISCONTINUED | OUTPATIENT
Start: 2022-01-07 | End: 2022-01-07 | Stop reason: SURG

## 2022-01-07 RX ORDER — ONDANSETRON 4 MG/1
4 TABLET, FILM COATED ORAL EVERY 6 HOURS PRN
Status: DISCONTINUED | OUTPATIENT
Start: 2022-01-07 | End: 2022-01-08 | Stop reason: HOSPADM

## 2022-01-07 RX ORDER — ONDANSETRON 2 MG/ML
4 INJECTION INTRAMUSCULAR; INTRAVENOUS ONCE AS NEEDED
Status: DISCONTINUED | OUTPATIENT
Start: 2022-01-07 | End: 2022-01-07 | Stop reason: HOSPADM

## 2022-01-07 RX ORDER — AMOXICILLIN 250 MG
2 CAPSULE ORAL 2 TIMES DAILY PRN
Status: DISCONTINUED | OUTPATIENT
Start: 2022-01-07 | End: 2022-01-08 | Stop reason: HOSPADM

## 2022-01-07 RX ORDER — ROCURONIUM BROMIDE 10 MG/ML
INJECTION, SOLUTION INTRAVENOUS AS NEEDED
Status: DISCONTINUED | OUTPATIENT
Start: 2022-01-07 | End: 2022-01-07 | Stop reason: SURG

## 2022-01-07 RX ORDER — ASPIRIN 81 MG/1
81 TABLET ORAL DAILY
Status: DISCONTINUED | OUTPATIENT
Start: 2022-01-07 | End: 2022-01-08 | Stop reason: HOSPADM

## 2022-01-07 RX ORDER — ALBUTEROL SULFATE 2.5 MG/3ML
2.5 SOLUTION RESPIRATORY (INHALATION)
Status: DISCONTINUED | OUTPATIENT
Start: 2022-01-07 | End: 2022-01-08 | Stop reason: HOSPADM

## 2022-01-07 RX ORDER — VECURONIUM BROMIDE 1 MG/ML
INJECTION, POWDER, LYOPHILIZED, FOR SOLUTION INTRAVENOUS AS NEEDED
Status: DISCONTINUED | OUTPATIENT
Start: 2022-01-07 | End: 2022-01-07 | Stop reason: SURG

## 2022-01-07 RX ORDER — CEFAZOLIN SODIUM 1 G/3ML
INJECTION, POWDER, FOR SOLUTION INTRAMUSCULAR; INTRAVENOUS AS NEEDED
Status: DISCONTINUED | OUTPATIENT
Start: 2022-01-07 | End: 2022-01-07 | Stop reason: HOSPADM

## 2022-01-07 RX ORDER — HEPARIN SODIUM 5000 [USP'U]/ML
INJECTION, SOLUTION INTRAVENOUS; SUBCUTANEOUS AS NEEDED
Status: DISCONTINUED | OUTPATIENT
Start: 2022-01-07 | End: 2022-01-07 | Stop reason: SURG

## 2022-01-07 RX ORDER — NEOSTIGMINE METHYLSULFATE 1 MG/ML
INJECTION, SOLUTION INTRAVENOUS AS NEEDED
Status: DISCONTINUED | OUTPATIENT
Start: 2022-01-07 | End: 2022-01-07 | Stop reason: SURG

## 2022-01-07 RX ORDER — FLUTICASONE PROPIONATE 50 MCG
1 SPRAY, SUSPENSION (ML) NASAL DAILY
Status: DISCONTINUED | OUTPATIENT
Start: 2022-01-07 | End: 2022-01-08 | Stop reason: HOSPADM

## 2022-01-07 RX ORDER — SODIUM CHLORIDE 9 MG/ML
100 INJECTION, SOLUTION INTRAVENOUS CONTINUOUS
Status: DISCONTINUED | OUTPATIENT
Start: 2022-01-07 | End: 2022-01-07 | Stop reason: HOSPADM

## 2022-01-07 RX ORDER — ALLOPURINOL 100 MG/1
100 TABLET ORAL 2 TIMES DAILY
Status: DISCONTINUED | OUTPATIENT
Start: 2022-01-07 | End: 2022-01-08 | Stop reason: HOSPADM

## 2022-01-07 RX ORDER — SODIUM CHLORIDE 9 MG/ML
75 INJECTION, SOLUTION INTRAVENOUS CONTINUOUS
Status: DISCONTINUED | OUTPATIENT
Start: 2022-01-07 | End: 2022-01-08 | Stop reason: HOSPADM

## 2022-01-07 RX ORDER — ONDANSETRON 2 MG/ML
4 INJECTION INTRAMUSCULAR; INTRAVENOUS EVERY 6 HOURS PRN
Status: DISCONTINUED | OUTPATIENT
Start: 2022-01-07 | End: 2022-01-08 | Stop reason: HOSPADM

## 2022-01-07 RX ORDER — CARVEDILOL 25 MG/1
50 TABLET ORAL 2 TIMES DAILY WITH MEALS
Status: DISCONTINUED | OUTPATIENT
Start: 2022-01-07 | End: 2022-01-08 | Stop reason: HOSPADM

## 2022-01-07 RX ORDER — EPHEDRINE SULFATE 50 MG/ML
INJECTION, SOLUTION INTRAVENOUS AS NEEDED
Status: DISCONTINUED | OUTPATIENT
Start: 2022-01-07 | End: 2022-01-07 | Stop reason: SURG

## 2022-01-07 RX ORDER — PROPOFOL 10 MG/ML
VIAL (ML) INTRAVENOUS AS NEEDED
Status: DISCONTINUED | OUTPATIENT
Start: 2022-01-07 | End: 2022-01-07 | Stop reason: SURG

## 2022-01-07 RX ORDER — NITROGLYCERIN 20 MG/100ML
5-200 INJECTION INTRAVENOUS
Status: DISCONTINUED | OUTPATIENT
Start: 2022-01-07 | End: 2022-01-08

## 2022-01-07 RX ORDER — CETIRIZINE HYDROCHLORIDE 10 MG/1
10 TABLET ORAL DAILY
Status: DISCONTINUED | OUTPATIENT
Start: 2022-01-07 | End: 2022-01-08 | Stop reason: HOSPADM

## 2022-01-07 RX ORDER — BISACODYL 10 MG
10 SUPPOSITORY, RECTAL RECTAL DAILY PRN
Status: DISCONTINUED | OUTPATIENT
Start: 2022-01-07 | End: 2022-01-08 | Stop reason: HOSPADM

## 2022-01-07 RX ORDER — MIDAZOLAM HYDROCHLORIDE 1 MG/ML
INJECTION INTRAMUSCULAR; INTRAVENOUS AS NEEDED
Status: DISCONTINUED | OUTPATIENT
Start: 2022-01-07 | End: 2022-01-07 | Stop reason: SURG

## 2022-01-07 RX ORDER — LIDOCAINE HYDROCHLORIDE 20 MG/ML
INJECTION, SOLUTION INFILTRATION; PERINEURAL AS NEEDED
Status: DISCONTINUED | OUTPATIENT
Start: 2022-01-07 | End: 2022-01-07 | Stop reason: SURG

## 2022-01-07 RX ORDER — BUPIVACAINE HCL/0.9 % NACL/PF 0.1 %
2 PLASTIC BAG, INJECTION (ML) EPIDURAL EVERY 8 HOURS
Status: COMPLETED | OUTPATIENT
Start: 2022-01-07 | End: 2022-01-08

## 2022-01-07 RX ORDER — HYDROCODONE BITARTRATE AND ACETAMINOPHEN 5; 325 MG/1; MG/1
1 TABLET ORAL EVERY 4 HOURS PRN
Status: DISCONTINUED | OUTPATIENT
Start: 2022-01-07 | End: 2022-01-08 | Stop reason: HOSPADM

## 2022-01-07 RX ORDER — ONDANSETRON 2 MG/ML
INJECTION INTRAMUSCULAR; INTRAVENOUS AS NEEDED
Status: DISCONTINUED | OUTPATIENT
Start: 2022-01-07 | End: 2022-01-07 | Stop reason: SURG

## 2022-01-07 RX ORDER — FENTANYL CITRATE 50 UG/ML
INJECTION, SOLUTION INTRAMUSCULAR; INTRAVENOUS AS NEEDED
Status: DISCONTINUED | OUTPATIENT
Start: 2022-01-07 | End: 2022-01-07 | Stop reason: SURG

## 2022-01-07 RX ORDER — FAMOTIDINE 10 MG
10 TABLET ORAL NIGHTLY
Status: DISCONTINUED | OUTPATIENT
Start: 2022-01-07 | End: 2022-01-08 | Stop reason: HOSPADM

## 2022-01-07 RX ORDER — PRAVASTATIN SODIUM 40 MG
80 TABLET ORAL NIGHTLY
Status: DISCONTINUED | OUTPATIENT
Start: 2022-01-07 | End: 2022-01-08 | Stop reason: HOSPADM

## 2022-01-07 RX ORDER — NIFEDIPINE 90 MG/1
90 TABLET, EXTENDED RELEASE ORAL NIGHTLY
Status: DISCONTINUED | OUTPATIENT
Start: 2022-01-08 | End: 2022-01-08 | Stop reason: HOSPADM

## 2022-01-07 RX ADMIN — CARVEDILOL 50 MG: 25 TABLET, FILM COATED ORAL at 17:26

## 2022-01-07 RX ADMIN — CEFAZOLIN SODIUM 2 G: 1 INJECTION, POWDER, FOR SOLUTION INTRAMUSCULAR; INTRAVENOUS at 10:22

## 2022-01-07 RX ADMIN — SODIUM CHLORIDE 100 ML/HR: 9 INJECTION, SOLUTION INTRAVENOUS at 08:23

## 2022-01-07 RX ADMIN — HYDROMORPHONE HYDROCHLORIDE 0.5 MG: 1 INJECTION, SOLUTION INTRAMUSCULAR; INTRAVENOUS; SUBCUTANEOUS at 22:54

## 2022-01-07 RX ADMIN — HEPARIN SODIUM 10000 UNITS: 5000 INJECTION INTRAVENOUS; SUBCUTANEOUS at 10:47

## 2022-01-07 RX ADMIN — ALLOPURINOL 100 MG: 100 TABLET ORAL at 20:05

## 2022-01-07 RX ADMIN — FENTANYL CITRATE 100 MCG: 50 INJECTION INTRAMUSCULAR; INTRAVENOUS at 09:54

## 2022-01-07 RX ADMIN — SODIUM CHLORIDE 75 ML/HR: 9 INJECTION, SOLUTION INTRAVENOUS at 15:16

## 2022-01-07 RX ADMIN — CEFAZOLIN 2 G: 10 INJECTION, POWDER, FOR SOLUTION INTRAVENOUS at 18:40

## 2022-01-07 RX ADMIN — PROTAMINE SULFATE 20 MG: 10 INJECTION, SOLUTION INTRAVENOUS at 12:46

## 2022-01-07 RX ADMIN — GLYCOPYRROLATE 0.4 MG: 0.2 INJECTION, SOLUTION INTRAMUSCULAR; INTRAVITREAL at 13:02

## 2022-01-07 RX ADMIN — GLYCOPYRROLATE 0.1 MG: 0.2 INJECTION, SOLUTION INTRAMUSCULAR; INTRAVITREAL at 10:59

## 2022-01-07 RX ADMIN — ASPIRIN 81 MG: 81 TABLET, FILM COATED ORAL at 15:20

## 2022-01-07 RX ADMIN — SODIUM CHLORIDE: 9 INJECTION, SOLUTION INTRAVENOUS at 10:06

## 2022-01-07 RX ADMIN — VALSARTAN: 80 TABLET ORAL at 15:16

## 2022-01-07 RX ADMIN — FENTANYL CITRATE 50 MCG: 50 INJECTION INTRAMUSCULAR; INTRAVENOUS at 13:07

## 2022-01-07 RX ADMIN — NEOSTIGMINE METHYLSULFATE 2 MG: 0.5 INJECTION INTRAVENOUS at 13:02

## 2022-01-07 RX ADMIN — ROCURONIUM BROMIDE 50 MG: 10 INJECTION INTRAVENOUS at 09:59

## 2022-01-07 RX ADMIN — VECURONIUM BROMIDE 1 MG: 10 INJECTION, POWDER, LYOPHILIZED, FOR SOLUTION INTRAVENOUS at 12:24

## 2022-01-07 RX ADMIN — ONDANSETRON 4 MG: 2 INJECTION INTRAMUSCULAR; INTRAVENOUS at 12:53

## 2022-01-07 RX ADMIN — HYDROCODONE BITARTRATE AND ACETAMINOPHEN 1 TABLET: 5; 325 TABLET ORAL at 20:05

## 2022-01-07 RX ADMIN — CLOPIDOGREL 75 MG: 75 TABLET, FILM COATED ORAL at 15:16

## 2022-01-07 RX ADMIN — EPHEDRINE SULFATE 5 MG: 50 INJECTION INTRAVENOUS at 12:24

## 2022-01-07 RX ADMIN — NITROGLYCERIN 5 MCG/MIN: 20 INJECTION INTRAVENOUS at 15:16

## 2022-01-07 RX ADMIN — VECURONIUM BROMIDE 4 MG: 10 INJECTION, POWDER, LYOPHILIZED, FOR SOLUTION INTRAVENOUS at 10:54

## 2022-01-07 RX ADMIN — MIDAZOLAM HYDROCHLORIDE 2 MG: 1 INJECTION, SOLUTION INTRAMUSCULAR; INTRAVENOUS at 09:49

## 2022-01-07 RX ADMIN — PRAVASTATIN SODIUM 80 MG: 40 TABLET ORAL at 20:05

## 2022-01-07 RX ADMIN — GLYCOPYRROLATE 0.1 MG: 0.2 INJECTION, SOLUTION INTRAMUSCULAR; INTRAVITREAL at 10:56

## 2022-01-07 RX ADMIN — NITROGLYCERIN 0.5 INCH: 20 OINTMENT TOPICAL at 14:06

## 2022-01-07 RX ADMIN — FAMOTIDINE 10 MG: 10 TABLET ORAL at 20:14

## 2022-01-07 RX ADMIN — EPHEDRINE SULFATE 5 MG: 50 INJECTION INTRAVENOUS at 12:18

## 2022-01-07 RX ADMIN — ALBUTEROL SULFATE 2.5 MG: 2.5 SOLUTION RESPIRATORY (INHALATION) at 16:03

## 2022-01-07 RX ADMIN — CETIRIZINE HYDROCHLORIDE 10 MG: 10 TABLET, FILM COATED ORAL at 15:16

## 2022-01-07 RX ADMIN — LIDOCAINE HYDROCHLORIDE 100 MG: 20 INJECTION, SOLUTION INFILTRATION; PERINEURAL at 09:53

## 2022-01-07 RX ADMIN — HYDROCODONE BITARTRATE AND ACETAMINOPHEN 1 TABLET: 5; 325 TABLET ORAL at 15:16

## 2022-01-07 RX ADMIN — FLUTICASONE PROPIONATE 1 SPRAY: 50 SPRAY, METERED NASAL at 15:20

## 2022-01-07 RX ADMIN — PROPOFOL 120 MG: 10 INJECTION, EMULSION INTRAVENOUS at 09:54

## 2022-01-07 RX ADMIN — VECURONIUM BROMIDE 2 MG: 10 INJECTION, POWDER, LYOPHILIZED, FOR SOLUTION INTRAVENOUS at 11:46

## 2022-01-07 RX ADMIN — VECURONIUM BROMIDE 2 MG: 10 INJECTION, POWDER, LYOPHILIZED, FOR SOLUTION INTRAVENOUS at 10:19

## 2022-01-07 RX ADMIN — HYDROMORPHONE HYDROCHLORIDE 0.5 MG: 1 INJECTION, SOLUTION INTRAMUSCULAR; INTRAVENOUS; SUBCUTANEOUS at 13:50

## 2022-01-07 NOTE — ANESTHESIA PROCEDURE NOTES
Peripheral IV    Patient location during procedure: OR  Line placed for Fluids/Medication Admin.  Performed By   CRNA: Shaun Catherine CRNA  Preanesthetic Checklist  Completed: patient identified, IV checked, site marked, risks and benefits discussed, surgical consent, monitors and equipment checked, pre-op evaluation and timeout performed  Peripheral IV Prep   Patient position: supine   Prep: ChloraPrep  Patient monitoring: heart rate, cardiac monitor and continuous pulse ox  Peripheral IV Procedure   Laterality:right  Location:  Wrist  Catheter size: 18 G         Post Assessment   Dressing Type: tape and transparent.    IV Dressing/Site: clean, dry and intact

## 2022-01-07 NOTE — OP NOTE
OPERATIVE NOTE    Jerald Bonilla  1945 1/7/2022    PREOP DIAGNOSES:  Abdominal aortic aneurysm (AAA) without rupture (HCC) [I71.4]    POSTOP DIAGNOSES:   Abdominal aortic aneurysm (AAA) without rupture (HCC) [I71.4]    PROCEDURE:   1. Percutaneous cannulation of the LEFT common femoral artery with a 7Fr sheath and placement of catheter abdominal aorta.  2. Percutaneous cannulation of the RIGHT common femoral artery with a 17Fr sheath and placement of catheter abdominal aorta.  3. Endovascular repair of the abdominal aortic aneurysm, unibody bifurcating prosthesis (Endologix AFX 28mm x 90mm Body, 39x38ry limbs)  4. Placement of proximal extension prosthesis for endovascular repair of abdominal aortic aneurysm (Endologix Garrett 34mm suprarenal, 100/20)  6. Abdominal aortogram with a bilateral iliofemoral arteriograms  8. Repair of LEFT common femoral artery (pre-close technique with Perclose device)  9. Repair of RIGHT common femoral artery (pre-close technique with Perclose device x2)  10. Vascular ultrasound guidance (LEFT and RIGHT femoral artery cannulation)  11. Complete radiologic supervision and interpretation.  12. LEFT common femoral endarterectomy    SURGEON: NASH Yi MD FACS RPVI    ASSISTANT: Luis Storey CFA provided critical assistance during the case including suctioning, exposure, retraction, and reduction of blood loss.    ANESTHESIA: GEN                                             ESTIMATED BLOOD LOSS: minimal      SPECIMEN:  None    COMPLICATIONS:  None    CONTRAST:   60 mL Isovue     FLUORO TIME:  13.7min    DESCRIPTION OF OPERATION: Patient taken to the operating room, placed in supine position. Radial arterial line placed by Anesthesia.  General endotracheal anesthesia was induced.  Prepped and draped in sterile fashion. Vascular ultrasound was used to identify the right common femoral artery which was 10 mm in diameter with mild posterior plaque, patent.  Cannulated via  modified Seldinger technique with mini-stick under ultrasound guidance, imaging saved. Wire and catheter exchanged for 6Fr sheath. Perclose devices with pre-close technique at the 10 and 2 o'clock positions were deployed and left untied.  Vascular ultrasound was used to identify the LEFT common femoral artery which was 10mm in diameter with mild posterior plaque, patent.  Cannulated via modified seldinger technique with mini-stick under ultrasound guidance, imaging saved. Wire and catheter exchanged for 7Fr sheath. Perclose device with pre-close technique at the 10 and 12 o'clock position was deployed and left untied. 10,000 units heparin given to maintain ACT around 300.    A 0.035 Glidewire was placed up the right side to the ascending thoracic aorta. Marker pigtail catheter advanced to proximal abdominal aorta with contrast injection for measurement of vessel lengths and characterize the anatomy and its topography. Exchanged for 0.035 Lunderquist guidewire with tip placed in the ascending thoracic aorta. RIGHT sheath exchanged for a 17Fr sheath, advanced up the iliac artery without difficulty just above the aortic bifurcation.  The yellow Wirecath was advanced to the end of the sheath. Snare brought up through the LEFT sheath.  Yellow Wirecath captured and brought out through LEFT sheath.  Endoluminal graft was advanced completely out of the sheath with no wire wrap and brought down snug against the bifurcation and deployed. Yellow cath removed.  Pigtail catheter fed over wire, wire disconnected from graft and removed.  Omni Flush advanced above the renals.  Proximal cuff brought up through the RIGHT sheath into the suprarenal aorta.  Adjusted for parallax.  Abdominal aortogram performed, renal arteries identified and marked.  Proximal cuff deployed with uncovered portion deployed in the suprarenal position, covered portion deployed just below the lowest renal artery.  Balloon dilatation of the RIGHT iliac limb  with 12 x 40 mm Conquest balloon.  Aortofemoral arteriogram performed demonstrating good flow through the graft no evidence of endoleak, dissection or extravasation.  Good distal flow beyond the graft.  There was good overlap between proximal and distal segment.      Left common femoral artery was repaired using pre-close technique with Perclose device with good hemostasis.  The right common femoral artery was repaired using pre-close technique with Perclose device with good hemostasis and good distal flow beyond the repair. Weak monophasic LEFT DP signal.  Vascular ultrasound shows minimal flow LEFT CFA.  Oblique incision LEFT groin dissection down to CFA and branches.  Good pulse proximal, no pulse distal CFA.  Profunda SFA controlled with profunda clamp.  CFA controlled with Zclamp.  CFA opened 11 blade.  Suture material removed.  Moderate obstructive plaque.  Common femoral endarterectomy performed.  Pericardial patch sewn into place with 6-0 prolene.  Usual debubbling technique performed.  Good pulse distal to repair.  biphasic flow in the feet at the completion of the procedure.  20 mg protamine was given with return of ACT to baseline.  Radiologic supervision and interpretation provided throughout the case.  Obesity moderate tissue edema, negative pressure wound therapy applied LEFT groin wound ([id number]) Patient tolerated procedure well transferred to PACU in stable condition.           This document has been electronically signed by Unruly Yi MD on January 7, 2022 13:01 CST

## 2022-01-07 NOTE — H&P
Jerald Bonilla  1945     Chief Complaint:        Chief Complaint   Patient presents with   • Aortic Aneurysm         HPI:       PCP:  Annie Penny MD  GI:  Dr Gavin     75 y.o. male with HTN(stable, increased risk stroke, rupture), Hyperlipidemia(stable, increased risk cardiovascular events) and Obesity(uncontrolled, increased risk cardiovascular events) , AAA(new, increase risk rupture).  former smoker.  Moderate abdominal pain, change in stool x 6 months.  Legs week after walking distance.  Asymptomatic AAA found on CT imaging for GI evaluation..  No TIA stroke amaurosis.  No MI claudication. No other associated signs, symptoms or modifying factors.     9/2014 US Aorta:  Infrarenal 29mm. Iliac R 14mm, L 17mm.  8/2021 CT Abdomen Pelvis:  Descending thoracic aorta 27mm, renals 23mm, 34mm focal saccular infrarenal aneurysm (appears expanded thru near circumferential calcium), iliac R 23mm, L 15mm, femoral R 10mm, L 10mm.     8/2021 ECG:  SB 52mm, QTc 429     The following portions of the patient's history were reviewed and updated as appropriate: allergies, current medications, past family history, past medical history, past social history, past surgical history and problem list.  Recent images independently reviewed.  Available laboratory values reviewed.     PMH:  Medical History        Past Medical History:   Diagnosis Date   • Acne conglobata     • Acute maxillary sinusitis     • Benign prostate hyperplasia     • Blurring of visual image     • Carbuncle       of infected sebaceous cyst on back   • Dizziness and giddiness     • Erectile dysfunction     • Essential hypertension       seeing nephro   • Gastroesophageal reflux disease     • Gout     • Hammer toe     • Hip pain     • Low back pain     • Malaise and fatigue     • Need for prophylactic vaccination against Streptococcus pneumoniae (pneumococcus)     • Nuclear senile cataract     • Osteoarthritis     • Otalgia     • Otitis externa     •  Pain in lower limb     • Rheumatoid arthritis (HCC)     • Skin lesion       right calf   • Swelling of knee joint     • Tinnitus     • Upper respiratory infection           Surgical History         Past Surgical History:   Procedure Laterality Date   • COLONOSCOPY   2009     Colonoscopy, diagnostic (screening) 58632 (1)    diverticuli, roids    • COLONOSCOPY N/A 2020     Procedure: COLONOSCOPY;  Surgeon: Dagoberto Gavin DO;  Location: [facility] ENDOSCOPY;  Service: Gastroenterology;  Laterality: N/A;  tattoo @ right colon    • COLONOSCOPY N/A 2021     Procedure: COLONOSCOPY;  Surgeon: Dagoberto Gavin DO;  Location: [facility] ENDOSCOPY;  Service: Gastroenterology;  Laterality: N/A;   • ENDOSCOPY N/A 2019     Procedure: ESOPHAGOGASTRODUODENOSCOPY possible dilation;  Surgeon: Jerald Nation MD;  Location: [facility] ENDOSCOPY;  Service: Gastroenterology   • HERNIA REPAIR         umbilical   • HIP SURGERY Left 2016   • INJECTION OF MEDICATION   2015     B12 (3)     • INJECTION OF MEDICATION   10/31/2011     Rocephin (1)      • JOINT REPLACEMENT       • OTHER SURGICAL HISTORY   2016     &D, Complicated or Multiple 18893 (3)      • OTHER SURGICAL HISTORY   2014     Remove Impacted Cerumen 98789 (1)                  Family History   Problem Relation Age of Onset   • Hypertension Father     • Heart disease Father     • Cancer Father     • Diabetes Father     • Kidney disease Father     • Hyperlipidemia Other     • Hypertension Other     • Cancer Other           prostate   • Arthritis Mother     • Hypertension Mother     • Obesity Mother     • Thyroid disease Mother     • Mental illness Mother     • Arthritis Maternal Grandmother        Social History            Tobacco Use   • Smoking status: Former Smoker       Quit date: 2000       Years since quittin.7   • Smokeless tobacco: Never Used   Vaping Use   • Vaping Use: Never used   Substance Use Topics   • Alcohol use: Yes        Comment: occassionally   • Drug use: Not Currently       Types: Marijuana         ALLERGIES:        Allergies   Allergen Reactions   • Lisinopril Other (See Comments)       Other reaction(s): cough/itching   • Minoxidil Unknown (See Comments)            MEDICATIONS:     Current Outpatient Medications:   •  allopurinol (ZYLOPRIM) 100 MG tablet, Take 2 tablets by mouth Daily., Disp: 180 tablet, Rfl: 3  •  aspirin 81 MG EC tablet, Take 81 mg by mouth Daily., Disp: , Rfl:   •  B Complex Vitamins (VITAMIN B COMPLEX PO), Take 1 tablet by mouth Daily., Disp: , Rfl:   •  carvedilol (COREG) 25 MG tablet, Take 2 tablets by mouth 2 (Two) Times a Day With Meals., Disp: 360 tablet, Rfl: 1  •  cholecalciferol (VITAMIN D3) 1000 UNITS tablet, Take 2,000 Units by mouth Daily., Disp: , Rfl:   •  clindamycin (Clindagel) 1 % gel, Apply  topically to the appropriate area as directed 2 (Two) Times a Day., Disp: 60 g, Rfl: 2  •  famotidine (PEPCID) 10 MG tablet, Take 10 mg by mouth 2 (Two) Times a Day., Disp: , Rfl:   •  fluticasone (FLONASE) 50 MCG/ACT nasal spray, 1 spray into the nostril(s) as directed by provider Daily., Disp: 1 bottle, Rfl: 0  •  loratadine (CLARITIN) 10 MG tablet, Take 10 mg by mouth Daily., Disp: , Rfl:   •  NIFEdipine XL (PROCARDIA XL) 90 MG 24 hr tablet, TAKE 2 TABLETS BY MOUTH AT  NIGHT, Disp: 180 tablet, Rfl: 3  •  pravastatin (PRAVACHOL) 80 MG tablet, TAKE 1 TABLET BY MOUTH  EVERY NIGHT, Disp: 90 tablet, Rfl: 3  •  SUPER B COMPLEX/C PO, Super B Complex + C  one tablet daily, Disp: , Rfl:   •  valsartan-hydrochlorothiazide (DIOVAN-HCT) 320-25 MG per tablet, TAKE 1 TABLET BY MOUTH  DAILY, Disp: 90 tablet, Rfl: 3     Current Facility-Administered Medications:   •  triamcinolone acetonide (KENALOG-40) injection 80 mg, 80 mg, Intramuscular, Once, Annie Penny MD     Review of Systems   Review of Systems   Constitutional: Positive for malaise/fatigue. Negative for weight loss.   Cardiovascular: Negative for  "chest pain, claudication and dyspnea on exertion.   Respiratory: Negative for cough and shortness of breath.    Skin: Negative for color change and poor wound healing.   Musculoskeletal: Positive for muscle weakness and myalgias.   Gastrointestinal: Positive for abdominal pain, change in bowel habit and constipation.   Neurological: Negative for dizziness, numbness and weakness.         Physical Exam   Vitals       Vitals:     10/04/21 0808   BP: 122/60   BP Location: Left arm   Patient Position: Sitting   Cuff Size: Adult   Pulse: 57   Temp: 97.8 °F (36.6 °C)   TempSrc: Temporal   SpO2: 98%   Weight: 117 kg (259 lb)   Height: 177.8 cm (70\")         Body surface area is 2.33 meters squared.  Body mass index is 37.16 kg/m².  Physical Exam  Constitutional:       General: He is not in acute distress.     Appearance: He is not ill-appearing.   HENT:      Right Ear: Hearing normal.      Left Ear: Hearing normal.      Nose: No nasal deformity.      Mouth/Throat:      Dentition: Normal dentition. Does not have dentures.   Cardiovascular:      Rate and Rhythm: Normal rate and regular rhythm.      Pulses:           Carotid pulses are 2+ on the right side and 2+ on the left side.       Radial pulses are 2+ on the right side and 2+ on the left side.        Dorsalis pedis pulses are 2+ on the right side and 2+ on the left side.        Posterior tibial pulses are 1+ on the right side and 1+ on the left side.      Heart sounds: No murmur heard.     Pulmonary:      Effort: Pulmonary effort is normal.      Breath sounds: Normal breath sounds.   Abdominal:      General: There is no distension.      Palpations: Abdomen is soft. There is no mass.      Tenderness: There is no abdominal tenderness.   Musculoskeletal:         General: No deformity.      Comments: Gait normal.    Skin:     General: Skin is warm and dry.      Coloration: Skin is not pale.      Findings: No erythema.      Comments: No venous staining   Neurological:      " Mental Status: He is alert and oriented to person, place, and time.   Psychiatric:         Speech: Speech normal.         Behavior: Behavior is cooperative.         Thought Content: Thought content normal.         Judgment: Judgment normal.                  BUN   Date Value Ref Range Status   09/23/2021 21 8 - 23 mg/dL Final            Creatinine   Date Value Ref Range Status   09/23/2021 1.30 (H) 0.76 - 1.27 mg/dL Final            eGFR   Amer   Date Value Ref Range Status   09/23/2021 66 >60 mL/min/1.73 Final         ASSESSMENT:  Diagnoses and all orders for this visit:     1. Rheumatoid arthritis, involving unspecified site, unspecified whether rheumatoid factor present (HCC) (Primary)     2. Primary osteoarthritis involving multiple joints     3. CHRIS on CPAP     4. Mixed hyperlipidemia     5. Essential hypertension     6. Abdominal aortic aneurysm (AAA) without rupture (HCC)       PLAN:  Detailed discussion with Jerald Bonilla regarding situation, options and plans.  Large abdominal aortic aneurysm, increase risk of rupture.  Repair of Abdominal Aortic Aneurysm is advisable.  Risks including but not limited to Mortality, Major Morbidity 2-3%, bleeding, transfusion, infection, pulmonary (prolonged mechanical ventilation, tracheostomy), renal dysfunction (dialysis), blood vessel injury, nerve injury, possible open procedure.  Understands and wishes to proceed.     Endovascular repair Abdominal Aortic Aneurysm, possible open, radial arterial line, , C-Arm fluoroscopy, C-Arm table, Ancef.  GEN.  SDS.  1/7/2022     Return after above studies complete  Obesity Class 2. Increased risk cardiovascular events, sleep and breathing disorders, joint issues, type 2 diabetes mellitus. Options for weight management, heart healthy diet, exercise programs, and associated health risks of obesity discussed.     Advance Care Planning     ACP discussion was declined by the patient. Patient does not have an advance  directive, declines further assistance.     Recommended regular physical activity, progressive walking program.  Continue current medications as directed.  Will Obtain relevant old records.

## 2022-01-07 NOTE — ANESTHESIA POSTPROCEDURE EVALUATION
Patient: Jerald Bonilla    Procedure Summary     Date: 01/07/22 Room / Location: [facility] OR  /  [facility] OR    Anesthesia Start: 0946 Anesthesia Stop: 6065    Procedure: ABDOMINAL AORTIC ANEURYSM REPAIR WITH ENDOGRAFT (N/A Abdomen) Diagnosis:       Abdominal aortic aneurysm (AAA) without rupture (HCC)      (Abdominal aortic aneurysm (AAA) without rupture (HCC) [I71.4])    Surgeons: Unruly Yi MD Provider: Haim Guzman MD    Anesthesia Type: general ASA Status: 4          Anesthesia Type: general    Vitals  No vitals data found for the desired time range.          Post Anesthesia Care and Evaluation    Patient location during evaluation: PACU  Level of consciousness: sleepy but conscious  Pain score: 0  Pain management: adequate  Airway patency: patent  Anesthetic complications: No anesthetic complications  PONV Status: none  Cardiovascular status: acceptable and hemodynamically stable  Respiratory status: acceptable, spontaneous ventilation, face mask and oral airway  Hydration status: acceptable

## 2022-01-07 NOTE — ANESTHESIA PROCEDURE NOTES
Airway  Urgency: elective    Date/Time: 1/7/2022 10:01 AM  End Time:1/7/2022 10:01 AM  Difficult airway (Limited neck extension, anterior larynx, large epiglottis.)    General Information and Staff    Patient location during procedure: OR  CRNA: Chuck Gutierrez CRNA    Indications and Patient Condition  Indications for airway management: airway protection and CNS depression    Preoxygenated: yes  MILS maintained throughout  Mask difficulty assessment: 2 - vent by mask + OA or adjuvant +/- NMBA    Final Airway Details  Final airway type: endotracheal airway      Successful airway: ETT  Cuffed: yes   Successful intubation technique: video laryngoscopy  Facilitating devices/methods: intubating stylet and anterior pressure/BURP  Endotracheal tube insertion site: oral  Blade: Mohan  Blade size: 4  ETT size (mm): 7.5  Cormack-Lehane Classification: grade IV - neither glottis nor epiglottis seen  Placement verified by: chest auscultation, capnometry and palpation of cuff   Measured from: lips  ETT/EBT  to lips (cm): 23  Number of attempts at approach: 2  Assessment: lips, teeth, and gum same as pre-op and atraumatic intubation    Additional Comments  1st attempt per ZOYA Aguilar SRNA grade 4 view. Attempt per SHERYL Gutierrez CRNA grade 3 view. Begum with blade 4 used for 3rd attempt with a grade 2a view and successful intubation.

## 2022-01-07 NOTE — ANESTHESIA PREPROCEDURE EVALUATION
Anesthesia Evaluation     no history of anesthetic complications:  NPO Solid Status: > 8 hours  NPO Liquid Status: > 2 hours           Airway   Mallampati: II  TM distance: >3 FB  Neck ROM: full  Possible difficult intubation  Dental    (+) poor dentition    Pulmonary - normal exam    breath sounds clear to auscultation  (+) sleep apnea on CPAP,   (-) COPD, asthma, not a smoker  Cardiovascular   Exercise tolerance: poor (<4 METS)    ECG reviewed  Patient on routine beta blocker and Beta blocker given within 24 hours of surgery  Rhythm: regular  Rate: normal    (+) hypertension well controlled 2 medications or greater, PVD (AAA), hyperlipidemia,   (-) valvular problems/murmurs, past MI, dysrhythmias, angina, murmur, cardiac stents    ROS comment: Sinus bradycardia with occasional Premature ventricular complexes  Otherwise normal ECG  When compared with ECG of 16-NOV-2016 11:01,  Premature ventricular complexes are now Present  Nonspecific T wave abnormality no longer evident in Inferior leads    Neuro/Psych  (-) seizures, TIA, CVA, headaches, weakness, numbness, psychiatric history  GI/Hepatic/Renal/Endo    (+) obesity,  GERD well controlled,  renal disease CRI,   (-) hepatitis, liver disease, diabetes, no thyroid disorder    Musculoskeletal     (+) arthralgias,       ROS comment: gout  Abdominal   (+) obese,    Substance History   (+) alcohol use (occ),   (-) drug use     OB/GYN          Other   arthritis,      (-) history of cancer                  Anesthesia Plan    ASA 4     general   (Arterial line, 2nd IV, possible post op vent discussed)  intravenous induction     Anesthetic plan, all risks, benefits, and alternatives have been provided, discussed and informed consent has been obtained with: patient.  Use of blood products discussed with patient  Consented to blood products.

## 2022-01-08 ENCOUNTER — READMISSION MANAGEMENT (OUTPATIENT)
Dept: CALL CENTER | Facility: HOSPITAL | Age: 77
End: 2022-01-08

## 2022-01-08 VITALS
HEART RATE: 72 BPM | DIASTOLIC BLOOD PRESSURE: 70 MMHG | RESPIRATION RATE: 18 BRPM | SYSTOLIC BLOOD PRESSURE: 149 MMHG | OXYGEN SATURATION: 96 % | TEMPERATURE: 99 F | HEIGHT: 70 IN | WEIGHT: 259.7 LBS | BODY MASS INDEX: 37.18 KG/M2

## 2022-01-08 LAB
ANION GAP SERPL CALCULATED.3IONS-SCNC: 8 MMOL/L (ref 5–15)
BASOPHILS # BLD AUTO: 0.05 10*3/MM3 (ref 0–0.2)
BASOPHILS NFR BLD AUTO: 0.6 % (ref 0–1.5)
BUN SERPL-MCNC: 18 MG/DL (ref 8–23)
BUN/CREAT SERPL: 11.2 (ref 7–25)
CALCIUM SPEC-SCNC: 8 MG/DL (ref 8.6–10.5)
CHLORIDE SERPL-SCNC: 109 MMOL/L (ref 98–107)
CO2 SERPL-SCNC: 22 MMOL/L (ref 22–29)
CREAT SERPL-MCNC: 1.61 MG/DL (ref 0.76–1.27)
DEPRECATED RDW RBC AUTO: 43.8 FL (ref 37–54)
EOSINOPHIL # BLD AUTO: 0.03 10*3/MM3 (ref 0–0.4)
EOSINOPHIL NFR BLD AUTO: 0.4 % (ref 0.3–6.2)
ERYTHROCYTE [DISTWIDTH] IN BLOOD BY AUTOMATED COUNT: 14.2 % (ref 12.3–15.4)
GFR SERPL CREATININE-BSD FRML MDRD: 51 ML/MIN/1.73
GLUCOSE SERPL-MCNC: 130 MG/DL (ref 65–99)
HCT VFR BLD AUTO: 37.7 % (ref 37.5–51)
HGB BLD-MCNC: 12.9 G/DL (ref 13–17.7)
IMM GRANULOCYTES # BLD AUTO: 0.03 10*3/MM3 (ref 0–0.05)
IMM GRANULOCYTES NFR BLD AUTO: 0.4 % (ref 0–0.5)
LYMPHOCYTES # BLD AUTO: 1.29 10*3/MM3 (ref 0.7–3.1)
LYMPHOCYTES NFR BLD AUTO: 16.6 % (ref 19.6–45.3)
MCH RBC QN AUTO: 29 PG (ref 26.6–33)
MCHC RBC AUTO-ENTMCNC: 34.2 G/DL (ref 31.5–35.7)
MCV RBC AUTO: 84.7 FL (ref 79–97)
MONOCYTES # BLD AUTO: 1.06 10*3/MM3 (ref 0.1–0.9)
MONOCYTES NFR BLD AUTO: 13.6 % (ref 5–12)
NEUTROPHILS NFR BLD AUTO: 5.31 10*3/MM3 (ref 1.7–7)
NEUTROPHILS NFR BLD AUTO: 68.4 % (ref 42.7–76)
NRBC BLD AUTO-RTO: 0 /100 WBC (ref 0–0.2)
PLATELET # BLD AUTO: 139 10*3/MM3 (ref 140–450)
PMV BLD AUTO: 9.1 FL (ref 6–12)
POTASSIUM SERPL-SCNC: 3.8 MMOL/L (ref 3.5–5.2)
RBC # BLD AUTO: 4.45 10*6/MM3 (ref 4.14–5.8)
SODIUM SERPL-SCNC: 139 MMOL/L (ref 136–145)
WBC NRBC COR # BLD: 7.77 10*3/MM3 (ref 3.4–10.8)

## 2022-01-08 PROCEDURE — 25010000002 HYDROMORPHONE 1 MG/ML SOLUTION: Performed by: THORACIC SURGERY (CARDIOTHORACIC VASCULAR SURGERY)

## 2022-01-08 PROCEDURE — 99024 POSTOP FOLLOW-UP VISIT: CPT | Performed by: NURSE PRACTITIONER

## 2022-01-08 PROCEDURE — 25010000002 CEFAZOLIN PER 500 MG: Performed by: THORACIC SURGERY (CARDIOTHORACIC VASCULAR SURGERY)

## 2022-01-08 PROCEDURE — 80048 BASIC METABOLIC PNL TOTAL CA: CPT | Performed by: THORACIC SURGERY (CARDIOTHORACIC VASCULAR SURGERY)

## 2022-01-08 PROCEDURE — 85025 COMPLETE CBC W/AUTO DIFF WBC: CPT | Performed by: THORACIC SURGERY (CARDIOTHORACIC VASCULAR SURGERY)

## 2022-01-08 RX ORDER — CLOPIDOGREL BISULFATE 75 MG/1
75 TABLET ORAL DAILY
Qty: 30 TABLET | Refills: 0 | Status: SHIPPED | OUTPATIENT
Start: 2022-01-09 | End: 2022-02-08 | Stop reason: SDUPTHER

## 2022-01-08 RX ORDER — HYDROCODONE BITARTRATE AND ACETAMINOPHEN 5; 325 MG/1; MG/1
1-2 TABLET ORAL EVERY 4 HOURS PRN
Qty: 40 TABLET | Refills: 0 | Status: SHIPPED | OUTPATIENT
Start: 2022-01-08 | End: 2022-01-14

## 2022-01-08 RX ADMIN — HYDROMORPHONE HYDROCHLORIDE 0.5 MG: 1 INJECTION, SOLUTION INTRAMUSCULAR; INTRAVENOUS; SUBCUTANEOUS at 05:44

## 2022-01-08 RX ADMIN — ASPIRIN 81 MG: 81 TABLET, FILM COATED ORAL at 08:20

## 2022-01-08 RX ADMIN — CLOPIDOGREL 75 MG: 75 TABLET, FILM COATED ORAL at 08:20

## 2022-01-08 RX ADMIN — CEFAZOLIN 2 G: 10 INJECTION, POWDER, FOR SOLUTION INTRAVENOUS at 03:20

## 2022-01-08 RX ADMIN — HYDROCODONE BITARTRATE AND ACETAMINOPHEN 1 TABLET: 5; 325 TABLET ORAL at 09:05

## 2022-01-08 RX ADMIN — FLUTICASONE PROPIONATE 1 SPRAY: 50 SPRAY, METERED NASAL at 08:25

## 2022-01-08 RX ADMIN — NITROGLYCERIN 90 MCG/MIN: 20 INJECTION INTRAVENOUS at 05:54

## 2022-01-08 RX ADMIN — CARVEDILOL 50 MG: 25 TABLET, FILM COATED ORAL at 08:20

## 2022-01-08 RX ADMIN — CETIRIZINE HYDROCHLORIDE 10 MG: 10 TABLET, FILM COATED ORAL at 08:21

## 2022-01-08 RX ADMIN — ALLOPURINOL 100 MG: 100 TABLET ORAL at 08:21

## 2022-01-08 RX ADMIN — VALSARTAN: 80 TABLET ORAL at 10:02

## 2022-01-08 RX ADMIN — NITROGLYCERIN 0.5 INCH: 20 OINTMENT TOPICAL at 02:32

## 2022-01-08 NOTE — PLAN OF CARE
Goal Outcome Evaluation:  Plan of Care Reviewed With: patient        Progress: improving  Outcome Summary: pt up in chair. up and leighton d/dom. UOP adequate.  pt remains on nitro gtt

## 2022-01-08 NOTE — DISCHARGE SUMMARY
CVTS DISCHARGE SUMMARY  Date of Admission: 1/7/2022  7:27 AM  Date of Discharge:  1/8/2022    Admission Diagnosis:   Abdominal aortic aneurysm (AAA) without rupture (HCC) [I71.4]    Discharge Diagnosis:   Post-Op Diagnosis Codes:     * Abdominal aortic aneurysm (AAA) without rupture (HCC) [I71.4]  Active Hospital Problems    Diagnosis  POA   • **Abdominal aortic aneurysm (AAA) without rupture (HCC) [I71.4]  Yes   • Rheumatoid arthritis (HCC) [M06.9]  Yes   • Mixed hyperlipidemia [E78.2]  Yes   • Essential hypertension [I10]  Yes      Resolved Hospital Problems   No resolved problems to display.       Consults:   Consults     No orders found for last 30 day(s).          Procedures Performed  Procedure(s): LEFT CFA Endarterectomy  ABDOMINAL AORTIC ANEURYSM REPAIR WITH ENDOGRAFT       Discharge Medications     Discharge Medications      New Medications      Instructions Start Date   clopidogrel 75 MG tablet  Commonly known as: PLAVIX   75 mg, Oral, Daily   Start Date: January 9, 2022     HYDROcodone-acetaminophen 5-325 MG per tablet  Commonly known as: NORCO   1-2 tablets, Oral, Every 4 Hours PRN         Continue These Medications      Instructions Start Date   allopurinol 100 MG tablet  Commonly known as: ZYLOPRIM   100 mg, Oral, 2 Times Daily      aspirin 81 MG EC tablet   81 mg, Oral, Daily      carvedilol 25 MG tablet  Commonly known as: COREG   50 mg, Oral, 2 Times Daily With Meals      cholecalciferol 25 MCG (1000 UT) tablet  Commonly known as: VITAMIN D3   1,000 Units, Oral, Daily      clindamycin 1 % gel  Commonly known as: Clindagel   Topical, 2 Times Daily      famotidine 10 MG tablet  Commonly known as: PEPCID   10 mg, Oral, Nightly      fluticasone 50 MCG/ACT nasal spray  Commonly known as: FLONASE   1 spray, Nasal, Daily      loratadine 10 MG tablet  Commonly known as: CLARITIN   10 mg, Oral, Daily      NIFEdipine XL 90 MG 24 hr tablet  Commonly known as: PROCARDIA XL   90 mg, Oral, Nightly, 2 tabs        pravastatin 80 MG tablet  Commonly known as: PRAVACHOL   80 mg, Oral, Nightly      valsartan-hydrochlorothiazide 320-25 MG per tablet  Commonly known as: DIOVAN-HCT   1 tablet, Oral, Daily      VITAMIN B COMPLEX PO   1 tablet, Oral, Daily           Medical Management: Aspirin, Plavix, Statin Reviewed risks, benefits, and habit forming potential and weaning from narcotic medication. Patient understands and wishes to receive prescription.  Prescription written for Norco #40 for post-surgical pain after Macario placed on file.    Discharge Diet:   Diet Instructions     Diet: Cardiac      Discharge Diet: Cardiac          Discharge Disposition: Home in stable condition with follow up appointments with CVTS Nurse Practitioner 1 week, Dr. Yi 6 weeks, Cardiology/PCP as scheduled.     History of Present Illness/Hospital Course:   75 y.o. male with HTN(stable, increased risk stroke, rupture), Hyperlipidemia(stable, increased risk cardiovascular events) and Obesity(uncontrolled, increased risk cardiovascular events) , AAA(new, increase risk rupture).  former smoker.  Moderate abdominal pain, change in stool x 6 months.  Legs week after walking distance.  Asymptomatic AAA found on CT imaging for GI evaluation..  No TIA stroke amaurosis.  No MI claudication. No other associated signs, symptoms or modifying factors.     9/2014 US Aorta:  Infrarenal 29mm. Iliac R 14mm, L 17mm.  8/2021 CT Abdomen Pelvis:  Descending thoracic aorta 27mm, renals 23mm, 34mm focal saccular infrarenal aneurysm (appears expanded thru near circumferential calcium), iliac R 23mm, L 15mm, femoral R 10mm, L 10mm.    Adequate preop studies were completed and the patient was scheduled electively. Operative day Jerald Bonilla admitted through [facility] [place] [facility], taken to operating suite and placed under general anesthesia.  Underwent said procedure without complications or difficulty. The patient was weaned easily from mechanical ventilation and extubated in the  "recovery room placed on oxygen per nasal cannula and further transferred to CCU for further care and monitoring. Jerald Bonilla remained hemodynamically and neurovascularly stable immediately postop.  POD1 the patient was out of the bed to the chair tolerating breakfast pain controlled with Norco and stable for dc home.   Operative incisions clean, dry, intact. Neurovascular status remained stable for discharge home on POD 1.     Vital Signs  Temp:  [96.8 °F (36 °C)-101.2 °F (38.4 °C)] 99 °F (37.2 °C)  Heart Rate:  [47-77] 72  Resp:  [16-20] 18  BP: (106-188)/(57-98) 133/60  Arterial Line BP: (128-194)/(44-68) 162/60  FiO2 (%):  [75 %-100 %] 75 %      01/07/22  0816 01/08/22  0500   Weight: 119 kg (262 lb 5.6 oz) 118 kg (259 lb 11.2 oz)       Pertinent Test Results:  Lab Results   Component Value Date    WBC 7.77 01/08/2022    HGB 12.9 (L) 01/08/2022    HCT 37.7 01/08/2022    MCV 84.7 01/08/2022     (L) 01/08/2022     Lab Results   Component Value Date    GLUCOSE 130 (H) 01/08/2022    BUN 18 01/08/2022    CREATININE 1.61 (H) 01/08/2022    EGFRIFAFRI 51 (L) 01/08/2022    BCR 11.2 01/08/2022    K 3.8 01/08/2022    CO2 22.0 01/08/2022    CALCIUM 8.0 (L) 01/08/2022    ALBUMIN 4.20 09/23/2021    AST 15 08/23/2021    ALT 17 08/23/2021       Physical Exam:  Physical Exam   General Appearance:  Comfortable.    Vital signs: (most recent): Blood pressure 133/60, pulse 72, temperature 99 °F (37.2 °C), temperature source Temporal, resp. rate 18, height 177.8 cm (70\"), weight 118 kg (259 lb 11.2 oz), SpO2 95 %.  Vital signs are normal.    Output: Producing urine.    HEENT: Normal HEENT exam.    Lungs:  Normal effort and normal respiratory rate.  Breath sounds clear to auscultation.    Heart: Normal rate.  Regular rhythm.    Abdomen: Abdomen is soft.  Bowel sounds are normal.     Extremities: Normal range of motion.    Pulses: Distal pulses are intact.    Neurological: Patient is alert and oriented to person, place " and time.    Skin:  Warm and dry.  ((L) groin prevena intact  (R) groin site CDI, soft)     Additional Instructions for the Follow-ups that You Need to Schedule     Discharge Follow-up with Specialty: Vascular Surgery; 1 Week   As directed      Specialty: Vascular Surgery    Follow Up: 1 Week    Follow Up Details: Radu SPEAR 1/17/22 9:00               Discharge Instructions: Discharge instructions include no heavy lifting anything greater than 10lbs for approximately 4 weeks.  No sex or driving for 2-4 weeks. Printed information given to the patient with advancement of activities weekly.  Risks and benefits of narcotic medications and weaning postoperatively have been discussed. Clean operative site with antibacterial soap/water, pat dry. Keep open to air unless draining, then may apply dry dressing.  No ointments or creams unless prescribed by provider. For signs and symptoms of infection including drainage from operative site, redness, swelling, with associated fever and/or chills notify [facility] immediately for wound check. If signs and symptoms of ischemia should occur including but not limited to pale/blue discoloration of limb, increasing pain with ambulation or at rest, or a non-healing wound the patient is to notify [facility] for immediate evaluation. Patient verbalizes understanding of discharge instructions, all questions are answered, follow-up appointments have been made. The patient is discharged home in stable condition.       Follow-up Appointments  Future Appointments   Date Time Provider Department Center   2/3/2022 11:15 AM Annie Penny MD [facility]   8/3/2022  1:30 PM Rebecca Maria APRN [facility]       Test Results Pending at Discharge        Time: Discharge 45 min           This document has been electronically signed by BEATRIS Medley on January 8, 2022 09:16 CST

## 2022-01-08 NOTE — OUTREACH NOTE
Prep Survey      Responses   [facility] patient discharged from? [place]   Is LACE score < 7 ? No   Emergency Room discharge w/ pulse ox? No   Eligibility [facility]   Date of Admission 01/07/22   Date of Discharge 01/08/22   Discharge Disposition Home or Self Care   Discharge diagnosis Abdominal aortic aneurysm,    LEFT CFA Endarterectomy   Does the patient have one of the following disease processes/diagnoses(primary or secondary)? Other   Does the patient have Home health ordered? No   Is there a DME ordered? No   Prep survey completed? Yes          Bessie Cleveland RN

## 2022-01-08 NOTE — PROGRESS NOTES
"  Cardiothoracic - Vascular Surgery Daily Note        LOS: 1 day   Patient Care Team:  Annie Penny MD as PCP - General (Family Medicine)  Wali Servin MD as Consulting Physician (Nephrology)  Rebecca Maria APRN as Nurse Practitioner (Nurse Practitioner)    POD#1 endoAAA. LCFA Endarterectomy    Chief Complaint: AAA      Subjective     The following portions of the patient's history were reviewed and updated as appropriate: allergies, current medications, past family history, past medical history, past social history, past surgical history and problem list.     Subjective:  Symptoms:  Stable.    Diet:  Adequate intake.    Activity level: Returning to normal.    Pain:  He complains of pain that is mild.  Pain is well controlled and requiring pain medication.          Objective     Vital Signs  Temp:  [96.8 °F (36 °C)-101.2 °F (38.4 °C)] 99 °F (37.2 °C)  Heart Rate:  [47-77] 72  Resp:  [16-20] 18  BP: (106-188)/(57-98) 133/60  Arterial Line BP: (128-194)/(44-68) 162/60  FiO2 (%):  [75 %-100 %] 75 %  Body mass index is 37.26 kg/m².    Intake/Output Summary (Last 24 hours) at 1/8/2022 0855  Last data filed at 1/8/2022 0500  Gross per 24 hour   Intake 3259 ml   Output 2300 ml   Net 959 ml     No intake/output data recorded.    Wt Readings from Last 3 Encounters:   01/08/22 118 kg (259 lb 11.2 oz)   12/15/21 117 kg (258 lb 3.2 oz)   12/14/21 118 kg (261 lb)         Physical Exam   Objective:  General Appearance:  Comfortable.    Vital signs: (most recent): Blood pressure 133/60, pulse 72, temperature 99 °F (37.2 °C), temperature source Temporal, resp. rate 18, height 177.8 cm (70\"), weight 118 kg (259 lb 11.2 oz), SpO2 95 %.  Vital signs are normal.    Output: Producing urine.    HEENT: Normal HEENT exam.    Lungs:  Normal effort and normal respiratory rate.  Breath sounds clear to auscultation.    Heart: Normal rate.  Regular rhythm.    Abdomen: Abdomen is soft.  Bowel sounds are normal.   "   Extremities: Normal range of motion.    Pulses: Distal pulses are intact.    Neurological: Patient is alert and oriented to person, place and time.    Skin:  Warm and dry.  ((L) groin prevena intact  (R) groin site CDI, soft)              Results Review:    Lab Results   Component Value Date    WBC 7.77 01/08/2022    HGB 12.9 (L) 01/08/2022    HCT 37.7 01/08/2022    MCV 84.7 01/08/2022     (L) 01/08/2022     Lab Results   Component Value Date    GLUCOSE 130 (H) 01/08/2022    BUN 18 01/08/2022    CREATININE 1.61 (H) 01/08/2022    EGFRIFAFRI 51 (L) 01/08/2022    BCR 11.2 01/08/2022    K 3.8 01/08/2022    CO2 22.0 01/08/2022    CALCIUM 8.0 (L) 01/08/2022    ALBUMIN 4.20 09/23/2021    AST 15 08/23/2021    ALT 17 08/23/2021       Calcium Calcium   Date/Time Value Ref Range Status   01/08/2022 0522 8.0 (L) 8.6 - 10.5 mg/dL Final      Magnesium No results found for: MG     Imaging Results (Last 24 Hours)     Procedure Component Value Units Date/Time    FL C Arm During Surgery [[id number]] Resulted: 01/07/22 0932     Updated: 01/07/22 1335                              albuterol, 2.5 mg, Nebulization, Q8H - RT  allopurinol, 100 mg, Oral, BID  aspirin, 81 mg, Oral, Daily  carvedilol, 50 mg, Oral, BID With Meals  cetirizine, 10 mg, Oral, Daily  clopidogrel, 75 mg, Oral, Daily  famotidine, 10 mg, Oral, Nightly  fluticasone, 1 spray, Nasal, Daily  NIFEdipine XL, 90 mg, Oral, Nightly  nitroglycerin, 0.5 inch, Topical, Q12H  pravastatin, 80 mg, Oral, Nightly  valsartan-HCTZ 320-25 combo dose, , Oral, Daily      nitroglycerin, 5-200 mcg/min, Last Rate: 70 mcg/min (01/08/22 0815)  sodium chloride, 75 mL/hr, Last Rate: 75 mL/hr (01/07/22 1516)              ASSESSMENT/PLAN     Active Hospital Problems    Diagnosis  POA   • **Abdominal aortic aneurysm (AAA) without rupture (HCC) [I71.4]  Yes   • Rheumatoid arthritis (HCC) [M06.9]  Yes       Stable Post Op endoAAA/ LEFT CFA Endarterectomy: Progressing well    AAA: s/p repair. BP  control    HTN: Chronic. Home meds Coreg/Diovan. DC NTG    Respiratory: RA. Incentive spirometer.     Pain: Scheduled Tylenol. [place] prn    Rehab: OOB to chair. Ambulate    Disposition: Stable dc home later today            This document has been electronically signed by BEATRIS Medely on January 8, 2022 08:56 CST

## 2022-01-10 ENCOUNTER — TRANSITIONAL CARE MANAGEMENT TELEPHONE ENCOUNTER (OUTPATIENT)
Dept: CALL CENTER | Facility: HOSPITAL | Age: 77
End: 2022-01-10

## 2022-01-10 NOTE — OUTREACH NOTE
"Call Center TCM Note      Responses   [facility] patient discharged from? [place]   Does the patient have one of the following disease processes/diagnoses(primary or secondary)? Other   TCM attempt successful? Yes   Call start time 1321   Call end time 1328   Discharge diagnosis Abdominal aortic aneurysm,    LEFT CFA Endarterectomy   Medication alerts for this patient PLAVIX   Meds reviewed with patient/caregiver? Yes   Is the patient having any side effects they believe may be caused by any medication additions or changes? No   Does the patient have all medications ordered at discharge? Yes   Is the patient taking all medications as directed (includes completed medication regime)? Yes   Medication comments Not taking any pain medications as it has not been needed   Comments regarding appointments Surgical f/u on 1/17/22   Does the patient have a primary care provider?  Yes   Does the patient have an appointment with their PCP within 7 days of discharge? No   Comments regarding PCP Pt declines to schedule a PCP f/u----will be following up with surgeon instead   Has the patient kept scheduled appointments due by today? N/A   Has home health visited the patient within 72 hours of discharge? N/A   DME comments Wound Vac in place---reviewed directions on AVS for care/removal/post removal of wound vac-- at time of call device is patent   Did the patient receive a copy of their discharge instructions? Yes   Nursing interventions Reviewed instructions with patient   What is the patient's perception of their health status since discharge? Improving  [Pt reports his pain is tolerable w/o pain medications and just feels like a \"pulling.\"  He is ambulatory, tolerating po intake, no BM yet but taking his bowel regimen. ]   Is the patient/caregiver able to teach back signs and symptoms related to disease process for when to call PCP? Yes   Is the patient/caregiver able to teach back signs and symptoms related to " disease process for when to call 911? Yes   Is the patient/caregiver able to teach back the hierarchy of who to call/visit for symptoms/problems? PCP, Specialist, Home health nurse, Urgent Care, ED, 911 Yes   Additional teach back comments REviewed s/s infection with pt---routine surgical precautions.   TCM call completed? Yes          Courtney Núñez RN    1/10/2022, 13:30 EST

## 2022-01-17 ENCOUNTER — OFFICE VISIT (OUTPATIENT)
Dept: CARDIAC SURGERY | Facility: CLINIC | Age: 77
End: 2022-01-17

## 2022-01-17 ENCOUNTER — READMISSION MANAGEMENT (OUTPATIENT)
Dept: CALL CENTER | Facility: HOSPITAL | Age: 77
End: 2022-01-17

## 2022-01-17 VITALS
DIASTOLIC BLOOD PRESSURE: 66 MMHG | TEMPERATURE: 98.4 F | HEART RATE: 62 BPM | BODY MASS INDEX: 36.08 KG/M2 | OXYGEN SATURATION: 99 % | HEIGHT: 70 IN | WEIGHT: 252 LBS | SYSTOLIC BLOOD PRESSURE: 126 MMHG

## 2022-01-17 DIAGNOSIS — I71.40 ABDOMINAL AORTIC ANEURYSM (AAA) WITHOUT RUPTURE: Primary | ICD-10-CM

## 2022-01-17 DIAGNOSIS — E78.2 MIXED HYPERLIPIDEMIA: ICD-10-CM

## 2022-01-17 DIAGNOSIS — Z48.812 SURGICAL AFTERCARE, CIRCULATORY SYSTEM: ICD-10-CM

## 2022-01-17 PROCEDURE — 99024 POSTOP FOLLOW-UP VISIT: CPT | Performed by: NURSE PRACTITIONER

## 2022-01-17 RX ORDER — CEPHALEXIN 500 MG/1
500 CAPSULE ORAL 3 TIMES DAILY
Qty: 30 CAPSULE | Refills: 0 | Status: SHIPPED | OUTPATIENT
Start: 2022-01-17 | End: 2022-01-27

## 2022-01-17 NOTE — PATIENT INSTRUCTIONS
Clean operative site with antibacterial soap/water, pat dry. Keep open to air unless draining, then may apply dry dressing.  No ointments or creams unless prescribed by provider.    Dry dressing in groin fold.     Antibiotics for mild drainage.     Follow up in 6 weeks with Dr. Yi, CTA combo to check endo graft.

## 2022-01-18 NOTE — OUTREACH NOTE
Medical Week 2 Survey      Responses   [facility] patient discharged from? [place]   Does the patient have one of the following disease processes/diagnoses(primary or secondary)? Other   Week 2 attempt successful? Yes   Call start time 1810   Call end time 1813   Meds reviewed with patient/caregiver? Yes   Is the patient taking all medications as directed (includes completed medication regime)? Yes   Medication comments antibiotic added today 01/17   Does the patient have a primary care provider?  Yes   Does the patient have an appointment with their PCP within 7 days of discharge? Yes   Has the patient kept scheduled appointments due by today? Yes   Comments Dr. Kenton Cedeno today   Comments Wound Vac done.    What is the patient's perception of their health status since discharge? Improving   Week 2 Call Completed? Yes   Graduated Yes   Is the patient interested in additional calls from an ambulatory ?  NOTE:  applies to high risk patients requiring additional follow-up. No   Did the patient feel the follow up calls were helpful during their recovery period? Yes   Was the number of calls appropriate? Yes   Graduated/Revoked comments He states he is doing ok, wound Vac d/c on antibiotics and seeing DrGabby no need for further calls          Lesley Saha RN

## 2022-01-18 NOTE — PROGRESS NOTES
1/18/2022    Jerald Bonilla  1945    Chief Complaint:    Chief Complaint   Patient presents with   • Aortic Aneurysm       HPI:      PCP:  Annie Penny MD  GI:  Dr Gavin    76 y.o. male with HTN(stable, increased risk stroke, rupture), Hyperlipidemia(stable, increased risk cardiovascular events) and Obesity(uncontrolled, increased risk cardiovascular events) , AAA(new, increase risk rupture).  former smoker.  Moderate abdominal pain, change in stool x 6 months.  Legs week after walking distance.  Asymptomatic AAA found on CT imaging for GI evaluation..  No TIA stroke amaurosis.  No MI claudication. No other associated signs, symptoms or modifying factors.    9/2014 US Aorta:  Infrarenal 29mm. Iliac R 14mm, L 17mm.  8/2021 CT Abdomen Pelvis:  Descending thoracic aorta 27mm, renals 23mm, 34mm focal saccular infrarenal aneurysm (appears expanded thru near circumferential calcium), iliac R 23mm, L 15mm, femoral R 10mm, L 10mm.  1/2022 Endo AAA repair    8/2021 ECG:  SB 52mm, QTc 429    The following portions of the patient's history were reviewed and updated as appropriate: allergies, current medications, past family history, past medical history, past social history, past surgical history and problem list.  Recent images independently reviewed.  Available laboratory values reviewed.    PMH:  Past Medical History:   Diagnosis Date   • Acne conglobata    • Acute maxillary sinusitis    • Benign prostate hyperplasia    • Blurring of visual image    • Carbuncle     of infected sebaceous cyst on back   • Dizziness and giddiness    • Erectile dysfunction    • Essential hypertension     seeing nephro   • Gastroesophageal reflux disease    • Gout    • Hammer toe    • Hip pain    • Hyperlipidemia    • Low back pain    • Malaise and fatigue    • Need for prophylactic vaccination against Streptococcus pneumoniae (pneumococcus)    • Nuclear senile cataract    • Osteoarthritis    • Otalgia    • Otitis externa    •  "Pain in lower limb    • Rheumatoid arthritis (HCC)    • Skin lesion     right calf   • Sleep apnea     sleeps with c-pap   • Stage 3 chronic kidney disease (HCC)    • Swelling of knee joint    • Tinnitus    • Upper respiratory infection      Past Surgical History:   Procedure Laterality Date   • COLONOSCOPY  2009    Colonoscopy, diagnostic (screening) 60598 (1)    diverticuli, roids    • COLONOSCOPY N/A 2020    Procedure: COLONOSCOPY;  Surgeon: Dagoberto Gavin DO;  Location: [facility] ENDOSCOPY;  Service: Gastroenterology;  Laterality: N/A;  tattoo @ right colon    • COLONOSCOPY N/A 2021    Procedure: COLONOSCOPY;  Surgeon: Dagoberto Gavin DO;  Location: [facility] ENDOSCOPY;  Service: Gastroenterology;  Laterality: N/A;   • ENDOSCOPY N/A 2019    Procedure: ESOPHAGOGASTRODUODENOSCOPY possible dilation;  Surgeon: Jerald Nation MD;  Location: [facility] ENDOSCOPY;  Service: Gastroenterology   • HERNIA REPAIR      umbilical   • JOINT REPLACEMENT     • TOTAL HIP ARTHROPLASTY Left      Family History   Problem Relation Age of Onset   • Hypertension Father    • Heart disease Father    • Cancer Father    • Diabetes Father    • Kidney disease Father    • Hyperlipidemia Other    • Hypertension Other    • Cancer Other         prostate   • Arthritis Mother    • Hypertension Mother    • Obesity Mother    • Thyroid disease Mother    • Mental illness Mother    • Arthritis Maternal Grandmother      Social History     Tobacco Use   • Smoking status: Former Smoker     Quit date:      Years since quittin.0   • Smokeless tobacco: Never Used   Vaping Use   • Vaping Use: Never used   Substance Use Topics   • Alcohol use: Yes     Comment: socially   • Drug use: Not Currently     Types: Marijuana       ALLERGIES:  Allergies   Allergen Reactions   • Lisinopril Itching   • Minoxidil Other (See Comments)     \"dont know\"         MEDICATIONS:    Current Outpatient Medications:   •  allopurinol (ZYLOPRIM) 100 MG " tablet, Take 100 mg by mouth 2 (Two) Times a Day., Disp: , Rfl:   •  B Complex Vitamins (VITAMIN B COMPLEX PO), Take 1 tablet by mouth Daily., Disp: , Rfl:   •  carvedilol (COREG) 25 MG tablet, Take 2 tablets by mouth 2 (Two) Times a Day With Meals., Disp: 360 tablet, Rfl: 1  •  cholecalciferol (VITAMIN D3) 1000 UNITS tablet, Take 1,000 Units by mouth Daily., Disp: , Rfl:   •  clindamycin (Clindagel) 1 % gel, Apply  topically to the appropriate area as directed 2 (Two) Times a Day., Disp: 60 g, Rfl: 2  •  clopidogrel (PLAVIX) 75 MG tablet, Take 1 tablet by mouth Daily., Disp: 30 tablet, Rfl: 0  •  famotidine (PEPCID) 10 MG tablet, Take 10 mg by mouth Every Night., Disp: , Rfl:   •  fluticasone (FLONASE) 50 MCG/ACT nasal spray, 1 spray into the nostril(s) as directed by provider Daily., Disp: 16 g, Rfl: 3  •  loratadine (CLARITIN) 10 MG tablet, Take 10 mg by mouth Daily., Disp: , Rfl:   •  NIFEdipine XL (PROCARDIA XL) 90 MG 24 hr tablet, Take 90 mg by mouth Every Night. 2 tabs, Disp: , Rfl:   •  pravastatin (PRAVACHOL) 80 MG tablet, Take 80 mg by mouth Every Night., Disp: , Rfl:   •  valsartan-hydrochlorothiazide (DIOVAN-HCT) 320-25 MG per tablet, TAKE 1 TABLET BY MOUTH  DAILY, Disp: 90 tablet, Rfl: 3  •  aspirin 81 MG EC tablet, Take 81 mg by mouth Daily., Disp: , Rfl:   •  cephalexin (KEFLEX) 500 MG capsule, Take 1 capsule by mouth 3 (Three) Times a Day for 10 days., Disp: 30 capsule, Rfl: 0    Review of Systems   Review of Systems   Constitutional: Positive for malaise/fatigue. Negative for weight loss.   Cardiovascular: Negative for chest pain, claudication and dyspnea on exertion.   Respiratory: Negative for cough and shortness of breath.    Skin: Negative for color change and poor wound healing.   Musculoskeletal: Positive for muscle weakness and myalgias.   Gastrointestinal: Positive for abdominal pain, change in bowel habit and constipation.   Neurological: Negative for dizziness, numbness and weakness.  "      Physical Exam   Vitals:    01/17/22 0835   BP: 126/66   BP Location: Left arm   Pulse: 62   Temp: 98.4 °F (36.9 °C)   TempSrc: Infrared   SpO2: 99%   Weight: 114 kg (252 lb)   Height: 177.8 cm (70\")     Body surface area is 2.3 meters squared.  Body mass index is 36.16 kg/m².  Physical Exam  Constitutional:       General: He is not in acute distress.     Appearance: He is not ill-appearing.   HENT:      Right Ear: Hearing normal.      Left Ear: Hearing normal.      Nose: No nasal deformity.      Mouth/Throat:      Dentition: Normal dentition. Does not have dentures.   Cardiovascular:      Rate and Rhythm: Normal rate and regular rhythm.      Pulses:           Carotid pulses are 2+ on the right side and 2+ on the left side.       Radial pulses are 2+ on the right side and 2+ on the left side.        Dorsalis pedis pulses are 2+ on the right side and 2+ on the left side.        Posterior tibial pulses are 1+ on the right side and 1+ on the left side.      Heart sounds: No murmur heard.      Pulmonary:      Effort: Pulmonary effort is normal.      Breath sounds: Normal breath sounds.   Abdominal:      General: There is no distension.      Palpations: Abdomen is soft. There is no mass.      Tenderness: There is no abdominal tenderness.   Musculoskeletal:         General: No deformity.      Comments: Gait normal.    Skin:     General: Skin is warm and dry.      Coloration: Skin is not pale.      Findings: No erythema.      Comments: Bilateral groin erythema, L longitudinal incision with blistering from adhesive, mild drainage.    Neurological:      Mental Status: He is alert and oriented to person, place, and time.   Psychiatric:         Speech: Speech normal.         Behavior: Behavior is cooperative.         Thought Content: Thought content normal.         Judgment: Judgment normal.         BUN   Date Value Ref Range Status   01/08/2022 18 8 - 23 mg/dL Final     Creatinine   Date Value Ref Range Status "   01/08/2022 1.61 (H) 0.76 - 1.27 mg/dL Final     eGFR   Amer   Date Value Ref Range Status   01/08/2022 51 (L) >60 mL/min/1.73 Final       ASSESSMENT/PLAN    1. Abdominal aortic aneurysm (AAA) without rupture (HCC)  BP control in 120 range    Status post endo graft exclusion, obtain CTA combo in 6 weeks, follow up with Dr. Yi to review    - CT Angiogram Abdomen Pelvis With & Without Contrast; Future    2. Mixed hyperlipidemia  Lipid-lowering therapy has been proven beneficial in patients with cardio-vascular disease. Current guidelines recommend statin treatment for all patients with PAD,CAD and carotid stenosis. Statins are beneficial in preventing cardiovascular events, increasing functional capacity and lower the risk of adverse limb loss in PAD. Statins decrease the progression of plaque formation and may improve peripheral vessel lining, and aid in reversing atherosclerosis.    3. Surgical aftercare, circulatory system  Clean operative site with antibacterial soap/water, pat dry. Keep open to air unless draining, then may apply dry dressing.  No ointments or creams unless prescribed by provider.    Empiric keflex, incisional erythema, mild drainage.         This document has been electronically signed by Radu Cedeno [email or web address]-BC @  On January 18, 2022 13:16 CST

## 2022-01-31 ENCOUNTER — DOCUMENTATION (OUTPATIENT)
Dept: SLEEP MEDICINE | Facility: HOSPITAL | Age: 77
End: 2022-01-31

## 2022-01-31 RX ORDER — FEXOFENADINE HYDROCHLORIDE 60 MG/1
60 TABLET, FILM COATED ORAL DAILY
Qty: 30 TABLET | Refills: 2 | Status: SHIPPED | OUTPATIENT
Start: 2022-01-31 | End: 2023-02-14

## 2022-01-31 NOTE — PROGRESS NOTES
Patient called with complaints of increased mucus and steaks of blood in his nasal drainage in the mornings after using his CPAP. He states that he feels dried out but after he gets up and moving for the day, he has nasal drainage. His humidification setting is currently only set at 1. I walked patient through how to adjust the settings to his preferences and he increased it to a 4 to try tonight.  I also advised him to turn the pre-heat mode off for now. We also discussed saline nasal spray or nasal lavage and decreasing the amount of times he blows his nose.     Patient states that he he has been taking Claritin for allergy symptoms for a long time and does not think that he has has tried anything else.   Will send Allegra to [facility] for patient to try instead.

## 2022-02-08 ENCOUNTER — OFFICE VISIT (OUTPATIENT)
Dept: CARDIAC SURGERY | Facility: CLINIC | Age: 77
End: 2022-02-08

## 2022-02-08 VITALS
WEIGHT: 258 LBS | HEIGHT: 70 IN | TEMPERATURE: 98.2 F | HEART RATE: 69 BPM | DIASTOLIC BLOOD PRESSURE: 60 MMHG | OXYGEN SATURATION: 99 % | BODY MASS INDEX: 36.94 KG/M2 | SYSTOLIC BLOOD PRESSURE: 121 MMHG

## 2022-02-08 DIAGNOSIS — Z09 FOLLOW-UP SURGERY CARE: Primary | ICD-10-CM

## 2022-02-08 DIAGNOSIS — I71.40 ABDOMINAL AORTIC ANEURYSM (AAA) WITHOUT RUPTURE: ICD-10-CM

## 2022-02-08 DIAGNOSIS — I10 ESSENTIAL HYPERTENSION: Chronic | ICD-10-CM

## 2022-02-08 DIAGNOSIS — E78.2 MIXED HYPERLIPIDEMIA: Chronic | ICD-10-CM

## 2022-02-08 PROCEDURE — 99024 POSTOP FOLLOW-UP VISIT: CPT | Performed by: NURSE PRACTITIONER

## 2022-02-08 RX ORDER — DOXYCYCLINE HYCLATE 100 MG/1
100 CAPSULE ORAL 2 TIMES DAILY
Qty: 20 CAPSULE | Refills: 0 | Status: SHIPPED | OUTPATIENT
Start: 2022-02-08 | End: 2022-02-22

## 2022-02-08 RX ORDER — CLOPIDOGREL BISULFATE 75 MG/1
75 TABLET ORAL DAILY
Qty: 90 TABLET | Refills: 3 | Status: SHIPPED | OUTPATIENT
Start: 2022-02-08 | End: 2022-12-14

## 2022-02-08 NOTE — PATIENT INSTRUCTIONS
Stable post op AAA/LEFT CFA Endarterectomy  Drainage LEFT groin.   -Empiric antibiotic coverage  -Polymem (pink sponge) daily to incision  Signs and symptoms of infection including drainage from operative site, redness, swelling, with associated fever and/or chills notify [facility] Vascular center immediately for wound check.    Return 2 weeks - Wound check

## 2022-02-08 NOTE — PROGRESS NOTES
Jerald Bonilla  1945    Chief Complaint:    Chief Complaint   Patient presents with   • Aortic Aneurysm       HPI:      PCP:  Annie Penny MD  GI:  Dr Gavin    76 y.o. male with HTN(stable, increased risk stroke, rupture), Hyperlipidemia(stable, increased risk cardiovascular events) and Obesity(uncontrolled, increased risk cardiovascular events) , AAA(new, increase risk rupture).  former smoker.  Moderate abdominal pain, change in stool x 6 months.  Legs week after walking distance.  Asymptomatic AAA found on CT imaging for GI evaluation..  No TIA stroke amaurosis.  No MI claudication. No other associated signs, symptoms or modifying factors. Requested evaluation for draining surgical incision.     9/2014 US Aorta:  Infrarenal 29mm. Iliac R 14mm, L 17mm.  8/2021 CT Abdomen Pelvis:  Descending thoracic aorta 27mm, renals 23mm, 34mm focal saccular infrarenal aneurysm (appears expanded thru near circumferential calcium), iliac R 23mm, L 15mm, femoral R 10mm, L 10mm.  1/2022 Endo AAA repair. LEFT CFA Endarterectomy    8/2021 ECG:  SB 52mm, QTc 429    The following portions of the patient's history were reviewed and updated as appropriate: allergies, current medications, past family history, past medical history, past social history, past surgical history and problem list.  Recent images independently reviewed.  Available laboratory values reviewed.    PMH:  Past Medical History:   Diagnosis Date   • Acne conglobata    • Acute maxillary sinusitis    • Benign prostate hyperplasia    • Blurring of visual image    • Carbuncle     of infected sebaceous cyst on back   • Dizziness and giddiness    • Erectile dysfunction    • Essential hypertension     seeing nephro   • Gastroesophageal reflux disease    • Gout    • Hammer toe    • Hip pain    • Hyperlipidemia    • Low back pain    • Malaise and fatigue    • Need for prophylactic vaccination against Streptococcus pneumoniae (pneumococcus)    • Nuclear senile  "cataract    • Osteoarthritis    • Otalgia    • Otitis externa    • Pain in lower limb    • Rheumatoid arthritis (HCC)    • Skin lesion     right calf   • Sleep apnea     sleeps with c-pap   • Stage 3 chronic kidney disease (HCC)    • Swelling of knee joint    • Tinnitus    • Upper respiratory infection      Past Surgical History:   Procedure Laterality Date   • COLONOSCOPY  2009    Colonoscopy, diagnostic (screening) 33549 (1)    diverticuli, roids    • COLONOSCOPY N/A 2020    Procedure: COLONOSCOPY;  Surgeon: Dagoberto Gavin DO;  Location: [facility] ENDOSCOPY;  Service: Gastroenterology;  Laterality: N/A;  tattoo @ right colon    • COLONOSCOPY N/A 2021    Procedure: COLONOSCOPY;  Surgeon: Dagoberto Gavin DO;  Location: [facility] ENDOSCOPY;  Service: Gastroenterology;  Laterality: N/A;   • ENDOSCOPY N/A 2019    Procedure: ESOPHAGOGASTRODUODENOSCOPY possible dilation;  Surgeon: Jerald Nation MD;  Location: [facility] ENDOSCOPY;  Service: Gastroenterology   • HERNIA REPAIR      umbilical   • JOINT REPLACEMENT     • TOTAL HIP ARTHROPLASTY Left      Family History   Problem Relation Age of Onset   • Hypertension Father    • Heart disease Father    • Cancer Father    • Diabetes Father    • Kidney disease Father    • Hyperlipidemia Other    • Hypertension Other    • Cancer Other         prostate   • Arthritis Mother    • Hypertension Mother    • Obesity Mother    • Thyroid disease Mother    • Mental illness Mother    • Arthritis Maternal Grandmother      Social History     Tobacco Use   • Smoking status: Former Smoker     Quit date:      Years since quittin.   • Smokeless tobacco: Never Used   Vaping Use   • Vaping Use: Never used   Substance Use Topics   • Alcohol use: Yes     Comment: socially   • Drug use: Not Currently     Types: Marijuana       ALLERGIES:  Allergies   Allergen Reactions   • Lisinopril Itching   • Minoxidil Other (See Comments)     \"dont know\" "         MEDICATIONS:    Current Outpatient Medications:   •  allopurinol (ZYLOPRIM) 100 MG tablet, Take 100 mg by mouth 2 (Two) Times a Day., Disp: , Rfl:   •  aspirin 81 MG EC tablet, Take 81 mg by mouth Daily., Disp: , Rfl:   •  B Complex Vitamins (VITAMIN B COMPLEX PO), Take 1 tablet by mouth Daily., Disp: , Rfl:   •  carvedilol (COREG) 25 MG tablet, Take 2 tablets by mouth 2 (Two) Times a Day With Meals., Disp: 360 tablet, Rfl: 1  •  cholecalciferol (VITAMIN D3) 1000 UNITS tablet, Take 1,000 Units by mouth Daily., Disp: , Rfl:   •  clindamycin (Clindagel) 1 % gel, Apply  topically to the appropriate area as directed 2 (Two) Times a Day., Disp: 60 g, Rfl: 2  •  clopidogrel (PLAVIX) 75 MG tablet, Take 1 tablet by mouth Daily., Disp: 90 tablet, Rfl: 3  •  famotidine (PEPCID) 10 MG tablet, Take 10 mg by mouth Every Night., Disp: , Rfl:   •  fexofenadine (Allegra Allergy) 60 MG tablet, Take 1 tablet by mouth Daily., Disp: 30 tablet, Rfl: 2  •  fluticasone (FLONASE) 50 MCG/ACT nasal spray, 1 spray into the nostril(s) as directed by provider Daily., Disp: 16 g, Rfl: 3  •  NIFEdipine XL (PROCARDIA XL) 90 MG 24 hr tablet, Take 90 mg by mouth Every Night. 2 tabs, Disp: , Rfl:   •  pravastatin (PRAVACHOL) 80 MG tablet, Take 80 mg by mouth Every Night., Disp: , Rfl:   •  valsartan-hydrochlorothiazide (DIOVAN-HCT) 320-25 MG per tablet, TAKE 1 TABLET BY MOUTH  DAILY, Disp: 90 tablet, Rfl: 3  •  doxycycline (VIBRAMYCIN) 100 MG capsule, Take 1 capsule by mouth 2 (Two) Times a Day., Disp: 20 capsule, Rfl: 0    Review of Systems   Review of Systems   Constitutional: Positive for malaise/fatigue. Negative for weight loss.   Cardiovascular: Negative for chest pain, claudication and dyspnea on exertion.   Respiratory: Negative for cough and shortness of breath.    Skin: Negative for color change and poor wound healing.   Musculoskeletal: Positive for muscle weakness and myalgias.   Gastrointestinal: Positive for abdominal pain,  "change in bowel habit and constipation.   Neurological: Negative for dizziness, numbness and weakness.       Physical Exam   Vitals:    02/08/22 1324   BP: 121/60   BP Location: Left arm   Patient Position: Sitting   Cuff Size: Adult   Pulse: 69   Temp: 98.2 °F (36.8 °C)   TempSrc: Temporal   SpO2: 99%   Weight: 117 kg (258 lb)   Height: 177.8 cm (70\")     Body surface area is 2.33 meters squared.  Body mass index is 37.02 kg/m².  Physical Exam  Constitutional:       General: He is not in acute distress.     Appearance: He is not ill-appearing.   HENT:      Right Ear: Hearing normal.      Left Ear: Hearing normal.      Nose: No nasal deformity.      Mouth/Throat:      Dentition: Normal dentition. Does not have dentures.   Cardiovascular:      Rate and Rhythm: Normal rate and regular rhythm.      Pulses:           Carotid pulses are 2+ on the right side and 2+ on the left side.       Radial pulses are 2+ on the right side and 2+ on the left side.        Dorsalis pedis pulses are 2+ on the right side and 2+ on the left side.        Posterior tibial pulses are 1+ on the right side and 1+ on the left side.      Heart sounds: No murmur heard.      Pulmonary:      Effort: Pulmonary effort is normal.      Breath sounds: Normal breath sounds.   Abdominal:      General: There is no distension.      Palpations: Abdomen is soft. There is no mass.      Tenderness: There is no abdominal tenderness.   Musculoskeletal:         General: No deformity.      Comments: Gait normal.    Skin:     General: Skin is warm and dry.      Coloration: Skin is not pale.      Findings: No erythema.      Comments: (L) Incision: small opening end incision. Hypertrophy tissue. Silver nitrate applied. Serous drainage.   Neurological:      Mental Status: He is alert and oriented to person, place, and time.   Psychiatric:         Speech: Speech normal.         Behavior: Behavior is cooperative.         Thought Content: Thought content normal.         " Judgment: Judgment normal.         BUN   Date Value Ref Range Status   01/08/2022 18 8 - 23 mg/dL Final     Creatinine   Date Value Ref Range Status   01/08/2022 1.61 (H) 0.76 - 1.27 mg/dL Final     eGFR   Amer   Date Value Ref Range Status   01/08/2022 51 (L) >60 mL/min/1.73 Final       ASSESSMENT/PLAN    1. Follow-up surgery care  Stable post op AAA/LEFT CFA Endarterectomy  Drainage LEFT groin.   -Empiric antibiotic coverage  -Polymem (pink sponge) daily to incision  Signs and symptoms of infection including drainage from operative site, redness, swelling, with associated fever and/or chills notify Heart and Vascular center immediately for wound check.    Return 2 weeks - Wound check  - doxycycline (VIBRAMYCIN) 100 MG capsule; Take 1 capsule by mouth 2 (Two) Times a Day.  Dispense: 20 capsule; Refill: 0  - Doppler Ankle Brachial Index Single Level CAR; Future  - clopidogrel (PLAVIX) 75 MG tablet; Take 1 tablet by mouth Daily.  Dispense: 90 tablet; Refill: 3    2. Abdominal aortic aneurysm (AAA) without rupture (HCC)   range  Follow up Combo CTA  as scheduled    3. Essential hypertension  Controlled.     4. Mixed hyperlipidemia  Lipid-lowering therapy has been proven beneficial in patients with cardio-vascular disease. Current guidelines recommend statin treatment for all patients with PAD,CAD and carotid stenosis. Statins are beneficial in preventing cardiovascular events, increasing functional capacity and lower the risk of adverse limb loss in PAD. Statins decrease the progression of plaque formation and may improve peripheral vessel lining, and aid in reversing atherosclerosis.          Advance Care Planning discussed and  Informational packet given to patient. Advance Care Plan on file: No    Patient's Body mass index is 37.02 kg/m². indicating that he is obese (BMI >30). Obesity-related health conditions include the following: hypertension, dyslipidemias and peripheral vascular disease. Obesity  is unchanged. BMI is is above average; BMI management plan is completed. We discussed low calorie, low carb based diet program and portion control..           This document has been electronically signed by BEATRIS Medley on February 8, 2022 14:54 CST

## 2022-02-14 DIAGNOSIS — I10 ESSENTIAL HYPERTENSION: Chronic | ICD-10-CM

## 2022-02-15 RX ORDER — CARVEDILOL 25 MG/1
TABLET ORAL
Qty: 360 TABLET | Refills: 0 | Status: SHIPPED | OUTPATIENT
Start: 2022-02-15 | End: 2022-04-29

## 2022-02-22 ENCOUNTER — TRANSCRIBE ORDERS (OUTPATIENT)
Dept: CT IMAGING | Facility: HOSPITAL | Age: 77
End: 2022-02-22

## 2022-02-22 ENCOUNTER — OFFICE VISIT (OUTPATIENT)
Dept: CARDIAC SURGERY | Facility: CLINIC | Age: 77
End: 2022-02-22

## 2022-02-22 VITALS
DIASTOLIC BLOOD PRESSURE: 69 MMHG | WEIGHT: 258 LBS | HEIGHT: 70 IN | SYSTOLIC BLOOD PRESSURE: 124 MMHG | BODY MASS INDEX: 36.94 KG/M2 | TEMPERATURE: 97.7 F | OXYGEN SATURATION: 97 % | HEART RATE: 65 BPM

## 2022-02-22 DIAGNOSIS — I71.40 ABDOMINAL AORTIC ANEURYSM WITHOUT RUPTURE: Primary | ICD-10-CM

## 2022-02-22 DIAGNOSIS — I71.40 ABDOMINAL AORTIC ANEURYSM (AAA) WITHOUT RUPTURE: ICD-10-CM

## 2022-02-22 DIAGNOSIS — Z09 FOLLOW-UP SURGERY CARE: Primary | ICD-10-CM

## 2022-02-22 DIAGNOSIS — I10 ESSENTIAL HYPERTENSION: Chronic | ICD-10-CM

## 2022-02-22 DIAGNOSIS — E78.2 MIXED HYPERLIPIDEMIA: Chronic | ICD-10-CM

## 2022-02-22 PROCEDURE — 99024 POSTOP FOLLOW-UP VISIT: CPT | Performed by: NURSE PRACTITIONER

## 2022-02-22 NOTE — PROGRESS NOTES
Jerald Bonilla  1945    Chief Complaint:    Chief Complaint   Patient presents with   • Wound Check       HPI:      PCP:  Annie Penny MD  GI:  Dr Gavin    76 y.o. male with HTN(stable, increased risk stroke, rupture), Hyperlipidemia(stable, increased risk cardiovascular events) and Obesity(uncontrolled, increased risk cardiovascular events) , AAA(new, increase risk rupture).  former smoker.  Moderate abdominal pain, change in stool x 6 months.  Legs week after walking distance.  Asymptomatic AAA found on CT imaging for GI evaluation..  No TIA stroke amaurosis.  No MI claudication. No other associated signs, symptoms or modifying factors. Returns for wound check.     9/2014 US Aorta:  Infrarenal 29mm. Iliac R 14mm, L 17mm.  8/2021 CT Abdomen Pelvis:  Descending thoracic aorta 27mm, renals 23mm, 34mm focal saccular infrarenal aneurysm (appears expanded thru near circumferential calcium), iliac R 23mm, L 15mm, femoral R 10mm, L 10mm.  1/2022 Endo AAA repair. LEFT CFA Endarterectomy    8/2021 ECG:  SB 52mm, QTc 429    The following portions of the patient's history were reviewed and updated as appropriate: allergies, current medications, past family history, past medical history, past social history, past surgical history and problem list.  Recent images independently reviewed.  Available laboratory values reviewed.    PMH:  Past Medical History:   Diagnosis Date   • Acne conglobata    • Acute maxillary sinusitis    • Benign prostate hyperplasia    • Blurring of visual image    • Carbuncle     of infected sebaceous cyst on back   • Dizziness and giddiness    • Erectile dysfunction    • Essential hypertension     seeing nephro   • Gastroesophageal reflux disease    • Gout    • Hammer toe    • Hip pain    • Hyperlipidemia    • Low back pain    • Malaise and fatigue    • Need for prophylactic vaccination against Streptococcus pneumoniae (pneumococcus)    • Nuclear senile cataract    • Osteoarthritis   "  • Otalgia    • Otitis externa    • Pain in lower limb    • Rheumatoid arthritis (HCC)    • Skin lesion     right calf   • Sleep apnea     sleeps with c-pap   • Stage 3 chronic kidney disease (HCC)    • Swelling of knee joint    • Tinnitus    • Upper respiratory infection      Past Surgical History:   Procedure Laterality Date   • COLONOSCOPY  2009    Colonoscopy, diagnostic (screening) 95528 (1)    diverticuli, roids    • COLONOSCOPY N/A 2020    Procedure: COLONOSCOPY;  Surgeon: Dagoberto Gavin DO;  Location: [facility] ENDOSCOPY;  Service: Gastroenterology;  Laterality: N/A;  tattoo @ right colon    • COLONOSCOPY N/A 2021    Procedure: COLONOSCOPY;  Surgeon: Dagoberto Gavin DO;  Location: [facility] ENDOSCOPY;  Service: Gastroenterology;  Laterality: N/A;   • ENDOSCOPY N/A 2019    Procedure: ESOPHAGOGASTRODUODENOSCOPY possible dilation;  Surgeon: Jerald Nation MD;  Location: [facility] ENDOSCOPY;  Service: Gastroenterology   • HERNIA REPAIR      umbilical   • JOINT REPLACEMENT     • TOTAL HIP ARTHROPLASTY Left      Family History   Problem Relation Age of Onset   • Hypertension Father    • Heart disease Father    • Cancer Father    • Diabetes Father    • Kidney disease Father    • Hyperlipidemia Other    • Hypertension Other    • Cancer Other         prostate   • Arthritis Mother    • Hypertension Mother    • Obesity Mother    • Thyroid disease Mother    • Mental illness Mother    • Arthritis Maternal Grandmother      Social History     Tobacco Use   • Smoking status: Former Smoker     Quit date:      Years since quittin.1   • Smokeless tobacco: Never Used   Vaping Use   • Vaping Use: Never used   Substance Use Topics   • Alcohol use: Yes     Comment: socially   • Drug use: Not Currently     Types: Marijuana       ALLERGIES:  Allergies   Allergen Reactions   • Lisinopril Itching   • Minoxidil Other (See Comments)     \"dont know\"         MEDICATIONS:    Current Outpatient Medications: "   •  allopurinol (ZYLOPRIM) 100 MG tablet, Take 100 mg by mouth 2 (Two) Times a Day., Disp: , Rfl:   •  aspirin 81 MG EC tablet, Take 81 mg by mouth Daily., Disp: , Rfl:   •  B Complex Vitamins (VITAMIN B COMPLEX PO), Take 1 tablet by mouth Daily., Disp: , Rfl:   •  carvedilol (COREG) 25 MG tablet, TAKE 2 TABLETS BY MOUTH  TWICE DAILY WITH MEALS, Disp: 360 tablet, Rfl: 0  •  cholecalciferol (VITAMIN D3) 1000 UNITS tablet, Take 1,000 Units by mouth Daily., Disp: , Rfl:   •  clindamycin (Clindagel) 1 % gel, Apply  topically to the appropriate area as directed 2 (Two) Times a Day., Disp: 60 g, Rfl: 2  •  clopidogrel (PLAVIX) 75 MG tablet, Take 1 tablet by mouth Daily., Disp: 90 tablet, Rfl: 3  •  famotidine (PEPCID) 10 MG tablet, Take 10 mg by mouth Every Night., Disp: , Rfl:   •  fexofenadine (Allegra Allergy) 60 MG tablet, Take 1 tablet by mouth Daily., Disp: 30 tablet, Rfl: 2  •  fluticasone (FLONASE) 50 MCG/ACT nasal spray, 1 spray into the nostril(s) as directed by provider Daily., Disp: 16 g, Rfl: 3  •  NIFEdipine XL (PROCARDIA XL) 90 MG 24 hr tablet, Take 90 mg by mouth Every Night. 2 tabs, Disp: , Rfl:   •  pravastatin (PRAVACHOL) 80 MG tablet, Take 80 mg by mouth Every Night., Disp: , Rfl:   •  valsartan-hydrochlorothiazide (DIOVAN-HCT) 320-25 MG per tablet, TAKE 1 TABLET BY MOUTH  DAILY, Disp: 90 tablet, Rfl: 3    Review of Systems   Review of Systems   Constitutional: Positive for malaise/fatigue. Negative for weight loss.   Cardiovascular: Negative for chest pain, claudication and dyspnea on exertion.   Respiratory: Negative for cough and shortness of breath.    Skin: Negative for color change and poor wound healing.   Musculoskeletal: Positive for muscle weakness and myalgias.   Gastrointestinal: Positive for abdominal pain, change in bowel habit and constipation.   Neurological: Negative for dizziness, numbness and weakness.       Physical Exam   Vitals:    02/22/22 1305   BP: 124/69   BP Location: Left  "arm   Patient Position: Sitting   Cuff Size: Adult   Pulse: 65   Temp: 97.7 °F (36.5 °C)   TempSrc: Temporal   SpO2: 97%   Weight: 117 kg (258 lb)   Height: 177.8 cm (70\")     Body surface area is 2.33 meters squared.  Body mass index is 37.02 kg/m².  Physical Exam  Constitutional:       General: He is not in acute distress.     Appearance: He is not ill-appearing.   HENT:      Right Ear: Hearing normal.      Left Ear: Hearing normal.      Nose: No nasal deformity.      Mouth/Throat:      Dentition: Normal dentition. Does not have dentures.   Cardiovascular:      Rate and Rhythm: Normal rate and regular rhythm.      Pulses:           Carotid pulses are 2+ on the right side and 2+ on the left side.       Radial pulses are 2+ on the right side and 2+ on the left side.        Dorsalis pedis pulses are 2+ on the right side and 2+ on the left side.        Posterior tibial pulses are 1+ on the right side and 1+ on the left side.      Heart sounds: No murmur heard.      Pulmonary:      Effort: Pulmonary effort is normal.      Breath sounds: Normal breath sounds.   Abdominal:      General: There is no distension.      Palpations: Abdomen is soft. There is no mass.      Tenderness: There is no abdominal tenderness.   Musculoskeletal:         General: No deformity.      Comments: Gait normal.    Skin:     General: Skin is warm and dry.      Coloration: Skin is not pale.      Findings: No erythema.      Comments: (L) Incision: healed   Neurological:      Mental Status: He is alert and oriented to person, place, and time.   Psychiatric:         Speech: Speech normal.         Behavior: Behavior is cooperative.         Thought Content: Thought content normal.         Judgment: Judgment normal.         BUN   Date Value Ref Range Status   01/08/2022 18 8 - 23 mg/dL Final     Creatinine   Date Value Ref Range Status   01/08/2022 1.61 (H) 0.76 - 1.27 mg/dL Final     eGFR   Amer   Date Value Ref Range Status   01/08/2022 51 (L) " >60 mL/min/1.73 Final       ASSESSMENT/PLAN    1. Follow-up surgery care  LEFT groin healed.   Apply hydrogel cover with Band-Aid x 1 week   Keep scheduled follow up for CT scan/[place] appointment     2. Abdominal aortic aneurysm (AAA) without rupture (HCC)  Stable post repair  CT Abd/Pelvis as scheduled    3. Essential hypertension  controlled    4. Mixed hyperlipidemia  Lipid-lowering therapy has been proven beneficial in patients with cardio-vascular disease. Current guidelines recommend statin treatment for all patients with PAD,CAD and carotid stenosis. Statins are beneficial in preventing cardiovascular events, increasing functional capacity and lower the risk of adverse limb loss in PAD. Statins decrease the progression of plaque formation and may improve peripheral vessel lining, and aid in reversing atherosclerosis.       Advance Care Planning discussed and  Informational packet given to patient. Advance Care Plan on file: No    Patient's Body mass index is 37.02 kg/m². indicating that he is obese (BMI >30). Obesity-related health conditions include the following: hypertension, dyslipidemias and peripheral vascular disease. Obesity is unchanged. BMI is is above average; BMI management plan is completed. We discussed low calorie, low carb based diet program and portion control..           This document has been electronically signed by BEATRIS Medley on February 22, 2022 13:58 CST

## 2022-02-22 NOTE — PATIENT INSTRUCTIONS
LEFT groin healed.   Apply hydrogel cover with Band-Aid x 1 week   Keep scheduled follow up for CT scan/[facility]

## 2022-02-28 ENCOUNTER — PREP FOR SURGERY (OUTPATIENT)
Dept: OTHER | Facility: HOSPITAL | Age: 77
End: 2022-02-28

## 2022-02-28 ENCOUNTER — HOSPITAL ENCOUNTER (OUTPATIENT)
Dept: CT IMAGING | Facility: HOSPITAL | Age: 77
Discharge: HOME | End: 2022-02-28

## 2022-02-28 ENCOUNTER — LAB (OUTPATIENT)
Dept: LAB | Facility: HOSPITAL | Age: 77
End: 2022-02-28

## 2022-02-28 VITALS
SYSTOLIC BLOOD PRESSURE: 135 MMHG | HEART RATE: 61 BPM | RESPIRATION RATE: 20 BRPM | DIASTOLIC BLOOD PRESSURE: 95 MMHG | OXYGEN SATURATION: 97 % | TEMPERATURE: 97.7 F

## 2022-02-28 DIAGNOSIS — I71.40 ABDOMINAL AORTIC ANEURYSM (AAA) WITHOUT RUPTURE: ICD-10-CM

## 2022-02-28 DIAGNOSIS — N28.9 RENAL INSUFFICIENCY, MILD: Primary | ICD-10-CM

## 2022-02-28 DIAGNOSIS — N28.9 RENAL INSUFFICIENCY, MILD: ICD-10-CM

## 2022-02-28 DIAGNOSIS — I71.40 ABDOMINAL AORTIC ANEURYSM WITHOUT RUPTURE: ICD-10-CM

## 2022-02-28 LAB
BUN SERPL-MCNC: 26 MG/DL (ref 8–23)
CREAT SERPL-MCNC: 1.6 MG/DL (ref 0.76–1.27)
EGFRCR SERPLBLD CKD-EPI 2021: 44.9 ML/MIN/1.73

## 2022-02-28 PROCEDURE — 74174 CTA ABD&PLVS W/CONTRAST: CPT

## 2022-02-28 PROCEDURE — 84520 ASSAY OF UREA NITROGEN: CPT

## 2022-02-28 PROCEDURE — 36415 COLL VENOUS BLD VENIPUNCTURE: CPT

## 2022-02-28 PROCEDURE — 0 IOPAMIDOL PER 1 ML: Performed by: NURSE PRACTITIONER

## 2022-02-28 PROCEDURE — 82565 ASSAY OF CREATININE: CPT

## 2022-02-28 RX ORDER — SODIUM CHLORIDE 9 MG/ML
200 INJECTION, SOLUTION INTRAVENOUS ONCE
Status: COMPLETED | OUTPATIENT
Start: 2022-02-28 | End: 2022-02-28

## 2022-02-28 RX ORDER — SODIUM CHLORIDE 9 MG/ML
200 INJECTION, SOLUTION INTRAVENOUS ONCE
Status: CANCELLED | OUTPATIENT
Start: 2022-02-28 | End: 2022-02-28

## 2022-02-28 RX ADMIN — SODIUM CHLORIDE 200 ML/HR: 9 INJECTION, SOLUTION INTRAVENOUS at 13:35

## 2022-02-28 RX ADMIN — IOPAMIDOL 90 ML: 755 INJECTION, SOLUTION INTRAVENOUS at 13:23

## 2022-03-01 RX ORDER — CLINDAMYCIN HYDROCHLORIDE 300 MG/1
300 CAPSULE ORAL 3 TIMES DAILY
Qty: 30 CAPSULE | Refills: 0 | Status: SHIPPED | OUTPATIENT
Start: 2022-03-01 | End: 2022-03-11

## 2022-03-07 ENCOUNTER — OFFICE VISIT (OUTPATIENT)
Dept: CARDIAC SURGERY | Facility: CLINIC | Age: 77
End: 2022-03-07

## 2022-03-07 VITALS
OXYGEN SATURATION: 98 % | TEMPERATURE: 97.6 F | WEIGHT: 258 LBS | BODY MASS INDEX: 36.94 KG/M2 | SYSTOLIC BLOOD PRESSURE: 140 MMHG | DIASTOLIC BLOOD PRESSURE: 82 MMHG | HEART RATE: 56 BPM | HEIGHT: 70 IN

## 2022-03-07 DIAGNOSIS — I71.40 ABDOMINAL AORTIC ANEURYSM (AAA) WITHOUT RUPTURE: Primary | ICD-10-CM

## 2022-03-07 DIAGNOSIS — E78.2 MIXED HYPERLIPIDEMIA: ICD-10-CM

## 2022-03-07 DIAGNOSIS — T81.31XD POSTOPERATIVE WOUND DEHISCENCE, SUBSEQUENT ENCOUNTER: ICD-10-CM

## 2022-03-07 DIAGNOSIS — I73.9 PVD (PERIPHERAL VASCULAR DISEASE) WITH CLAUDICATION: ICD-10-CM

## 2022-03-07 DIAGNOSIS — K86.2 PANCREATIC CYST: Primary | ICD-10-CM

## 2022-03-07 PROCEDURE — 99024 POSTOP FOLLOW-UP VISIT: CPT | Performed by: NURSE PRACTITIONER

## 2022-03-07 NOTE — PROGRESS NOTES
Jerald Bonilla  1945    Chief Complaint:    Chief Complaint   Patient presents with   • Aortic Aneurysm   • Peripheral Vascular Disease       HPI:      PCP:  Annie Penny MD  GI:  Dr Gavin    76 y.o. male with HTN(stable, increased risk stroke, rupture), Hyperlipidemia(stable, increased risk cardiovascular events) and Obesity(uncontrolled, increased risk cardiovascular events) , AAA(new, increase risk rupture).  former smoker.  Moderate abdominal pain, change in stool x 6 months.  Legs week after walking distance.  Asymptomatic AAA found on CT imaging for GI evaluation..  No TIA stroke amaurosis.  No MI claudication. No other associated signs, symptoms or modifying factors. Returns for wound check.     9/2014 US Aorta:  Infrarenal 29mm. Iliac R 14mm, L 17mm.  8/2021 CT Abdomen Pelvis:  Descending thoracic aorta 27mm, renals 23mm, 34mm focal saccular infrarenal aneurysm (appears expanded thru near circumferential calcium), iliac R 23mm, L 15mm, femoral R 10mm, L 10mm.  1/2022 Endo AAA repair. LEFT CFA Endarterectomy  3/2022 JEANETH: Right 0.72 triphasic.  Left 0.93 triphasic  3/2022 CTA Run Off: No endo leak, residual aneurysmal sac 34mm, incidental [id number] pancreatic cyst    8/2021 ECG:  SB 52mm, QTc 429    The following portions of the patient's history were reviewed and updated as appropriate: allergies, current medications, past family history, past medical history, past social history, past surgical history and problem list.  Recent images independently reviewed.  Available laboratory values reviewed.    PMH:  Past Medical History:   Diagnosis Date   • Acne conglobata    • Acute maxillary sinusitis    • Benign prostate hyperplasia    • Blurring of visual image    • Carbuncle     of infected sebaceous cyst on back   • Dizziness and giddiness    • Erectile dysfunction    • Essential hypertension     seeing nephro   • Gastroesophageal reflux disease    • Gout    • Hammer toe    • Hip pain    •  Hyperlipidemia    • Low back pain    • Malaise and fatigue    • Need for prophylactic vaccination against Streptococcus pneumoniae (pneumococcus)    • Nuclear senile cataract    • Osteoarthritis    • Otalgia    • Otitis externa    • Pain in lower limb    • Rheumatoid arthritis (HCC)    • Skin lesion     right calf   • Sleep apnea     sleeps with c-pap   • Stage 3 chronic kidney disease (HCC)    • Swelling of knee joint    • Tinnitus    • Upper respiratory infection      Past Surgical History:   Procedure Laterality Date   • COLONOSCOPY  2009    Colonoscopy, diagnostic (screening) 91312 (1)    diverticuli, roids    • COLONOSCOPY N/A 2020    Procedure: COLONOSCOPY;  Surgeon: Dagoberto Gavin DO;  Location: [facility] ENDOSCOPY;  Service: Gastroenterology;  Laterality: N/A;  tattoo @ right colon    • COLONOSCOPY N/A 2021    Procedure: COLONOSCOPY;  Surgeon: Dagoberto Gavin DO;  Location: [facility] ENDOSCOPY;  Service: Gastroenterology;  Laterality: N/A;   • ENDOSCOPY N/A 2019    Procedure: ESOPHAGOGASTRODUODENOSCOPY possible dilation;  Surgeon: Jerald Nation MD;  Location: [facility] ENDOSCOPY;  Service: Gastroenterology   • HERNIA REPAIR      umbilical   • JOINT REPLACEMENT     • TOTAL HIP ARTHROPLASTY Left      Family History   Problem Relation Age of Onset   • Hypertension Father    • Heart disease Father    • Cancer Father    • Diabetes Father    • Kidney disease Father    • Hyperlipidemia Other    • Hypertension Other    • Cancer Other         prostate   • Arthritis Mother    • Hypertension Mother    • Obesity Mother    • Thyroid disease Mother    • Mental illness Mother    • Arthritis Maternal Grandmother      Social History     Tobacco Use   • Smoking status: Former Smoker     Quit date: 2000     Years since quittin.1   • Smokeless tobacco: Never Used   Vaping Use   • Vaping Use: Never used   Substance Use Topics   • Alcohol use: Yes     Comment: socially   • Drug use: Not Currently      "Types: Marijuana       ALLERGIES:  Allergies   Allergen Reactions   • Lisinopril Itching   • Minoxidil Other (See Comments)     \"dont know\"         MEDICATIONS:    Current Outpatient Medications:   •  allopurinol (ZYLOPRIM) 100 MG tablet, Take 100 mg by mouth 2 (Two) Times a Day., Disp: , Rfl:   •  aspirin 81 MG EC tablet, Take 81 mg by mouth Daily., Disp: , Rfl:   •  B Complex Vitamins (VITAMIN B COMPLEX PO), Take 1 tablet by mouth Daily., Disp: , Rfl:   •  carvedilol (COREG) 25 MG tablet, TAKE 2 TABLETS BY MOUTH  TWICE DAILY WITH MEALS, Disp: 360 tablet, Rfl: 0  •  cholecalciferol (VITAMIN D3) 1000 UNITS tablet, Take 1,000 Units by mouth Daily., Disp: , Rfl:   •  clindamycin (CLEOCIN) 300 MG capsule, Take 1 capsule by mouth 3 (Three) Times a Day for 10 days., Disp: 30 capsule, Rfl: 0  •  clindamycin (Clindagel) 1 % gel, Apply  topically to the appropriate area as directed 2 (Two) Times a Day., Disp: 60 g, Rfl: 2  •  clopidogrel (PLAVIX) 75 MG tablet, Take 1 tablet by mouth Daily., Disp: 90 tablet, Rfl: 3  •  famotidine (PEPCID) 10 MG tablet, Take 10 mg by mouth Every Night., Disp: , Rfl:   •  fexofenadine (Allegra Allergy) 60 MG tablet, Take 1 tablet by mouth Daily., Disp: 30 tablet, Rfl: 2  •  fluticasone (FLONASE) 50 MCG/ACT nasal spray, 1 spray into the nostril(s) as directed by provider Daily., Disp: 16 g, Rfl: 3  •  NIFEdipine XL (PROCARDIA XL) 90 MG 24 hr tablet, Take 90 mg by mouth Every Night. 2 tabs, Disp: , Rfl:   •  pravastatin (PRAVACHOL) 80 MG tablet, Take 80 mg by mouth Every Night., Disp: , Rfl:   •  valsartan-hydrochlorothiazide (DIOVAN-HCT) 320-25 MG per tablet, TAKE 1 TABLET BY MOUTH  DAILY, Disp: 90 tablet, Rfl: 3    Review of Systems   Review of Systems   Constitutional: Positive for malaise/fatigue. Negative for weight loss.   Cardiovascular: Negative for chest pain, claudication and dyspnea on exertion.   Respiratory: Negative for cough and shortness of breath.    Skin: Negative for color " "change and poor wound healing.   Musculoskeletal: Positive for muscle weakness and myalgias.   Gastrointestinal: Positive for abdominal pain, change in bowel habit and constipation.   Neurological: Negative for dizziness, numbness and weakness.       Physical Exam   Vitals:    03/07/22 0939   BP: 140/82   BP Location: Left arm   Patient Position: Sitting   Cuff Size: Adult   Pulse: 56   Temp: 97.6 °F (36.4 °C)   TempSrc: Temporal   SpO2: 98%   Weight: 117 kg (258 lb)   Height: 177.8 cm (70\")     Body surface area is 2.33 meters squared.  Body mass index is 37.02 kg/m².  Physical Exam  Constitutional:       General: He is not in acute distress.     Appearance: He is not ill-appearing.   HENT:      Right Ear: Hearing normal.      Left Ear: Hearing normal.      Nose: No nasal deformity.      Mouth/Throat:      Dentition: Normal dentition. Does not have dentures.   Cardiovascular:      Rate and Rhythm: Normal rate and regular rhythm.      Pulses:           Carotid pulses are 2+ on the right side and 2+ on the left side.       Radial pulses are 2+ on the right side and 2+ on the left side.        Dorsalis pedis pulses are 2+ on the right side and 2+ on the left side.        Posterior tibial pulses are 1+ on the right side and 1+ on the left side.      Heart sounds: No murmur heard.  Pulmonary:      Effort: Pulmonary effort is normal.      Breath sounds: Normal breath sounds.   Abdominal:      General: There is no distension.      Palpations: Abdomen is soft. There is no mass.      Tenderness: There is no abdominal tenderness.   Musculoskeletal:         General: No deformity.      Comments: Gait normal.    Skin:     General: Skin is warm and dry.      Coloration: Skin is not pale.      Findings: No erythema.      Comments: (L) Incision: healed   Neurological:      Mental Status: He is alert and oriented to person, place, and time.   Psychiatric:         Speech: Speech normal.         Behavior: Behavior is cooperative.    "      Thought Content: Thought content normal.         Judgment: Judgment normal.         BUN   Date Value Ref Range Status   02/28/2022 26 (H) 8 - 23 mg/dL Final     Creatinine   Date Value Ref Range Status   02/28/2022 1.60 (H) 0.76 - 1.27 mg/dL Final     eGFR   Amer   Date Value Ref Range Status   01/08/2022 51 (L) >60 mL/min/1.73 Final       ASSESSMENT/PLAN  3/2022 JEANETH: Right 0.72 triphasic.  Left 0.93 triphasic  3/2022 CTA Run Off: No endo leak, residual aneurysmal sac 34mm, incidental [id number] pancreatic cyst    1. Abdominal aortic aneurysm (AAA) without rupture (HCC)  Status post endo aaa repair, no evidence of endo leak on CTA  Smoking cessation, lipid management, BP control in 120 range advised. Risks, benefits discussed.  Understands and wishes to proceed with plan    Return in 6 months with CTA Abdomen Pelvis    - CT Abdomen With & Without Contrast; Future    2. PVD (peripheral vascular disease) with claudication (HCC)  Moderate reduction in RLE perfusion, normal resting perfusion to left lower extremity.     No claudication    ASA, Statin, Plavix    Repeat JEANETH in 6 months    - Doppler Ankle Brachial Index Single Level CAR; Future    3. Mixed hyperlipidemia  Lipid-lowering therapy has been proven beneficial in patients with cardio-vascular disease. Current guidelines recommend statin treatment for all patients with PAD,CAD and carotid stenosis. Statins are beneficial in preventing cardiovascular events, increasing functional capacity and lower the risk of adverse limb loss in PAD. Statins decrease the progression of plaque formation and may improve peripheral vessel lining, and aid in reversing atherosclerosis.    4. Postoperative wound dehiscence, subsequent encounter  Polymem to bilateral groin sites.  Left groin granulating near healed.  Right groin small pin hole with scant yellowish drainage.  Complete abx.  Contact [facility] for persistent drainage.    5. Pancreatic Cyst  Incidental finding on CT  imaging during endo AAA surveillance. Previously measured 31x30 on initial CTA from [facility].      Referred to general surgery for evaluation and recommendations.     Advance Care Planning discussed and  Informational packet given to patient. Advance Care Plan on file: No    Patient's Body mass index is 37.02 kg/m². indicating that he is obese (BMI >30). Obesity-related health conditions include the following: hypertension, dyslipidemias and peripheral vascular disease. Obesity is unchanged. BMI is is above average; BMI management plan is completed. We discussed low calorie, low carb based diet program and portion control..             This document has been electronically signed by VICKY Samaniego-BC @  On March 7, 2022 11:06 CST

## 2022-03-07 NOTE — PATIENT INSTRUCTIONS
The results of your vascular studies of your right leg shows mild disease with good  circulation, in the left leg shows milddisease with good  circulation.      If signs and symptoms of ischemia should occur including but not limited to pale/blue discoloration of limb, increasing pain with ambulation or at rest, or a non-healing wound. Patient is to notify [facility] center for immediate evaluation.    Return 6 months CTA and JEANETH follow up with Dr. Yi to review films    Dressing incision sites with polymem daily.      Contact heart and vascular center for any further drainage.

## 2022-03-28 ENCOUNTER — TRANSCRIBE ORDERS (OUTPATIENT)
Dept: LAB | Facility: HOSPITAL | Age: 77
End: 2022-03-28

## 2022-03-28 DIAGNOSIS — N18.9 CHRONIC KIDNEY DISEASE, UNSPECIFIED CKD STAGE: Primary | ICD-10-CM

## 2022-04-05 ENCOUNTER — OFFICE VISIT (OUTPATIENT)
Dept: SURGERY | Facility: CLINIC | Age: 77
End: 2022-04-05

## 2022-04-05 VITALS
HEART RATE: 62 BPM | TEMPERATURE: 98.2 F | BODY MASS INDEX: 36.88 KG/M2 | DIASTOLIC BLOOD PRESSURE: 50 MMHG | SYSTOLIC BLOOD PRESSURE: 134 MMHG | HEIGHT: 70 IN | OXYGEN SATURATION: 96 % | WEIGHT: 257.6 LBS

## 2022-04-05 DIAGNOSIS — K86.2 PANCREATIC CYST: Primary | ICD-10-CM

## 2022-04-05 PROCEDURE — 99203 OFFICE O/P NEW LOW 30 MIN: CPT | Performed by: SURGERY

## 2022-04-05 RX ORDER — BENZALKONIUM CHLORIDE 1.3 MG/ML
CLOTH TOPICAL
COMMUNITY

## 2022-04-05 NOTE — PROGRESS NOTES
Chief Complaint   Patient presents with   • Consult     Ref: Kenton Cedeno, pancreatic cystic lesion        HPI  76-year-old man referred for the possibility of a pancreatic neoplasm.  The head of the pancreas demonstrates a small cystic lesion for which a 6-month follow-up has been requested.  He is virtually asymptomatic from this lesion.  Past Medical History:   Diagnosis Date   • Acne conglobata    • Acute maxillary sinusitis    • Allergies    • Benign prostate hyperplasia    • Blurring of visual image    • Carbuncle     of infected sebaceous cyst on back   • Dizziness and giddiness    • Erectile dysfunction    • Essential hypertension     seeing nephro   • Gastroesophageal reflux disease    • Gout    • Hammer toe    • Hearing loss    • Hip pain    • Hyperlipidemia    • Low back pain    • Malaise and fatigue    • Need for prophylactic vaccination against Streptococcus pneumoniae (pneumococcus)    • Nuclear senile cataract    • Obesity    • Osteoarthritis    • Otalgia    • Otitis externa    • Pain in lower limb    • Rheumatoid arthritis (HCC)    • Skin lesion     right calf   • Sleep apnea     sleeps with c-pap   • Stage 3 chronic kidney disease (HCC)    • Swelling of knee joint    • Tinnitus    • Upper respiratory infection        Past Surgical History:   Procedure Laterality Date   • ABDOMINAL AORTIC ANEURYSM REPAIR WITH ENDOGRAFT N/A 01/07/2022    Procedure: ABDOMINAL AORTIC ANEURYSM REPAIR WITH ENDOGRAFT;  Surgeon: Unruly Yi MD;  Location: [facility] OR;  Service: Cardiothoracic;  Laterality: N/A;   • COLONOSCOPY  05/18/2009    Colonoscopy, diagnostic (screening) 66433 (1)    diverticuli, roids    • COLONOSCOPY N/A 08/13/2020    Procedure: COLONOSCOPY;  Surgeon: Dagoberto Gavin DO;  Location: [facility] ENDOSCOPY;  Service: Gastroenterology;  Laterality: N/A;  tattoo @ right colon    • COLONOSCOPY N/A 02/18/2021    Procedure: COLONOSCOPY;  Surgeon: Dagoberto Gavin DO;  Location: [facility] ENDOSCOPY;   Service: Gastroenterology;  Laterality: N/A;   • CYST REMOVAL Bilateral     cheeks   • ENDOSCOPY N/A 01/16/2019    Procedure: ESOPHAGOGASTRODUODENOSCOPY possible dilation;  Surgeon: Jerald Nation MD;  Location: [facility] ENDOSCOPY;  Service: Gastroenterology   • HERNIA REPAIR      umbilical   • JOINT REPLACEMENT     • PILONIDAL CYSTECTOMY N/A     HAYWARD   • TOTAL HIP ARTHROPLASTY Left    • WISDOM TOOTH EXTRACTION           Current Outpatient Medications:   •  allopurinol (ZYLOPRIM) 100 MG tablet, Take 100 mg by mouth 2 (Two) Times a Day., Disp: , Rfl:   •  aspirin 81 MG EC tablet, Take 81 mg by mouth Daily., Disp: , Rfl:   •  B Complex Vitamins (VITAMIN B COMPLEX PO), Take 1 tablet by mouth Daily., Disp: , Rfl:   •  carvedilol (COREG) 25 MG tablet, TAKE 2 TABLETS BY MOUTH  TWICE DAILY WITH MEALS, Disp: 360 tablet, Rfl: 0  •  cholecalciferol (VITAMIN D3) 1000 UNITS tablet, Take 1,000 Units by mouth Daily., Disp: , Rfl:   •  clindamycin (Clindagel) 1 % gel, Apply  topically to the appropriate area as directed 2 (Two) Times a Day., Disp: 60 g, Rfl: 2  •  clopidogrel (PLAVIX) 75 MG tablet, Take 1 tablet by mouth Daily., Disp: 90 tablet, Rfl: 3  •  famotidine (PEPCID) 10 MG tablet, Take 10 mg by mouth Every Night., Disp: , Rfl:   •  fexofenadine (Allegra Allergy) 60 MG tablet, Take 1 tablet by mouth Daily., Disp: 30 tablet, Rfl: 2  •  fluticasone (FLONASE) 50 MCG/ACT nasal spray, 1 spray into the nostril(s) as directed by provider Daily., Disp: 16 g, Rfl: 3  •  NIFEdipine XL (PROCARDIA XL) 90 MG 24 hr tablet, Take 90 mg by mouth Every Night. 2 tabs, Disp: , Rfl:   •  pravastatin (PRAVACHOL) 80 MG tablet, Take 80 mg by mouth Every Night., Disp: , Rfl:   •  valsartan-hydrochlorothiazide (DIOVAN-HCT) 320-25 MG per tablet, TAKE 1 TABLET BY MOUTH  DAILY, Disp: 90 tablet, Rfl: 3  •  Respiratory Therapy Supplies (CareTouch 2 CPAP Hose ) misc, , Disp: , Rfl:     Allergies   Allergen Reactions   • Lisinopril Itching   •  "Minoxidil Other (See Comments)     \"dont know\"       Family History   Problem Relation Age of Onset   • Arthritis Mother    • Hypertension Mother    • Obesity Mother    • Thyroid disease Mother    • Mental illness Mother    • Hypertension Father    • Heart disease Father    • Cancer Father    • Diabetes Father    • Kidney disease Father    • Obesity Daughter    • Obesity Daughter    • No Known Problems Daughter    • No Known Problems Son    • Arthritis Maternal Grandmother        Social History     Socioeconomic History   • Marital status:    Tobacco Use   • Smoking status: Former Smoker     Quit date: 2000     Years since quittin.2   • Smokeless tobacco: Never Used   Vaping Use   • Vaping Use: Never used   Substance and Sexual Activity   • Alcohol use: Yes     Comment: socially   • Drug use: Not Currently     Types: Marijuana     Comment: none in 1-2 years   • Sexual activity: Defer       Review of Systems   HENT: Positive for hearing loss.    Cardiovascular: Positive for leg swelling.   Gastrointestinal:        Heartburn   Genitourinary: Positive for frequency.   Musculoskeletal: Positive for arthralgias and back pain.   All other systems reviewed and are negative.      Physical Exam  Vitals reviewed.   Constitutional:       Appearance: Normal appearance.   HENT:      Head: Normocephalic.   Eyes:      Extraocular Movements: Extraocular movements intact.      Conjunctiva/sclera: Conjunctivae normal.      Pupils: Pupils are equal, round, and reactive to light.   Cardiovascular:      Rate and Rhythm: Normal rate and regular rhythm.   Pulmonary:      Effort: Pulmonary effort is normal. No respiratory distress.   Abdominal:      General: Abdomen is flat. There is no distension.      Palpations: Abdomen is soft. There is no mass.      Tenderness: There is no abdominal tenderness. There is no guarding or rebound.      Hernia: No hernia is present.   Musculoskeletal:         General: Normal range of motion.    "   Cervical back: Normal range of motion and neck supple.   Skin:     General: Skin is warm and dry.   Neurological:      General: No focal deficit present.      Mental Status: He is alert and oriented to person, place, and time.   Psychiatric:         Mood and Affect: Mood normal.         Behavior: Behavior normal.           ASSESSMENT    Diagnoses and all orders for this visit:    1. Pancreatic cyst (Primary)  -     CT Abdomen With Contrast; Future        PLAN    1.  6-month follow-up with CT scan              This document has been electronically signed by Ghulam Hernandez MD on April 7, 2022 20:41 CDT

## 2022-04-19 ENCOUNTER — OFFICE VISIT (OUTPATIENT)
Dept: CARDIAC SURGERY | Facility: CLINIC | Age: 77
End: 2022-04-19

## 2022-04-19 VITALS
HEIGHT: 70 IN | HEART RATE: 62 BPM | TEMPERATURE: 98.2 F | DIASTOLIC BLOOD PRESSURE: 70 MMHG | BODY MASS INDEX: 37.94 KG/M2 | SYSTOLIC BLOOD PRESSURE: 140 MMHG | WEIGHT: 265 LBS | OXYGEN SATURATION: 98 %

## 2022-04-19 DIAGNOSIS — I71.40 ABDOMINAL AORTIC ANEURYSM (AAA) WITHOUT RUPTURE: ICD-10-CM

## 2022-04-19 DIAGNOSIS — T81.31XD POSTOPERATIVE WOUND DEHISCENCE, SUBSEQUENT ENCOUNTER: ICD-10-CM

## 2022-04-19 DIAGNOSIS — E78.2 MIXED HYPERLIPIDEMIA: Primary | Chronic | ICD-10-CM

## 2022-04-19 DIAGNOSIS — I73.9 PVD (PERIPHERAL VASCULAR DISEASE) WITH CLAUDICATION: ICD-10-CM

## 2022-04-19 PROCEDURE — 99024 POSTOP FOLLOW-UP VISIT: CPT | Performed by: NURSE PRACTITIONER

## 2022-04-19 RX ORDER — CEPHALEXIN 500 MG/1
500 CAPSULE ORAL 3 TIMES DAILY
Qty: 42 CAPSULE | Refills: 0 | Status: SHIPPED | OUTPATIENT
Start: 2022-04-19 | End: 2022-05-04

## 2022-04-19 NOTE — PATIENT INSTRUCTIONS
Clean operative site with antibacterial soap/water, pat dry. Keep site dry with 4x4 dressing and change daily.      Antibiotic three times a day  for two weeks

## 2022-04-20 NOTE — PROGRESS NOTES
Jerald Bonilla  1945    Chief Complaint:    Chief Complaint   Patient presents with   • Wound Check       HPI:      PCP:  Annie Penny MD  GI:  Dr Gavin    76 y.o. male with HTN(stable, increased risk stroke, rupture), Hyperlipidemia(stable, increased risk cardiovascular events) and Obesity(uncontrolled, increased risk cardiovascular events) , AAA(new, increase risk rupture).  former smoker.  Moderate abdominal pain, change in stool x 6 months.  Legs week after walking distance.  Asymptomatic AAA found on CT imaging for GI evaluation..  No TIA stroke amaurosis.  No MI claudication. No other associated signs, symptoms or modifying factors. Returns for wound check.     9/2014 US Aorta:  Infrarenal 29mm. Iliac R 14mm, L 17mm.  8/2021 CT Abdomen Pelvis:  Descending thoracic aorta 27mm, renals 23mm, 34mm focal saccular infrarenal aneurysm (appears expanded thru near circumferential calcium), iliac R 23mm, L 15mm, femoral R 10mm, L 10mm.  1/2022 Endo AAA repair. LEFT CFA Endarterectomy  3/2022 JEANETH: Right 0.72 triphasic.  Left 0.93 triphasic  3/2022 CTA Run Off: No endo leak, residual aneurysmal sac 34mm, incidental [id number] pancreatic cyst    8/2021 ECG:  SB 52mm, QTc 429    The following portions of the patient's history were reviewed and updated as appropriate: allergies, current medications, past family history, past medical history, past social history, past surgical history and problem list.  Recent images independently reviewed.  Available laboratory values reviewed.    PMH:  Past Medical History:   Diagnosis Date   • Acne conglobata    • Acute maxillary sinusitis    • Allergies    • Benign prostate hyperplasia    • Blurring of visual image    • Carbuncle     of infected sebaceous cyst on back   • Dizziness and giddiness    • Erectile dysfunction    • Essential hypertension     seeing nephro   • Gastroesophageal reflux disease    • Gout    • Hammer toe    • Hearing loss    • Hip pain    •  Hyperlipidemia    • Low back pain    • Malaise and fatigue    • Need for prophylactic vaccination against Streptococcus pneumoniae (pneumococcus)    • Nuclear senile cataract    • Obesity    • Osteoarthritis    • Otalgia    • Otitis externa    • Pain in lower limb    • Rheumatoid arthritis (HCC)    • Skin lesion     right calf   • Sleep apnea     sleeps with c-pap   • Stage 3 chronic kidney disease (HCC)    • Swelling of knee joint    • Tinnitus    • Upper respiratory infection      Past Surgical History:   Procedure Laterality Date   • ABDOMINAL AORTIC ANEURYSM REPAIR WITH ENDOGRAFT N/A 01/07/2022    Procedure: ABDOMINAL AORTIC ANEURYSM REPAIR WITH ENDOGRAFT;  Surgeon: Unruly Yi MD;  Location: [facility] OR;  Service: Cardiothoracic;  Laterality: N/A;   • COLONOSCOPY  05/18/2009    Colonoscopy, diagnostic (screening) 99704 (1)    diverticuli, roids    • COLONOSCOPY N/A 08/13/2020    Procedure: COLONOSCOPY;  Surgeon: Dagoberto Gavin DO;  Location: [facility] ENDOSCOPY;  Service: Gastroenterology;  Laterality: N/A;  tattoo @ right colon    • COLONOSCOPY N/A 02/18/2021    Procedure: COLONOSCOPY;  Surgeon: Dagoberto Gavin DO;  Location: [facility] ENDOSCOPY;  Service: Gastroenterology;  Laterality: N/A;   • CYST REMOVAL Bilateral     cheeks   • ENDOSCOPY N/A 01/16/2019    Procedure: ESOPHAGOGASTRODUODENOSCOPY possible dilation;  Surgeon: Jerald Nation MD;  Location: [facility] ENDOSCOPY;  Service: Gastroenterology   • HERNIA REPAIR      umbilical   • JOINT REPLACEMENT     • PILONIDAL CYSTECTOMY N/A     HAYWARD   • TOTAL HIP ARTHROPLASTY Left    • WISDOM TOOTH EXTRACTION       Family History   Problem Relation Age of Onset   • Arthritis Mother    • Hypertension Mother    • Obesity Mother    • Thyroid disease Mother    • Mental illness Mother    • Hypertension Father    • Heart disease Father    • Cancer Father    • Diabetes Father    • Kidney disease Father    • Obesity Daughter    • Obesity Daughter    • No Known  "Problems Daughter    • No Known Problems Son    • Arthritis Maternal Grandmother      Social History     Tobacco Use   • Smoking status: Former Smoker     Quit date:      Years since quittin.3   • Smokeless tobacco: Never Used   Vaping Use   • Vaping Use: Never used   Substance Use Topics   • Alcohol use: Yes     Comment: socially   • Drug use: Not Currently     Types: Marijuana     Comment: none in 1-2 years       ALLERGIES:  Allergies   Allergen Reactions   • Lisinopril Itching   • Minoxidil Other (See Comments)     \"dont know\"         MEDICATIONS:    Current Outpatient Medications:   •  allopurinol (ZYLOPRIM) 100 MG tablet, Take 100 mg by mouth 2 (Two) Times a Day., Disp: , Rfl:   •  aspirin 81 MG EC tablet, Take 81 mg by mouth Daily., Disp: , Rfl:   •  B Complex Vitamins (VITAMIN B COMPLEX PO), Take 1 tablet by mouth Daily., Disp: , Rfl:   •  carvedilol (COREG) 25 MG tablet, TAKE 2 TABLETS BY MOUTH  TWICE DAILY WITH MEALS, Disp: 360 tablet, Rfl: 0  •  cholecalciferol (VITAMIN D3) 1000 UNITS tablet, Take 1,000 Units by mouth Daily., Disp: , Rfl:   •  clindamycin (Clindagel) 1 % gel, Apply  topically to the appropriate area as directed 2 (Two) Times a Day., Disp: 60 g, Rfl: 2  •  clopidogrel (PLAVIX) 75 MG tablet, Take 1 tablet by mouth Daily., Disp: 90 tablet, Rfl: 3  •  famotidine (PEPCID) 10 MG tablet, Take 10 mg by mouth Every Night., Disp: , Rfl:   •  fexofenadine (Allegra Allergy) 60 MG tablet, Take 1 tablet by mouth Daily., Disp: 30 tablet, Rfl: 2  •  fluticasone (FLONASE) 50 MCG/ACT nasal spray, 1 spray into the nostril(s) as directed by provider Daily., Disp: 16 g, Rfl: 3  •  NIFEdipine XL (PROCARDIA XL) 90 MG 24 hr tablet, Take 90 mg by mouth Every Night. 2 tabs, Disp: , Rfl:   •  pravastatin (PRAVACHOL) 80 MG tablet, Take 80 mg by mouth Every Night., Disp: , Rfl:   •  Respiratory Therapy Supplies (CareTouch 2 CPAP Hose ) misc, , Disp: , Rfl:   •  valsartan-hydrochlorothiazide (DIOVAN-HCT) " "320-25 MG per tablet, TAKE 1 TABLET BY MOUTH  DAILY, Disp: 90 tablet, Rfl: 3  •  cephalexin (Keflex) 500 MG capsule, Take 1 capsule by mouth 3 (Three) Times a Day., Disp: 42 capsule, Rfl: 0    Review of Systems   Review of Systems   Constitutional: Positive for malaise/fatigue. Negative for weight loss.   Cardiovascular: Negative for chest pain, claudication and dyspnea on exertion.   Respiratory: Negative for cough and shortness of breath.    Skin: Negative for color change and poor wound healing.   Musculoskeletal: Positive for muscle weakness and myalgias.   Gastrointestinal: Positive for abdominal pain, change in bowel habit and constipation.   Neurological: Negative for dizziness, numbness and weakness.       Physical Exam   Vitals:    04/19/22 1334   BP: 140/70   BP Location: Left arm   Patient Position: Sitting   Cuff Size: Adult   Pulse: 62   Temp: 98.2 °F (36.8 °C)   TempSrc: Temporal   SpO2: 98%   Weight: 120 kg (265 lb)   Height: 177.8 cm (70\")     Body surface area is 2.35 meters squared.  Body mass index is 38.02 kg/m².  Physical Exam  Constitutional:       General: He is not in acute distress.     Appearance: He is not ill-appearing.   HENT:      Right Ear: Hearing normal.      Left Ear: Hearing normal.      Nose: No nasal deformity.      Mouth/Throat:      Dentition: Normal dentition. Does not have dentures.   Cardiovascular:      Rate and Rhythm: Normal rate and regular rhythm.      Pulses:           Carotid pulses are 2+ on the right side and 2+ on the left side.       Radial pulses are 2+ on the right side and 2+ on the left side.        Dorsalis pedis pulses are 2+ on the right side and 2+ on the left side.        Posterior tibial pulses are 1+ on the right side and 1+ on the left side.      Heart sounds: No murmur heard.  Pulmonary:      Effort: Pulmonary effort is normal.      Breath sounds: Normal breath sounds.   Abdominal:      General: There is no distension.      Palpations: Abdomen is " soft. There is no mass.      Tenderness: There is no abdominal tenderness.   Musculoskeletal:         General: No deformity.      Comments: Gait normal.    Skin:     General: Skin is warm and dry.      Coloration: Skin is not pale.      Findings: No erythema.      Comments: (L) Incision: healed   Neurological:      Mental Status: He is alert and oriented to person, place, and time.   Psychiatric:         Speech: Speech normal.         Behavior: Behavior is cooperative.         Thought Content: Thought content normal.         Judgment: Judgment normal.         BUN   Date Value Ref Range Status   02/28/2022 26 (H) 8 - 23 mg/dL Final     Creatinine   Date Value Ref Range Status   02/28/2022 1.60 (H) 0.76 - 1.27 mg/dL Final     eGFR   Amer   Date Value Ref Range Status   01/08/2022 51 (L) >60 mL/min/1.73 Final       ASSESSMENT/PLAN    1. Abdominal aortic aneurysm (AAA) without rupture (HCC)  Status post endo aaa repair, no evidence of endo leak on CTA  Smoking cessation, lipid management, BP control in 120 range advised. Risks, benefits discussed.  Understands and wishes to proceed with plan    Return as scheduled for CTA with Dr. Yi    2. PVD (peripheral vascular disease) with claudication (HCC)  Moderate reduction in RLE perfusion, normal resting perfusion to left lower extremity.     No claudication    ASA, Statin, Plavix    Repeat JEANETH as scheduled    3. Mixed hyperlipidemia  Lipid-lowering therapy has been proven beneficial in patients with cardio-vascular disease. Current guidelines recommend statin treatment for all patients with PAD,CAD and carotid stenosis. Statins are beneficial in preventing cardiovascular events, increasing functional capacity and lower the risk of adverse limb loss in PAD. Statins decrease the progression of plaque formation and may improve peripheral vessel lining, and aid in reversing atherosclerosis.    4. Postoperative wound dehiscence, subsequent encounter  Delayed wound  healing, bilateral femoral sites.   Secondary to moisture bilateral groin, mild drainage, no focal abscess or palpable fluid collection.     Keflex x 2 weeks, apply dry 4x4 dressing to bilateral groin folds daily, change as needed to keep dry.     Return 2 weeks for recheck.     Advance Care Planning discussed and  Informational packet given to patient. Advance Care Plan on file: No    Patient's Body mass index is 38.02 kg/m². indicating that he is obese (BMI >30). Obesity-related health conditions include the following: hypertension, dyslipidemias and peripheral vascular disease. Obesity is unchanged. BMI is is above average; BMI management plan is completed. We discussed low calorie, low carb based diet program and portion control..             This document has been electronically signed by VICKY Samaniego-BC @  On April 20, 2022 15:08 CDBOLA

## 2022-04-29 DIAGNOSIS — I10 ESSENTIAL HYPERTENSION: Chronic | ICD-10-CM

## 2022-04-29 RX ORDER — ALLOPURINOL 100 MG/1
TABLET ORAL
Qty: 180 TABLET | Refills: 3 | Status: SHIPPED | OUTPATIENT
Start: 2022-04-29 | End: 2022-06-13

## 2022-04-29 RX ORDER — NIFEDIPINE 90 MG/1
TABLET, EXTENDED RELEASE ORAL
Qty: 180 TABLET | Refills: 3 | Status: SHIPPED | OUTPATIENT
Start: 2022-04-29

## 2022-04-29 RX ORDER — CARVEDILOL 25 MG/1
TABLET ORAL
Qty: 360 TABLET | Refills: 0 | Status: SHIPPED | OUTPATIENT
Start: 2022-04-29 | End: 2022-06-13

## 2022-05-03 ENCOUNTER — OFFICE VISIT (OUTPATIENT)
Dept: CARDIAC SURGERY | Facility: CLINIC | Age: 77
End: 2022-05-03

## 2022-05-03 VITALS
WEIGHT: 265 LBS | DIASTOLIC BLOOD PRESSURE: 60 MMHG | SYSTOLIC BLOOD PRESSURE: 112 MMHG | BODY MASS INDEX: 37.94 KG/M2 | OXYGEN SATURATION: 97 % | HEIGHT: 70 IN | HEART RATE: 60 BPM

## 2022-05-03 DIAGNOSIS — T81.31XD POSTOPERATIVE WOUND DEHISCENCE, SUBSEQUENT ENCOUNTER: Primary | ICD-10-CM

## 2022-05-03 PROCEDURE — 99214 OFFICE O/P EST MOD 30 MIN: CPT | Performed by: NURSE PRACTITIONER

## 2022-05-03 NOTE — PATIENT INSTRUCTIONS
Clean operative site with antibacterial soap/water, pat dry.  Keep area dry, apply 4x4 gauze.     [employer], [address], [place] [address]  For shoes

## 2022-05-04 NOTE — PROGRESS NOTES
Jerald Bonilla  1945    Chief Complaint:    Chief Complaint   Patient presents with   • Wound Check       HPI:      PCP:  Annie Penny MD  GI:  Dr Gavin    76 y.o. male with HTN(stable, increased risk stroke, rupture), Hyperlipidemia(stable, increased risk cardiovascular events) and Obesity(uncontrolled, increased risk cardiovascular events) , AAA(new, increase risk rupture).  former smoker.  Moderate abdominal pain, change in stool x 6 months.  Legs week after walking distance.  Asymptomatic AAA found on CT imaging for GI evaluation..  No TIA stroke amaurosis.  No MI claudication. No other associated signs, symptoms or modifying factors. Returns for wound check.     9/2014 US Aorta:  Infrarenal 29mm. Iliac R 14mm, L 17mm.  8/2021 CT Abdomen Pelvis:  Descending thoracic aorta 27mm, renals 23mm, 34mm focal saccular infrarenal aneurysm (appears expanded thru near circumferential calcium), iliac R 23mm, L 15mm, femoral R 10mm, L 10mm.  1/2022 Endo AAA repair. LEFT CFA Endarterectomy  3/2022 JEANETH: Right 0.72 triphasic.  Left 0.93 triphasic  3/2022 CTA Run Off: No endo leak, residual aneurysmal sac 34mm, incidental [id number] pancreatic cyst    8/2021 ECG:  SB 52mm, QTc 429    The following portions of the patient's history were reviewed and updated as appropriate: allergies, current medications, past family history, past medical history, past social history, past surgical history and problem list.  Recent images independently reviewed.  Available laboratory values reviewed.    PMH:  Past Medical History:   Diagnosis Date   • Acne conglobata    • Acute maxillary sinusitis    • Allergies    • Benign prostate hyperplasia    • Blurring of visual image    • Carbuncle     of infected sebaceous cyst on back   • Dizziness and giddiness    • Erectile dysfunction    • Essential hypertension     seeing nephro   • Gastroesophageal reflux disease    • Gout    • Hammer toe    • Hearing loss    • Hip pain    •  Hyperlipidemia    • Low back pain    • Malaise and fatigue    • Need for prophylactic vaccination against Streptococcus pneumoniae (pneumococcus)    • Nuclear senile cataract    • Obesity    • Osteoarthritis    • Otalgia    • Otitis externa    • Pain in lower limb    • Rheumatoid arthritis (HCC)    • Skin lesion     right calf   • Sleep apnea     sleeps with c-pap   • Stage 3 chronic kidney disease (HCC)    • Swelling of knee joint    • Tinnitus    • Upper respiratory infection      Past Surgical History:   Procedure Laterality Date   • ABDOMINAL AORTIC ANEURYSM REPAIR WITH ENDOGRAFT N/A 01/07/2022    Procedure: ABDOMINAL AORTIC ANEURYSM REPAIR WITH ENDOGRAFT;  Surgeon: Unruly Yi MD;  Location: [facility] OR;  Service: Cardiothoracic;  Laterality: N/A;   • COLONOSCOPY  05/18/2009    Colonoscopy, diagnostic (screening) 94531 (1)    diverticuli, roids    • COLONOSCOPY N/A 08/13/2020    Procedure: COLONOSCOPY;  Surgeon: Dagoberto Gavin DO;  Location: [facility] ENDOSCOPY;  Service: Gastroenterology;  Laterality: N/A;  tattoo @ right colon    • COLONOSCOPY N/A 02/18/2021    Procedure: COLONOSCOPY;  Surgeon: Dagoberto Gavin DO;  Location: [facility] ENDOSCOPY;  Service: Gastroenterology;  Laterality: N/A;   • CYST REMOVAL Bilateral     cheeks   • ENDOSCOPY N/A 01/16/2019    Procedure: ESOPHAGOGASTRODUODENOSCOPY possible dilation;  Surgeon: Jerald Nation MD;  Location: [facility] ENDOSCOPY;  Service: Gastroenterology   • HERNIA REPAIR      umbilical   • JOINT REPLACEMENT     • PILONIDAL CYSTECTOMY N/A     HAYWARD   • TOTAL HIP ARTHROPLASTY Left    • WISDOM TOOTH EXTRACTION       Family History   Problem Relation Age of Onset   • Arthritis Mother    • Hypertension Mother    • Obesity Mother    • Thyroid disease Mother    • Mental illness Mother    • Hypertension Father    • Heart disease Father    • Cancer Father    • Diabetes Father    • Kidney disease Father    • Obesity Daughter    • Obesity Daughter    • No Known  "Problems Daughter    • No Known Problems Son    • Arthritis Maternal Grandmother      Social History     Tobacco Use   • Smoking status: Former Smoker     Quit date:      Years since quittin.3   • Smokeless tobacco: Never Used   Vaping Use   • Vaping Use: Never used   Substance Use Topics   • Alcohol use: Yes     Comment: socially   • Drug use: Not Currently     Types: Marijuana     Comment: none in 1-2 years       ALLERGIES:  Allergies   Allergen Reactions   • Lisinopril Itching   • Minoxidil Other (See Comments)     \"dont know\"         MEDICATIONS:    Current Outpatient Medications:   •  allopurinol (ZYLOPRIM) 100 MG tablet, TAKE 2 TABLETS BY MOUTH  DAILY, Disp: 180 tablet, Rfl: 3  •  aspirin 81 MG EC tablet, Take 81 mg by mouth Daily., Disp: , Rfl:   •  B Complex Vitamins (VITAMIN B COMPLEX PO), Take 1 tablet by mouth Daily., Disp: , Rfl:   •  carvedilol (COREG) 25 MG tablet, TAKE 2 TABLETS BY MOUTH  TWICE DAILY WITH MEALS, Disp: 360 tablet, Rfl: 0  •  cholecalciferol (VITAMIN D3) 1000 UNITS tablet, Take 1,000 Units by mouth Daily., Disp: , Rfl:   •  clindamycin (Clindagel) 1 % gel, Apply  topically to the appropriate area as directed 2 (Two) Times a Day., Disp: 60 g, Rfl: 2  •  clopidogrel (PLAVIX) 75 MG tablet, Take 1 tablet by mouth Daily., Disp: 90 tablet, Rfl: 3  •  famotidine (PEPCID) 10 MG tablet, Take 10 mg by mouth Every Night., Disp: , Rfl:   •  fexofenadine (Allegra Allergy) 60 MG tablet, Take 1 tablet by mouth Daily., Disp: 30 tablet, Rfl: 2  •  fluticasone (FLONASE) 50 MCG/ACT nasal spray, 1 spray into the nostril(s) as directed by provider Daily., Disp: 16 g, Rfl: 3  •  NIFEdipine XL (PROCARDIA XL) 90 MG 24 hr tablet, TAKE 2 TABLETS BY MOUTH AT  NIGHT, Disp: 180 tablet, Rfl: 3  •  pravastatin (PRAVACHOL) 80 MG tablet, Take 80 mg by mouth Every Night., Disp: , Rfl:   •  Respiratory Therapy Supplies (CareTouch 2 CPAP Hose ) misc, , Disp: , Rfl:   •  valsartan-hydrochlorothiazide " "(DIOVAN-HCT) 320-25 MG per tablet, TAKE 1 TABLET BY MOUTH  DAILY, Disp: 90 tablet, Rfl: 3    Review of Systems   Review of Systems   Constitutional: Positive for malaise/fatigue. Negative for weight loss.   Cardiovascular: Negative for chest pain, claudication and dyspnea on exertion.   Respiratory: Negative for cough and shortness of breath.    Skin: Negative for color change and poor wound healing.   Musculoskeletal: Positive for muscle weakness and myalgias.   Gastrointestinal: Positive for abdominal pain, change in bowel habit and constipation.   Neurological: Negative for dizziness, numbness and weakness.       Physical Exam   Vitals:    05/03/22 1258   BP: 112/60   BP Location: Left arm   Patient Position: Sitting   Cuff Size: Adult   Pulse: 60   SpO2: 97%   Weight: 120 kg (265 lb)   Height: 177.8 cm (70\")     Body surface area is 2.35 meters squared.  Body mass index is 38.02 kg/m².  Physical Exam  Constitutional:       General: He is not in acute distress.     Appearance: He is not ill-appearing.   HENT:      Right Ear: Hearing normal.      Left Ear: Hearing normal.      Nose: No nasal deformity.      Mouth/Throat:      Dentition: Normal dentition. Does not have dentures.   Cardiovascular:      Rate and Rhythm: Normal rate and regular rhythm.      Pulses:           Carotid pulses are 2+ on the right side and 2+ on the left side.       Radial pulses are 2+ on the right side and 2+ on the left side.        Dorsalis pedis pulses are 2+ on the right side and 2+ on the left side.        Posterior tibial pulses are 1+ on the right side and 1+ on the left side.      Heart sounds: No murmur heard.  Pulmonary:      Effort: Pulmonary effort is normal.      Breath sounds: Normal breath sounds.   Abdominal:      General: There is no distension.      Palpations: Abdomen is soft. There is no mass.      Tenderness: There is no abdominal tenderness.   Musculoskeletal:         General: No deformity.      Comments: Gait " normal.    Skin:     General: Skin is warm and dry.      Coloration: Skin is not pale.      Findings: No erythema.      Comments: (L) Incision: healed   Neurological:      Mental Status: He is alert and oriented to person, place, and time.   Psychiatric:         Speech: Speech normal.         Behavior: Behavior is cooperative.         Thought Content: Thought content normal.         Judgment: Judgment normal.         BUN   Date Value Ref Range Status   02/28/2022 26 (H) 8 - 23 mg/dL Final     Creatinine   Date Value Ref Range Status   02/28/2022 1.60 (H) 0.76 - 1.27 mg/dL Final     eGFR   Amer   Date Value Ref Range Status   01/08/2022 51 (L) >60 mL/min/1.73 Final       ASSESSMENT/PLAN    1. Abdominal aortic aneurysm (AAA) without rupture (HCC)  Status post endo aaa repair, no evidence of endo leak on CTA  Smoking cessation, lipid management, BP control in 120 range advised. Risks, benefits discussed.  Understands and wishes to proceed with plan    Return as scheduled for CTA with Dr. Yi    2. PVD (peripheral vascular disease) with claudication (HCC)  Moderate reduction in RLE perfusion, normal resting perfusion to left lower extremity.     No claudication    ASA, Statin, Plavix    Repeat JEANETH as scheduled    3. Mixed hyperlipidemia  Lipid-lowering therapy has been proven beneficial in patients with cardio-vascular disease. Current guidelines recommend statin treatment for all patients with PAD,CAD and carotid stenosis. Statins are beneficial in preventing cardiovascular events, increasing functional capacity and lower the risk of adverse limb loss in PAD. Statins decrease the progression of plaque formation and may improve peripheral vessel lining, and aid in reversing atherosclerosis.    4. Postoperative wound dehiscence, subsequent encounter  Delayed wound healing, bilateral femoral sites.   Secondary to moisture bilateral groin, mild drainage, no focal abscess or palpable fluid collection.     This  has significantly improved from prior exam.  Near resolution, also has pre-existing dermatological condition with prior complex laser treatment which is likely a factor.  No evidence of infection.  Continue to use 4x4 in bilateral groin folds due to moisture.     Follow up as needed for wound care as needed.      Advance Care Planning discussed and  Informational packet given to patient. Advance Care Plan on file: No    Patient's Body mass index is 38.02 kg/m². indicating that he is obese (BMI >30). Obesity-related health conditions include the following: hypertension, dyslipidemias and peripheral vascular disease. Obesity is unchanged. BMI is is above average; BMI management plan is completed. We discussed low calorie, low carb based diet program and portion control..             This document has been electronically signed by VICKY Samaniego-BC @  On May 4, 2022 14:38 CDT

## 2022-05-06 ENCOUNTER — LAB (OUTPATIENT)
Dept: LAB | Facility: HOSPITAL | Age: 77
End: 2022-05-06

## 2022-05-06 DIAGNOSIS — N18.9 CHRONIC KIDNEY DISEASE, UNSPECIFIED CKD STAGE: ICD-10-CM

## 2022-05-06 PROCEDURE — 84156 ASSAY OF PROTEIN URINE: CPT | Performed by: INTERNAL MEDICINE

## 2022-05-06 PROCEDURE — 36415 COLL VENOUS BLD VENIPUNCTURE: CPT | Performed by: INTERNAL MEDICINE

## 2022-05-06 PROCEDURE — 82570 ASSAY OF URINE CREATININE: CPT | Performed by: INTERNAL MEDICINE

## 2022-05-06 PROCEDURE — 80069 RENAL FUNCTION PANEL: CPT | Performed by: INTERNAL MEDICINE

## 2022-05-07 LAB
ALBUMIN SERPL-MCNC: 4.1 G/DL (ref 3.5–5.2)
ANION GAP SERPL CALCULATED.3IONS-SCNC: 11 MMOL/L (ref 5–15)
BUN SERPL-MCNC: 29 MG/DL (ref 8–23)
BUN/CREAT SERPL: 17.8 (ref 7–25)
CALCIUM SPEC-SCNC: 9.6 MG/DL (ref 8.6–10.5)
CHLORIDE SERPL-SCNC: 106 MMOL/L (ref 98–107)
CO2 SERPL-SCNC: 24 MMOL/L (ref 22–29)
CREAT SERPL-MCNC: 1.63 MG/DL (ref 0.76–1.27)
CREAT UR-MCNC: 140.7 MG/DL
EGFRCR SERPLBLD CKD-EPI 2021: 43.9 ML/MIN/1.73
GLUCOSE SERPL-MCNC: 96 MG/DL (ref 65–99)
PHOSPHATE SERPL-MCNC: 3.2 MG/DL (ref 2.5–4.5)
POTASSIUM SERPL-SCNC: 4.1 MMOL/L (ref 3.5–5.2)
PROT ?TM UR-MCNC: 20.2 MG/DL
PROT/CREAT UR: 143.6 MG/G CREA (ref 0–200)
SODIUM SERPL-SCNC: 141 MMOL/L (ref 136–145)

## 2022-05-19 ENCOUNTER — PREP FOR SURGERY (OUTPATIENT)
Dept: OTHER | Facility: HOSPITAL | Age: 77
End: 2022-05-19

## 2022-05-19 DIAGNOSIS — Z86.010 PERSONAL HISTORY OF COLONIC POLYPS: Primary | ICD-10-CM

## 2022-05-19 RX ORDER — DEXTROSE AND SODIUM CHLORIDE 5; .45 G/100ML; G/100ML
30 INJECTION, SOLUTION INTRAVENOUS CONTINUOUS PRN
Status: CANCELLED | OUTPATIENT
Start: 2022-06-14

## 2022-05-20 PROBLEM — Z86.010 PERSONAL HISTORY OF COLONIC POLYPS: Status: ACTIVE | Noted: 2022-05-20

## 2022-06-13 RX ORDER — ZINC GLUCONATE 50 MG
1 TABLET ORAL NIGHTLY
COMMUNITY

## 2022-06-13 RX ORDER — ALLOPURINOL 100 MG/1
100 TABLET ORAL DAILY
COMMUNITY

## 2022-06-13 RX ORDER — CARVEDILOL 25 MG/1
25 TABLET ORAL 2 TIMES DAILY WITH MEALS
COMMUNITY
End: 2022-09-30

## 2022-06-14 ENCOUNTER — HOSPITAL ENCOUNTER (OUTPATIENT)
Facility: HOSPITAL | Age: 77
Setting detail: HOSPITAL OUTPATIENT SURGERY
Discharge: HOME | End: 2022-06-14
Attending: INTERNAL MEDICINE | Admitting: INTERNAL MEDICINE

## 2022-06-14 ENCOUNTER — ANESTHESIA EVENT (OUTPATIENT)
Dept: GASTROENTEROLOGY | Facility: HOSPITAL | Age: 77
End: 2022-06-14

## 2022-06-14 ENCOUNTER — ANESTHESIA (OUTPATIENT)
Dept: GASTROENTEROLOGY | Facility: HOSPITAL | Age: 77
End: 2022-06-14

## 2022-06-14 VITALS
HEIGHT: 70 IN | SYSTOLIC BLOOD PRESSURE: 114 MMHG | DIASTOLIC BLOOD PRESSURE: 55 MMHG | BODY MASS INDEX: 36.52 KG/M2 | HEART RATE: 50 BPM | WEIGHT: 255.07 LBS | OXYGEN SATURATION: 94 % | TEMPERATURE: 97.1 F | RESPIRATION RATE: 20 BRPM

## 2022-06-14 DIAGNOSIS — Z86.010 PERSONAL HISTORY OF COLONIC POLYPS: ICD-10-CM

## 2022-06-14 PROCEDURE — 88305 TISSUE EXAM BY PATHOLOGIST: CPT

## 2022-06-14 PROCEDURE — 25010000002 PROPOFOL 10 MG/ML EMULSION: Performed by: NURSE ANESTHETIST, CERTIFIED REGISTERED

## 2022-06-14 DEVICE — CLIPPING DEVICE
Type: IMPLANTABLE DEVICE | Site: ASCENDING COLON | Status: FUNCTIONAL
Brand: RESOLUTION CLIP

## 2022-06-14 RX ORDER — PROPOFOL 10 MG/ML
VIAL (ML) INTRAVENOUS AS NEEDED
Status: DISCONTINUED | OUTPATIENT
Start: 2022-06-14 | End: 2022-06-14 | Stop reason: SURG

## 2022-06-14 RX ORDER — ONDANSETRON 2 MG/ML
4 INJECTION INTRAMUSCULAR; INTRAVENOUS ONCE AS NEEDED
Status: DISCONTINUED | OUTPATIENT
Start: 2022-06-14 | End: 2022-06-14 | Stop reason: HOSPADM

## 2022-06-14 RX ORDER — DEXTROSE AND SODIUM CHLORIDE 5; .45 G/100ML; G/100ML
30 INJECTION, SOLUTION INTRAVENOUS CONTINUOUS PRN
Status: DISCONTINUED | OUTPATIENT
Start: 2022-06-14 | End: 2022-06-14 | Stop reason: HOSPADM

## 2022-06-14 RX ORDER — PROMETHAZINE HYDROCHLORIDE 25 MG/1
25 SUPPOSITORY RECTAL ONCE AS NEEDED
Status: DISCONTINUED | OUTPATIENT
Start: 2022-06-14 | End: 2022-06-14 | Stop reason: HOSPADM

## 2022-06-14 RX ORDER — PROMETHAZINE HYDROCHLORIDE 25 MG/1
25 TABLET ORAL ONCE AS NEEDED
Status: DISCONTINUED | OUTPATIENT
Start: 2022-06-14 | End: 2022-06-14 | Stop reason: HOSPADM

## 2022-06-14 RX ORDER — MEPERIDINE HYDROCHLORIDE 25 MG/ML
12.5 INJECTION INTRAMUSCULAR; INTRAVENOUS; SUBCUTANEOUS
Status: DISCONTINUED | OUTPATIENT
Start: 2022-06-14 | End: 2022-06-14 | Stop reason: HOSPADM

## 2022-06-14 RX ADMIN — PROPOFOL 20 MG: 10 INJECTION, EMULSION INTRAVENOUS at 10:48

## 2022-06-14 RX ADMIN — DEXTROSE AND SODIUM CHLORIDE 30 ML/HR: 5; 450 INJECTION, SOLUTION INTRAVENOUS at 09:39

## 2022-06-14 RX ADMIN — PROPOFOL 50 MG: 10 INJECTION, EMULSION INTRAVENOUS at 10:51

## 2022-06-14 RX ADMIN — PROPOFOL 20 MG: 10 INJECTION, EMULSION INTRAVENOUS at 10:42

## 2022-06-14 RX ADMIN — PROPOFOL 20 MG: 10 INJECTION, EMULSION INTRAVENOUS at 10:39

## 2022-06-14 RX ADMIN — PROPOFOL 80 MG: 10 INJECTION, EMULSION INTRAVENOUS at 10:35

## 2022-06-14 RX ADMIN — PROPOFOL 30 MG: 10 INJECTION, EMULSION INTRAVENOUS at 10:45

## 2022-06-14 NOTE — DISCHARGE INSTR - APPOINTMENTS
Dr. Dagoberto Gavin, DO  [address], [address]  [place] [address]  [phone]    Appointment date and time:     December 16, 2022  @  10:45 am

## 2022-06-14 NOTE — H&P
Gonzalo Cruz DO,[facility]  Gastroenterology  Hepatology  Endoscopy  Board Certified in Internal Medicine and gastroenterology  [address], [address]  [place]. [address]  - [phone]   F - [phone]     GASTROENTEROLOGY HISTORY AND PHYSICAL  NOTE   GONZALO CRUZ DO.         SUBJECTIVE:   6/14/2022    Name: Jerald Bonilla  DOD: 1945      Chief Complaint:     Subjective : History of colon polyps, right colon piecemeal polypectomy    Patient is 76 y.o. male presents with desire for elective colonoscopy.      ROS/HISTORY/ CURRENT MEDICATIONS/OBJECTIVE/VS/PE:   Review of Systems:  All systems unremarkable unless specified below.  Constitutional   HENT  Eyes   Respiratory    Cardiovascular  Gastrointestinal   Endocrine  Genitourinary    Musculoskeletal   Skin  Allergic/Immunologic    Neurological    Hematological  Psychiatric/Behavioral    History:     Past Medical History:   Diagnosis Date   • Acne conglobata    • Acute maxillary sinusitis    • Allergies    • Benign prostate hyperplasia    • Blurring of visual image    • Carbuncle     of infected sebaceous cyst on back   • Cyst of pancreas    • Dizziness and giddiness    • Erectile dysfunction    • Essential hypertension     seeing nephro   • Gastroesophageal reflux disease    • Gout    • Hammer toe    • Hearing loss    • Hip pain    • Hyperlipidemia    • Low back pain    • Malaise and fatigue    • Need for prophylactic vaccination against Streptococcus pneumoniae (pneumococcus)    • Nuclear senile cataract    • Obesity    • Osteoarthritis    • Otalgia    • Otitis externa    • Pain in lower limb    • Rheumatoid arthritis (HCC)    • Skin lesion     right calf   • Sleep apnea     sleeps with c-pap   • Stage 3 chronic kidney disease (HCC)    • Swelling of knee joint    • Tinnitus    • Upper respiratory infection      Past Surgical History:   Procedure Laterality Date   • ABDOMINAL AORTIC ANEURYSM REPAIR WITH ENDOGRAFT N/A 01/07/2022     Procedure: ABDOMINAL AORTIC ANEURYSM REPAIR WITH ENDOGRAFT;  Surgeon: Unruly Yi MD;  Location: [facility] OR;  Service: Cardiothoracic;  Laterality: N/A;   • COLONOSCOPY  2009    Colonoscopy, diagnostic (screening) 27312 (1)    diverticuli, roids    • COLONOSCOPY N/A 2020    Procedure: COLONOSCOPY;  Surgeon: Dagoberto Gavin DO;  Location: [facility] ENDOSCOPY;  Service: Gastroenterology;  Laterality: N/A;  tattoo @ right colon    • COLONOSCOPY N/A 2021    Procedure: COLONOSCOPY;  Surgeon: Dagoberto Gavin DO;  Location: [facility] ENDOSCOPY;  Service: Gastroenterology;  Laterality: N/A;   • CYST REMOVAL Bilateral     cheeks   • ENDOSCOPY N/A 2019    Procedure: ESOPHAGOGASTRODUODENOSCOPY possible dilation;  Surgeon: Jerald Nation MD;  Location: [facility] ENDOSCOPY;  Service: Gastroenterology   • HERNIA REPAIR      umbilical   • JOINT REPLACEMENT     • PILONIDAL CYSTECTOMY N/A     HAYWARD   • TOTAL HIP ARTHROPLASTY Left    • WISDOM TOOTH EXTRACTION       Family History   Problem Relation Age of Onset   • Arthritis Mother    • Hypertension Mother    • Obesity Mother    • Thyroid disease Mother    • Mental illness Mother    • Hypertension Father    • Heart disease Father    • Cancer Father    • Diabetes Father    • Kidney disease Father    • Obesity Daughter    • Obesity Daughter    • No Known Problems Daughter    • No Known Problems Son    • Arthritis Maternal Grandmother      Social History     Tobacco Use   • Smoking status: Former Smoker     Quit date:      Years since quittin.4   • Smokeless tobacco: Never Used   Vaping Use   • Vaping Use: Never used   Substance Use Topics   • Alcohol use: Yes     Comment: socially   • Drug use: Not Currently     Types: Marijuana     Comment: none in 1-2 years     Prior to Admission medications    Medication Sig Start Date End Date Taking? Authorizing Provider   allopurinol (ZYLOPRIM) 100 MG tablet Take 100 mg by mouth Daily. 2 tablets daily    Yes Amanda Trotter MD   Apoaequorin (PREVAGEN PO) Take 1 tablet by mouth Daily.   Yes Amanda Trotter MD   carvedilol (COREG) 25 MG tablet Take 25 mg by mouth 2 (Two) Times a Day With Meals. Take 2 tablets bid   Yes Amanda Trotter MD   cholecalciferol (VITAMIN D3) 1000 UNITS tablet Take 1,000 Units by mouth Daily.   Yes Amanda Trotter MD   clindamycin (Clindagel) 1 % gel Apply  topically to the appropriate area as directed 2 (Two) Times a Day. 5/5/21  Yes Annie Penny MD   famotidine (PEPCID) 10 MG tablet Take 10 mg by mouth Every Night.   Yes Amanda Trotter MD   fexofenadine (Allegra Allergy) 60 MG tablet Take 1 tablet by mouth Daily. 1/31/22  Yes Rebecca Maria APRN   fluticasone (FLONASE) 50 MCG/ACT nasal spray 1 spray into the nostril(s) as directed by provider Daily. 12/16/21  Yes Annie Penny MD   NIFEdipine XL (PROCARDIA XL) 90 MG 24 hr tablet TAKE 2 TABLETS BY MOUTH AT  NIGHT 4/29/22  Yes Christin Martinez APRN   pravastatin (PRAVACHOL) 80 MG tablet Take 80 mg by mouth Every Night.   Yes Amanda Trotter MD   Respiratory Therapy Supplies (CareTouch 2 CPAP Hose ) misc    Yes Amanda Trotter MD   valsartan-hydrochlorothiazide (DIOVAN-HCT) 320-25 MG per tablet TAKE 1 TABLET BY MOUTH  DAILY 9/27/21  Yes Annie Penny MD   Zinc 50 MG tablet Take 1 tablet by mouth Every Night.   Yes ProviderAmanda MD   aspirin 81 MG EC tablet Take 81 mg by mouth Daily.    Amanda Trotter MD   B Complex Vitamins (VITAMIN B COMPLEX PO) Take 1 tablet by mouth Daily.    Amanda Trotter MD   clopidogrel (PLAVIX) 75 MG tablet Take 1 tablet by mouth Daily. 2/8/22   Molly Roque APRN     Allergies:  Lisinopril and Minoxidil    I have reviewed the patients medical history, surgical history and family history in the available medical record system.     Current Medications:     Current Facility-Administered Medications   Medication  Dose Route Frequency Provider Last Rate Last Admin   • dextrose 5 % and sodium chloride 0.45 % infusion  30 mL/hr Intravenous Continuous PRN Dagoberto Gavin DO 30 mL/hr at 06/14/22 0939 30 mL/hr at 06/14/22 0939   • meperidine (DEMEROL) injection 12.5 mg  12.5 mg Intravenous Q5 Min PRN Mitzi Cruz CRNA       • ondansetron (ZOFRAN) injection 4 mg  4 mg Intravenous Once PRN Mitzi Cruz CRNA       • promethazine (PHENERGAN) suppository 25 mg  25 mg Rectal Once PRN Mitzi Cruz CRNA        Or   • promethazine (PHENERGAN) tablet 25 mg  25 mg Oral Once PRN Mitzi Cruz CRNA           Objective     Physical Exam:   Temp:  [97.5 °F (36.4 °C)] 97.5 °F (36.4 °C)  Heart Rate:  [53] 53  Resp:  [20] 20  BP: (149)/(59) 149/59    Physical Exam:  General Appearance:    Alert, cooperative, in no acute distress   Head:    Normocephalic, without obvious abnormality, atraumatic   Eyes:            Lids and lashes normal, conjunctivae and sclerae normal, no icterus, no pallor, corneas clear, PERRLA   Ears:    Ears appear intact with no abnormalities noted   Throat:   No oral lesions, no thrush, oral mucosa moist   Neck:   No adenopathy, supple, trachea midline, no thyromegaly, no  carotid bruit, no JVD   Back:     No kyphosis present, no scoliosis present, no skin lesions,   erythema or scars, no tenderness to percussion or                 palpation,  range of motion normal   Lungs:     Clear to auscultation,respirations regular, even and         unlabored    Heart:    Regular rhythm and normal rate, normal S1 and S2, no  murmur, no gallop, no rub, no click   Breast Exam:    Deferred   Abdomen:     Normal bowel sounds, no masses, no organomegaly, soft  nontender, nondistended, no guarding, no rebound                 tenderness   Genitalia:    Deferred   Extremities:   Moves all extremities well, no edema, no cyanosis, no          redness   Pulses:   Pulses palpable and equal bilaterally   Skin:   No  bleeding, bruising or rash   Lymph nodes:   No palpable adenopathy   Neurologic:   Cranial nerves 2 - 12 grossly intact, sensation intact, DTR     present and equal bilaterally      Results Review:     Lab Results   Component Value Date    WBC 7.77 01/08/2022    WBC 5.00 12/14/2021    WBC 4.99 08/23/2021    HGB 12.9 (L) 01/08/2022    HGB 15.1 12/14/2021    HGB 16.2 08/23/2021    HCT 37.7 01/08/2022    HCT 43.8 12/14/2021    HCT 47.5 08/23/2021     (L) 01/08/2022     12/14/2021     08/23/2021             No results found for: LIPASE  Lab Results   Component Value Date    INR 2.20 (H) 01/05/2017    INR 1.3 (H) 12/07/2016    INR 1.3 (H) 12/06/2016     No results found for: THROATCX    Radiology Review:  Imaging Results (Last 72 Hours)     ** No results found for the last 72 hours. **           I reviewed the patient's new clinical results.  I reviewed the patient's new imaging results and agree with the interpretation.     ASSESSMENT/PLAN:   ASSESSMENT:  1.  Personal history of colon polyps, status post piecemeal polypectomy, right colon    PLAN:  1.  Colonoscopy    Risk and benefits associated with the procedure are reviewed with the patient.  The patient wished to proceed     Dagoberto Gavin DO  06/14/22  10:24 CDT

## 2022-06-14 NOTE — ANESTHESIA PREPROCEDURE EVALUATION
Anesthesia Evaluation     Patient summary reviewed and Nursing notes reviewed                Airway   Mallampati: II  TM distance: >3 FB  Neck ROM: full  No difficulty expected  Dental - normal exam     Pulmonary    (+) a smoker Current, sleep apnea on CPAP,   Cardiovascular - normal exam    PT is on anticoagulation therapy  Patient on routine beta blocker    (+) hypertension well controlled less than 2 medications, hyperlipidemia,       Neuro/Psych  GI/Hepatic/Renal/Endo    (+) morbid obesity, GERD poorly controlled,  renal disease CRI,     Musculoskeletal     (+) joint swelling,   Abdominal   (+) obese,    Substance History      OB/GYN          Other   arthritis,        Other Comment: Gout                Anesthesia Plan    ASA 3       total IV anesthesia  intravenous induction     Anesthetic plan, risks, benefits, and alternatives have been provided, discussed and informed consent has been obtained with: patient.        CODE STATUS:

## 2022-06-14 NOTE — ANESTHESIA POSTPROCEDURE EVALUATION
Patient: Jerald Bonilla    Procedure Summary     Date: 06/14/22 Room / Location: [facility] ENDOSCOPY 2 / [facility] ENDOSCOPY    Anesthesia Start: 1027 Anesthesia Stop: 1055    Procedure: COLONOSCOPY 10:00 (N/A ) Diagnosis:       Personal history of colonic polyps      (Personal history of colonic polyps [Z86.010])    Surgeons: Dagoberto Gavin DO Provider: Mitzi Cruz CRNA    Anesthesia Type: Not recorded ASA Status: 3          Anesthesia Type: No value filed.    Vitals  No vitals data found for the desired time range.          Post Anesthesia Care and Evaluation    Patient location during evaluation: bedside  Patient participation: complete - patient participated  Level of consciousness: sleepy but conscious  Pain score: 0    Airway patency: patent  Anesthetic complications: No anesthetic complications  PONV Status: none  Cardiovascular status: bradycardic  Respiratory status: spontaneous ventilation and room air  Hydration status: acceptable    Comments: 97/52 50 16 93%

## 2022-06-16 LAB — REF LAB TEST METHOD: NORMAL

## 2022-06-21 ENCOUNTER — LAB (OUTPATIENT)
Dept: LAB | Facility: HOSPITAL | Age: 77
End: 2022-06-21

## 2022-06-21 ENCOUNTER — OFFICE VISIT (OUTPATIENT)
Dept: FAMILY MEDICINE CLINIC | Facility: CLINIC | Age: 77
End: 2022-06-21

## 2022-06-21 VITALS
OXYGEN SATURATION: 98 % | HEART RATE: 62 BPM | BODY MASS INDEX: 36.92 KG/M2 | DIASTOLIC BLOOD PRESSURE: 70 MMHG | WEIGHT: 257.9 LBS | SYSTOLIC BLOOD PRESSURE: 124 MMHG | HEIGHT: 70 IN | RESPIRATION RATE: 18 BRPM

## 2022-06-21 DIAGNOSIS — R39.15 URINARY URGENCY: ICD-10-CM

## 2022-06-21 DIAGNOSIS — I10 ESSENTIAL HYPERTENSION: Primary | Chronic | ICD-10-CM

## 2022-06-21 DIAGNOSIS — M48.061 SPINAL STENOSIS OF LUMBAR REGION WITH RADICULOPATHY: ICD-10-CM

## 2022-06-21 DIAGNOSIS — M54.2 NECK PAIN: ICD-10-CM

## 2022-06-21 DIAGNOSIS — Z12.5 ENCOUNTER FOR PROSTATE CANCER SCREENING: ICD-10-CM

## 2022-06-21 DIAGNOSIS — M51.36 DDD (DEGENERATIVE DISC DISEASE), LUMBAR: Chronic | ICD-10-CM

## 2022-06-21 DIAGNOSIS — F41.9 ANXIETY: ICD-10-CM

## 2022-06-21 DIAGNOSIS — M54.16 SPINAL STENOSIS OF LUMBAR REGION WITH RADICULOPATHY: ICD-10-CM

## 2022-06-21 PROCEDURE — 99214 OFFICE O/P EST MOD 30 MIN: CPT | Performed by: GENERAL PRACTICE

## 2022-06-21 PROCEDURE — 81003 URINALYSIS AUTO W/O SCOPE: CPT

## 2022-06-21 RX ORDER — VALSARTAN AND HYDROCHLOROTHIAZIDE 320; 25 MG/1; MG/1
1 TABLET, FILM COATED ORAL DAILY
Qty: 90 TABLET | Refills: 1 | Status: SHIPPED | OUTPATIENT
Start: 2022-06-21 | End: 2022-10-24

## 2022-06-21 RX ORDER — DIAZEPAM 5 MG/1
TABLET ORAL
Qty: 4 TABLET | Refills: 0 | Status: SHIPPED | OUTPATIENT
Start: 2022-06-21 | End: 2022-08-26

## 2022-06-22 DIAGNOSIS — R39.15 URINARY URGENCY: Primary | ICD-10-CM

## 2022-06-22 LAB
BILIRUB UR QL STRIP: NEGATIVE
CLARITY UR: CLEAR
COLOR UR: YELLOW
GLUCOSE UR STRIP-MCNC: NEGATIVE MG/DL
HGB UR QL STRIP.AUTO: NEGATIVE
KETONES UR QL STRIP: NEGATIVE
LEUKOCYTE ESTERASE UR QL STRIP.AUTO: NEGATIVE
NITRITE UR QL STRIP: NEGATIVE
PH UR STRIP.AUTO: 5.5 [PH] (ref 5–8)
PROT UR QL STRIP: NEGATIVE
SP GR UR STRIP: 1.02 (ref 1–1.03)
UROBILINOGEN UR QL STRIP: NORMAL

## 2022-06-22 RX ORDER — TAMSULOSIN HYDROCHLORIDE 0.4 MG/1
1 CAPSULE ORAL DAILY
Qty: 30 CAPSULE | Refills: 1 | Status: SHIPPED | OUTPATIENT
Start: 2022-06-22 | End: 2022-08-26 | Stop reason: SDUPTHER

## 2022-06-24 ENCOUNTER — HOSPITAL ENCOUNTER (OUTPATIENT)
Dept: MRI IMAGING | Facility: HOSPITAL | Age: 77
Discharge: HOME | End: 2022-06-24
Admitting: GENERAL PRACTICE

## 2022-06-24 DIAGNOSIS — M48.061 SPINAL STENOSIS OF LUMBAR REGION WITH RADICULOPATHY: ICD-10-CM

## 2022-06-24 DIAGNOSIS — M54.16 SPINAL STENOSIS OF LUMBAR REGION WITH RADICULOPATHY: ICD-10-CM

## 2022-06-24 DIAGNOSIS — M51.36 DDD (DEGENERATIVE DISC DISEASE), LUMBAR: ICD-10-CM

## 2022-06-24 PROCEDURE — 72148 MRI LUMBAR SPINE W/O DYE: CPT

## 2022-07-01 ENCOUNTER — TELEPHONE (OUTPATIENT)
Dept: FAMILY MEDICINE CLINIC | Facility: CLINIC | Age: 77
End: 2022-07-01

## 2022-07-01 NOTE — TELEPHONE ENCOUNTER
Pt called for Xray and MRI results. Phone is [phone]. I told him that you were out until Tuesday.

## 2022-07-06 DIAGNOSIS — G89.29 CHRONIC BILATERAL LOW BACK PAIN WITH RIGHT-SIDED SCIATICA: ICD-10-CM

## 2022-07-06 DIAGNOSIS — M54.2 NECK PAIN: Primary | ICD-10-CM

## 2022-07-06 DIAGNOSIS — M54.41 CHRONIC BILATERAL LOW BACK PAIN WITH RIGHT-SIDED SCIATICA: ICD-10-CM

## 2022-07-28 DIAGNOSIS — I71.40 ABDOMINAL AORTIC ANEURYSM (AAA) WITHOUT RUPTURE: Primary | ICD-10-CM

## 2022-08-03 ENCOUNTER — OFFICE VISIT (OUTPATIENT)
Dept: SLEEP MEDICINE | Facility: HOSPITAL | Age: 77
End: 2022-08-03

## 2022-08-03 VITALS
OXYGEN SATURATION: 96 % | DIASTOLIC BLOOD PRESSURE: 65 MMHG | WEIGHT: 256 LBS | BODY MASS INDEX: 36.65 KG/M2 | HEART RATE: 52 BPM | SYSTOLIC BLOOD PRESSURE: 141 MMHG | HEIGHT: 70 IN

## 2022-08-03 DIAGNOSIS — E66.01 MORBID OBESITY: ICD-10-CM

## 2022-08-03 DIAGNOSIS — G47.33 OBSTRUCTIVE SLEEP APNEA, ADULT: Primary | ICD-10-CM

## 2022-08-03 PROCEDURE — 99213 OFFICE O/P EST LOW 20 MIN: CPT | Performed by: NURSE PRACTITIONER

## 2022-08-03 NOTE — PROGRESS NOTES
Sleep Clinic Follow Up    Date: 8/3/2022  Primary Care Provider: Annie Penny MD    Last office visit: 2021 (I reviewed this note)    CC: Follow up: CHRIS on CPAP      Interim History:  Since the last visit:    1) severe CHRIS -  Jerald Bonilla has remained compliant with CPAP. He denies mask and machine issues, headaches, or pressures intolerance. He denies abnormal dreams, sleep paralysis, nasal congestion, URI sx.    2) Patient denies RLS symptoms.     Sleep Testin. PSG on 2007, AHI of 34   2. CPAP titration recommended 11 cm H2O   3. Currently on 10-20 cm H2O    PAP Data:    Time frame: 2021-2022   Compliance: 97.2%  Average use on days used: 7 hrs 54 min  Percent of days with usage greater than or equal to 4 hours: 96.7%  PAP range: 10-20 cm H2O  Average 90% pressure: 14.8 cmH2O  Leak: 4 minutes  Average AHI: 7.6 events/hr  JULIANE: 1.6 events/hr  Mask type: Nasal cushion  DME: [facility]  Machine type: [employer] DreamStation    Bed time: 3518-7689  Sleep latency: 5-15 minutes  Number of times awakens during the night: 1  Wake time: 4775-3372  Estimated total sleep time at night: 6-7 hours  Nap time: occasional   Sleepiness with Driving: none     [place] - 6  [place] Sleepiness Scale  Sitting and reading: Moderate chance of dozing  Watching TV: Slight chance of dozing  Sitting, inactive in a public place (e.g. a theatre or a meeting): Would never doze  As a passenger in a car for an hour without a break: Slight chance of dozing  Lying down to rest in the afternoon when circumstances permit: Slight chance of dozing  Sitting and talking to someone: Would never doze  Sitting quietly after a lunch without alcohol: Slight chance of dozing  In a car, while stopped for a few minutes in traffic: Would never doze  Total score: 6    PMHx, FH, SH reviewed and pertinent changes are: Wife started dialysis.     Review of Systems:   Negative for chest pain, SOA, fever, chills, cough,  N/V/D, abdominal pain.    Smoking:none    Jerald Bonilla  reports that he quit smoking about 22 years ago. He has never used smokeless tobacco.      Physical Exam:  Vitals:    08/03/22 1326   BP: 141/65   Pulse: 52   SpO2: 96%           08/03/22  1326   Weight: 116 kg (256 lb)     Body mass index is 36.73 kg/m². Class 2 Severe Obesity (BMI >=35 and <=39.9). Obesity-related health conditions include the following: obstructive sleep apnea, hypertension, dyslipidemias and GERD. Obesity is unchanged. BMI is is above average; BMI management plan is completed. I recommend portion control and increasing exercise.    Gen:                No distress, conversant, pleasant, appears stated age, alert, oriented  Eyes:               Anicteric sclera, moist conjunctiva, no lid lag                           PERRL, EOMI   Heent:             NC/AT                          Oropharynx clear                          Normal hearing  Lungs:             Normal effort, non-labored breathing         CV:                  Normal S1/S2                          No lower extremity edema  ABD:               Soft, rounded, non-distended          Psych:             Appropriate affect  Neuro:             CN 2-12 appear intact    Past Medical History:   Diagnosis Date   • Acne conglobata    • Acute maxillary sinusitis    • Allergies    • Benign prostate hyperplasia    • Blurring of visual image    • Carbuncle     of infected sebaceous cyst on back   • Cyst of pancreas    • Dizziness and giddiness    • Erectile dysfunction    • Essential hypertension     seeing nephro   • Gastroesophageal reflux disease    • Gout    • Hammer toe    • Hearing loss    • Hip pain    • Hyperlipidemia    • Low back pain    • Malaise and fatigue    • Need for prophylactic vaccination against Streptococcus pneumoniae (pneumococcus)    • Nuclear senile cataract    • Obesity    • Osteoarthritis    • Otalgia    • Otitis externa    • Pain in lower limb    • Rheumatoid  arthritis (HCC)    • Skin lesion     right calf   • Sleep apnea     sleeps with c-pap   • Stage 3 chronic kidney disease (HCC)    • Swelling of knee joint    • Tinnitus    • Upper respiratory infection        Current Outpatient Medications:   •  allopurinol (ZYLOPRIM) 100 MG tablet, Take 100 mg by mouth Daily. 2 tablets daily, Disp: , Rfl:   •  Apoaequorin (PREVAGEN PO), Take 1 tablet by mouth Daily., Disp: , Rfl:   •  aspirin 81 MG EC tablet, Take 81 mg by mouth Daily., Disp: , Rfl:   •  B Complex Vitamins (VITAMIN B COMPLEX PO), Take 1 tablet by mouth Daily., Disp: , Rfl:   •  carvedilol (COREG) 25 MG tablet, Take 25 mg by mouth 2 (Two) Times a Day With Meals. Take 2 tablets bid, Disp: , Rfl:   •  cholecalciferol (VITAMIN D3) 1000 UNITS tablet, Take 1,000 Units by mouth Daily., Disp: , Rfl:   •  clindamycin (Clindagel) 1 % gel, Apply  topically to the appropriate area as directed 2 (Two) Times a Day., Disp: 60 g, Rfl: 2  •  clopidogrel (PLAVIX) 75 MG tablet, Take 1 tablet by mouth Daily., Disp: 90 tablet, Rfl: 3  •  diazePAM (VALIUM) 5 MG tablet, Take 1 tab 30 mins before procedure, may repeat x 1, Disp: 4 tablet, Rfl: 0  •  famotidine (PEPCID) 10 MG tablet, Take 10 mg by mouth Every Night., Disp: , Rfl:   •  fexofenadine (Allegra Allergy) 60 MG tablet, Take 1 tablet by mouth Daily., Disp: 30 tablet, Rfl: 2  •  fluticasone (FLONASE) 50 MCG/ACT nasal spray, 1 spray into the nostril(s) as directed by provider Daily., Disp: 16 g, Rfl: 3  •  NIFEdipine XL (PROCARDIA XL) 90 MG 24 hr tablet, TAKE 2 TABLETS BY MOUTH AT  NIGHT, Disp: 180 tablet, Rfl: 3  •  pravastatin (PRAVACHOL) 80 MG tablet, Take 80 mg by mouth Every Night., Disp: , Rfl:   •  Respiratory Therapy Supplies (CareTouch 2 CPAP Hose ) misc, , Disp: , Rfl:   •  tamsulosin (FLOMAX) 0.4 MG capsule 24 hr capsule, Take 1 capsule by mouth Daily., Disp: 30 capsule, Rfl: 1  •  valsartan-hydrochlorothiazide (DIOVAN-HCT) 320-25 MG per tablet, Take 1 tablet by  mouth Daily., Disp: 90 tablet, Rfl: 1  •  Zinc 50 MG tablet, Take 1 tablet by mouth Every Night., Disp: , Rfl:     WBC   Date Value Ref Range Status   01/08/2022 7.77 3.40 - 10.80 10*3/mm3 Final     RBC   Date Value Ref Range Status   01/08/2022 4.45 4.14 - 5.80 10*6/mm3 Final     Hemoglobin   Date Value Ref Range Status   01/08/2022 12.9 (L) 13.0 - 17.7 g/dL Final     Hematocrit   Date Value Ref Range Status   01/08/2022 37.7 37.5 - 51.0 % Final     MCV   Date Value Ref Range Status   01/08/2022 84.7 79.0 - 97.0 fL Final     MCH   Date Value Ref Range Status   01/08/2022 29.0 26.6 - 33.0 pg Final     MCHC   Date Value Ref Range Status   01/08/2022 34.2 31.5 - 35.7 g/dL Final     RDW   Date Value Ref Range Status   01/08/2022 14.2 12.3 - 15.4 % Final     RDW-SD   Date Value Ref Range Status   01/08/2022 43.8 37.0 - 54.0 fl Final     MPV   Date Value Ref Range Status   01/08/2022 9.1 6.0 - 12.0 fL Final     Platelets   Date Value Ref Range Status   01/08/2022 139 (L) 140 - 450 10*3/mm3 Final     Neutrophil %   Date Value Ref Range Status   01/08/2022 68.4 42.7 - 76.0 % Final     Lymphocyte %   Date Value Ref Range Status   01/08/2022 16.6 (L) 19.6 - 45.3 % Final     Monocyte %   Date Value Ref Range Status   01/08/2022 13.6 (H) 5.0 - 12.0 % Final     Eosinophil %   Date Value Ref Range Status   01/08/2022 0.4 0.3 - 6.2 % Final     Basophil %   Date Value Ref Range Status   01/08/2022 0.6 0.0 - 1.5 % Final     Immature Grans %   Date Value Ref Range Status   01/08/2022 0.4 0.0 - 0.5 % Final     Neutrophils, Absolute   Date Value Ref Range Status   01/08/2022 5.31 1.70 - 7.00 10*3/mm3 Final     Lymphocytes, Absolute   Date Value Ref Range Status   01/08/2022 1.29 0.70 - 3.10 10*3/mm3 Final     Monocytes, Absolute   Date Value Ref Range Status   01/08/2022 1.06 (H) 0.10 - 0.90 10*3/mm3 Final     Eosinophils, Absolute   Date Value Ref Range Status   01/08/2022 0.03 0.00 - 0.40 10*3/mm3 Final     Basophils, Absolute    Date Value Ref Range Status   01/08/2022 0.05 0.00 - 0.20 10*3/mm3 Final     Immature Grans, Absolute   Date Value Ref Range Status   01/08/2022 0.03 0.00 - 0.05 10*3/mm3 Final     nRBC   Date Value Ref Range Status   01/08/2022 0.0 0.0 - 0.2 /100 WBC Final       Lab Results   Component Value Date    GLUCOSE 96 05/06/2022    BUN 29 (H) 05/06/2022    CREATININE 1.63 (H) 05/06/2022    EGFRIFAFRI 51 (L) 01/08/2022    BCR 17.8 05/06/2022    K 4.1 05/06/2022    CO2 24.0 05/06/2022    CALCIUM 9.6 05/06/2022    ALBUMIN 4.10 05/06/2022    AST 15 08/23/2021    ALT 17 08/23/2021       Assessment and Plan:    1. Obstructive sleep apnea - Established, stable (1)  1. Compliant with PAP therapy  2. Continue PAP as prescribed  3. Script for PAP supplies  4. Adjust pressure to 14.5-20 cm H2O - check compliance report in 1 month  5. Return to clinic in 1 year with compliance report unless change in symptoms in interim period  2. Morbid obesity - BMI 36.7 - stable chronic illness      I spent 20 minutes caring for Jerald on this date of service. This time includes time spent by me in the following activities: preparing for the visit, reviewing tests, obtaining and/or reviewing a separately obtained history, performing a medically appropriate examination and/or evaluation , counseling and educating the patient/family/caregiver, documenting information in the medical record and care coordination; discussing PAP therapy, PAP compliance and PAP maintenance    RTC in 12 months. Patient agrees to return sooner if changes in symptoms.        This document has been electronically signed by BEATRIS Curtis on August 3, 2022 13:31 CDT          CC: Annie Penny MD          No ref. provider found

## 2022-08-03 NOTE — PATIENT INSTRUCTIONS
Update from [employer] regarding safety recall June 2022:    Q. What is the Patient Portal?  A. We created the Patient Portal to allow you to check the status of your order until you receive your replacement device. The content is tailored around you and the information you have given us. You can find the Patient Portal at [email or web address]    Q. What can I use the Patient Portal for?  A. You can use the Patient Portal to check on the status of your device and to check if you are matched with your DME. You can also input your patient prioritization information which ensures that we have the information we need to get your replacement device to you as soon as possible. Please note that you cannot change or update information once it has been inputted.    Q. What do I do if I need to update my information.  A. Once you have inputted your information, you cannot change it. If you need to update your address, please call [phone].    Q. How can I access the Patient Portal?  A. To use the Patient Portal, please be ready to enter your registration confirmation number or serial number. You will also need to confirm your identity using your last name, zip code, and the last 4 digits of your phone number you used to register your affected device.

## 2022-08-22 ENCOUNTER — LAB (OUTPATIENT)
Dept: LAB | Facility: HOSPITAL | Age: 77
End: 2022-08-22

## 2022-08-22 DIAGNOSIS — Z12.5 ENCOUNTER FOR PROSTATE CANCER SCREENING: ICD-10-CM

## 2022-08-22 DIAGNOSIS — M54.41 CHRONIC BILATERAL LOW BACK PAIN WITH RIGHT-SIDED SCIATICA: ICD-10-CM

## 2022-08-22 DIAGNOSIS — I10 ESSENTIAL HYPERTENSION: ICD-10-CM

## 2022-08-22 DIAGNOSIS — M54.2 NECK PAIN: ICD-10-CM

## 2022-08-22 DIAGNOSIS — G89.29 CHRONIC BILATERAL LOW BACK PAIN WITH RIGHT-SIDED SCIATICA: ICD-10-CM

## 2022-08-22 LAB
ALBUMIN SERPL-MCNC: 3.9 G/DL (ref 3.5–5.2)
ALBUMIN/GLOB SERPL: 1.3 G/DL
ALP SERPL-CCNC: 51 U/L (ref 39–117)
ALT SERPL W P-5'-P-CCNC: 21 U/L (ref 1–41)
ANION GAP SERPL CALCULATED.3IONS-SCNC: 13.7 MMOL/L (ref 5–15)
AST SERPL-CCNC: 20 U/L (ref 1–40)
BACTERIA UR QL AUTO: NORMAL /HPF
BILIRUB SERPL-MCNC: 0.3 MG/DL (ref 0–1.2)
BILIRUB UR QL STRIP: NEGATIVE
BUN SERPL-MCNC: 29 MG/DL (ref 8–23)
BUN/CREAT SERPL: 19.9 (ref 7–25)
CALCIUM SPEC-SCNC: 9.5 MG/DL (ref 8.6–10.5)
CHLORIDE SERPL-SCNC: 108 MMOL/L (ref 98–107)
CHOLEST SERPL-MCNC: 148 MG/DL (ref 0–200)
CLARITY UR: CLEAR
CO2 SERPL-SCNC: 20.3 MMOL/L (ref 22–29)
COLOR UR: ABNORMAL
CREAT SERPL-MCNC: 1.46 MG/DL (ref 0.76–1.27)
CRP SERPL-MCNC: 0.88 MG/DL (ref 0–0.5)
EGFRCR SERPLBLD CKD-EPI 2021: 50.2 ML/MIN/1.73
GLOBULIN UR ELPH-MCNC: 3.1 GM/DL
GLUCOSE SERPL-MCNC: 103 MG/DL (ref 65–99)
GLUCOSE UR STRIP-MCNC: NEGATIVE MG/DL
HDLC SERPL-MCNC: 37 MG/DL (ref 40–60)
HGB UR QL STRIP.AUTO: NEGATIVE
HYALINE CASTS UR QL AUTO: NORMAL /LPF
KETONES UR QL STRIP: NEGATIVE
LDLC SERPL CALC-MCNC: 93 MG/DL (ref 0–100)
LDLC/HDLC SERPL: 2.48 {RATIO}
LEUKOCYTE ESTERASE UR QL STRIP.AUTO: NEGATIVE
NITRITE UR QL STRIP: NEGATIVE
PH UR STRIP.AUTO: 5.5 [PH] (ref 5–8)
POTASSIUM SERPL-SCNC: 3.7 MMOL/L (ref 3.5–5.2)
PROT SERPL-MCNC: 7 G/DL (ref 6–8.5)
PROT UR QL STRIP: ABNORMAL
PSA SERPL-MCNC: 2.73 NG/ML (ref 0–4)
RBC # UR STRIP: NORMAL /HPF
REF LAB TEST METHOD: NORMAL
SODIUM SERPL-SCNC: 142 MMOL/L (ref 136–145)
SP GR UR STRIP: 1.02 (ref 1–1.03)
SQUAMOUS #/AREA URNS HPF: NORMAL /HPF
TRIGL SERPL-MCNC: 97 MG/DL (ref 0–150)
UROBILINOGEN UR QL STRIP: ABNORMAL
VLDLC SERPL-MCNC: 18 MG/DL (ref 5–40)
WBC # UR STRIP: NORMAL /HPF

## 2022-08-22 PROCEDURE — G0103 PSA SCREENING: HCPCS

## 2022-08-22 PROCEDURE — 85025 COMPLETE CBC W/AUTO DIFF WBC: CPT

## 2022-08-22 PROCEDURE — 81001 URINALYSIS AUTO W/SCOPE: CPT

## 2022-08-22 PROCEDURE — 80053 COMPREHEN METABOLIC PANEL: CPT

## 2022-08-22 PROCEDURE — 86140 C-REACTIVE PROTEIN: CPT

## 2022-08-22 PROCEDURE — 36415 COLL VENOUS BLD VENIPUNCTURE: CPT

## 2022-08-22 PROCEDURE — 80061 LIPID PANEL: CPT

## 2022-08-22 PROCEDURE — 85652 RBC SED RATE AUTOMATED: CPT

## 2022-08-23 LAB
BASOPHILS # BLD AUTO: 0.06 10*3/MM3 (ref 0–0.2)
BASOPHILS NFR BLD AUTO: 1.2 % (ref 0–1.5)
DEPRECATED RDW RBC AUTO: 46.5 FL (ref 37–54)
EOSINOPHIL # BLD AUTO: 0.2 10*3/MM3 (ref 0–0.4)
EOSINOPHIL NFR BLD AUTO: 4 % (ref 0.3–6.2)
ERYTHROCYTE [DISTWIDTH] IN BLOOD BY AUTOMATED COUNT: 14.7 % (ref 12.3–15.4)
ERYTHROCYTE [SEDIMENTATION RATE] IN BLOOD: 27 MM/HR (ref 0–20)
HCT VFR BLD AUTO: 44.6 % (ref 37.5–51)
HGB BLD-MCNC: 14.7 G/DL (ref 13–17.7)
IMM GRANULOCYTES # BLD AUTO: 0.01 10*3/MM3 (ref 0–0.05)
IMM GRANULOCYTES NFR BLD AUTO: 0.2 % (ref 0–0.5)
LYMPHOCYTES # BLD AUTO: 1.53 10*3/MM3 (ref 0.7–3.1)
LYMPHOCYTES NFR BLD AUTO: 30.9 % (ref 19.6–45.3)
MCH RBC QN AUTO: 28.5 PG (ref 26.6–33)
MCHC RBC AUTO-ENTMCNC: 33 G/DL (ref 31.5–35.7)
MCV RBC AUTO: 86.4 FL (ref 79–97)
MONOCYTES # BLD AUTO: 0.67 10*3/MM3 (ref 0.1–0.9)
MONOCYTES NFR BLD AUTO: 13.5 % (ref 5–12)
NEUTROPHILS NFR BLD AUTO: 2.48 10*3/MM3 (ref 1.7–7)
NEUTROPHILS NFR BLD AUTO: 50.2 % (ref 42.7–76)
NRBC BLD AUTO-RTO: 0 /100 WBC (ref 0–0.2)
PLATELET # BLD AUTO: 193 10*3/MM3 (ref 140–450)
PMV BLD AUTO: 10.1 FL (ref 6–12)
RBC # BLD AUTO: 5.16 10*6/MM3 (ref 4.14–5.8)
WBC NRBC COR # BLD: 4.95 10*3/MM3 (ref 3.4–10.8)

## 2022-08-26 ENCOUNTER — OFFICE VISIT (OUTPATIENT)
Dept: FAMILY MEDICINE CLINIC | Facility: CLINIC | Age: 77
End: 2022-08-26

## 2022-08-26 VITALS
BODY MASS INDEX: 36.73 KG/M2 | HEIGHT: 70 IN | SYSTOLIC BLOOD PRESSURE: 122 MMHG | OXYGEN SATURATION: 97 % | DIASTOLIC BLOOD PRESSURE: 74 MMHG | RESPIRATION RATE: 18 BRPM | WEIGHT: 256.6 LBS | HEART RATE: 53 BPM

## 2022-08-26 DIAGNOSIS — R07.2 PRECORDIAL PAIN: ICD-10-CM

## 2022-08-26 DIAGNOSIS — L70.1 ACNE CONGLOBATA: ICD-10-CM

## 2022-08-26 DIAGNOSIS — Z00.00 MEDICARE ANNUAL WELLNESS VISIT, SUBSEQUENT: Primary | ICD-10-CM

## 2022-08-26 DIAGNOSIS — R39.15 URINARY URGENCY: ICD-10-CM

## 2022-08-26 PROCEDURE — 1170F FXNL STATUS ASSESSED: CPT | Performed by: GENERAL PRACTICE

## 2022-08-26 PROCEDURE — 1126F AMNT PAIN NOTED NONE PRSNT: CPT | Performed by: GENERAL PRACTICE

## 2022-08-26 PROCEDURE — 93010 ELECTROCARDIOGRAM REPORT: CPT | Performed by: INTERNAL MEDICINE

## 2022-08-26 PROCEDURE — G0439 PPPS, SUBSEQ VISIT: HCPCS | Performed by: GENERAL PRACTICE

## 2022-08-26 PROCEDURE — 93005 ELECTROCARDIOGRAM TRACING: CPT | Performed by: GENERAL PRACTICE

## 2022-08-26 PROCEDURE — 99213 OFFICE O/P EST LOW 20 MIN: CPT | Performed by: GENERAL PRACTICE

## 2022-08-26 PROCEDURE — 1159F MED LIST DOCD IN RCRD: CPT | Performed by: GENERAL PRACTICE

## 2022-08-26 RX ORDER — TAMSULOSIN HYDROCHLORIDE 0.4 MG/1
1 CAPSULE ORAL DAILY
Qty: 90 CAPSULE | Refills: 1 | Status: SHIPPED | OUTPATIENT
Start: 2022-08-26 | End: 2023-02-16

## 2022-08-31 LAB
QT INTERVAL: 468 MS
QTC INTERVAL: 435 MS

## 2022-09-23 ENCOUNTER — TRANSCRIBE ORDERS (OUTPATIENT)
Dept: CT IMAGING | Facility: HOSPITAL | Age: 77
End: 2022-09-23

## 2022-09-23 DIAGNOSIS — I71.40 ABDOMINAL AORTIC ANEURYSM (AAA) WITHOUT RUPTURE: Primary | ICD-10-CM

## 2022-09-28 ENCOUNTER — TELEPHONE (OUTPATIENT)
Dept: CARDIOLOGY | Facility: CLINIC | Age: 77
End: 2022-09-28

## 2022-09-28 ENCOUNTER — OFFICE VISIT (OUTPATIENT)
Dept: CARDIOLOGY | Facility: CLINIC | Age: 77
End: 2022-09-28

## 2022-09-28 VITALS
SYSTOLIC BLOOD PRESSURE: 132 MMHG | DIASTOLIC BLOOD PRESSURE: 66 MMHG | BODY MASS INDEX: 36.85 KG/M2 | OXYGEN SATURATION: 97 % | HEART RATE: 59 BPM | HEIGHT: 70 IN | WEIGHT: 257.4 LBS

## 2022-09-28 DIAGNOSIS — R00.1 BRADYCARDIA, SINUS: ICD-10-CM

## 2022-09-28 DIAGNOSIS — R07.2 PRECORDIAL PAIN: Primary | ICD-10-CM

## 2022-09-28 PROCEDURE — 99214 OFFICE O/P EST MOD 30 MIN: CPT | Performed by: NURSE PRACTITIONER

## 2022-09-28 NOTE — PROGRESS NOTES
precordial pain    Current providers  PCP: Dr. Penny  Nephrologist: Dr. Keyes  Surgeon: Dr. Hernandez   CT vascular: Dr. Yi    History of Present Illness  Mr. Jerald Bonilla is a 76-year-old male with medical history of essential hypertension, mixed hyperlipidemia, GERD, CHRIS on CPAP, abdominal aortic aneurysm repair, gout, rheumatoid arthritis, hypertensive heart disease, obesity, and stage III CKD.     He has been referred to cardiology for chest pain.  He reports that chest pain started 3 months ago.  Denies associated symptoms shortness of breath, diaphoresis, nausea, or weakness.  He has history of abdominal aortic aneurysm that has been repaired.  He does cardiac risk factors of hypertension, hypercholesterol, obesity, being male, and sedentary.  He reports that he has never had this pain up until 3 months ago.  He reports past cardiac work-up years ago with negative nuclear stress testing.      Chest Pain   Pain location: lateral region  Pain quality: dull ache, varying intensity   Pain radiates to: Left shoulder and down arm.  Sometimes goes through to the back.    Onset quality: moderate  Duration: varies inlength of time. Longest episode about 15 min  Timing: sometimes daily and sometimes weekly  Progression: About the same  Chronicity: 3 months  Relieved by: Nothing, goes away on its own  Worsened by: Nothing  Associated symptoms: none    Upon looking at his medication list he is on good heart medications with carvedilol, nifedipine, aspirin, and pravastatin.  He reports a bad experience with a nuclear stress test and does not want this is an option as a cardiac work-up.  CTA was discussed as well however his creatinine is 1.49 and EGFR 50 he is getting contrast tomorrow for CT to evaluate pancrease.  He does not want to make a decision before speaking to Dr. Keyes. A CTA of coronaries can use 70 to 120 mL of contrast dye.  I agree with getting Dr. Keyes's thoughts on additional dye with a CTA.  He really  does not want a nuclear myocardial stress test.    The 10-year ASCVD risk score (Edison BURNS Jr., et al., 2013) is: 22.6%    Values used to calculate the score:      Age: 76 years      Sex: Male      Is Non- : Yes      Diabetic: No      Tobacco smoker: No      Systolic Blood Pressure: 132 mmHg      Is BP treated: Yes      HDL Cholesterol: 37 mg/dL      Total Cholesterol: 148 mg/dL    Cardiac Risk Factors:  Sedentary life style, Obesity and Male, family history, age    Past Medical History:   Diagnosis Date   • Acne conglobata    • Acute maxillary sinusitis    • Allergies    • Benign prostate hyperplasia    • Blurring of visual image    • Carbuncle     of infected sebaceous cyst on back   • Cyst of pancreas    • Dizziness and giddiness    • Erectile dysfunction    • Essential hypertension     seeing nephro   • Gastroesophageal reflux disease    • Gout    • Hammer toe    • Hearing loss    • Hip pain    • Hyperlipidemia    • Low back pain    • Malaise and fatigue    • Need for prophylactic vaccination against Streptococcus pneumoniae (pneumococcus)    • Nuclear senile cataract    • Obesity    • Osteoarthritis    • Otalgia    • Otitis externa    • Pain in lower limb    • Rheumatoid arthritis (HCC)    • Skin lesion     right calf   • Sleep apnea     sleeps with c-pap   • Stage 3 chronic kidney disease (HCC)    • Swelling of knee joint    • Tinnitus    • Upper respiratory infection      Past Surgical History:   Procedure Laterality Date   • ABDOMINAL AORTIC ANEURYSM REPAIR WITH ENDOGRAFT N/A 01/07/2022    Procedure: ABDOMINAL AORTIC ANEURYSM REPAIR WITH ENDOGRAFT;  Surgeon: Unruly Yi MD;  Location: [facility] OR;  Service: Cardiothoracic;  Laterality: N/A;   • COLONOSCOPY  05/18/2009    Colonoscopy, diagnostic (screening) 41788 (1)    diverticuli, roids    • COLONOSCOPY N/A 08/13/2020    Procedure: COLONOSCOPY;  Surgeon: Dagoberto Gavin DO;  Location: [facility] ENDOSCOPY;  Service:  "Gastroenterology;  Laterality: N/A;  tattoo @ right colon    • COLONOSCOPY N/A 2021    Procedure: COLONOSCOPY;  Surgeon: Dagoberto Gavin DO;  Location: [facility] ENDOSCOPY;  Service: Gastroenterology;  Laterality: N/A;   • COLONOSCOPY N/A 2022    Procedure: COLONOSCOPY 10:00;  Surgeon: Dagoberto Gavin DO;  Location: [facility] ENDOSCOPY;  Service: Gastroenterology;  Laterality: N/A;   • CYST REMOVAL Bilateral     cheeks   • ENDOSCOPY N/A 2019    Procedure: ESOPHAGOGASTRODUODENOSCOPY possible dilation;  Surgeon: Jerald Nation MD;  Location: [facility] ENDOSCOPY;  Service: Gastroenterology   • HERNIA REPAIR      umbilical   • JOINT REPLACEMENT     • PILONIDAL CYSTECTOMY N/A     HAYWARD   • TOTAL HIP ARTHROPLASTY Left    • WISDOM TOOTH EXTRACTION       Social History     Socioeconomic History   • Marital status:    Tobacco Use   • Smoking status: Former Smoker     Quit date:      Years since quittin.7   • Smokeless tobacco: Never Used   Vaping Use   • Vaping Use: Never used   Substance and Sexual Activity   • Alcohol use: Yes     Comment: socially   • Drug use: Not Currently     Types: Marijuana     Comment: none in 1-2 years   • Sexual activity: Defer     Family History   Problem Relation Age of Onset   • Arthritis Mother    • Hypertension Mother    • Obesity Mother    • Thyroid disease Mother    • Mental illness Mother    • Hypertension Father    • Heart disease Father    • Cancer Father    • Diabetes Father    • Kidney disease Father    • Obesity Daughter    • Obesity Daughter    • No Known Problems Daughter    • No Known Problems Son    • Arthritis Maternal Grandmother        ALLERGIES:  Allergies   Allergen Reactions   • Lisinopril Itching   • Minoxidil Other (See Comments)     \"dont know\"         Review of Systems   Constitutional: Negative for diaphoresis, malaise/fatigue and night sweats.   Eyes: Negative for blurred vision and double vision.   Cardiovascular: Positive for chest pain. " Negative for irregular heartbeat, leg swelling, orthopnea, palpitations, paroxysmal nocturnal dyspnea and syncope.   Respiratory: Negative for cough and shortness of breath.    Endocrine: Negative for polyphagia.   Hematologic/Lymphatic: Negative for adenopathy and bleeding problem.   Skin: Negative for flushing and rash.   Musculoskeletal: Positive for arthritis. Negative for muscle cramps, muscle weakness and myalgias.   Gastrointestinal: Positive for heartburn. Negative for abdominal pain, anorexia and nausea.   Neurological: Negative for dizziness, headaches and numbness.   Psychiatric/Behavioral: Negative for depression. The patient is not nervous/anxious.        Current Outpatient Medications   Medication Sig Dispense Refill   • allopurinol (ZYLOPRIM) 100 MG tablet Take 100 mg by mouth Daily. 2 tablets daily     • Apoaequorin (PREVAGEN PO) Take 1 tablet by mouth Daily.     • aspirin 81 MG EC tablet Take 81 mg by mouth Daily.     • B Complex Vitamins (VITAMIN B COMPLEX PO) Take 1 tablet by mouth Daily.     • carvedilol (COREG) 25 MG tablet Take 25 mg by mouth 2 (Two) Times a Day With Meals. Take 2 tablets bid     • cholecalciferol (VITAMIN D3) 1000 UNITS tablet Take 1,000 Units by mouth Daily.     • clindamycin (Clindagel) 1 % gel Apply  topically to the appropriate area as directed 2 (Two) Times a Day. 60 g 2   • clopidogrel (PLAVIX) 75 MG tablet Take 1 tablet by mouth Daily. 90 tablet 3   • famotidine (PEPCID) 10 MG tablet Take 10 mg by mouth Every Night.     • fexofenadine (Allegra Allergy) 60 MG tablet Take 1 tablet by mouth Daily. 30 tablet 2   • fluticasone (FLONASE) 50 MCG/ACT nasal spray 1 spray into the nostril(s) as directed by provider Daily. 16 g 3   • NIFEdipine XL (PROCARDIA XL) 90 MG 24 hr tablet TAKE 2 TABLETS BY MOUTH AT  NIGHT 180 tablet 3   • pravastatin (PRAVACHOL) 80 MG tablet Take 80 mg by mouth Every Night.     • Respiratory Therapy Supplies (CareTouch 2 CPAP Hose ) misc      •  "tamsulosin (FLOMAX) 0.4 MG capsule 24 hr capsule Take 1 capsule by mouth Daily. 90 capsule 1   • valsartan-hydrochlorothiazide (DIOVAN-HCT) 320-25 MG per tablet Take 1 tablet by mouth Daily. 90 tablet 1   • Zinc 50 MG tablet Take 1 tablet by mouth Every Night.       No current facility-administered medications for this visit.       OBJECTIVE:    Physical Exam:   Vitals reviewed.   Constitutional:       Appearance: Well-groomed and not in distress. Obese.   Eyes:      General: Lids are normal.      Conjunctiva/sclera: Conjunctivae normal.      Right eye: Right conjunctiva is not injected.      Left eye: Left conjunctiva is not injected.      Pupils: Pupils are equal, round, and reactive to light.   HENT:      Head: Normocephalic and atraumatic.   Neck:      Vascular: No JVD.      Trachea: Trachea normal.   Pulmonary:      Effort: Pulmonary effort is normal.      Breath sounds: Normal breath sounds and air entry. No decreased air movement. No wheezing. No rhonchi.   Cardiovascular:      PMI at left midclavicular line. Bradycardia present. Regular rhythm. Normal S1 with normal intensity. Normal S2 with normal intensity.      Murmurs: There is no murmur.      No gallop.   Pulses:     Intact distal pulses.   Edema:     Peripheral edema absent.   Skin:     General: Skin is warm and dry.      Capillary Refill: Capillary refill takes less than 2 seconds.      Coloration: Skin is not pale.   Neurological:      Mental Status: Alert, oriented to person, place, and time and oriented to person, place and time.      Gait: Gait is intact.   Psychiatric:         Attention and Perception: Attention normal.         Mood and Affect: Mood normal.         Speech: Speech normal.         Cognition and Memory: Cognition normal.       Vitals:    09/28/22 1001   BP: 132/66   BP Location: Left arm   Patient Position: Sitting   Cuff Size: Adult   Pulse: 59   SpO2: 97%   Weight: 117 kg (257 lb 6.4 oz)   Height: 177.8 cm (70\")       DATA " REVIEWED:       No radiology results for the last 30 days.  CXR:     CT:     Labs: BMP, CBC, LIPID, TSH  Lab Results   Component Value Date    GLUCOSE 103 (H) 2022    CALCIUM 9.5 2022     2022    K 3.7 2022    CO2 20.3 (L) 2022     (H) 2022    BUN 29 (H) 2022    CREATININE 1.46 (H) 2022    EGFRIFAFRI 51 (L) 2022    BCR 19.9 2022    ANIONGAP 13.7 2022     Lab Results   Component Value Date    WBC 4.95 2022    HGB 14.7 2022    HCT 44.6 2022    MCV 86.4 2022     2022     Lab Results   Component Value Date    CHOL 148 2022    CHOL 162 2021    CHOL 157 2020     Lab Results   Component Value Date    TRIG 97 2022    TRIG 139 2021    TRIG 137 2020     Lab Results   Component Value Date    HDL 37 (L) 2022    HDL 45 2021    HDL 39 (L) 2020     No components found for: LDLCALC  Lab Results   Component Value Date    LDL 93 2022    LDL 92 2021    LDL 91 2020     No results found for: HDLLDLRATIO  No components found for: CHOLHDL  Lab Results   Component Value Date    TSH 2.080 2021     No results found for: PROBNP    EK2022    EK2021    The following portions of the patient's history were reviewed and updated as appropriate: allergies, current medications, past family history, past medical history, past social history, past surgical history and problem list.  Old records reviewed and pertinent information is included in the above objective data.     ASSESSMENT/PLAN:     Diagnosis Plan   1. Precordial pain  CT Angiogram Coronary   2. Bradycardia, sinus       1.  Precordial pain. Reviewed EKG performed last year with the one that was done recently 22.  No significant changes, patient is bradycardic but on carvedilol.  SC interval has lengthened and he has a first-degree AV block.  He is asymptomatic to bradycardia. He  reports that Ford he placed him on carvedilol for blood pressure due to kidney issues.  He is bradycardic but asymptomatic.  Denies dizziness, syncope, or nausea.     His chest pain seems typical.   Chest pain radiates to left shoulder and down left arm.  Sometimes pain goes to back.   ASCVD risk 22.6%    -Recommended him to undergo a nuclear myocardial stress test, 2-day study.  He is reluctant and does not want to have this at this time because of a bad experience during nuclear stress test.  The other option could be harmful to his kidneys and we discussed this at this visit.  Last creatinine 1.46, eGFR 50.2--CKD stage IIIa.  He wants me to talk to Dr. Keyes with his thoughts on contrast dye, he would prefer to undergo a CTA of coronaries instead of a nuclear myocardial stress test.    Spoke to Dr. Keyes and he would prefer for him to have contrast at 1 time.  He is scheduled for CT of abdomen tomorrow to reassess pancreatic cysts.  I spoke with CT, Greta RT and they are able to get both procedures done at the same time.  He is already scheduled for IV fluids before and after CT exams.    2. Bradycardia, asymptomatic. Reviewed EKG performed last year with the one that was done recently 8/26/22.  No significant changes as he was having PVCs at that time.  He is also bradycardic and most likely due to carvedilol.  CT interval has lengthened and he has a first-degree AV block. He reports that [facility] he placed him on carvedilol for blood pressure due to kidney issues. He is asymptomatic to bradycardia. Denies dizziness, syncope, or nausea.    -We will monitor as needed, he is asymptomatic with good blood pressure..    I spent 39 minutes caring for Jerald on this date of service. This time includes time spent by me in the following activities: reviewing tests, obtaining and/or reviewing a separately obtained history, performing a medically appropriate examination and/or evaluation, counseling and educating the  patient/family/caregiver, referring and communicating with other health care professionals, documenting information in the medical record and independently interpreting results and communicating that information with the patient/family/caregiver  Return in about 4 weeks (around 10/26/2022) for Recheck.      This document has been electronically signed by BEATRIS Alegre on September 28, 2022 11:21 CDT   Electronically signed by BEATRIS Alegre, 09/28/22, 10:14 AM CDT.

## 2022-09-29 ENCOUNTER — HOSPITAL ENCOUNTER (OUTPATIENT)
Dept: CT IMAGING | Facility: HOSPITAL | Age: 77
Discharge: HOME | End: 2022-09-29
Admitting: NURSE PRACTITIONER

## 2022-09-29 VITALS
RESPIRATION RATE: 16 BRPM | BODY MASS INDEX: 36.3 KG/M2 | DIASTOLIC BLOOD PRESSURE: 67 MMHG | SYSTOLIC BLOOD PRESSURE: 146 MMHG | WEIGHT: 253.53 LBS | TEMPERATURE: 97 F | HEART RATE: 57 BPM | OXYGEN SATURATION: 97 % | HEIGHT: 70 IN

## 2022-09-29 DIAGNOSIS — I71.40 ABDOMINAL AORTIC ANEURYSM (AAA) WITHOUT RUPTURE: ICD-10-CM

## 2022-09-29 LAB
BUN SERPL-MCNC: 30 MG/DL (ref 8–23)
CREAT SERPL-MCNC: 1.69 MG/DL (ref 0.76–1.27)
EGFRCR SERPLBLD CKD-EPI 2021: 42.1 ML/MIN/1.73

## 2022-09-29 PROCEDURE — 84520 ASSAY OF UREA NITROGEN: CPT | Performed by: RADIOLOGY

## 2022-09-29 PROCEDURE — 82565 ASSAY OF CREATININE: CPT | Performed by: RADIOLOGY

## 2022-09-29 PROCEDURE — 74174 CTA ABD&PLVS W/CONTRAST: CPT

## 2022-09-29 PROCEDURE — 0 IOPAMIDOL PER 1 ML: Performed by: THORACIC SURGERY (CARDIOTHORACIC VASCULAR SURGERY)

## 2022-09-29 RX ORDER — SODIUM CHLORIDE 9 MG/ML
200 INJECTION, SOLUTION INTRAVENOUS CONTINUOUS
Status: DISCONTINUED | OUTPATIENT
Start: 2022-09-29 | End: 2022-09-30 | Stop reason: HOSPADM

## 2022-09-29 RX ADMIN — IOPAMIDOL 90 ML: 755 INJECTION, SOLUTION INTRAVENOUS at 09:17

## 2022-09-29 RX ADMIN — SODIUM CHLORIDE 200 ML/HR: 9 INJECTION, SOLUTION INTRAVENOUS at 07:41

## 2022-09-30 RX ORDER — CARVEDILOL 25 MG/1
TABLET ORAL
Qty: 360 TABLET | Refills: 3 | Status: SHIPPED | OUTPATIENT
Start: 2022-09-30

## 2022-10-06 ENCOUNTER — TRANSCRIBE ORDERS (OUTPATIENT)
Dept: LAB | Facility: HOSPITAL | Age: 77
End: 2022-10-06

## 2022-10-06 DIAGNOSIS — N18.30 STAGE 3 CHRONIC KIDNEY DISEASE, UNSPECIFIED WHETHER STAGE 3A OR 3B CKD: Primary | ICD-10-CM

## 2022-10-07 ENCOUNTER — LAB (OUTPATIENT)
Dept: LAB | Facility: HOSPITAL | Age: 77
End: 2022-10-07

## 2022-10-07 ENCOUNTER — OFFICE VISIT (OUTPATIENT)
Dept: SURGERY | Facility: CLINIC | Age: 77
End: 2022-10-07

## 2022-10-07 VITALS
SYSTOLIC BLOOD PRESSURE: 130 MMHG | OXYGEN SATURATION: 98 % | HEART RATE: 56 BPM | WEIGHT: 260.8 LBS | DIASTOLIC BLOOD PRESSURE: 70 MMHG | BODY MASS INDEX: 37.34 KG/M2 | HEIGHT: 70 IN

## 2022-10-07 DIAGNOSIS — K86.2 PANCREATIC CYST: Primary | ICD-10-CM

## 2022-10-07 DIAGNOSIS — N18.30 STAGE 3 CHRONIC KIDNEY DISEASE, UNSPECIFIED WHETHER STAGE 3A OR 3B CKD: ICD-10-CM

## 2022-10-07 LAB
ANION GAP SERPL CALCULATED.3IONS-SCNC: 10.2 MMOL/L (ref 5–15)
BUN SERPL-MCNC: 29 MG/DL (ref 8–23)
BUN/CREAT SERPL: 19.5 (ref 7–25)
CALCIUM SPEC-SCNC: 9.4 MG/DL (ref 8.6–10.5)
CHLORIDE SERPL-SCNC: 105 MMOL/L (ref 98–107)
CO2 SERPL-SCNC: 23.8 MMOL/L (ref 22–29)
CREAT SERPL-MCNC: 1.49 MG/DL (ref 0.76–1.27)
EGFRCR SERPLBLD CKD-EPI 2021: 48.9 ML/MIN/1.73
GLUCOSE SERPL-MCNC: 111 MG/DL (ref 65–99)
POTASSIUM SERPL-SCNC: 3.8 MMOL/L (ref 3.5–5.2)
SODIUM SERPL-SCNC: 139 MMOL/L (ref 136–145)

## 2022-10-07 PROCEDURE — 80048 BASIC METABOLIC PNL TOTAL CA: CPT

## 2022-10-07 PROCEDURE — 36415 COLL VENOUS BLD VENIPUNCTURE: CPT

## 2022-10-07 PROCEDURE — 99212 OFFICE O/P EST SF 10 MIN: CPT | Performed by: SURGERY

## 2022-10-10 ENCOUNTER — OFFICE VISIT (OUTPATIENT)
Dept: CARDIAC SURGERY | Facility: CLINIC | Age: 77
End: 2022-10-10

## 2022-10-10 VITALS
OXYGEN SATURATION: 100 % | BODY MASS INDEX: 37.22 KG/M2 | SYSTOLIC BLOOD PRESSURE: 130 MMHG | WEIGHT: 260 LBS | DIASTOLIC BLOOD PRESSURE: 60 MMHG | HEIGHT: 70 IN | HEART RATE: 57 BPM | TEMPERATURE: 97.7 F

## 2022-10-10 DIAGNOSIS — Z99.89 OSA ON CPAP: ICD-10-CM

## 2022-10-10 DIAGNOSIS — E78.2 MIXED HYPERLIPIDEMIA: Chronic | ICD-10-CM

## 2022-10-10 DIAGNOSIS — I10 ESSENTIAL HYPERTENSION: Chronic | ICD-10-CM

## 2022-10-10 DIAGNOSIS — G47.33 OSA ON CPAP: ICD-10-CM

## 2022-10-10 DIAGNOSIS — M06.9 RHEUMATOID ARTHRITIS, INVOLVING UNSPECIFIED SITE, UNSPECIFIED WHETHER RHEUMATOID FACTOR PRESENT: ICD-10-CM

## 2022-10-10 DIAGNOSIS — E66.01 CLASS 2 SEVERE OBESITY DUE TO EXCESS CALORIES WITH SERIOUS COMORBIDITY AND BODY MASS INDEX (BMI) OF 37.0 TO 37.9 IN ADULT: ICD-10-CM

## 2022-10-10 DIAGNOSIS — I71.43 INFRARENAL ABDOMINAL AORTIC ANEURYSM (AAA) WITHOUT RUPTURE: Primary | ICD-10-CM

## 2022-10-10 DIAGNOSIS — N18.31 STAGE 3A CHRONIC KIDNEY DISEASE: ICD-10-CM

## 2022-10-10 DIAGNOSIS — M25.552 LEFT HIP PAIN: ICD-10-CM

## 2022-10-10 PROCEDURE — 99214 OFFICE O/P EST MOD 30 MIN: CPT | Performed by: THORACIC SURGERY (CARDIOTHORACIC VASCULAR SURGERY)

## 2022-10-10 NOTE — PROGRESS NOTES
Chief Complaint   Patient presents with   • Follow-up     CT results          HPI  Follow-up of the cystic pancreatic mass.    Study Result    Narrative & Impression   CT ABDOMEN AND PELVIS WITHOUT AND WITH INTRAVENOUS CONTRAST     CLINICAL HISTORY:  76 years Male,Aortic aneurysm (AAA), post-op,  I71.4 Abdominal aortic aneurysm, without rupture     COMPARISON: CT of the abdomen and pelvis dated 2/28/2022     TECHNIQUE: This examination was performed according to our  departmental dose optimization program which includes automated  exposure control, adjustment of the MA and kV according to  patient size, and/or use of iterative reconstruction technique.     FINDINGS:   Prior endograft repair of abdominal aortic aneurysm. The excluded  aneurysm sac extends along the anterior and right margin of the  infrarenal abdominal aorta and measures up to 3.2 x 3.2 cm,  stable relative to the 2/28/2022 exam. 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  There is opacification of the inferior mesenteric artery  beginning approximately 2 cm from its origin via  collateralization. The right common iliac artery again measures  up to 2.3 cm with apparent extension of contrast beyond the  margin of the endograft, stable in appearance relative to  2/28/2022.     Negative appendix. Colonic diverticulosis, particularly along the  sigmoid colon, without evidence for acute diverticulitis. No  evidence of mechanical small bowel obstruction. No  lymphadenopathy in the abdomen or pelvis. Bilateral  fat-containing inguinal hernias.     Again noted in the pancreatic head is a 3.5 x 3 cm cystic lesion.  No pancreatic or biliary ductal dilatation. In the right adrenal  gland is a small, 1.6 cm nodule with indeterminate attenuation  characteristics on the unenhanced CT. Negative left adrenal  gland. There are small bilateral renal cysts with  the largest  measuring up to 2.8 cm. No specific follow-up is recommended for  these renal cysts. No hydronephrosis.     Likely atelectasis in the dependent lung bases.     No acute or suspicious osseous abnormality. Prior left hip  arthroplasty.        IMPRESSION:  1. Prior endograft repair of the abdominal aorta. The excluded  aneurysm sac is stable and again measures up to 3.2 cm.  2. The right common iliac artery measures up to 2.3 cm with  apparent extension of contrast beyond the margin of the  endograft, stable relative to 2/28/2022.  3. Within the pancreatic head is a stable 3.5 cm cystic lesion.  Repeat follow-up imaging is recommended in six months to ensure  continued stability.  4. A 1.6 cm right adrenal nodule is stable though demonstrates  indeterminate attenuation characteristics on the unenhanced CT. A  follow-up abdominal CT or MRI per the adrenal protocol, which  includes delayed phase imaging to assess washout characteristics,  can be obtained for a more complete evaluation.     Electronically signed by:  Alex Mendoza MD  10/4/2022 8:06 AM CDT  Workstation: [id number]     I have personally reviewed the imaging and concur with the radiologist's findings.    Past Medical History:   Diagnosis Date   • Acne conglobata    • Acute maxillary sinusitis    • Allergies    • Benign prostate hyperplasia    • Blurring of visual image    • Carbuncle     of infected sebaceous cyst on back   • Cyst of pancreas    • Dizziness and giddiness    • Erectile dysfunction    • Essential hypertension     seeing nephro   • Gastroesophageal reflux disease    • Gout    • Hammer toe    • Hearing loss    • Hip pain    • Hyperlipidemia    • Low back pain    • Malaise and fatigue    • Need for prophylactic vaccination against Streptococcus pneumoniae (pneumococcus)    • Nuclear senile cataract    • Obesity    • Osteoarthritis    • Otalgia    • Otitis externa    • Pain in lower limb    • Rheumatoid arthritis (HCC)    • Skin  lesion     right calf   • Sleep apnea     sleeps with c-pap   • Stage 3 chronic kidney disease (HCC)    • Swelling of knee joint    • Tinnitus    • Upper respiratory infection        Past Surgical History:   Procedure Laterality Date   • ABDOMINAL AORTIC ANEURYSM REPAIR WITH ENDOGRAFT N/A 01/07/2022    Procedure: ABDOMINAL AORTIC ANEURYSM REPAIR WITH ENDOGRAFT;  Surgeon: Unruly Yi MD;  Location: [facility] OR;  Service: Cardiothoracic;  Laterality: N/A;   • COLONOSCOPY  05/18/2009    Colonoscopy, diagnostic (screening) 21279 (1)    diverticuli, roids    • COLONOSCOPY N/A 08/13/2020    Procedure: COLONOSCOPY;  Surgeon: Dagoberto Gavin DO;  Location: [facility] ENDOSCOPY;  Service: Gastroenterology;  Laterality: N/A;  tattoo @ right colon    • COLONOSCOPY N/A 02/18/2021    Procedure: COLONOSCOPY;  Surgeon: Dagoberto Gavin DO;  Location: [facility] ENDOSCOPY;  Service: Gastroenterology;  Laterality: N/A;   • COLONOSCOPY N/A 6/14/2022    Procedure: COLONOSCOPY 10:00;  Surgeon: Dagoberto Gavin DO;  Location: [facility] ENDOSCOPY;  Service: Gastroenterology;  Laterality: N/A;   • CYST REMOVAL Bilateral     cheeks   • ENDOSCOPY N/A 01/16/2019    Procedure: ESOPHAGOGASTRODUODENOSCOPY possible dilation;  Surgeon: Jerald Nation MD;  Location: [facility] ENDOSCOPY;  Service: Gastroenterology   • HERNIA REPAIR      umbilical   • JOINT REPLACEMENT     • PILONIDAL CYSTECTOMY N/A     HAYWARD   • TOTAL HIP ARTHROPLASTY Left    • WISDOM TOOTH EXTRACTION           Current Outpatient Medications:   •  allopurinol (ZYLOPRIM) 100 MG tablet, Take 100 mg by mouth Daily. 2 tablets daily, Disp: , Rfl:   •  Apoaequorin (PREVAGEN PO), Take 1 tablet by mouth Daily., Disp: , Rfl:   •  aspirin 81 MG EC tablet, Take 81 mg by mouth Daily., Disp: , Rfl:   •  B Complex Vitamins (VITAMIN B COMPLEX PO), Take 1 tablet by mouth Daily., Disp: , Rfl:   •  carvedilol (COREG) 25 MG tablet, TAKE 2 TABLETS BY MOUTH  TWICE DAILY WITH MEALS, Disp: 360 tablet,  "Rfl: 3  •  cholecalciferol (VITAMIN D3) 1000 UNITS tablet, Take 1,000 Units by mouth Daily., Disp: , Rfl:   •  clindamycin (Clindagel) 1 % gel, Apply  topically to the appropriate area as directed 2 (Two) Times a Day., Disp: 60 g, Rfl: 2  •  clopidogrel (PLAVIX) 75 MG tablet, Take 1 tablet by mouth Daily., Disp: 90 tablet, Rfl: 3  •  famotidine (PEPCID) 10 MG tablet, Take 10 mg by mouth Every Night., Disp: , Rfl:   •  fexofenadine (Allegra Allergy) 60 MG tablet, Take 1 tablet by mouth Daily., Disp: 30 tablet, Rfl: 2  •  fluticasone (FLONASE) 50 MCG/ACT nasal spray, 1 spray into the nostril(s) as directed by provider Daily., Disp: 16 g, Rfl: 3  •  NIFEdipine XL (PROCARDIA XL) 90 MG 24 hr tablet, TAKE 2 TABLETS BY MOUTH AT  NIGHT, Disp: 180 tablet, Rfl: 3  •  pravastatin (PRAVACHOL) 80 MG tablet, Take 80 mg by mouth Every Night., Disp: , Rfl:   •  Respiratory Therapy Supplies (CareTouch 2 CPAP Hose ) misc, , Disp: , Rfl:   •  tamsulosin (FLOMAX) 0.4 MG capsule 24 hr capsule, Take 1 capsule by mouth Daily., Disp: 90 capsule, Rfl: 1  •  valsartan-hydrochlorothiazide (DIOVAN-HCT) 320-25 MG per tablet, Take 1 tablet by mouth Daily., Disp: 90 tablet, Rfl: 1  •  Zinc 50 MG tablet, Take 1 tablet by mouth Every Night., Disp: , Rfl:     Allergies   Allergen Reactions   • Lisinopril Itching   • Minoxidil Other (See Comments)     \"dont know\"       Family History   Problem Relation Age of Onset   • Arthritis Mother    • Hypertension Mother    • Obesity Mother    • Thyroid disease Mother    • Mental illness Mother    • Hypertension Father    • Heart disease Father    • Cancer Father    • Diabetes Father    • Kidney disease Father    • Obesity Daughter    • Obesity Daughter    • No Known Problems Daughter    • No Known Problems Son    • Arthritis Maternal Grandmother        Social History     Socioeconomic History   • Marital status:    Tobacco Use   • Smoking status: Former     Types: Cigarettes     Quit date: 2000    "  Years since quittin.7   • Smokeless tobacco: Never   Vaping Use   • Vaping Use: Never used   Substance and Sexual Activity   • Alcohol use: Yes     Comment: socially   • Drug use: Not Currently     Types: Marijuana     Comment: none in 1-2 years   • Sexual activity: Defer       Physical Exam  Abdominal:      General: Abdomen is flat. There is no distension.      Palpations: Abdomen is soft. There is no mass.      Tenderness: There is no abdominal tenderness. There is no guarding or rebound.      Hernia: No hernia is present.           ASSESSMENT    Diagnoses and all orders for this visit:    1. Pancreatic cyst (Primary)  -     CT Abdomen Pelvis With Contrast; Future        PLAN    1.  CT scan 6 months              This document has been electronically signed by Ghulam Hernandez MD on October 10, 2022 16:50 CDT

## 2022-10-18 ENCOUNTER — DOCUMENTATION (OUTPATIENT)
Dept: CARDIOLOGY | Facility: CLINIC | Age: 77
End: 2022-10-18

## 2022-10-18 DIAGNOSIS — R07.2 PRECORDIAL PAIN: Primary | ICD-10-CM

## 2022-10-18 NOTE — PROGRESS NOTES
I called and spoke to Mr. Jerald Bonilla regarding options to evaluate heart.  He did not get his CTA at the time of his CT of abdomen and pelvis.  Radiology called and reported this to me.  We discussed nuclear 2-day stress testing as best options for his kidneys to evaluate chest pains.  Myocardial perfusion scintigraphy (MPS)  was recommended to the patient as the best non-invasive modality for the assessment of obstructive CAD.  I  did spend some time discussing the procedure, as well as risks and benefits.  I also informed the patient that the risk of nonfatal MI or major cardiac complication is about  0.02%. The patient was also informed about the risks and benefits of MPS. He is agreeable to proceed.      We will call with appointment date and time as well as instructions.          This document has been electronically signed by BEATRIS Alegre on October 18, 2022 14:45 CDT   Electronically signed by BEATRIS Alegre, 10/18/22, 2:45 PM CDT.

## 2022-10-24 ENCOUNTER — HOSPITAL ENCOUNTER (OUTPATIENT)
Dept: NUCLEAR MEDICINE | Facility: HOSPITAL | Age: 77
Discharge: HOME | End: 2022-10-24

## 2022-10-24 DIAGNOSIS — I10 ESSENTIAL HYPERTENSION: Chronic | ICD-10-CM

## 2022-10-24 DIAGNOSIS — R07.2 PRECORDIAL PAIN: ICD-10-CM

## 2022-10-24 PROCEDURE — 0 TECHNETIUM SESTAMIBI: Performed by: NURSE PRACTITIONER

## 2022-10-24 PROCEDURE — 78452 HT MUSCLE IMAGE SPECT MULT: CPT

## 2022-10-24 PROCEDURE — 93017 CV STRESS TEST TRACING ONLY: CPT

## 2022-10-24 PROCEDURE — A9500 TC99M SESTAMIBI: HCPCS | Performed by: NURSE PRACTITIONER

## 2022-10-24 RX ORDER — VALSARTAN AND HYDROCHLOROTHIAZIDE 320; 25 MG/1; MG/1
1 TABLET, FILM COATED ORAL DAILY
Qty: 90 TABLET | Refills: 3 | Status: SHIPPED | OUTPATIENT
Start: 2022-10-24

## 2022-10-24 RX ADMIN — TECHNETIUM TC 99M SESTAMIBI 1 DOSE: 1 INJECTION INTRAVENOUS at 09:39

## 2022-10-25 ENCOUNTER — HOSPITAL ENCOUNTER (OUTPATIENT)
Dept: CARDIOLOGY | Facility: HOSPITAL | Age: 77
Discharge: HOME | End: 2022-10-25

## 2022-10-25 ENCOUNTER — HOSPITAL ENCOUNTER (OUTPATIENT)
Dept: NUCLEAR MEDICINE | Facility: HOSPITAL | Age: 77
Discharge: HOME | End: 2022-10-25

## 2022-10-25 LAB
BH CV REST NUCLEAR ISOTOPE DOSE: 32.7 MCI
BH CV STRESS BP STAGE 1: NORMAL
BH CV STRESS COMMENTS STAGE 1: NORMAL
BH CV STRESS DOSE REGADENOSON STAGE 1: 0.4
BH CV STRESS DURATION MIN STAGE 1: 0
BH CV STRESS DURATION SEC STAGE 1: 10
BH CV STRESS HR STAGE 1: 54
BH CV STRESS NUCLEAR ISOTOPE DOSE: 36.4 MCI
BH CV STRESS PROTOCOL 1: NORMAL
BH CV STRESS RECOVERY BP: NORMAL MMHG
BH CV STRESS RECOVERY HR: 60 BPM
BH CV STRESS STAGE 1: 1
MAXIMAL PREDICTED HEART RATE: 146 BPM
PERCENT MAX PREDICTED HR: 43.15 %
STRESS BASELINE BP: NORMAL MMHG
STRESS BASELINE HR: 65 BPM
STRESS PERCENT HR: 51 %
STRESS POST ESTIMATED WORKLOAD: 1 METS
STRESS POST PEAK BP: NORMAL MMHG
STRESS POST PEAK HR: 63 BPM
STRESS TARGET HR: 124 BPM

## 2022-10-25 PROCEDURE — 93018 CV STRESS TEST I&R ONLY: CPT | Performed by: INTERNAL MEDICINE

## 2022-10-25 PROCEDURE — 93016 CV STRESS TEST SUPVJ ONLY: CPT | Performed by: INTERNAL MEDICINE

## 2022-10-25 PROCEDURE — 25010000002 REGADENOSON 0.4 MG/5ML SOLUTION: Performed by: NURSE PRACTITIONER

## 2022-10-25 PROCEDURE — 78452 HT MUSCLE IMAGE SPECT MULT: CPT | Performed by: INTERNAL MEDICINE

## 2022-10-25 PROCEDURE — 0 TECHNETIUM SESTAMIBI: Performed by: NURSE PRACTITIONER

## 2022-10-25 PROCEDURE — A9500 TC99M SESTAMIBI: HCPCS | Performed by: NURSE PRACTITIONER

## 2022-10-25 RX ORDER — SODIUM CHLORIDE 0.9 % (FLUSH) 0.9 %
10 SYRINGE (ML) INJECTION ONCE
Status: COMPLETED | OUTPATIENT
Start: 2022-10-25 | End: 2022-10-25

## 2022-10-25 RX ADMIN — TECHNETIUM TC 99M SESTAMIBI 1 DOSE: 1 INJECTION INTRAVENOUS at 08:55

## 2022-10-25 RX ADMIN — REGADENOSON 0.4 MG: 0.08 INJECTION, SOLUTION INTRAVENOUS at 08:54

## 2022-10-25 RX ADMIN — Medication 10 ML: at 08:54

## 2022-10-27 ENCOUNTER — OFFICE VISIT (OUTPATIENT)
Dept: CARDIOLOGY | Facility: CLINIC | Age: 77
End: 2022-10-27

## 2022-10-27 VITALS
HEIGHT: 70 IN | BODY MASS INDEX: 37.31 KG/M2 | HEART RATE: 62 BPM | OXYGEN SATURATION: 99 % | DIASTOLIC BLOOD PRESSURE: 80 MMHG | SYSTOLIC BLOOD PRESSURE: 138 MMHG

## 2022-10-27 DIAGNOSIS — R07.2 PRECORDIAL PAIN: Primary | ICD-10-CM

## 2022-10-27 PROCEDURE — 99213 OFFICE O/P EST LOW 20 MIN: CPT | Performed by: NURSE PRACTITIONER

## 2022-10-27 RX ORDER — TRETINOIN 0.25 MG/G
GEL TOPICAL
COMMUNITY
Start: 2022-10-14

## 2022-10-27 RX ORDER — DOXYCYCLINE 100 MG/1
TABLET ORAL
COMMUNITY
Start: 2022-10-12 | End: 2023-02-14

## 2022-10-27 RX ORDER — IPRATROPIUM BROMIDE 21 UG/1
SPRAY, METERED NASAL
COMMUNITY
End: 2023-02-14

## 2022-10-27 RX ORDER — TACROLIMUS 1 MG/G
OINTMENT TOPICAL
COMMUNITY
Start: 2022-10-13

## 2022-10-27 RX ORDER — HYDRALAZINE HYDROCHLORIDE 50 MG/1
TABLET, FILM COATED ORAL
COMMUNITY

## 2022-11-09 ENCOUNTER — TRANSCRIBE ORDERS (OUTPATIENT)
Dept: LAB | Facility: HOSPITAL | Age: 77
End: 2022-11-09

## 2022-11-09 DIAGNOSIS — N18.30 STAGE 3 CHRONIC KIDNEY DISEASE, UNSPECIFIED WHETHER STAGE 3A OR 3B CKD: Primary | ICD-10-CM

## 2022-11-11 ENCOUNTER — TRANSCRIBE ORDERS (OUTPATIENT)
Dept: LAB | Facility: HOSPITAL | Age: 77
End: 2022-11-11

## 2022-11-11 ENCOUNTER — LAB (OUTPATIENT)
Dept: LAB | Facility: HOSPITAL | Age: 77
End: 2022-11-11

## 2022-11-11 DIAGNOSIS — N18.30 STAGE 3 CHRONIC KIDNEY DISEASE, UNSPECIFIED WHETHER STAGE 3A OR 3B CKD: Primary | ICD-10-CM

## 2022-11-11 DIAGNOSIS — N18.30 STAGE 3 CHRONIC KIDNEY DISEASE, UNSPECIFIED WHETHER STAGE 3A OR 3B CKD: ICD-10-CM

## 2022-11-11 PROCEDURE — 80069 RENAL FUNCTION PANEL: CPT

## 2022-11-11 PROCEDURE — 36415 COLL VENOUS BLD VENIPUNCTURE: CPT

## 2022-11-11 PROCEDURE — 82570 ASSAY OF URINE CREATININE: CPT

## 2022-11-11 PROCEDURE — 84156 ASSAY OF PROTEIN URINE: CPT

## 2022-11-11 PROCEDURE — 83970 ASSAY OF PARATHORMONE: CPT

## 2022-11-12 LAB
ALBUMIN SERPL-MCNC: 4 G/DL (ref 3.5–5.2)
ANION GAP SERPL CALCULATED.3IONS-SCNC: 12 MMOL/L (ref 5–15)
BUN SERPL-MCNC: 28 MG/DL (ref 8–23)
BUN/CREAT SERPL: 18.1 (ref 7–25)
CALCIUM SPEC-SCNC: 9.1 MG/DL (ref 8.6–10.5)
CHLORIDE SERPL-SCNC: 106 MMOL/L (ref 98–107)
CO2 SERPL-SCNC: 22 MMOL/L (ref 22–29)
CREAT SERPL-MCNC: 1.55 MG/DL (ref 0.76–1.27)
CREAT UR-MCNC: 118 MG/DL
EGFRCR SERPLBLD CKD-EPI 2021: 46.7 ML/MIN/1.73
GLUCOSE SERPL-MCNC: 108 MG/DL (ref 65–99)
PHOSPHATE SERPL-MCNC: 2.9 MG/DL (ref 2.5–4.5)
POTASSIUM SERPL-SCNC: 4 MMOL/L (ref 3.5–5.2)
PROT ?TM UR-MCNC: 11.6 MG/DL
PROT/CREAT UR: 98.3 MG/G CREA (ref 0–200)
PTH-INTACT SERPL-MCNC: 41.3 PG/ML (ref 15–65)
SODIUM SERPL-SCNC: 140 MMOL/L (ref 136–145)

## 2022-11-16 ENCOUNTER — TRANSCRIBE ORDERS (OUTPATIENT)
Dept: LAB | Facility: HOSPITAL | Age: 77
End: 2022-11-16

## 2022-11-16 DIAGNOSIS — N18.30 STAGE 3 CHRONIC KIDNEY DISEASE, UNSPECIFIED WHETHER STAGE 3A OR 3B CKD: Primary | ICD-10-CM

## 2022-11-23 PROBLEM — N18.31 STAGE 3A CHRONIC KIDNEY DISEASE (HCC): Status: ACTIVE | Noted: 2022-11-23

## 2022-11-23 PROBLEM — E66.01 CLASS 2 SEVERE OBESITY DUE TO EXCESS CALORIES WITH SERIOUS COMORBIDITY AND BODY MASS INDEX (BMI) OF 37.0 TO 37.9 IN ADULT: Status: ACTIVE | Noted: 2022-11-23

## 2022-11-23 NOTE — PROGRESS NOTES
10/10/2022    Jerald Bonilla  1945    Chief Complaint:    Chief Complaint   Patient presents with   • Aortic Aneurysm       HPI:      PCP:  Annie Penny MD  GI:  Dr Gavin     76 y.o. male with HTN(stable, increased risk stroke, rupture), Hyperlipidemia(stable, increased risk cardiovascular events) and Obesity(uncontrolled, increased risk cardiovascular events) , AAA(new, increase risk rupture).  former smoker.  Moderate abdominal pain, change in stool x 6 months.  Legs week after walking distance.  Asymptomatic AAA found on CT imaging for GI evaluation..  No TIA stroke amaurosis.  No MI claudication. No other associated signs, symptoms or modifying factors.     9/2014 US Aorta:  Infrarenal 29mm. Iliac R 14mm, L 17mm.  8/2021 CT Abdomen Pelvis:  Descending thoracic aorta 27mm, renals 23mm, 34mm focal saccular infrarenal aneurysm (appears expanded thru near circumferential calcium), iliac R 23mm, L 15mm, femoral R 10mm, L 10mm.  1/2022 Endo AAA repair. LEFT CFA Endarterectomy  3/2022 CTA Abdomen Pelvis combo: No endo leak, residual aneurysmal sac 34mm, incidental [id number] pancreatic cyst  10/2022 CTA Abdomen Pelvis combo: descending thoracic aorta 28mm, infrarenal 33mm, iliac R 24mm, L 14mm, femoral R 9mm, L 9mm. No endo leak,  incidental [id number] pancreatic cyst    3/2022 JEANETH: Right 0.72 triphasic.  Left 0.93 triphasic    8/2021 ECG:  SB 52mm, QTc 429      The following portions of the patient's history were reviewed and updated as appropriate: allergies, current medications, past family history, past medical history, past social history, past surgical history and problem list.  Recent images independently reviewed.  Available laboratory values reviewed.    PMH:  Past Medical History:   Diagnosis Date   • Acne conglobata    • Acute maxillary sinusitis    • Allergies    • Benign prostate hyperplasia    • Blurring of visual image    • Carbuncle     of infected sebaceous cyst on back   • Cyst of pancreas     • Dizziness and giddiness    • Erectile dysfunction    • Essential hypertension     seeing nephro   • Gastroesophageal reflux disease    • Gout    • Hammer toe    • Hearing loss    • Hip pain    • Hyperlipidemia    • Low back pain    • Malaise and fatigue    • Need for prophylactic vaccination against Streptococcus pneumoniae (pneumococcus)    • Nuclear senile cataract    • Obesity    • Osteoarthritis    • Otalgia    • Otitis externa    • Pain in lower limb    • Rheumatoid arthritis (HCC)    • Skin lesion     right calf   • Sleep apnea     sleeps with c-pap   • Stage 3 chronic kidney disease (HCC)    • Swelling of knee joint    • Tinnitus    • Upper respiratory infection      Past Surgical History:   Procedure Laterality Date   • ABDOMINAL AORTIC ANEURYSM REPAIR WITH ENDOGRAFT N/A 01/07/2022    Procedure: ABDOMINAL AORTIC ANEURYSM REPAIR WITH ENDOGRAFT;  Surgeon: Unruly Yi MD;  Location: [facility] OR;  Service: Cardiothoracic;  Laterality: N/A;   • COLONOSCOPY  05/18/2009    Colonoscopy, diagnostic (screening) 00900 (1)    diverticuli, roids    • COLONOSCOPY N/A 08/13/2020    Procedure: COLONOSCOPY;  Surgeon: Dagoberto Gavin DO;  Location: [facility] ENDOSCOPY;  Service: Gastroenterology;  Laterality: N/A;  tattoo @ right colon    • COLONOSCOPY N/A 02/18/2021    Procedure: COLONOSCOPY;  Surgeon: Dagoberto Gavin DO;  Location: [facility] ENDOSCOPY;  Service: Gastroenterology;  Laterality: N/A;   • COLONOSCOPY N/A 6/14/2022    Procedure: COLONOSCOPY 10:00;  Surgeon: Dagoberto Gavin DO;  Location: [facility] ENDOSCOPY;  Service: Gastroenterology;  Laterality: N/A;   • CYST REMOVAL Bilateral     cheeks   • ENDOSCOPY N/A 01/16/2019    Procedure: ESOPHAGOGASTRODUODENOSCOPY possible dilation;  Surgeon: Jerald Nation MD;  Location: [facility] ENDOSCOPY;  Service: Gastroenterology   • HERNIA REPAIR      umbilical   • JOINT REPLACEMENT     • PILONIDAL CYSTECTOMY N/A     HAYWARD   • TOTAL HIP ARTHROPLASTY Left    •  "WISDOM TOOTH EXTRACTION       Family History   Problem Relation Age of Onset   • Arthritis Mother    • Hypertension Mother    • Obesity Mother    • Thyroid disease Mother    • Mental illness Mother    • Hypertension Father    • Heart disease Father    • Cancer Father    • Diabetes Father    • Kidney disease Father    • Obesity Daughter    • Obesity Daughter    • No Known Problems Daughter    • No Known Problems Son    • Arthritis Maternal Grandmother      Social History     Tobacco Use   • Smoking status: Former     Types: Cigarettes     Quit date: 2000     Years since quittin.9   • Smokeless tobacco: Never   Vaping Use   • Vaping Use: Never used   Substance Use Topics   • Alcohol use: Yes     Comment: socially   • Drug use: Not Currently     Types: Marijuana     Comment: none in 1-2 years       ALLERGIES:  Allergies   Allergen Reactions   • Lisinopril Itching   • Minoxidil Other (See Comments)     \"dont know\"         MEDICATIONS:    Current Outpatient Medications:   •  allopurinol (ZYLOPRIM) 100 MG tablet, Take 100 mg by mouth Daily. 2 tablets daily, Disp: , Rfl:   •  Apoaequorin (PREVAGEN PO), Take 1 tablet by mouth Daily., Disp: , Rfl:   •  aspirin 81 MG EC tablet, Take 81 mg by mouth Daily., Disp: , Rfl:   •  B Complex Vitamins (VITAMIN B COMPLEX PO), Take 1 tablet by mouth Daily., Disp: , Rfl:   •  carvedilol (COREG) 25 MG tablet, TAKE 2 TABLETS BY MOUTH  TWICE DAILY WITH MEALS, Disp: 360 tablet, Rfl: 3  •  cholecalciferol (VITAMIN D3) 1000 UNITS tablet, Take 1,000 Units by mouth Daily., Disp: , Rfl:   •  clindamycin (Clindagel) 1 % gel, Apply  topically to the appropriate area as directed 2 (Two) Times a Day., Disp: 60 g, Rfl: 2  •  clopidogrel (PLAVIX) 75 MG tablet, Take 1 tablet by mouth Daily., Disp: 90 tablet, Rfl: 3  •  famotidine (PEPCID) 10 MG tablet, Take 10 mg by mouth Every Night., Disp: , Rfl:   •  fexofenadine (Allegra Allergy) 60 MG tablet, Take 1 tablet by mouth Daily., Disp: 30 tablet, Rfl: " 2  •  fluticasone (FLONASE) 50 MCG/ACT nasal spray, 1 spray into the nostril(s) as directed by provider Daily., Disp: 16 g, Rfl: 3  •  NIFEdipine XL (PROCARDIA XL) 90 MG 24 hr tablet, TAKE 2 TABLETS BY MOUTH AT  NIGHT, Disp: 180 tablet, Rfl: 3  •  pravastatin (PRAVACHOL) 80 MG tablet, Take 80 mg by mouth Every Night., Disp: , Rfl:   •  Respiratory Therapy Supplies (CareTouch 2 CPAP Hose ) misc, , Disp: , Rfl:   •  tamsulosin (FLOMAX) 0.4 MG capsule 24 hr capsule, Take 1 capsule by mouth Daily., Disp: 90 capsule, Rfl: 1  •  Zinc 50 MG tablet, Take 1 tablet by mouth Every Night., Disp: , Rfl:   •  doxycycline (ADOXA) 100 MG tablet, TAKE 1 TABLET BY MOUTH TWICE DAILY FOR 10 DAYS & REFILL AS NEEDED FOR FLARES, Disp: , Rfl:   •  hydrALAZINE (APRESOLINE) 50 MG tablet, hydralazine 50 mg tablet, Disp: , Rfl:   •  ipratropium (ATROVENT) 0.03 % nasal spray, ipratropium bromide 21 mcg (0.03 %) nasal spray  as directed, Disp: , Rfl:   •  mupirocin (BACTROBAN) 2 % ointment, mupirocin 2 % topical ointment, Disp: , Rfl:   •  tacrolimus (PROTOPIC) 0.1 % ointment, APPLY THIN LAYER TO THE AFFECTED AREA TWICE DAILY FOR 2 WEEKS. STOP FOR 2 WEEKS. MAY REPEAT CYCLE AS NEEDED FOR FLARES. FOR SCALP., Disp: , Rfl:   •  tretinoin (RETIN-A) 0.025 % gel, APPLY THIN LAYER TO AFFECTED AREA 2 TIMES WEEKLY AT BEDTIME FOR 2 WEEKS, THEN INCREASE TO 3 TIMES WEEKLY AT BEDTIME FOR 2 WEEKS, THEN INCREASE TO NIGHTLY AS TOLERATED, Disp: , Rfl:   •  valsartan-hydrochlorothiazide (DIOVAN-HCT) 320-25 MG per tablet, TAKE 1 TABLET BY MOUTH  DAILY, Disp: 90 tablet, Rfl: 3    Review of Systems   Review of Systems   Constitutional: Positive for malaise/fatigue. Negative for weight loss.   Cardiovascular: Negative for chest pain, claudication and dyspnea on exertion.   Respiratory: Negative for cough and shortness of breath.    Skin: Negative for color change and poor wound healing.   Musculoskeletal: Positive for muscle weakness and myalgias.  "  Gastrointestinal: Positive for abdominal pain and constipation.   Neurological: Negative for dizziness, numbness and weakness.       Physical Exam   Vitals:    10/10/22 1333   BP: 130/60   BP Location: Left arm   Patient Position: Sitting   Cuff Size: Adult   Pulse: 57   Temp: 97.7 °F (36.5 °C)   TempSrc: Temporal   SpO2: 100%   Weight: 118 kg (260 lb)   Height: 177.8 cm (70\")     Body surface area is 2.33 meters squared.  Body mass index is 37.31 kg/m².  Physical Exam  Constitutional:       General: He is not in acute distress.     Appearance: He is not ill-appearing.   HENT:      Right Ear: Hearing normal.      Left Ear: Hearing normal.      Nose: No nasal deformity.      Mouth/Throat:      Dentition: Normal dentition. Does not have dentures.   Cardiovascular:      Rate and Rhythm: Normal rate and regular rhythm.      Pulses:           Carotid pulses are 2+ on the right side and 2+ on the left side.       Radial pulses are 2+ on the right side and 2+ on the left side.        Dorsalis pedis pulses are 2+ on the right side and 2+ on the left side.        Posterior tibial pulses are 1+ on the right side and 1+ on the left side.      Heart sounds: No murmur heard.  Pulmonary:      Effort: Pulmonary effort is normal.      Breath sounds: Normal breath sounds.   Abdominal:      General: There is no distension.      Palpations: Abdomen is soft. There is no mass.      Tenderness: There is no abdominal tenderness.   Musculoskeletal:         General: No deformity.      Comments: Gait normal.    Skin:     General: Skin is warm and dry.      Coloration: Skin is not pale.      Findings: No erythema.      Comments: No venous staining   Neurological:      Mental Status: He is alert and oriented to person, place, and time.   Psychiatric:         Speech: Speech normal.         Behavior: Behavior is cooperative.         Thought Content: Thought content normal.         Judgment: Judgment normal.         BUN   Date Value Ref Range " Status   11/11/2022 28 (H) 8 - 23 mg/dL Final     Creatinine   Date Value Ref Range Status   11/11/2022 1.55 (H) 0.76 - 1.27 mg/dL Final     eGFR   Amer   Date Value Ref Range Status   01/08/2022 51 (L) >60 mL/min/1.73 Final       ASSESSMENT:  Diagnoses and all orders for this visit:    1. Infrarenal abdominal aortic aneurysm (AAA) without rupture (Primary)  -     CT Angiogram Abdomen Pelvis; Future    2. Essential hypertension    3. Mixed hyperlipidemia    4. Left hip pain    5. CHRIS on CPAP    6. Rheumatoid arthritis, involving unspecified site, unspecified whether rheumatoid factor present ([facility])    7. Stage 3a chronic kidney disease ([facility])    8. Class 2 severe obesity due to excess calories with serious comorbidity and body mass index (BMI) of 37.0 to 37.9 in adult ([facility])    PLAN:  Detailed discussion with Jerald Bonilla regarding situation and options.  Aneurysm abdominal aorta.  Surgical intervention not indicated at this time.  Will plan to follow with interval imaging.  Smoking cessation, lipid management, BP control in 120 range advised.  Discussed symptoms of rupture, details of surgical repair including endovascular and open repair.  Risks, benefits discussed.  Understands and wishes to proceed with plan    Return in 1 year with CTA Abdomen Pelvis combo    Return after above studies complete  Obesity Class  2. Increased risk cardiovascular events, sleep and breathing disorders, joint issues, type 2 diabetes mellitus. Options for weight management, heart healthy diet, exercise programs, and associated health risks of obesity discussed.    Recommended regular physical activity, progressive walking program.  Continue current medications as directed.  Advance Care Planning   ACP discussion was declined by the patient. Patient does not have an advance directive, declines further assistance.     Thank you for the opportunity to participate in this patient's care.    Copy to primary care provider.

## 2022-12-13 DIAGNOSIS — Z09 FOLLOW-UP SURGERY CARE: ICD-10-CM

## 2022-12-14 RX ORDER — CLOPIDOGREL BISULFATE 75 MG/1
75 TABLET ORAL DAILY
Qty: 90 TABLET | Refills: 3 | Status: SHIPPED | OUTPATIENT
Start: 2022-12-14

## 2022-12-19 RX ORDER — PRAVASTATIN SODIUM 80 MG/1
TABLET ORAL
Qty: 90 TABLET | Refills: 3 | Status: SHIPPED | OUTPATIENT
Start: 2022-12-19

## 2022-12-28 ENCOUNTER — OFFICE VISIT (OUTPATIENT)
Dept: PODIATRY | Facility: CLINIC | Age: 77
End: 2022-12-28

## 2022-12-28 VITALS — WEIGHT: 260 LBS | BODY MASS INDEX: 37.22 KG/M2 | HEIGHT: 70 IN

## 2022-12-28 DIAGNOSIS — M79.674 CHRONIC PAIN OF TOE OF RIGHT FOOT: Primary | ICD-10-CM

## 2022-12-28 DIAGNOSIS — G89.29 CHRONIC PAIN OF TOE OF RIGHT FOOT: Primary | ICD-10-CM

## 2022-12-28 DIAGNOSIS — L60.0 INGROWN TOENAIL: ICD-10-CM

## 2022-12-28 PROCEDURE — 11750 EXCISION NAIL&NAIL MATRIX: CPT | Performed by: PODIATRIST

## 2022-12-28 PROCEDURE — 99213 OFFICE O/P EST LOW 20 MIN: CPT | Performed by: PODIATRIST

## 2022-12-28 NOTE — PROGRESS NOTES
Jerald Bonilla  1945  77 y.o. male    12/28/2022       Chief Complaint   Patient presents with   • Right Foot - Pain, Follow-up   • Left Foot - Pain, Follow-up           History of Present Illness    Jerald Bonilla is a 77 y.o. male Patient states he has intermittent right hallux nail pain and increased discoloration.  He denies any known trauma or injuries.      Past Medical History:   Diagnosis Date   • Acne conglobata    • Acute maxillary sinusitis    • Allergies    • Benign prostate hyperplasia    • Blurring of visual image    • Carbuncle     of infected sebaceous cyst on back   • Cyst of pancreas    • Dizziness and giddiness    • Erectile dysfunction    • Essential hypertension     seeing nephro   • Gastroesophageal reflux disease    • Gout    • Hammer toe    • Hearing loss    • Hip pain    • Hyperlipidemia    • Low back pain    • Malaise and fatigue    • Need for prophylactic vaccination against Streptococcus pneumoniae (pneumococcus)    • Nuclear senile cataract    • Obesity    • Osteoarthritis    • Otalgia    • Otitis externa    • Pain in lower limb    • Rheumatoid arthritis (HCC)    • Skin lesion     right calf   • Sleep apnea     sleeps with c-pap   • Stage 3 chronic kidney disease (HCC)    • Swelling of knee joint    • Tinnitus    • Upper respiratory infection          Past Surgical History:   Procedure Laterality Date   • ABDOMINAL AORTIC ANEURYSM REPAIR WITH ENDOGRAFT N/A 01/07/2022    Procedure: ABDOMINAL AORTIC ANEURYSM REPAIR WITH ENDOGRAFT;  Surgeon: Unruly Yi MD;  Location: [facility] OR;  Service: Cardiothoracic;  Laterality: N/A;   • COLONOSCOPY  05/18/2009    Colonoscopy, diagnostic (screening) 03352 (1)    diverticuli, roids    • COLONOSCOPY N/A 08/13/2020    Procedure: COLONOSCOPY;  Surgeon: Dagoberto Gavin DO;  Location: [facility] ENDOSCOPY;  Service: Gastroenterology;  Laterality: N/A;  tattoo @ right colon    • COLONOSCOPY N/A 02/18/2021    Procedure: COLONOSCOPY;   Surgeon: Dagoberto Gavin DO;  Location: [facility] ENDOSCOPY;  Service: Gastroenterology;  Laterality: N/A;   • COLONOSCOPY N/A 2022    Procedure: COLONOSCOPY 10:00;  Surgeon: Dagoberto Gavin DO;  Location: [facility] ENDOSCOPY;  Service: Gastroenterology;  Laterality: N/A;   • CYST REMOVAL Bilateral     cheeks   • ENDOSCOPY N/A 2019    Procedure: ESOPHAGOGASTRODUODENOSCOPY possible dilation;  Surgeon: Jerald Nation MD;  Location: [facility] ENDOSCOPY;  Service: Gastroenterology   • HERNIA REPAIR      umbilical   • JOINT REPLACEMENT     • PILONIDAL CYSTECTOMY N/A     HAYWARD   • TOTAL HIP ARTHROPLASTY Left    • WISDOM TOOTH EXTRACTION           Family History   Problem Relation Age of Onset   • Arthritis Mother    • Hypertension Mother    • Obesity Mother    • Thyroid disease Mother    • Mental illness Mother    • Hypertension Father    • Heart disease Father    • Cancer Father    • Diabetes Father    • Kidney disease Father    • Obesity Daughter    • Obesity Daughter    • No Known Problems Daughter    • No Known Problems Son    • Arthritis Maternal Grandmother          Social History     Socioeconomic History   • Marital status:    Tobacco Use   • Smoking status: Former     Types: Cigarettes     Quit date:      Years since quittin.0   • Smokeless tobacco: Never   Vaping Use   • Vaping Use: Never used   Substance and Sexual Activity   • Alcohol use: Yes     Comment: socially   • Drug use: Not Currently     Types: Marijuana     Comment: none in 1-2 years   • Sexual activity: Defer         Current Outpatient Medications   Medication Sig Dispense Refill   • allopurinol (ZYLOPRIM) 100 MG tablet Take 100 mg by mouth Daily. 2 tablets daily     • Apoaequorin (PREVAGEN PO) Take 1 tablet by mouth Daily.     • aspirin 81 MG EC tablet Take 81 mg by mouth Daily.     • B Complex Vitamins (VITAMIN B COMPLEX PO) Take 1 tablet by mouth Daily.     • carvedilol (COREG) 25 MG tablet TAKE 2 TABLETS BY MOUTH  TWICE  DAILY WITH MEALS 360 tablet 3   • cholecalciferol (VITAMIN D3) 1000 UNITS tablet Take 1,000 Units by mouth Daily.     • clindamycin (Clindagel) 1 % gel Apply  topically to the appropriate area as directed 2 (Two) Times a Day. 60 g 2   • clopidogrel (PLAVIX) 75 MG tablet TAKE 1 TABLET BY MOUTH  DAILY 90 tablet 3   • doxycycline (ADOXA) 100 MG tablet TAKE 1 TABLET BY MOUTH TWICE DAILY FOR 10 DAYS & REFILL AS NEEDED FOR FLARES     • famotidine (PEPCID) 10 MG tablet Take 10 mg by mouth Every Night.     • fexofenadine (Allegra Allergy) 60 MG tablet Take 1 tablet by mouth Daily. 30 tablet 2   • fluticasone (FLONASE) 50 MCG/ACT nasal spray 1 spray into the nostril(s) as directed by provider Daily. 16 g 3   • hydrALAZINE (APRESOLINE) 50 MG tablet hydralazine 50 mg tablet     • ipratropium (ATROVENT) 0.03 % nasal spray ipratropium bromide 21 mcg (0.03 %) nasal spray   as directed     • mupirocin (BACTROBAN) 2 % ointment mupirocin 2 % topical ointment     • NIFEdipine XL (PROCARDIA XL) 90 MG 24 hr tablet TAKE 2 TABLETS BY MOUTH AT  NIGHT 180 tablet 3   • pravastatin (PRAVACHOL) 80 MG tablet TAKE 1 TABLET BY MOUTH AT  NIGHT 90 tablet 3   • Respiratory Therapy Supplies (CareTouch 2 CPAP Hose ) misc      • tacrolimus (PROTOPIC) 0.1 % ointment APPLY THIN LAYER TO THE AFFECTED AREA TWICE DAILY FOR 2 WEEKS. STOP FOR 2 WEEKS. MAY REPEAT CYCLE AS NEEDED FOR FLARES. FOR SCALP.     • tamsulosin (FLOMAX) 0.4 MG capsule 24 hr capsule Take 1 capsule by mouth Daily. 90 capsule 1   • tretinoin (RETIN-A) 0.025 % gel APPLY THIN LAYER TO AFFECTED AREA 2 TIMES WEEKLY AT BEDTIME FOR 2 WEEKS, THEN INCREASE TO 3 TIMES WEEKLY AT BEDTIME FOR 2 WEEKS, THEN INCREASE TO NIGHTLY AS TOLERATED     • valsartan-hydrochlorothiazide (DIOVAN-HCT) 320-25 MG per tablet TAKE 1 TABLET BY MOUTH  DAILY 90 tablet 3   • Zinc 50 MG tablet Take 1 tablet by mouth Every Night.       No current facility-administered medications for this visit.         OBJECTIVE    Ht  "177.8 cm (70\")   Wt 118 kg (260 lb)   BMI 37.31 kg/m²       Review of Systems   Musculoskeletal:        Toe pain    Psychiatric/Behavioral: Negative.    All other systems reviewed and are negative.        Physical Exam  Vitals reviewed.   Constitutional:       General: He is not in acute distress.     Appearance: He is well-developed.   HENT:      Head: Normocephalic and atraumatic.      Nose: Nose normal.   Eyes:      Conjunctiva/sclera: Conjunctivae normal.      Pupils: Pupils are equal, round, and reactive to light.   Cardiovascular:      Pulses:           Dorsalis pedis pulses are 2+ on the right side.        Posterior tibial pulses are 2+ on the right side.   Pulmonary:      Effort: Pulmonary effort is normal. No respiratory distress.      Breath sounds: No wheezing.   Feet:      Right foot:      Skin integrity: Skin integrity normal.      Toenail Condition: Right toenails are abnormally thick and ingrown. Fungal disease present.  Skin:     General: Skin is warm and dry.      Capillary Refill: Capillary refill takes less than 2 seconds.   Neurological:      Mental Status: He is alert and oriented to person, place, and time.   Psychiatric:         Behavior: Behavior normal.         Thought Content: Thought content normal.            Nail Removal    Date/Time: 12/28/2022 1:01 PM  Performed by: Michael Gomez DPM  Authorized by: Michael Gomez DPM   Consent: Verbal consent obtained. Written consent obtained.  Risks and benefits: risks, benefits and alternatives were discussed  Consent given by: patient  Patient identity confirmed: verbally with patient  Location: right foot  Location details: right big toe  Anesthesia: digital block    Anesthesia:  Local Anesthetic: lidocaine 2% without epinephrine    Sedation:  Patient sedated: no    Preparation: skin prepped with Betadine  Amount removed: partial  Side: medial  Nail matrix removed: partial  Dressing: antibiotic ointment and dressing applied  Patient " tolerance: patient tolerated the procedure well with no immediate complications          ASSESSMENT AND PLAN    Diagnoses and all orders for this visit:    1. Chronic pain of toe of right foot (Primary)    2. Ingrown toenail    Other orders  -     Nail Removal       - Comprehensive foot and ankle exam performed  - Diagnosis, prevention and treatment of ingrown toenails discussed with patient, including risks and potential benefits of nail avulsion both temporary and permanent versus simple debridement.  - Patient elected for a partial permanent nail avulsion  - Dispensed aftercare instruction sheet  - All questions were answered and the patient is in agreement with the current treatment plan.  - RTC in 2 weeks           This document has been electronically signed by Michael Gomez DPM on December 30, 2022 13:02 CST

## 2023-01-03 ENCOUNTER — TELEPHONE (OUTPATIENT)
Dept: PODIATRY | Facility: CLINIC | Age: 78
End: 2023-01-03
Payer: MEDICARE

## 2023-01-03 NOTE — TELEPHONE ENCOUNTER
PT REQUESTS A CALL BACK RE WANTS TO KNOW IF HE HAS TO KEEP THE BANGAGE ON HIS PARTIAL NAIL REMOVAL UNTIL HIS NEXT VISIT OR IS IT OKAY TO REMOVE.  CALL BACK # [phone].  THANK YOU.

## 2023-01-11 ENCOUNTER — OFFICE VISIT (OUTPATIENT)
Dept: PODIATRY | Facility: CLINIC | Age: 78
End: 2023-01-11
Payer: MEDICARE

## 2023-01-11 VITALS — WEIGHT: 260 LBS | BODY MASS INDEX: 37.22 KG/M2 | HEIGHT: 70 IN

## 2023-01-11 DIAGNOSIS — L60.0 INGROWN TOENAIL: Primary | ICD-10-CM

## 2023-01-11 DIAGNOSIS — B35.1 ONYCHOMYCOSIS: ICD-10-CM

## 2023-01-11 PROCEDURE — 99212 OFFICE O/P EST SF 10 MIN: CPT | Performed by: PODIATRIST

## 2023-01-11 NOTE — PROGRESS NOTES
Jerald Bonilla  1945  77 y.o. male    01/11/2023         Chief Complaint   Patient presents with   • Right Foot - Pain, Follow-up           History of Present Illness    Jerald Bonilla is a 77 y.o. male Patient presents to clinic for right hallux nail removal follow up,    Past Medical History:   Diagnosis Date   • Acne conglobata    • Acute maxillary sinusitis    • Allergies    • Benign prostate hyperplasia    • Blurring of visual image    • Carbuncle     of infected sebaceous cyst on back   • Cyst of pancreas    • Dizziness and giddiness    • Erectile dysfunction    • Essential hypertension     seeing nephro   • Gastroesophageal reflux disease    • Gout    • Hammer toe    • Hearing loss    • Hip pain    • Hyperlipidemia    • Low back pain    • Malaise and fatigue    • Need for prophylactic vaccination against Streptococcus pneumoniae (pneumococcus)    • Nuclear senile cataract    • Obesity    • Osteoarthritis    • Otalgia    • Otitis externa    • Pain in lower limb    • Rheumatoid arthritis (HCC)    • Skin lesion     right calf   • Sleep apnea     sleeps with c-pap   • Stage 3 chronic kidney disease (HCC)    • Swelling of knee joint    • Tinnitus    • Upper respiratory infection          Past Surgical History:   Procedure Laterality Date   • ABDOMINAL AORTIC ANEURYSM REPAIR WITH ENDOGRAFT N/A 01/07/2022    Procedure: ABDOMINAL AORTIC ANEURYSM REPAIR WITH ENDOGRAFT;  Surgeon: Unruly Yi MD;  Location: [facility] OR;  Service: Cardiothoracic;  Laterality: N/A;   • COLONOSCOPY  05/18/2009    Colonoscopy, diagnostic (screening) 30895 (1)    diverticuli, roids    • COLONOSCOPY N/A 08/13/2020    Procedure: COLONOSCOPY;  Surgeon: Dagoberto Gavin DO;  Location: [facility] ENDOSCOPY;  Service: Gastroenterology;  Laterality: N/A;  tattoo @ right colon    • COLONOSCOPY N/A 02/18/2021    Procedure: COLONOSCOPY;  Surgeon: Dagoberto Gavin DO;  Location: [facility] ENDOSCOPY;  Service: Gastroenterology;   Laterality: N/A;   • COLONOSCOPY N/A 2022    Procedure: COLONOSCOPY 10:00;  Surgeon: Dagoberto Gavin DO;  Location: [facility] ENDOSCOPY;  Service: Gastroenterology;  Laterality: N/A;   • CYST REMOVAL Bilateral     cheeks   • ENDOSCOPY N/A 2019    Procedure: ESOPHAGOGASTRODUODENOSCOPY possible dilation;  Surgeon: Jerald Nation MD;  Location: [facility] ENDOSCOPY;  Service: Gastroenterology   • HERNIA REPAIR      umbilical   • JOINT REPLACEMENT     • PILONIDAL CYSTECTOMY N/A     [facility]   • TOTAL HIP ARTHROPLASTY Left    • WISDOM TOOTH EXTRACTION           Family History   Problem Relation Age of Onset   • Arthritis Mother    • Hypertension Mother    • Obesity Mother    • Thyroid disease Mother    • Mental illness Mother    • Hypertension Father    • Heart disease Father    • Cancer Father    • Diabetes Father    • Kidney disease Father    • Obesity Daughter    • Obesity Daughter    • No Known Problems Daughter    • No Known Problems Son    • Arthritis Maternal Grandmother          Social History     Socioeconomic History   • Marital status:    Tobacco Use   • Smoking status: Former     Types: Cigarettes     Quit date:      Years since quittin.0   • Smokeless tobacco: Never   Vaping Use   • Vaping Use: Never used   Substance and Sexual Activity   • Alcohol use: Yes     Comment: socially   • Drug use: Not Currently     Types: Marijuana     Comment: none in 1-2 years   • Sexual activity: Defer         Current Outpatient Medications   Medication Sig Dispense Refill   • allopurinol (ZYLOPRIM) 100 MG tablet Take 100 mg by mouth Daily. 2 tablets daily     • Apoaequorin (PREVAGEN PO) Take 1 tablet by mouth Daily.     • aspirin 81 MG EC tablet Take 81 mg by mouth Daily.     • B Complex Vitamins (VITAMIN B COMPLEX PO) Take 1 tablet by mouth Daily.     • carvedilol (COREG) 25 MG tablet TAKE 2 TABLETS BY MOUTH  TWICE DAILY WITH MEALS 360 tablet 3   • cholecalciferol (VITAMIN D3) 1000 UNITS tablet Take 1,000  "Units by mouth Daily.     • clindamycin (Clindagel) 1 % gel Apply  topically to the appropriate area as directed 2 (Two) Times a Day. 60 g 2   • clopidogrel (PLAVIX) 75 MG tablet TAKE 1 TABLET BY MOUTH  DAILY 90 tablet 3   • doxycycline (ADOXA) 100 MG tablet TAKE 1 TABLET BY MOUTH TWICE DAILY FOR 10 DAYS & REFILL AS NEEDED FOR FLARES     • famotidine (PEPCID) 10 MG tablet Take 10 mg by mouth Every Night.     • fexofenadine (Allegra Allergy) 60 MG tablet Take 1 tablet by mouth Daily. 30 tablet 2   • fluticasone (FLONASE) 50 MCG/ACT nasal spray 1 spray into the nostril(s) as directed by provider Daily. 16 g 3   • hydrALAZINE (APRESOLINE) 50 MG tablet hydralazine 50 mg tablet     • ipratropium (ATROVENT) 0.03 % nasal spray ipratropium bromide 21 mcg (0.03 %) nasal spray   as directed     • mupirocin (BACTROBAN) 2 % ointment mupirocin 2 % topical ointment     • NIFEdipine XL (PROCARDIA XL) 90 MG 24 hr tablet TAKE 2 TABLETS BY MOUTH AT  NIGHT 180 tablet 3   • pravastatin (PRAVACHOL) 80 MG tablet TAKE 1 TABLET BY MOUTH AT  NIGHT 90 tablet 3   • Respiratory Therapy Supplies (CareTouch 2 CPAP Hose ) misc      • tacrolimus (PROTOPIC) 0.1 % ointment APPLY THIN LAYER TO THE AFFECTED AREA TWICE DAILY FOR 2 WEEKS. STOP FOR 2 WEEKS. MAY REPEAT CYCLE AS NEEDED FOR FLARES. FOR SCALP.     • tamsulosin (FLOMAX) 0.4 MG capsule 24 hr capsule Take 1 capsule by mouth Daily. 90 capsule 1   • tretinoin (RETIN-A) 0.025 % gel APPLY THIN LAYER TO AFFECTED AREA 2 TIMES WEEKLY AT BEDTIME FOR 2 WEEKS, THEN INCREASE TO 3 TIMES WEEKLY AT BEDTIME FOR 2 WEEKS, THEN INCREASE TO NIGHTLY AS TOLERATED     • valsartan-hydrochlorothiazide (DIOVAN-HCT) 320-25 MG per tablet TAKE 1 TABLET BY MOUTH  DAILY 90 tablet 3   • Zinc 50 MG tablet Take 1 tablet by mouth Every Night.       No current facility-administered medications for this visit.         OBJECTIVE    Ht 177.8 cm (70\")   Wt 118 kg (260 lb)   BMI 37.31 kg/m²       Review of Systems "   Musculoskeletal:        Toe pain    Psychiatric/Behavioral: Negative.    All other systems reviewed and are negative.        Physical Exam  Vitals reviewed.   Constitutional:       General: He is not in acute distress.     Appearance: He is well-developed.   HENT:      Head: Normocephalic and atraumatic.      Nose: Nose normal.   Eyes:      Conjunctiva/sclera: Conjunctivae normal.      Pupils: Pupils are equal, round, and reactive to light.   Cardiovascular:      Pulses:           Dorsalis pedis pulses are 2+ on the right side.        Posterior tibial pulses are 2+ on the right side.   Pulmonary:      Effort: Pulmonary effort is normal. No respiratory distress.      Breath sounds: No wheezing.   Feet:      Right foot:      Skin integrity: Skin integrity normal.   Skin:     General: Skin is warm and dry.      Capillary Refill: Capillary refill takes less than 2 seconds.   Neurological:      Mental Status: He is alert and oriented to person, place, and time.   Psychiatric:         Behavior: Behavior normal.         Thought Content: Thought content normal.            Procedures    ASSESSMENT AND PLAN    Diagnoses and all orders for this visit:    1. Ingrown toenail (Primary)    2. Onychomycosis      - Patient doing well following nail procedure.  Advised on site care going forward.  All questions were answered.  Recheck as needed.           This document has been electronically signed by Michael Gomez DPM on January 14, 2023 12:31 CST

## 2023-02-14 ENCOUNTER — OFFICE VISIT (OUTPATIENT)
Dept: CARDIOLOGY | Facility: CLINIC | Age: 78
End: 2023-02-14
Payer: MEDICARE

## 2023-02-14 VITALS
HEART RATE: 54 BPM | SYSTOLIC BLOOD PRESSURE: 126 MMHG | HEIGHT: 70 IN | BODY MASS INDEX: 36.92 KG/M2 | DIASTOLIC BLOOD PRESSURE: 58 MMHG | WEIGHT: 257.9 LBS | OXYGEN SATURATION: 98 %

## 2023-02-14 DIAGNOSIS — R07.2 PRECORDIAL PAIN: Primary | ICD-10-CM

## 2023-02-14 DIAGNOSIS — R07.89 CHEST PAIN, ATYPICAL: ICD-10-CM

## 2023-02-14 PROCEDURE — 99214 OFFICE O/P EST MOD 30 MIN: CPT | Performed by: NURSE PRACTITIONER

## 2023-02-14 PROCEDURE — 3074F SYST BP LT 130 MM HG: CPT | Performed by: NURSE PRACTITIONER

## 2023-02-14 PROCEDURE — 3078F DIAST BP <80 MM HG: CPT | Performed by: NURSE PRACTITIONER

## 2023-02-14 RX ORDER — RANOLAZINE 500 MG/1
500 TABLET, EXTENDED RELEASE ORAL 2 TIMES DAILY
Qty: 60 TABLET | Refills: 3 | Status: SHIPPED | OUTPATIENT
Start: 2023-02-14 | End: 2023-03-14 | Stop reason: SDUPTHER

## 2023-02-16 DIAGNOSIS — R39.15 URINARY URGENCY: ICD-10-CM

## 2023-02-16 RX ORDER — TAMSULOSIN HYDROCHLORIDE 0.4 MG/1
1 CAPSULE ORAL DAILY
Qty: 90 CAPSULE | Refills: 3 | Status: SHIPPED | OUTPATIENT
Start: 2023-02-16

## 2023-03-03 ENCOUNTER — OFFICE VISIT (OUTPATIENT)
Dept: FAMILY MEDICINE CLINIC | Facility: CLINIC | Age: 78
End: 2023-03-03
Payer: MEDICARE

## 2023-03-03 VITALS
WEIGHT: 252 LBS | BODY MASS INDEX: 36.08 KG/M2 | RESPIRATION RATE: 18 BRPM | SYSTOLIC BLOOD PRESSURE: 132 MMHG | OXYGEN SATURATION: 99 % | HEART RATE: 55 BPM | HEIGHT: 70 IN | DIASTOLIC BLOOD PRESSURE: 82 MMHG

## 2023-03-03 DIAGNOSIS — I10 ESSENTIAL HYPERTENSION: Primary | Chronic | ICD-10-CM

## 2023-03-03 DIAGNOSIS — E78.2 MIXED HYPERLIPIDEMIA: Chronic | ICD-10-CM

## 2023-03-03 DIAGNOSIS — Z12.5 ENCOUNTER FOR PROSTATE CANCER SCREENING: ICD-10-CM

## 2023-03-03 PROCEDURE — 99213 OFFICE O/P EST LOW 20 MIN: CPT | Performed by: GENERAL PRACTICE

## 2023-03-03 NOTE — PROGRESS NOTES
Subjective   Jerald Bonilla is a 77 y.o. male.   Chief Complaint   Patient presents with   • Hypertension     Follow up        For review and evaluation of management of chronic medical problems. Records reviewed. Any recent labs, xrays reviewed and medications reconciled.   Hypertension  This is a chronic problem. The current episode started more than 1 year ago. The problem is unchanged. The problem is controlled. Pertinent negatives include no palpitations. There are no associated agents to hypertension. Current antihypertension treatment includes beta blockers, angiotensin blockers, diuretics, calcium channel blockers and direct vasodilators. The current treatment provides significant improvement. There are no compliance problems.       The following portions of the patient's history were reviewed and updated as appropriate: allergies, current medications, past social history and problem list.    Outpatient Medications Prior to Visit   Medication Sig Dispense Refill   • allopurinol (ZYLOPRIM) 100 MG tablet Take 1 tablet by mouth Daily. 2 tablets daily     • B Complex Vitamins (VITAMIN B COMPLEX PO) Take 1 tablet by mouth Daily.     • carvedilol (COREG) 25 MG tablet TAKE 2 TABLETS BY MOUTH  TWICE DAILY WITH MEALS 360 tablet 3   • cholecalciferol (VITAMIN D3) 1000 UNITS tablet Take 1 tablet by mouth Daily.     • clindamycin (Clindagel) 1 % gel Apply  topically to the appropriate area as directed 2 (Two) Times a Day. 60 g 2   • clopidogrel (PLAVIX) 75 MG tablet TAKE 1 TABLET BY MOUTH  DAILY 90 tablet 3   • famotidine (PEPCID) 10 MG tablet Take 1 tablet by mouth Every Night.     • NIFEdipine XL (PROCARDIA XL) 90 MG 24 hr tablet TAKE 2 TABLETS BY MOUTH AT  NIGHT 180 tablet 3   • pravastatin (PRAVACHOL) 80 MG tablet TAKE 1 TABLET BY MOUTH AT  NIGHT 90 tablet 3   • ranolazine (Ranexa) 500 MG 12 hr tablet Take 1 tablet by mouth 2 (Two) Times a Day. 60 tablet 3   • Respiratory Therapy Supplies (CareTouch 2 CPAP Hose  ") misc      • tacrolimus (PROTOPIC) 0.1 % ointment APPLY THIN LAYER TO THE AFFECTED AREA TWICE DAILY FOR 2 WEEKS. STOP FOR 2 WEEKS. MAY REPEAT CYCLE AS NEEDED FOR FLARES. FOR SCALP.     • tamsulosin (FLOMAX) 0.4 MG capsule 24 hr capsule TAKE 1 CAPSULE BY MOUTH  DAILY 90 capsule 3   • tretinoin (RETIN-A) 0.025 % gel APPLY THIN LAYER TO AFFECTED AREA 2 TIMES WEEKLY AT BEDTIME FOR 2 WEEKS, THEN INCREASE TO 3 TIMES WEEKLY AT BEDTIME FOR 2 WEEKS, THEN INCREASE TO NIGHTLY AS TOLERATED     • valsartan-hydrochlorothiazide (DIOVAN-HCT) 320-25 MG per tablet TAKE 1 TABLET BY MOUTH  DAILY 90 tablet 3   • Zinc 50 MG tablet Take 1 tablet by mouth Every Night.     • hydrALAZINE (APRESOLINE) 50 MG tablet hydralazine 50 mg tablet     • mupirocin (BACTROBAN) 2 % ointment mupirocin 2 % topical ointment       No facility-administered medications prior to visit.       Review of Systems   Cardiovascular: Negative for palpitations.     I have reviewed 12 systems with patient. Findings were negative except what is noted below and/or in history of present illness.     Objective   Visit Vitals  /82   Pulse 55   Resp 18   Ht 177.8 cm (70\")   Wt 114 kg (252 lb)   SpO2 99%   BMI 36.16 kg/m²     Physical Exam  Vitals and nursing note reviewed.   Constitutional:       General: He is not in acute distress.     Appearance: He is well-developed.   HENT:      Head: Normocephalic.      Nose: Nose normal.   Eyes:      General:         Right eye: No discharge.         Left eye: No discharge.      Conjunctiva/sclera: Conjunctivae normal.      Pupils: Pupils are equal, round, and reactive to light.   Neck:      Thyroid: No thyromegaly.   Cardiovascular:      Rate and Rhythm: Normal rate and regular rhythm.      Heart sounds: Normal heart sounds. No murmur heard.  Pulmonary:      Effort: Pulmonary effort is normal.      Breath sounds: Normal breath sounds.   Lymphadenopathy:      Cervical: No cervical adenopathy.   Skin:     General: Skin is " warm and dry.   Neurological:      Mental Status: He is alert and oriented to person, place, and time.         Notes brought forward are reviewed and updated if indicated.     Assessment & Plan   Problems Addressed this Visit        Cardiac and Vasculature    Essential hypertension - Primary (Chronic)    Relevant Orders    CBC & Differential    Comprehensive Metabolic Panel    Lipid Panel    Urinalysis With Culture If Indicated - Urine, Clean Catch    PSA Screen    Mixed hyperlipidemia (Chronic)    Relevant Orders    CBC & Differential    Comprehensive Metabolic Panel    Lipid Panel    Urinalysis With Culture If Indicated - Urine, Clean Catch    PSA Screen   Other Visit Diagnoses     Encounter for prostate cancer screening        Relevant Orders    PSA Screen      Diagnoses       Codes Comments    Essential hypertension    -  Primary ICD-10-CM: I10  ICD-9-CM: 401.9     Mixed hyperlipidemia     ICD-10-CM: E78.2  ICD-9-CM: 272.2     Encounter for prostate cancer screening     ICD-10-CM: Z12.5  ICD-9-CM: V76.44           Continue current medications.     No orders of the defined types were placed in this encounter.    No follow-ups on file.        This document has been electronically signed by Annie Penny MD on March 3, 2023 10:53 CST

## 2023-03-14 ENCOUNTER — OFFICE VISIT (OUTPATIENT)
Dept: CARDIOLOGY | Facility: CLINIC | Age: 78
End: 2023-03-14
Payer: MEDICARE

## 2023-03-14 VITALS
HEART RATE: 59 BPM | SYSTOLIC BLOOD PRESSURE: 132 MMHG | HEIGHT: 70 IN | BODY MASS INDEX: 36.33 KG/M2 | OXYGEN SATURATION: 97 % | DIASTOLIC BLOOD PRESSURE: 80 MMHG | WEIGHT: 253.8 LBS

## 2023-03-14 DIAGNOSIS — R07.89 CHEST PAIN, ATYPICAL: ICD-10-CM

## 2023-03-14 PROCEDURE — 1160F RVW MEDS BY RX/DR IN RCRD: CPT | Performed by: NURSE PRACTITIONER

## 2023-03-14 PROCEDURE — 3079F DIAST BP 80-89 MM HG: CPT | Performed by: NURSE PRACTITIONER

## 2023-03-14 PROCEDURE — 99213 OFFICE O/P EST LOW 20 MIN: CPT | Performed by: NURSE PRACTITIONER

## 2023-03-14 PROCEDURE — 3075F SYST BP GE 130 - 139MM HG: CPT | Performed by: NURSE PRACTITIONER

## 2023-03-14 PROCEDURE — 1159F MED LIST DOCD IN RCRD: CPT | Performed by: NURSE PRACTITIONER

## 2023-03-14 RX ORDER — RANOLAZINE 500 MG/1
500 TABLET, EXTENDED RELEASE ORAL 2 TIMES DAILY
Qty: 180 TABLET | Refills: 3 | Status: SHIPPED | OUTPATIENT
Start: 2023-03-14

## 2023-03-14 NOTE — PROGRESS NOTES
Precordial pain    Current providers  PCP: Dr. Penny  Nephrologist: Dr. Keyes  Surgeon: Dr. Hernandez   CT vascular: Dr. Yi    History of Present Illness  Mr. Jerald Bonilla is a 76-year-old male with medical history of essential hypertension, mixed hyperlipidemia, GERD, CHRIS on CPAP, abdominal aortic aneurysm repair, gout, rheumatoid arthritis, hypertensive heart disease, obesity, and stage III CKD.     He has been referred to cardiology for chest pain.  He reports that chest pain started 3 months ago.  Denies associated symptoms shortness of breath, diaphoresis, nausea, or weakness.  He has history of abdominal aortic aneurysm that has been repaired.  He does cardiac risk factors of hypertension, hypercholesterol, obesity, being male, and sedentary.  He reports that he has never had this pain up until 3 months ago.  He reports past cardiac work-up years ago with negative nuclear stress testing.      Chest Pain   Pain location: lateral region  Pain quality: dull ache, varying intensity   Pain radiates to: Left shoulder and down arm.  Sometimes goes through to the back.    Onset quality: moderate  Duration: varies inlength of time. Longest episode about 15 min  Timing: sometimes daily and sometimes weekly  Progression: About the same  Chronicity: 3 months  Relieved by: Nothing, goes away on its own  Worsened by: Nothing  Associated symptoms: none    Upon looking at his medication list he is on good heart medications with carvedilol, nifedipine, aspirin, and pravastatin.  He reports a bad experience with a nuclear stress test and does not want this is an option as a cardiac work-up.  CTA was discussed as well however his creatinine is 1.49 and EGFR 50 he is getting contrast tomorrow for CT to evaluate pancrease.  He does not want to make a decision before speaking to Dr. Keyes. A CTA of coronaries can use 70 to 120 mL of contrast dye.  I agree with getting Dr. Keyes's thoughts on additional dye with a CTA.  He really  "does not want a nuclear myocardial stress test.    10/27/22 patient presents for follow-up regarding chest pain.  Myocardial nuclear stress test showed poor quality, unable to do CT attenuated images due to patient's claustrophobia.  There is a small size inferior defect without significant reversibility.  But could not rule out small sized inferior infarct.  No ischemia noted.  He tells he has only had 1 significant episode of chest pain that radiated to his left arm.  Chest pain is typically generalized to left chest and does not radiate.  Has not worsened and is denying shortness of air, diaphoresis, or nausea with it.  Does have fatigue daily.    2/14/23, he presents for follow-up regarding chest pain.  He is having more right side abdominal pain going across to left side.  Pain occurs after eating, belching improves pain.  He has not had,  what he calls typical chest pain, that is left-sided that goes down left arm in a while.  He is on maximum tolerated medications.  We discussed adding Ranexa today just for 2 weeks to see if this would help.  He wants me to discuss with Dr. Keyes.    Today he presents for follow-up for chest pain.  He believes Ranexa is helping.  He still has right upper abdominal pain that is relieved with belching.  Reports that he has not had \" typical\" chest pain in a while.  He does a lot for his wife, family members, and community.  He is trying to step back on some of these responsibilities.    The 10-year ASCVD risk score (Samia ALMANZA, et al., 2019) is: 23.3%    Values used to calculate the score:      Age: 77 years      Sex: Male      Is Non- : Yes      Diabetic: No      Tobacco smoker: No      Systolic Blood Pressure: 132 mmHg      Is BP treated: Yes      HDL Cholesterol: 37 mg/dL      Total Cholesterol: 148 mg/dL    Cardiac Risk Factors:  Sedentary life style, Obesity and Male, family history, age    Past Medical History:   Diagnosis Date   • Acne conglobata  "   • Acute maxillary sinusitis    • Allergies    • Benign prostate hyperplasia    • Blurring of visual image    • Carbuncle     of infected sebaceous cyst on back   • Cyst of pancreas    • Dizziness and giddiness    • Erectile dysfunction    • Essential hypertension     seeing nephro   • Gastroesophageal reflux disease    • Gout    • Hammer toe    • Hearing loss    • Hip pain    • Hyperlipidemia    • Low back pain    • Malaise and fatigue    • Need for prophylactic vaccination against Streptococcus pneumoniae (pneumococcus)    • Nuclear senile cataract    • Obesity    • Osteoarthritis    • Otalgia    • Otitis externa    • Pain in lower limb    • Rheumatoid arthritis (HCC)    • Skin lesion     right calf   • Sleep apnea     sleeps with c-pap   • Stage 3 chronic kidney disease (HCC)    • Swelling of knee joint    • Tinnitus    • Upper respiratory infection      Past Surgical History:   Procedure Laterality Date   • ABDOMINAL AORTIC ANEURYSM REPAIR WITH ENDOGRAFT N/A 01/07/2022    Procedure: ABDOMINAL AORTIC ANEURYSM REPAIR WITH ENDOGRAFT;  Surgeon: Unruly Yi MD;  Location: [facility] OR;  Service: Cardiothoracic;  Laterality: N/A;   • COLONOSCOPY  05/18/2009    Colonoscopy, diagnostic (screening) 79472 (1)    diverticuli, roids    • COLONOSCOPY N/A 08/13/2020    Procedure: COLONOSCOPY;  Surgeon: Dagoberto Gavin DO;  Location: [facility] ENDOSCOPY;  Service: Gastroenterology;  Laterality: N/A;  tattoo @ right colon    • COLONOSCOPY N/A 02/18/2021    Procedure: COLONOSCOPY;  Surgeon: Dagoberto Gavin DO;  Location: [facility] ENDOSCOPY;  Service: Gastroenterology;  Laterality: N/A;   • COLONOSCOPY N/A 6/14/2022    Procedure: COLONOSCOPY 10:00;  Surgeon: Dagoberto Gavin DO;  Location: [facility] ENDOSCOPY;  Service: Gastroenterology;  Laterality: N/A;   • CYST REMOVAL Bilateral     cheeks   • ENDOSCOPY N/A 01/16/2019    Procedure: ESOPHAGOGASTRODUODENOSCOPY possible dilation;  Surgeon: Jerald Nation MD;   "Location: [facility] ENDOSCOPY;  Service: Gastroenterology   • HERNIA REPAIR      umbilical   • JOINT REPLACEMENT     • PILONIDAL CYSTECTOMY N/A     [facility]   • TOTAL HIP ARTHROPLASTY Left    • WISDOM TOOTH EXTRACTION       Social History     Socioeconomic History   • Marital status:    Tobacco Use   • Smoking status: Former     Types: Cigarettes     Quit date: 2000     Years since quittin.2   • Smokeless tobacco: Never   Vaping Use   • Vaping Use: Never used   Substance and Sexual Activity   • Alcohol use: Yes     Comment: socially   • Drug use: Not Currently     Types: Marijuana     Comment: none in 1-2 years   • Sexual activity: Defer     Family History   Problem Relation Age of Onset   • Arthritis Mother    • Hypertension Mother    • Obesity Mother    • Thyroid disease Mother    • Mental illness Mother    • Hypertension Father    • Heart disease Father    • Cancer Father    • Diabetes Father    • Kidney disease Father    • Obesity Daughter    • Obesity Daughter    • No Known Problems Daughter    • No Known Problems Son    • Arthritis Maternal Grandmother      ALLERGIES:  Allergies   Allergen Reactions   • Lisinopril Itching   • Minoxidil Other (See Comments)     \"dont know\"     Review of Systems   Constitutional: Negative for decreased appetite, diaphoresis, malaise/fatigue and night sweats.   Eyes: Negative.    Cardiovascular: Negative for chest pain, dyspnea on exertion, irregular heartbeat, leg swelling, orthopnea, palpitations, paroxysmal nocturnal dyspnea and syncope.   Respiratory: Negative for cough and shortness of breath.    Endocrine: Negative.    Hematologic/Lymphatic: Negative.    Musculoskeletal: Negative for falls, muscle weakness and myalgias.   Gastrointestinal: Positive for abdominal pain (Same) and heartburn (Same, unchanged). Negative for nausea.   Neurological: Negative for dizziness and headaches.       Current Outpatient Medications   Medication Sig Dispense Refill   • allopurinol " (ZYLOPRIM) 100 MG tablet Take 1 tablet by mouth Daily. 2 tablets daily     • B Complex Vitamins (VITAMIN B COMPLEX PO) Take 1 tablet by mouth Daily.     • carvedilol (COREG) 25 MG tablet TAKE 2 TABLETS BY MOUTH  TWICE DAILY WITH MEALS 360 tablet 3   • cholecalciferol (VITAMIN D3) 1000 UNITS tablet Take 1 tablet by mouth Daily.     • clindamycin (Clindagel) 1 % gel Apply  topically to the appropriate area as directed 2 (Two) Times a Day. 60 g 2   • clopidogrel (PLAVIX) 75 MG tablet TAKE 1 TABLET BY MOUTH  DAILY 90 tablet 3   • famotidine (PEPCID) 10 MG tablet Take 1 tablet by mouth Every Night.     • hydrALAZINE (APRESOLINE) 50 MG tablet hydralazine 50 mg tablet     • mupirocin (BACTROBAN) 2 % ointment mupirocin 2 % topical ointment     • NIFEdipine XL (PROCARDIA XL) 90 MG 24 hr tablet TAKE 2 TABLETS BY MOUTH AT  NIGHT 180 tablet 3   • pravastatin (PRAVACHOL) 80 MG tablet TAKE 1 TABLET BY MOUTH AT  NIGHT 90 tablet 3   • ranolazine (Ranexa) 500 MG 12 hr tablet Take 1 tablet by mouth 2 (Two) Times a Day. 180 tablet 3   • Respiratory Therapy Supplies (CareTouch 2 CPAP Hose ) misc      • tacrolimus (PROTOPIC) 0.1 % ointment APPLY THIN LAYER TO THE AFFECTED AREA TWICE DAILY FOR 2 WEEKS. STOP FOR 2 WEEKS. MAY REPEAT CYCLE AS NEEDED FOR FLARES. FOR SCALP.     • tamsulosin (FLOMAX) 0.4 MG capsule 24 hr capsule TAKE 1 CAPSULE BY MOUTH  DAILY 90 capsule 3   • tretinoin (RETIN-A) 0.025 % gel APPLY THIN LAYER TO AFFECTED AREA 2 TIMES WEEKLY AT BEDTIME FOR 2 WEEKS, THEN INCREASE TO 3 TIMES WEEKLY AT BEDTIME FOR 2 WEEKS, THEN INCREASE TO NIGHTLY AS TOLERATED     • valsartan-hydrochlorothiazide (DIOVAN-HCT) 320-25 MG per tablet TAKE 1 TABLET BY MOUTH  DAILY 90 tablet 3   • Zinc 50 MG tablet Take 1 tablet by mouth Every Night.       No current facility-administered medications for this visit.     OBJECTIVE:    Physical Exam:   Vitals reviewed.   Constitutional:       Appearance: Well-groomed and not in distress. Obese.   Eyes:  "     General:         Right eye: No discharge.         Left eye: No discharge.      Conjunctiva/sclera: Conjunctivae normal.      Right eye: Right conjunctiva is not injected.      Left eye: Left conjunctiva is not injected.      Pupils: Pupils are equal, round, and reactive to light.   HENT:      Head: Normocephalic and atraumatic.   Neck:      Vascular: No JVD.      Trachea: Trachea normal.   Pulmonary:      Effort: Pulmonary effort is normal. No prolonged expiration.      Breath sounds: Normal breath sounds and air entry. No decreased air movement. No wheezing.   Cardiovascular:      Normal rate. Regular rhythm. Normal S1 with normal intensity. Normal S2 with normal intensity.      Murmurs: There is no murmur.      No click.   Skin:     General: Skin is warm and dry.      Capillary Refill: Capillary refill takes less than 2 seconds.   Neurological:      Mental Status: Alert, oriented to person, place, and time and oriented to person, place and time.      Gait: Gait is intact.   Psychiatric:         Attention and Perception: Attention normal.         Mood and Affect: Mood normal.       Vitals:    03/14/23 1354   BP: 132/80   BP Location: Left arm   Patient Position: Sitting   Cuff Size: Adult   Pulse: 59   SpO2: 97%   Weight: 115 kg (253 lb 12.8 oz)   Height: 177.8 cm (70\")       DATA REVIEWED:       No radiology results for the last 30 days.  CXR:     CT:     Labs: BMP, CBC, LIPID, TSH  Lab Results   Component Value Date    GLUCOSE 108 (H) 11/11/2022    CALCIUM 9.1 11/11/2022     11/11/2022    K 4.0 11/11/2022    CO2 22.0 11/11/2022     11/11/2022    BUN 28 (H) 11/11/2022    CREATININE 1.55 (H) 11/11/2022    EGFRIFAFRI 51 (L) 01/08/2022    BCR 18.1 11/11/2022    ANIONGAP 12.0 11/11/2022     Lab Results   Component Value Date    WBC 4.95 08/22/2022    HGB 14.7 08/22/2022    HCT 44.6 08/22/2022    MCV 86.4 08/22/2022     08/22/2022     Lab Results   Component Value Date    CHOL 148 08/22/2022    " CHOL 162 2021    CHOL 157 2020     Lab Results   Component Value Date    TRIG 97 2022    TRIG 139 2021    TRIG 137 2020     Lab Results   Component Value Date    HDL 37 (L) 2022    HDL 45 2021    HDL 39 (L) 2020     No components found for: LDLCALC  Lab Results   Component Value Date    LDL 93 2022    LDL 92 2021    LDL 91 2020     No results found for: HDLLDLRATIO  No components found for: CHOLHDL  Lab Results   Component Value Date    TSH 2.080 2021     No results found for: PROBNP    EK2022    EK2021    The following portions of the patient's history were reviewed and updated as appropriate: allergies, current medications, past family history, past medical history, past social history, past surgical history and problem list.  Old records reviewed and pertinent information is included in the above objective data.     ASSESSMENT/PLAN:     Diagnosis Plan   1. Chest pain, atypical  ranolazine (Ranexa) 500 MG 12 hr tablet        1.  Precordial pain/chest pain atypical.  Nuclear myocardial stress test that showed small size defect without significant reversibility.  But could not rule out small sized inferior infarct.  He tells me that he has not been having chest pain like before.  Occasionally will have left-sided chest pain that comes and goes.  He has heartburn and has wondered if his pain is related to reflux.      Symptoms appear typical and atypical.  Atypical features are right sided abdominal pain that goes across to left side.  Belching improves pain.  Pain occurs after eating.    He tells me that Ranexa is helping.  He is concerned about cost.  [facility] card given to him today and cost around $29/month.  This is doable and he would like for me to send his prescription to [facility].    -Refilled Ranexa 500 mg twice daily, today.     -Continue aspirin, carvedilol, and Plavix    I spent 22 minutes caring for Jerald on this  date of service. This time includes time spent by me in the following activities: reviewing tests, obtaining and/or reviewing a separately obtained history, performing a medically appropriate examination and/or evaluation, counseling and educating the patient/family/caregiver and documenting information in the medical record     Return in about 3 months (around 6/14/2023) for Next scheduled follow up, Recheck.          This document has been electronically signed by BEATRIS Alegre on March 14, 2023 15:30 CDT   Electronically signed by BETARIS Alegre, 03/14/23, 3:30 PM CDT.

## 2023-04-01 ENCOUNTER — TRANSCRIBE ORDERS (OUTPATIENT)
Dept: GENERAL RADIOLOGY | Facility: HOSPITAL | Age: 78
End: 2023-04-01
Payer: MEDICARE

## 2023-04-01 DIAGNOSIS — K86.2 PANCREATIC CYST: Primary | ICD-10-CM

## 2023-04-06 ENCOUNTER — TELEPHONE (OUTPATIENT)
Dept: SURGERY | Facility: CLINIC | Age: 78
End: 2023-04-06

## 2023-04-06 NOTE — TELEPHONE ENCOUNTER
pt called to cancel his ct and follow up with Dr. Hernandez due to his kidneys being so bad that he can't take the dye for ct.    pt ph# [phone]

## 2023-05-17 ENCOUNTER — TRANSCRIBE ORDERS (OUTPATIENT)
Dept: LAB | Facility: HOSPITAL | Age: 78
End: 2023-05-17
Payer: MEDICARE

## 2023-05-17 DIAGNOSIS — N18.30 STAGE 3 CHRONIC KIDNEY DISEASE, UNSPECIFIED WHETHER STAGE 3A OR 3B CKD: Primary | ICD-10-CM

## 2023-05-19 NOTE — PROGRESS NOTES
Precordial pain    Current providers  PCP: Dr. Penny  Nephrologist: Dr. Keyes  Surgeon: Dr. Hernandez   CT vascular: Dr. Yi    History of Present Illness  Mr. Jerald Bonilla is a 76-year-old male with medical history of essential hypertension, mixed hyperlipidemia, GERD, CHRIS on CPAP, abdominal aortic aneurysm repair, gout, rheumatoid arthritis, hypertensive heart disease, obesity, and stage III CKD.     He has been referred to cardiology for chest pain.  He reports that chest pain started 3 months ago.  Denies associated symptoms shortness of breath, diaphoresis, nausea, or weakness.  He has history of abdominal aortic aneurysm that has been repaired.  He does cardiac risk factors of hypertension, hypercholesterol, obesity, being male, and sedentary.  He reports that he has never had this pain up until 3 months ago.  He reports past cardiac work-up years ago with negative nuclear stress testing.      Chest Pain   Pain location: lateral region  Pain quality: dull ache, varying intensity   Pain radiates to: Left shoulder and down arm.  Sometimes goes through to the back.    Onset quality: moderate  Duration: varies inlength of time. Longest episode about 15 min  Timing: sometimes daily and sometimes weekly  Progression: About the same  Chronicity: 3 months  Relieved by: Nothing, goes away on its own  Worsened by: Nothing  Associated symptoms: none    Upon looking at his medication list he is on good heart medications with carvedilol, nifedipine, aspirin, and pravastatin.  He reports a bad experience with a nuclear stress test and does not want this is an option as a cardiac work-up.  CTA was discussed as well however his creatinine is 1.49 and EGFR 50 he is getting contrast tomorrow for CT to evaluate pancrease.  He does not want to make a decision before speaking to Dr. Keyes. A CTA of coronaries can use 70 to 120 mL of contrast dye.  I agree with getting Dr. Keyes's thoughts on additional dye with a CTA.  He really  Myself does not want a nuclear myocardial stress test.    Today, patient presents for follow-up regarding chest pain.  Myocardial nuclear stress test showed poor quality, unable to do CT attenuated images due to patient's claustrophobia.  There is a small size inferior defect without significant reversibility.  But could not rule out small sized inferior infarct.  No ischemia noted.  He tells he has only had 1 significant episode of chest pain that radiated to his left arm.  Chest pain is typically generalized to left chest and does not radiate.  Has not worsened and is denying shortness of air, diaphoresis, or nausea with it.  Does have fatigue daily.    The 10-year ASCVD risk score (Samia ALMANZA, et al., 2019) is: 24.4%    Values used to calculate the score:      Age: 76 years      Sex: Male      Is Non- : Yes      Diabetic: No      Tobacco smoker: No      Systolic Blood Pressure: 138 mmHg      Is BP treated: Yes      HDL Cholesterol: 37 mg/dL      Total Cholesterol: 148 mg/dL    Cardiac Risk Factors:  Sedentary life style, Obesity and Male, family history, age    Past Medical History:   Diagnosis Date   • Acne conglobata    • Acute maxillary sinusitis    • Allergies    • Benign prostate hyperplasia    • Blurring of visual image    • Carbuncle     of infected sebaceous cyst on back   • Cyst of pancreas    • Dizziness and giddiness    • Erectile dysfunction    • Essential hypertension     seeing nephro   • Gastroesophageal reflux disease    • Gout    • Hammer toe    • Hearing loss    • Hip pain    • Hyperlipidemia    • Low back pain    • Malaise and fatigue    • Need for prophylactic vaccination against Streptococcus pneumoniae (pneumococcus)    • Nuclear senile cataract    • Obesity    • Osteoarthritis    • Otalgia    • Otitis externa    • Pain in lower limb    • Rheumatoid arthritis (HCC)    • Skin lesion     right calf   • Sleep apnea     sleeps with c-pap   • Stage 3 chronic kidney disease (HCC)     • Swelling of knee joint    • Tinnitus    • Upper respiratory infection      Past Surgical History:   Procedure Laterality Date   • ABDOMINAL AORTIC ANEURYSM REPAIR WITH ENDOGRAFT N/A 2022    Procedure: ABDOMINAL AORTIC ANEURYSM REPAIR WITH ENDOGRAFT;  Surgeon: Unruly Yi MD;  Location: [facility] OR;  Service: Cardiothoracic;  Laterality: N/A;   • COLONOSCOPY  2009    Colonoscopy, diagnostic (screening) 94243 (1)    diverticuli, roids    • COLONOSCOPY N/A 2020    Procedure: COLONOSCOPY;  Surgeon: Dagoberto Gavin DO;  Location: [facility] ENDOSCOPY;  Service: Gastroenterology;  Laterality: N/A;  tattoo @ right colon    • COLONOSCOPY N/A 2021    Procedure: COLONOSCOPY;  Surgeon: Dagoberto Gavin DO;  Location: [facility] ENDOSCOPY;  Service: Gastroenterology;  Laterality: N/A;   • COLONOSCOPY N/A 2022    Procedure: COLONOSCOPY 10:00;  Surgeon: Dagoberto Gavin DO;  Location: [facility] ENDOSCOPY;  Service: Gastroenterology;  Laterality: N/A;   • CYST REMOVAL Bilateral     cheeks   • ENDOSCOPY N/A 2019    Procedure: ESOPHAGOGASTRODUODENOSCOPY possible dilation;  Surgeon: Jerald Nation MD;  Location: [facility] ENDOSCOPY;  Service: Gastroenterology   • HERNIA REPAIR      umbilical   • JOINT REPLACEMENT     • PILONIDAL CYSTECTOMY N/A     [facility]   • TOTAL HIP ARTHROPLASTY Left    • WISDOM TOOTH EXTRACTION       Social History     Socioeconomic History   • Marital status:    Tobacco Use   • Smoking status: Former     Types: Cigarettes     Quit date: 2000     Years since quittin.8   • Smokeless tobacco: Never   Vaping Use   • Vaping Use: Never used   Substance and Sexual Activity   • Alcohol use: Yes     Comment: socially   • Drug use: Not Currently     Types: Marijuana     Comment: none in 1-2 years   • Sexual activity: Defer     Family History   Problem Relation Age of Onset   • Arthritis Mother    • Hypertension Mother    • Obesity Mother    • Thyroid disease Mother    •  "Mental illness Mother    • Hypertension Father    • Heart disease Father    • Cancer Father    • Diabetes Father    • Kidney disease Father    • Obesity Daughter    • Obesity Daughter    • No Known Problems Daughter    • No Known Problems Son    • Arthritis Maternal Grandmother      ALLERGIES:  Allergies   Allergen Reactions   • Lisinopril Itching   • Minoxidil Other (See Comments)     \"dont know\"     Review of Systems   Constitutional: Negative for decreased appetite, diaphoresis, malaise/fatigue and night sweats.   Eyes: Negative for blurred vision and photophobia.   Cardiovascular: Positive for chest pain. Negative for irregular heartbeat, leg swelling, orthopnea, palpitations, paroxysmal nocturnal dyspnea and syncope.   Respiratory: Negative for cough and shortness of breath.    Endocrine: Negative for polydipsia and polyphagia.   Hematologic/Lymphatic: Negative for adenopathy and bleeding problem.   Skin: Negative for flushing and rash.   Musculoskeletal: Negative for muscle weakness and myalgias.   Gastrointestinal: Positive for heartburn. Negative for abdominal pain, anorexia and nausea.   Neurological: Negative for dizziness, headaches and numbness.   Psychiatric/Behavioral: Negative for depression. The patient is not nervous/anxious.        Current Outpatient Medications   Medication Sig Dispense Refill   • allopurinol (ZYLOPRIM) 100 MG tablet Take 100 mg by mouth Daily. 2 tablets daily     • Apoaequorin (PREVAGEN PO) Take 1 tablet by mouth Daily.     • aspirin 81 MG EC tablet Take 81 mg by mouth Daily.     • B Complex Vitamins (VITAMIN B COMPLEX PO) Take 1 tablet by mouth Daily.     • carvedilol (COREG) 25 MG tablet TAKE 2 TABLETS BY MOUTH  TWICE DAILY WITH MEALS 360 tablet 3   • cholecalciferol (VITAMIN D3) 1000 UNITS tablet Take 1,000 Units by mouth Daily.     • clindamycin (Clindagel) 1 % gel Apply  topically to the appropriate area as directed 2 (Two) Times a Day. 60 g 2   • clopidogrel (PLAVIX) 75 MG " tablet Take 1 tablet by mouth Daily. 90 tablet 3   • doxycycline (ADOXA) 100 MG tablet TAKE 1 TABLET BY MOUTH TWICE DAILY FOR 10 DAYS & REFILL AS NEEDED FOR FLARES     • famotidine (PEPCID) 10 MG tablet Take 10 mg by mouth Every Night.     • fexofenadine (Allegra Allergy) 60 MG tablet Take 1 tablet by mouth Daily. 30 tablet 2   • fluticasone (FLONASE) 50 MCG/ACT nasal spray 1 spray into the nostril(s) as directed by provider Daily. 16 g 3   • hydrALAZINE (APRESOLINE) 50 MG tablet hydralazine 50 mg tablet     • ipratropium (ATROVENT) 0.03 % nasal spray ipratropium bromide 21 mcg (0.03 %) nasal spray   as directed     • mupirocin (BACTROBAN) 2 % ointment mupirocin 2 % topical ointment     • NIFEdipine XL (PROCARDIA XL) 90 MG 24 hr tablet TAKE 2 TABLETS BY MOUTH AT  NIGHT 180 tablet 3   • pravastatin (PRAVACHOL) 80 MG tablet Take 80 mg by mouth Every Night.     • Respiratory Therapy Supplies (CareTouch 2 CPAP Hose ) misc      • tacrolimus (PROTOPIC) 0.1 % ointment APPLY THIN LAYER TO THE AFFECTED AREA TWICE DAILY FOR 2 WEEKS. STOP FOR 2 WEEKS. MAY REPEAT CYCLE AS NEEDED FOR FLARES. FOR SCALP.     • tamsulosin (FLOMAX) 0.4 MG capsule 24 hr capsule Take 1 capsule by mouth Daily. 90 capsule 1   • tretinoin (RETIN-A) 0.025 % gel APPLY THIN LAYER TO AFFECTED AREA 2 TIMES WEEKLY AT BEDTIME FOR 2 WEEKS, THEN INCREASE TO 3 TIMES WEEKLY AT BEDTIME FOR 2 WEEKS, THEN INCREASE TO NIGHTLY AS TOLERATED     • valsartan-hydrochlorothiazide (DIOVAN-HCT) 320-25 MG per tablet TAKE 1 TABLET BY MOUTH  DAILY 90 tablet 3   • Zinc 50 MG tablet Take 1 tablet by mouth Every Night.       No current facility-administered medications for this visit.     OBJECTIVE:    Physical Exam:   Vitals reviewed.   Constitutional:       Appearance: Well-groomed and not in distress. Obese.   Eyes:      General: Lids are normal.      Conjunctiva/sclera: Conjunctivae normal.      Right eye: Right conjunctiva is not injected.      Left eye: Left conjunctiva  "is not injected.      Pupils: Pupils are equal, round, and reactive to light.   HENT:      Head: Normocephalic and atraumatic.   Neck:      Vascular: No JVD.      Trachea: Trachea normal.   Pulmonary:      Effort: Pulmonary effort is normal.      Breath sounds: Normal air entry. No decreased air movement. No wheezing.   Cardiovascular:      PMI at left midclavicular line. Normal rate. Regular rhythm. Normal S1 with normal intensity. Normal S2 with normal intensity.      Murmurs: There is no murmur.      No gallop.   Pulses:     Intact distal pulses.   Edema:     Peripheral edema absent.   Skin:     General: Skin is warm and dry.      Capillary Refill: Capillary refill takes less than 2 seconds.      Coloration: Skin is not pale.   Neurological:      Mental Status: Alert, oriented to person, place, and time and oriented to person, place and time.   Psychiatric:         Attention and Perception: Attention normal.         Mood and Affect: Mood normal.         Speech: Speech normal.         Behavior: Behavior is cooperative.         Cognition and Memory: Cognition normal.       Vitals:    10/27/22 1328   BP: 138/80   BP Location: Left arm   Patient Position: Sitting   Cuff Size: Adult   Pulse: 62   SpO2: 99%   Height: 177.8 cm (70\")       DATA REVIEWED:       CT Angiogram Abdomen Pelvis    Result Date: 10/4/2022  1. Prior endograft repair of the abdominal aorta. The excluded aneurysm sac is stable and again measures up to 3.2 cm. 2. The right common iliac artery measures up to 2.3 cm with apparent extension of contrast beyond the margin of the endograft, stable relative to 2/28/2022. 3. Within the pancreatic head is a stable 3.5 cm cystic lesion. Repeat follow-up imaging is recommended in six months to ensure continued stability. 4. A 1.6 cm right adrenal nodule is stable though demonstrates indeterminate attenuation characteristics on the unenhanced CT. A follow-up abdominal CT or MRI per the adrenal protocol, which " includes delayed phase imaging to assess washout characteristics, can be obtained for a more complete evaluation. Electronically signed by:  Alex Mendoza MD  10/4/2022 8:06 AM CDT Workstation: [id number]    CXR:     CT:     Labs: BMP, CBC, LIPID, TSH  Lab Results   Component Value Date    GLUCOSE 111 (H) 10/07/2022    CALCIUM 9.4 10/07/2022     10/07/2022    K 3.8 10/07/2022    CO2 23.8 10/07/2022     10/07/2022    BUN 29 (H) 10/07/2022    CREATININE 1.49 (H) 10/07/2022    EGFRIFAFRI 51 (L) 2022    BCR 19.5 10/07/2022    ANIONGAP 10.2 10/07/2022     Lab Results   Component Value Date    WBC 4.95 2022    HGB 14.7 2022    HCT 44.6 2022    MCV 86.4 2022     2022     Lab Results   Component Value Date    CHOL 148 2022    CHOL 162 2021    CHOL 157 2020     Lab Results   Component Value Date    TRIG 97 2022    TRIG 139 2021    TRIG 137 2020     Lab Results   Component Value Date    HDL 37 (L) 2022    HDL 45 2021    HDL 39 (L) 2020     No components found for: LDLCALC  Lab Results   Component Value Date    LDL 93 2022    LDL 92 2021    LDL 91 2020     No results found for: HDLLDLRATIO  No components found for: CHOLHDL  Lab Results   Component Value Date    TSH 2.080 2021     No results found for: PROBNP    EK2022    EK2021    The following portions of the patient's history were reviewed and updated as appropriate: allergies, current medications, past family history, past medical history, past social history, past surgical history and problem list.  Old records reviewed and pertinent information is included in the above objective data.     ASSESSMENT/PLAN:     Diagnosis Plan   1. Precordial pain          1.  Precordial pain.  Nuclear myocardial stress test that showed small size defect without significant reversibility.  But could not rule out small sized inferior infarct.  He  "tells me that he has not been having chest pain like before.  Occasionally will have left-sided chest pain that comes and goes.  He has heartburn and has wondered if his pain is related to reflux.  He has been on Pepcid for a while.  Dr. Keyes took him off a medication that ended in  \"ozole\" does not know why.     -We discussed trialing Tums to see if it will work.  He is agreeable.     -Discussed starting isosorbide are Ranexa.  He wants to think about this before adding another medication to his daily list.  -Continue aspirin, carvedilol, and Plavix    I spent 29 minutes caring for Jerald on this date of service. This time includes time spent by me in the following activities: reviewing tests, obtaining and/or reviewing a separately obtained history, performing a medically appropriate examination and/or evaluation, counseling and educating the patient/family/caregiver and documenting information in the medical record  Return in about 3 months (around 1/27/2023) for Recheck.      This document has been electronically signed by BEATRIS Alegre on October 27, 2022 13:59 CDT   Electronically signed by BEATRIS Alegre, 10/27/22, 1:59 PM CDT.    "

## 2023-05-25 NOTE — PROGRESS NOTES
Subjective   Jerald Bonilla is a 76 y.o. male.   Chief Complaint   Patient presents with   • Medicare Wellness-subsequent   • Annual Exam       Chest Pain   This is a new problem. The current episode started 1 to 4 weeks ago. The onset quality is sudden. The pain is present in the lateral region. The pain is at a severity of 3/10. The pain is moderate. The quality of the pain is described as dull. The pain does not radiate. Pertinent negatives include no diaphoresis, nausea, palpitations or shortness of breath. He has tried rest for the symptoms. The treatment provided significant relief. Risk factors include being elderly, sedentary lifestyle, lack of exercise, male gender and obesity.   His family medical history is significant for CAD.      The following portions of the patient's history were reviewed and updated as appropriate: allergies, current medications, past social history and problem list.    Outpatient Medications Prior to Visit   Medication Sig Dispense Refill   • allopurinol (ZYLOPRIM) 100 MG tablet Take 100 mg by mouth Daily. 2 tablets daily     • aspirin 81 MG EC tablet Take 81 mg by mouth Daily.     • B Complex Vitamins (VITAMIN B COMPLEX PO) Take 1 tablet by mouth Daily.     • carvedilol (COREG) 25 MG tablet Take 25 mg by mouth 2 (Two) Times a Day With Meals. Take 2 tablets bid     • cholecalciferol (VITAMIN D3) 1000 UNITS tablet Take 1,000 Units by mouth Daily.     • clindamycin (Clindagel) 1 % gel Apply  topically to the appropriate area as directed 2 (Two) Times a Day. 60 g 2   • clopidogrel (PLAVIX) 75 MG tablet Take 1 tablet by mouth Daily. 90 tablet 3   • famotidine (PEPCID) 10 MG tablet Take 10 mg by mouth Every Night.     • fexofenadine (Allegra Allergy) 60 MG tablet Take 1 tablet by mouth Daily. 30 tablet 2   • fluticasone (FLONASE) 50 MCG/ACT nasal spray 1 spray into the nostril(s) as directed by provider Daily. 16 g 3   • NIFEdipine XL (PROCARDIA XL) 90 MG 24 hr tablet TAKE 2  "TABLETS BY MOUTH AT  NIGHT 180 tablet 3   • pravastatin (PRAVACHOL) 80 MG tablet Take 80 mg by mouth Every Night.     • Respiratory Therapy Supplies (CareTouch 2 CPAP Hose ) misc      • valsartan-hydrochlorothiazide (DIOVAN-HCT) 320-25 MG per tablet Take 1 tablet by mouth Daily. 90 tablet 1   • Zinc 50 MG tablet Take 1 tablet by mouth Every Night.     • tamsulosin (FLOMAX) 0.4 MG capsule 24 hr capsule Take 1 capsule by mouth Daily. 30 capsule 1   • Apoaequorin (PREVAGEN PO) Take 1 tablet by mouth Daily.     • diazePAM (VALIUM) 5 MG tablet Take 1 tab 30 mins before procedure, may repeat x 1 4 tablet 0     No facility-administered medications prior to visit.       Review of Systems   Constitutional: Negative for diaphoresis.   Respiratory: Negative for shortness of breath.    Cardiovascular: Positive for chest pain. Negative for palpitations.   Gastrointestinal: Negative for nausea.     I have reviewed 12 systems with patient. Findings were negative except what is noted below and/or in history of present illness.     Objective   Visit Vitals  /74   Pulse 53   Resp 18   Ht 177.8 cm (70\")   Wt 116 kg (256 lb 9.6 oz)   SpO2 97%   BMI 36.82 kg/m²     Physical Exam  Vitals and nursing note reviewed.   Constitutional:       General: He is not in acute distress.     Appearance: He is well-developed.   HENT:      Head: Normocephalic.      Nose: Nose normal.   Eyes:      General:         Right eye: No discharge.         Left eye: No discharge.      Conjunctiva/sclera: Conjunctivae normal.      Pupils: Pupils are equal, round, and reactive to light.   Neck:      Thyroid: No thyromegaly.   Cardiovascular:      Rate and Rhythm: Normal rate and regular rhythm.      Heart sounds: Normal heart sounds. No murmur heard.  Pulmonary:      Effort: Pulmonary effort is normal.      Breath sounds: Normal breath sounds.   Lymphadenopathy:      Cervical: No cervical adenopathy.   Skin:     General: Skin is warm and dry. "   Neurological:      Mental Status: He is alert and oriented to person, place, and time.         Notes brought forward are reviewed and updated if indicated.     Assessment & Plan   Problems Addressed this Visit        Skin    Acne conglobata    Relevant Orders    Ambulatory Referral to Dermatology (Completed)      Other Visit Diagnoses     Medicare annual wellness visit, subsequent    -  Primary    Precordial pain        Relevant Orders    ECG 12 Lead (Completed)    Ambulatory Referral to Cardiology    Urinary urgency        Relevant Medications    tamsulosin (FLOMAX) 0.4 MG capsule 24 hr capsule      Diagnoses       Codes Comments    Medicare annual wellness visit, subsequent    -  Primary ICD-10-CM: Z00.00  ICD-9-CM: V70.0     Precordial pain     ICD-10-CM: R07.2  ICD-9-CM: 786.51     Acne conglobata     ICD-10-CM: L70.1  ICD-9-CM: 706.1     Urinary urgency     ICD-10-CM: R39.15  ICD-9-CM: 788.63          Cardiology referral. Continue ASA daily. If develops chest pain that does not resolve then present to ER.     New Medications Ordered This Visit   Medications   • tamsulosin (FLOMAX) 0.4 MG capsule 24 hr capsule     Sig: Take 1 capsule by mouth Daily.     Dispense:  90 capsule     Refill:  1     Return in about 6 months (around 2/26/2023) for Recheck.        This document has been electronically signed by Annie Penny MD on September 11, 2022 13:32 CDT     Gabapentin Counseling: I discussed with the patient the risks of gabapentin including but not limited to dizziness, somnolence, fatigue and ataxia.

## 2023-05-30 ENCOUNTER — PREP FOR SURGERY (OUTPATIENT)
Dept: OTHER | Facility: HOSPITAL | Age: 78
End: 2023-05-30

## 2023-05-30 DIAGNOSIS — Z86.010 PERSONAL HISTORY OF COLONIC POLYPS: Primary | ICD-10-CM

## 2023-05-30 RX ORDER — DEXTROSE AND SODIUM CHLORIDE 5; .45 G/100ML; G/100ML
30 INJECTION, SOLUTION INTRAVENOUS CONTINUOUS PRN
OUTPATIENT
Start: 2023-05-30

## 2023-05-30 RX ORDER — SODIUM CHLORIDE 9 MG/ML
40 INJECTION, SOLUTION INTRAVENOUS AS NEEDED
OUTPATIENT
Start: 2023-05-30

## 2023-05-31 RX ORDER — ALLOPURINOL 100 MG/1
TABLET ORAL
Qty: 180 TABLET | Refills: 1 | Status: SHIPPED | OUTPATIENT
Start: 2023-05-31

## 2023-06-06 ENCOUNTER — LAB (OUTPATIENT)
Dept: LAB | Facility: HOSPITAL | Age: 78
End: 2023-06-06
Payer: MEDICARE

## 2023-06-06 DIAGNOSIS — N18.30 STAGE 3 CHRONIC KIDNEY DISEASE, UNSPECIFIED WHETHER STAGE 3A OR 3B CKD: ICD-10-CM

## 2023-06-06 LAB
ALBUMIN SERPL-MCNC: 3.9 G/DL (ref 3.5–5.2)
ANION GAP SERPL CALCULATED.3IONS-SCNC: 11.4 MMOL/L (ref 5–15)
BUN SERPL-MCNC: 31 MG/DL (ref 8–23)
BUN/CREAT SERPL: 19.4 (ref 7–25)
CALCIUM SPEC-SCNC: 9.4 MG/DL (ref 8.6–10.5)
CHLORIDE SERPL-SCNC: 107 MMOL/L (ref 98–107)
CO2 SERPL-SCNC: 21.6 MMOL/L (ref 22–29)
CREAT SERPL-MCNC: 1.6 MG/DL (ref 0.76–1.27)
CREAT UR-MCNC: 114.9 MG/DL
EGFRCR SERPLBLD CKD-EPI 2021: 44.7 ML/MIN/1.73
GLUCOSE SERPL-MCNC: 105 MG/DL (ref 65–99)
PHOSPHATE SERPL-MCNC: 3 MG/DL (ref 2.5–4.5)
POTASSIUM SERPL-SCNC: 3.8 MMOL/L (ref 3.5–5.2)
PROT ?TM UR-MCNC: 12.4 MG/DL
PROT/CREAT UR: 107.9 MG/G CREA (ref 0–200)
SODIUM SERPL-SCNC: 140 MMOL/L (ref 136–145)

## 2023-06-06 PROCEDURE — 84156 ASSAY OF PROTEIN URINE: CPT

## 2023-06-06 PROCEDURE — 82570 ASSAY OF URINE CREATININE: CPT

## 2023-06-06 PROCEDURE — 36415 COLL VENOUS BLD VENIPUNCTURE: CPT

## 2023-06-06 PROCEDURE — 80069 RENAL FUNCTION PANEL: CPT

## 2023-06-08 ENCOUNTER — HOSPITAL ENCOUNTER (OUTPATIENT)
Facility: HOSPITAL | Age: 78
Setting detail: HOSPITAL OUTPATIENT SURGERY
Discharge: HOME | End: 2023-06-08
Attending: INTERNAL MEDICINE | Admitting: INTERNAL MEDICINE
Payer: MEDICARE

## 2023-06-08 ENCOUNTER — ANESTHESIA (OUTPATIENT)
Dept: GASTROENTEROLOGY | Facility: HOSPITAL | Age: 78
End: 2023-06-08
Payer: MEDICARE

## 2023-06-08 ENCOUNTER — ANESTHESIA EVENT (OUTPATIENT)
Dept: GASTROENTEROLOGY | Facility: HOSPITAL | Age: 78
End: 2023-06-08
Payer: MEDICARE

## 2023-06-08 VITALS
DIASTOLIC BLOOD PRESSURE: 56 MMHG | OXYGEN SATURATION: 96 % | SYSTOLIC BLOOD PRESSURE: 110 MMHG | WEIGHT: 246 LBS | BODY MASS INDEX: 35.22 KG/M2 | HEART RATE: 53 BPM | HEIGHT: 70 IN | RESPIRATION RATE: 18 BRPM | TEMPERATURE: 97.2 F

## 2023-06-08 DIAGNOSIS — Z86.010 PERSONAL HISTORY OF COLONIC POLYPS: ICD-10-CM

## 2023-06-08 PROCEDURE — 25010000002 PROPOFOL 10 MG/ML EMULSION: Performed by: NURSE ANESTHETIST, CERTIFIED REGISTERED

## 2023-06-08 PROCEDURE — 88305 TISSUE EXAM BY PATHOLOGIST: CPT

## 2023-06-08 RX ORDER — DEXTROSE AND SODIUM CHLORIDE 5; .45 G/100ML; G/100ML
30 INJECTION, SOLUTION INTRAVENOUS CONTINUOUS PRN
Status: DISCONTINUED | OUTPATIENT
Start: 2023-06-08 | End: 2023-06-08 | Stop reason: HOSPADM

## 2023-06-08 RX ORDER — LIDOCAINE HYDROCHLORIDE 20 MG/ML
INJECTION, SOLUTION INTRAVENOUS AS NEEDED
Status: DISCONTINUED | OUTPATIENT
Start: 2023-06-08 | End: 2023-06-08 | Stop reason: SURG

## 2023-06-08 RX ORDER — PROPOFOL 10 MG/ML
VIAL (ML) INTRAVENOUS AS NEEDED
Status: DISCONTINUED | OUTPATIENT
Start: 2023-06-08 | End: 2023-06-08 | Stop reason: SURG

## 2023-06-08 RX ORDER — SODIUM CHLORIDE 9 MG/ML
40 INJECTION, SOLUTION INTRAVENOUS AS NEEDED
Status: DISCONTINUED | OUTPATIENT
Start: 2023-06-08 | End: 2023-06-08 | Stop reason: HOSPADM

## 2023-06-08 RX ADMIN — PROPOFOL 50 MG: 10 INJECTION, EMULSION INTRAVENOUS at 09:31

## 2023-06-08 RX ADMIN — DEXTROSE AND SODIUM CHLORIDE 30 ML/HR: 5; 450 INJECTION, SOLUTION INTRAVENOUS at 08:49

## 2023-06-08 RX ADMIN — PROPOFOL 40 MG: 10 INJECTION, EMULSION INTRAVENOUS at 09:37

## 2023-06-08 RX ADMIN — PROPOFOL 40 MG: 10 INJECTION, EMULSION INTRAVENOUS at 09:33

## 2023-06-08 RX ADMIN — PROPOFOL 120 MG: 10 INJECTION, EMULSION INTRAVENOUS at 09:28

## 2023-06-08 RX ADMIN — LIDOCAINE HYDROCHLORIDE 60 MG: 20 INJECTION, SOLUTION INTRAVENOUS at 09:28

## 2023-06-08 NOTE — ANESTHESIA PREPROCEDURE EVALUATION
Anesthesia Evaluation     Patient summary reviewed and Nursing notes reviewed                Airway   Mallampati: II  TM distance: >3 FB  Neck ROM: full  No difficulty expected  Dental - normal exam     Pulmonary    (+) a smoker Current,sleep apnea on CPAP  Cardiovascular - normal exam    PT is on anticoagulation therapy  Patient on routine beta blocker    (+) hypertension well controlled less than 2 medicationshyperlipidemia      Neuro/Psych  GI/Hepatic/Renal/Endo    (+) morbid obesity, GERD poorly controlled, renal disease CRI    Musculoskeletal     (+) joint swelling  Abdominal   (+) obese   Substance History      OB/GYN          Other   arthritis,       Other Comment: Gout                  Anesthesia Plan    ASA 3     general   total IV anesthesia  intravenous induction     Anesthetic plan, risks, benefits, and alternatives have been provided, discussed and informed consent has been obtained with: patient.      CODE STATUS:

## 2023-06-08 NOTE — ANESTHESIA POSTPROCEDURE EVALUATION
Patient: Jerald Bonilla    Procedure Summary       Date: 06/08/23 Room / Location: [facility] ENDOSCOPY 2 / [facility] ENDOSCOPY    Anesthesia Start: 0926 Anesthesia Stop: 0942    Procedure: COLONOSCOPY 9:30 Diagnosis:       Personal history of colonic polyps      (Personal history of colonic polyps [Z86.010])    Surgeons: Dagoberto Gavin DO Provider: Crow Guzman CRNA    Anesthesia Type: general ASA Status: 3            Anesthesia Type: general    Vitals  No vitals data found for the desired time range.          Post Anesthesia Care and Evaluation    Patient location during evaluation: PHASE II  Patient participation: waiting for patient participation  Level of consciousness: sleepy but conscious  Pain management: adequate    Airway patency: patent  Anesthetic complications: No anesthetic complications  PONV Status: none  Cardiovascular status: acceptable  Respiratory status: acceptable, spontaneous ventilation and room air  Hydration status: acceptable    Comments: See flow sheet

## 2023-06-08 NOTE — H&P
Gonzalo Cruz DO,[facility]  Gastroenterology  Hepatology  Endoscopy  Board Certified in Internal Medicine and gastroenterology  [address], [address]  [place]. [address]  - [phone]   F [phone]     GASTROENTEROLOGY HISTORY AND PHYSICAL  NOTE   GONZALO CRUZ DO.         SUBJECTIVE:   6/8/2023    Name: Jerald Bonilla  DOD: 1945        Chief Complaint:         Subjective: Personal history of colon polyps    Patient is 77 y.o. male presents with desire for elective colonoscopy.      ROS/HISTORY/ CURRENT MEDICATIONS/OBJECTIVE/VS/PE:   Review of Systems:  All systems unremarkable unless specified below.  Constitutional   HENT  Eyes   Respiratory    Cardiovascular  Gastrointestinal   Endocrine  Genitourinary    Musculoskeletal   Skin  Allergic/Immunologic    Neurological    Hematological  Psychiatric/Behavioral    History:     Past Medical History:   Diagnosis Date    Acne conglobata     Acute maxillary sinusitis     Allergies     Benign prostate hyperplasia     Blurring of visual image     Carbuncle     of infected sebaceous cyst on back    Cyst of pancreas     Dizziness and giddiness     Erectile dysfunction     Essential hypertension     seeing nephro    Gastroesophageal reflux disease     Gout     Hammer toe     Hearing loss     Hip pain     Hyperlipidemia     Low back pain     Malaise and fatigue     Need for prophylactic vaccination against Streptococcus pneumoniae (pneumococcus)     Nuclear senile cataract     Obesity     Osteoarthritis     Otalgia     Otitis externa     Pain in lower limb     Rheumatoid arthritis     Skin lesion     right calf    Sleep apnea     sleeps with c-pap    Stage 3 chronic kidney disease     Swelling of knee joint     Tinnitus     Upper respiratory infection      Past Surgical History:   Procedure Laterality Date    ABDOMINAL AORTIC ANEURYSM REPAIR WITH ENDOGRAFT N/A 01/07/2022    Procedure: ABDOMINAL AORTIC ANEURYSM REPAIR WITH ENDOGRAFT;   Surgeon: Unruly Yi MD;  Location: [facility] OR;  Service: Cardiothoracic;  Laterality: N/A;    COLONOSCOPY  2009    Colonoscopy, diagnostic (screening) 26719 (1)    diverticuli, roids     COLONOSCOPY N/A 2020    Procedure: COLONOSCOPY;  Surgeon: Dagoberto Gavin DO;  Location: [facility] ENDOSCOPY;  Service: Gastroenterology;  Laterality: N/A;  tattoo @ right colon     COLONOSCOPY N/A 2021    Procedure: COLONOSCOPY;  Surgeon: Dagoberto Gavin DO;  Location: [facility] ENDOSCOPY;  Service: Gastroenterology;  Laterality: N/A;    COLONOSCOPY N/A 2022    Procedure: COLONOSCOPY 10:00;  Surgeon: Dagoberto Gavin DO;  Location: [facility] ENDOSCOPY;  Service: Gastroenterology;  Laterality: N/A;    CYST REMOVAL Bilateral     cheeks    ENDOSCOPY N/A 2019    Procedure: ESOPHAGOGASTRODUODENOSCOPY possible dilation;  Surgeon: Jerald Nation MD;  Location: [facility] ENDOSCOPY;  Service: Gastroenterology    HERNIA REPAIR      umbilical    JOINT REPLACEMENT      PILONIDAL CYSTECTOMY N/A     HAYWARD    TOTAL HIP ARTHROPLASTY Left     WISDOM TOOTH EXTRACTION       Family History   Problem Relation Age of Onset    Arthritis Mother     Hypertension Mother     Obesity Mother     Thyroid disease Mother     Mental illness Mother     Hypertension Father     Heart disease Father     Cancer Father     Diabetes Father     Kidney disease Father     Obesity Daughter     Obesity Daughter     No Known Problems Daughter     No Known Problems Son     Arthritis Maternal Grandmother      Social History     Tobacco Use    Smoking status: Former     Types: Cigarettes     Quit date: 2000     Years since quittin.4    Smokeless tobacco: Never   Vaping Use    Vaping Use: Never used   Substance Use Topics    Alcohol use: Yes     Comment: socially    Drug use: Not Currently     Types: Marijuana     Comment: none in 1-2 years     Prior to Admission medications    Medication Sig Start Date End Date Taking? Authorizing  Provider   allopurinol (ZYLOPRIM) 100 MG tablet TAKE 2 TABLETS BY MOUTH  DAILY 5/31/23  Yes Annie Penny MD   B Complex Vitamins (VITAMIN B COMPLEX PO) Take 1 tablet by mouth Daily.   Yes ProviderAmanda MD   carvedilol (COREG) 25 MG tablet TAKE 2 TABLETS BY MOUTH  TWICE DAILY WITH MEALS 9/30/22  Yes Annie Penny MD   cholecalciferol (VITAMIN D3) 1000 UNITS tablet Take 1 tablet by mouth Daily.   Yes ProviderAmanda MD   famotidine (PEPCID) 10 MG tablet Take 1 tablet by mouth Every Night.   Yes Provider, MD Amanda   hydrALAZINE (APRESOLINE) 50 MG tablet hydralazine 50 mg tablet   Yes Provider, MD Amanda   NIFEdipine XL (PROCARDIA XL) 90 MG 24 hr tablet TAKE 2 TABLETS BY MOUTH AT  NIGHT 4/29/22  Yes Christin Martinez APRN   pravastatin (PRAVACHOL) 80 MG tablet TAKE 1 TABLET BY MOUTH AT  NIGHT 12/19/22  Yes Annie Penny MD   ranolazine (Ranexa) 500 MG 12 hr tablet Take 1 tablet by mouth 2 (Two) Times a Day. 3/14/23  Yes Leticia Machado APRN   tamsulosin (FLOMAX) 0.4 MG capsule 24 hr capsule TAKE 1 CAPSULE BY MOUTH  DAILY 2/16/23  Yes Annie Penny MD   valsartan-hydrochlorothiazide (DIOVAN-HCT) 320-25 MG per tablet TAKE 1 TABLET BY MOUTH  DAILY 10/24/22  Yes Annie Penny MD   Zinc 50 MG tablet Take 1 tablet by mouth Every Night.   Yes ProviderAmanda MD   clindamycin (Clindagel) 1 % gel Apply  topically to the appropriate area as directed 2 (Two) Times a Day. 5/5/21   Annie Penny MD   clopidogrel (PLAVIX) 75 MG tablet TAKE 1 TABLET BY MOUTH  DAILY 12/14/22   Molly Roque APRN   mupirocin (BACTROBAN) 2 % ointment mupirocin 2 % topical ointment    Provider, MD Amanda   Respiratory Therapy Supplies (CareTouch 2 CPAP Hose ) misc     Provider, MD Amanda   tacrolimus (PROTOPIC) 0.1 % ointment APPLY THIN LAYER TO THE AFFECTED AREA TWICE DAILY FOR 2 WEEKS. STOP FOR 2 WEEKS. MAY REPEAT CYCLE AS NEEDED FOR FLARES. FOR SCALP. 10/13/22    ProviderAmanda MD   tretinoin (RETIN-A) 0.025 % gel APPLY THIN LAYER TO AFFECTED AREA 2 TIMES WEEKLY AT BEDTIME FOR 2 WEEKS, THEN INCREASE TO 3 TIMES WEEKLY AT BEDTIME FOR 2 WEEKS, THEN INCREASE TO NIGHTLY AS TOLERATED 10/14/22   Amanda Trotter MD     Allergies:  Lisinopril and Minoxidil    I have reviewed the patients medical history, surgical history and family history in the available medical record system.     Current Medications:     Current Facility-Administered Medications   Medication Dose Route Frequency Provider Last Rate Last Admin    dextrose 5 % and sodium chloride 0.45 % infusion  30 mL/hr Intravenous Continuous PRN Dagoberto Gavin DO 30 mL/hr at 06/08/23 0849 30 mL/hr at 06/08/23 0849    sodium chloride 0.9 % infusion 40 mL  40 mL Intravenous PRN Dagoberto Gavin DO           Objective     Physical Exam:   Temp:  [98.7 °F (37.1 °C)] 98.7 °F (37.1 °C)  Heart Rate:  [57] 57  Resp:  [18] 18  BP: (161)/(72) 161/72    Physical Exam:  General Appearance:    Alert, cooperative, in no acute distress   Head:    Normocephalic, without obvious abnormality, atraumatic   Eyes:            Lids and lashes normal, conjunctivae and sclerae normal, no icterus, no pallor, corneas clear, PERRLA   Ears:    Ears appear intact with no abnormalities noted   Throat:   No oral lesions, no thrush, oral mucosa moist   Neck:   No adenopathy, supple, trachea midline, no thyromegaly, no  carotid bruit, no JVD   Back:     No kyphosis present, no scoliosis present, no skin lesions,   erythema or scars, no tenderness to percussion or                 palpation,  range of motion normal   Lungs:     Clear to auscultation,respirations regular, even and         unlabored    Heart:    Regular rhythm and normal rate, normal S1 and S2, no  murmur, no gallop, no rub, no click   Breast Exam:    Deferred   Abdomen:     Normal bowel sounds, no masses, no organomegaly, soft  nontender, nondistended, no guarding, no rebound                  tenderness   Genitalia:    Deferred   Extremities:   Moves all extremities well, no edema, no cyanosis, no          redness   Pulses:   Pulses palpable and equal bilaterally   Skin:   No bleeding, bruising or rash   Lymph nodes:   No palpable adenopathy   Neurologic:   Cranial nerves 2 - 12 grossly intact, sensation intact, DTR     present and equal bilaterally      Results Review:     Lab Results   Component Value Date    WBC 4.95 08/22/2022    WBC 7.77 01/08/2022    WBC 5.00 12/14/2021    HGB 14.7 08/22/2022    HGB 12.9 (L) 01/08/2022    HGB 15.1 12/14/2021    HCT 44.6 08/22/2022    HCT 37.7 01/08/2022    HCT 43.8 12/14/2021     08/22/2022     (L) 01/08/2022     12/14/2021             No results found for: LIPASE  Lab Results   Component Value Date    INR 2.20 (H) 01/05/2017    INR 1.3 (H) 12/07/2016    INR 1.3 (H) 12/06/2016     No results found for: URINECX    Radiology Review:  Imaging Results (Last 72 Hours)       ** No results found for the last 72 hours. **             I reviewed the patient's new clinical results.  I reviewed the patient's new imaging results and agree with the interpretation.     ASSESSMENT/PLAN:   ASSESSMENT:  1.  Personal history of colon polyps    PLAN:  1.  Colonoscopy    Risk and benefits associated with the procedure are reviewed with the patient.  The patient wished to proceed     Dagoberto Gavin DO  06/08/23  09:19 CDT

## 2023-06-09 LAB — REF LAB TEST METHOD: NORMAL

## 2023-06-19 ENCOUNTER — OFFICE VISIT (OUTPATIENT)
Dept: CARDIOLOGY | Facility: CLINIC | Age: 78
End: 2023-06-19
Payer: MEDICARE

## 2023-06-19 VITALS
BODY MASS INDEX: 36.13 KG/M2 | SYSTOLIC BLOOD PRESSURE: 130 MMHG | DIASTOLIC BLOOD PRESSURE: 76 MMHG | HEIGHT: 70 IN | WEIGHT: 252.4 LBS | OXYGEN SATURATION: 97 % | HEART RATE: 53 BPM

## 2023-06-19 DIAGNOSIS — R07.89 CHEST PAIN, ATYPICAL: Primary | ICD-10-CM

## 2023-06-19 DIAGNOSIS — R14.2 BELCHING: ICD-10-CM

## 2023-06-19 DIAGNOSIS — K21.9 GASTROESOPHAGEAL REFLUX DISEASE, UNSPECIFIED WHETHER ESOPHAGITIS PRESENT: ICD-10-CM

## 2023-06-19 DIAGNOSIS — I10 ESSENTIAL HYPERTENSION: Chronic | ICD-10-CM

## 2023-06-19 RX ORDER — ISOSORBIDE MONONITRATE 30 MG/1
15 TABLET, EXTENDED RELEASE ORAL EVERY MORNING
Qty: 90 TABLET | Refills: 0 | Status: SHIPPED | OUTPATIENT
Start: 2023-06-19

## 2023-06-19 NOTE — PROGRESS NOTES
Chest pain, atypical    Current providers  PCP: Dr. Penny  Nephrologist: Dr. Keyes  Surgeon: Dr. Hernandez   CT vascular: Dr. Yi    History of Present Illness  Mr. Jerald Bonilla is a 76-year-old male with medical history of essential hypertension, mixed hyperlipidemia, GERD, CHRIS on CPAP, abdominal aortic aneurysm repair, gout, rheumatoid arthritis, hypertensive heart disease, obesity, and stage III CKD.     He has been referred to cardiology for chest pain.  He reports that chest pain started 3 months ago.  Denies associated symptoms shortness of breath, diaphoresis, nausea, or weakness.  He has history of abdominal aortic aneurysm that has been repaired.  He does cardiac risk factors of hypertension, hypercholesterol, obesity, being male, and sedentary.  He reports that he has never had this pain up until 3 months ago.  He reports past cardiac work-up years ago with negative nuclear stress testing.      Chest Pain   Pain location: lateral region  Pain quality: dull ache, varying intensity   Pain radiates to: Left shoulder and down arm.  Sometimes goes through to the back.    Onset quality: moderate  Duration: varies inlength of time. Longest episode about 15 min  Timing: sometimes daily and sometimes weekly  Progression: About the same  Chronicity: 3 months  Relieved by: Nothing, goes away on its own  Worsened by: Nothing  Associated symptoms: none    Upon looking at his medication list he is on good heart medications with carvedilol, nifedipine, aspirin, and pravastatin.  He reports a bad experience with a nuclear stress test and does not want this is an option as a cardiac work-up.  CTA was discussed as well however his creatinine is 1.49 and EGFR 50 he is getting contrast tomorrow for CT to evaluate pancrease.  He does not want to make a decision before speaking to Dr. Keyes. A CTA of coronaries can use 70 to 120 mL of contrast dye.  I agree with getting Dr. Keyes's thoughts on additional dye with a CTA.  He  "really does not want a nuclear myocardial stress test.    10/27/22 patient presents for follow-up regarding chest pain.  Myocardial nuclear stress test showed poor quality, unable to do CT attenuated images due to patient's claustrophobia.  There is a small size inferior defect without significant reversibility.  But could not rule out small sized inferior infarct.  No ischemia noted.  He tells he has only had 1 significant episode of chest pain that radiated to his left arm.  Chest pain is typically generalized to left chest and does not radiate.  Has not worsened and is denying shortness of air, diaphoresis, or nausea with it.  Does have fatigue daily.    2/14/23, he presents for follow-up regarding chest pain.  He is having more right side abdominal pain going across to left side.  Pain occurs after eating, belching improves pain.  He has not had,  what he calls typical chest pain, that is left-sided that goes down left arm in a while.  He is on maximum tolerated medications.  We discussed adding Ranexa today just for 2 weeks to see if this would help.  He wants me to discuss with Dr. Keyes.    3/14/23 he presents for follow-up for chest pain.  He believes Ranexa is helping.  He still has right upper abdominal pain that is relieved with belching.  Reports that he has not had \" typical\" chest pain in a while.  He does a lot for his wife, family members, and community.  He is trying to step back on some of these responsibilities.    Today, he presents for follow-up regarding chest pain.  He has not had chest pain.  Describes upper left-sided back pain that comes and goes at rest.  Not present with activity.  Also stating that Tums relieves that pain.  His symptoms appear to be more GI related.  He has ongoing belching, abdominal pain- discomfort after eating.  Denies chest pain, diaphoresis or SOA on exertion.    He is wanting another medication besides Ranexa due to cost.  Ranexa has helped symptoms of chest pain. "  We discussed isosorbide.    The 10-year ASCVD risk score (Samia ALMANZA, et al., 2019) is: 22.7%    Values used to calculate the score:      Age: 77 years      Sex: Male      Is Non- : Yes      Diabetic: No      Tobacco smoker: No      Systolic Blood Pressure: 130 mmHg      Is BP treated: Yes      HDL Cholesterol: 37 mg/dL      Total Cholesterol: 148 mg/dL    Cardiac Risk Factors:  Sedentary life style, Obesity and Male, family history, age    Past Medical History:   Diagnosis Date   • Acne conglobata    • Acute maxillary sinusitis    • Allergies    • Benign prostate hyperplasia    • Blurring of visual image    • Carbuncle     of infected sebaceous cyst on back   • Cyst of pancreas    • Dizziness and giddiness    • Erectile dysfunction    • Essential hypertension     seeing nephro   • Gastroesophageal reflux disease    • Gout    • Hammer toe    • Hearing loss    • Hip pain    • Hyperlipidemia    • Low back pain    • Malaise and fatigue    • Need for prophylactic vaccination against Streptococcus pneumoniae (pneumococcus)    • Nuclear senile cataract    • Obesity    • Osteoarthritis    • Otalgia    • Otitis externa    • Pain in lower limb    • Rheumatoid arthritis    • Skin lesion     right calf   • Sleep apnea     sleeps with c-pap   • Stage 3 chronic kidney disease    • Swelling of knee joint    • Tinnitus    • Upper respiratory infection      Past Surgical History:   Procedure Laterality Date   • ABDOMINAL AORTIC ANEURYSM REPAIR WITH ENDOGRAFT N/A 01/07/2022    Procedure: ABDOMINAL AORTIC ANEURYSM REPAIR WITH ENDOGRAFT;  Surgeon: Unruly Yi MD;  Location: [facility] OR;  Service: Cardiothoracic;  Laterality: N/A;   • COLONOSCOPY  05/18/2009    Colonoscopy, diagnostic (screening) 13909 (1)    diverticuli, roids    • COLONOSCOPY N/A 08/13/2020    Procedure: COLONOSCOPY;  Surgeon: Dagoberto Gavin DO;  Location: [facility] ENDOSCOPY;  Service: Gastroenterology;  Laterality: N/A;  tattoo @  "right colon    • COLONOSCOPY N/A 2021    Procedure: COLONOSCOPY;  Surgeon: Dagoberto Gavin DO;  Location: [facility] ENDOSCOPY;  Service: Gastroenterology;  Laterality: N/A;   • COLONOSCOPY N/A 2022    Procedure: COLONOSCOPY 10:00;  Surgeon: Dagoberto Gavin DO;  Location: [facility] ENDOSCOPY;  Service: Gastroenterology;  Laterality: N/A;   • COLONOSCOPY N/A 2023    Procedure: COLONOSCOPY 9:30;  Surgeon: Dagoberto Gavin DO;  Location: [facility] ENDOSCOPY;  Service: Gastroenterology;  Laterality: N/A;   • CYST REMOVAL Bilateral     cheeks   • ENDOSCOPY N/A 2019    Procedure: ESOPHAGOGASTRODUODENOSCOPY possible dilation;  Surgeon: Jerald Nation MD;  Location: [facility] ENDOSCOPY;  Service: Gastroenterology   • HERNIA REPAIR      umbilical   • JOINT REPLACEMENT     • PILONIDAL CYSTECTOMY N/A     HAYWARD   • TOTAL HIP ARTHROPLASTY Left    • WISDOM TOOTH EXTRACTION       Social History     Socioeconomic History   • Marital status:    Tobacco Use   • Smoking status: Former     Types: Cigarettes     Quit date:      Years since quittin.4   • Smokeless tobacco: Never   Vaping Use   • Vaping Use: Never used   Substance and Sexual Activity   • Alcohol use: Yes     Comment: socially   • Drug use: Not Currently     Types: Marijuana     Comment: none in 1-2 years   • Sexual activity: Defer     Family History   Problem Relation Age of Onset   • Arthritis Mother    • Hypertension Mother    • Obesity Mother    • Thyroid disease Mother    • Mental illness Mother    • Hypertension Father    • Heart disease Father    • Cancer Father    • Diabetes Father    • Kidney disease Father    • Obesity Daughter    • Obesity Daughter    • No Known Problems Daughter    • No Known Problems Son    • Arthritis Maternal Grandmother      ALLERGIES:  Allergies   Allergen Reactions   • Lisinopril Itching   • Minoxidil Other (See Comments)     \"dont know\"     Review of Systems   Constitutional: Negative for decreased " appetite, diaphoresis and malaise/fatigue.   Eyes: Negative.    Cardiovascular: Negative for chest pain, dyspnea on exertion, irregular heartbeat, leg swelling, orthopnea, palpitations, paroxysmal nocturnal dyspnea and syncope.   Respiratory: Negative for cough, shortness of breath and sleep disturbances due to breathing.    Musculoskeletal: Positive for back pain. Negative for falls, muscle cramps, muscle weakness and myalgias.   Gastrointestinal: Positive for abdominal pain (Same) and heartburn (Same, unchanged, takes Tums with relief). Negative for diarrhea, dysphagia and nausea.        Belching   Neurological: Negative for dizziness, headaches, light-headedness and weakness.       Current Outpatient Medications   Medication Sig Dispense Refill   • allopurinol (ZYLOPRIM) 100 MG tablet TAKE 2 TABLETS BY MOUTH  DAILY 180 tablet 1   • B Complex Vitamins (VITAMIN B COMPLEX PO) Take 1 tablet by mouth Daily.     • carvedilol (COREG) 25 MG tablet TAKE 2 TABLETS BY MOUTH  TWICE DAILY WITH MEALS 360 tablet 3   • cholecalciferol (VITAMIN D3) 1000 UNITS tablet Take 1 tablet by mouth Daily.     • clindamycin (Clindagel) 1 % gel Apply  topically to the appropriate area as directed 2 (Two) Times a Day. 60 g 2   • clopidogrel (PLAVIX) 75 MG tablet TAKE 1 TABLET BY MOUTH  DAILY 90 tablet 3   • famotidine (PEPCID) 10 MG tablet Take 1 tablet by mouth Every Night.     • hydrALAZINE (APRESOLINE) 50 MG tablet hydralazine 50 mg tablet     • mupirocin (BACTROBAN) 2 % ointment mupirocin 2 % topical ointment     • NIFEdipine XL (PROCARDIA XL) 90 MG 24 hr tablet TAKE 2 TABLETS BY MOUTH AT  NIGHT 180 tablet 3   • pravastatin (PRAVACHOL) 80 MG tablet TAKE 1 TABLET BY MOUTH AT  NIGHT 90 tablet 3   • Respiratory Therapy Supplies (CareTouch 2 CPAP Hose ) misc      • tacrolimus (PROTOPIC) 0.1 % ointment APPLY THIN LAYER TO THE AFFECTED AREA TWICE DAILY FOR 2 WEEKS. STOP FOR 2 WEEKS. MAY REPEAT CYCLE AS NEEDED FOR FLARES. FOR SCALP.     •  tamsulosin (FLOMAX) 0.4 MG capsule 24 hr capsule TAKE 1 CAPSULE BY MOUTH  DAILY 90 capsule 3   • tretinoin (RETIN-A) 0.025 % gel APPLY THIN LAYER TO AFFECTED AREA 2 TIMES WEEKLY AT BEDTIME FOR 2 WEEKS, THEN INCREASE TO 3 TIMES WEEKLY AT BEDTIME FOR 2 WEEKS, THEN INCREASE TO NIGHTLY AS TOLERATED     • valsartan-hydrochlorothiazide (DIOVAN-HCT) 320-25 MG per tablet TAKE 1 TABLET BY MOUTH  DAILY 90 tablet 3   • Zinc 50 MG tablet Take 1 tablet by mouth Every Night.     • isosorbide mononitrate (IMDUR) 30 MG 24 hr tablet Take 0.5 tablets by mouth Every Morning. 90 tablet 0     No current facility-administered medications for this visit.     OBJECTIVE:    Physical Exam:   Vitals reviewed.   Constitutional:       Appearance: Well-developed, well-groomed and not in distress. Obese.   Eyes:      General: Lids are normal.      Conjunctiva/sclera: Conjunctivae normal.      Right eye: Right conjunctiva is not injected.      Left eye: Left conjunctiva is not injected.      Pupils: Pupils are equal, round, and reactive to light.   HENT:      Head: Normocephalic and atraumatic.    Mouth/Throat:      Lips: Pink.      Mouth: Mucous membranes are moist.   Neck:      Vascular: No JVD.      Trachea: Trachea normal.   Pulmonary:      Effort: Pulmonary effort is normal.      Breath sounds: Normal breath sounds and air entry.   Cardiovascular:      Normal rate. Regular rhythm. Normal S1 with normal intensity. Normal S2 with normal intensity.      Murmurs: There is no murmur.      No gallop. No click.   Edema:     Peripheral edema absent.   Skin:     General: Skin is warm and dry.      Capillary Refill: Capillary refill takes less than 2 seconds.   Neurological:      Mental Status: Alert, oriented to person, place, and time and oriented to person, place and time.   Psychiatric:         Attention and Perception: Attention normal.         Mood and Affect: Mood normal.         Speech: Speech normal.         Behavior: Behavior is cooperative.   "       Thought Content: Thought content normal.       Vitals:    23 1106   BP: 130/76   BP Location: Left arm   Patient Position: Sitting   Cuff Size: Adult   Pulse: 53   SpO2: 97%   Weight: 114 kg (252 lb 6.4 oz)   Height: 177.8 cm (70\")       DATA REVIEWED:       No radiology results for the last 30 days.  CXR:     CT:     Labs: BMP, CBC, LIPID, TSH  Lab Results   Component Value Date    GLUCOSE 105 (H) 2023    CALCIUM 9.4 2023     2023    K 3.8 2023    CO2 21.6 (L) 2023     2023    BUN 31 (H) 2023    CREATININE 1.60 (H) 2023    EGFRIFAFRI 51 (L) 2022    BCR 19.4 2023    ANIONGAP 11.4 2023     Lab Results   Component Value Date    WBC 4.95 2022    HGB 14.7 2022    HCT 44.6 2022    MCV 86.4 2022     2022     Lab Results   Component Value Date    CHOL 148 2022    CHOL 162 2021    CHOL 157 2020     Lab Results   Component Value Date    TRIG 97 2022    TRIG 139 2021    TRIG 137 2020     Lab Results   Component Value Date    HDL 37 (L) 2022    HDL 45 2021    HDL 39 (L) 2020     No components found for: LDLCALC  Lab Results   Component Value Date    LDL 93 2022    LDL 92 2021    LDL 91 2020     No results found for: HDLLDLRATIO  No components found for: CHOLHDL  Lab Results   Component Value Date    TSH 2.080 2021     No results found for: PROBNP    EK2022    EK2021    The following portions of the patient's history were reviewed and updated as appropriate: allergies, current medications, past family history, past medical history, past social history, past surgical history and problem list.  Old records reviewed and pertinent information is included in the above objective data.     ASSESSMENT/PLAN:     Diagnosis Plan   1. Chest pain, atypical  Adult Transthoracic Echo Complete w/ Color, Spectral and Contrast " if Necessary Per Protocol      2. Essential hypertension  isosorbide mononitrate (IMDUR) 30 MG 24 hr tablet      3. Gastroesophageal reflux disease, unspecified whether esophagitis present        4. Belching  Ambulatory Referral to Gastroenterology        1.  Chest pain, atypical . Nuclear myocardial stress test that showed small size defect without significant reversibility.  But could not rule out small sized inferior infarct.      Denying typical chest pain.  Reports that left upper back pain is relieved by Tums.  Most at rest and not on exertion.  Very atypical for heart etiology.   Pain occurs after eating.  Belching or Tums improves pain    Not able to afford Ranexa.  Start isosorbide mononitrate 15 mg daily.  Will uptitrate as needed   -Continue aspirin, carvedilol, and Plavix     2.  Hypertension, essential.  Normotensive today.  Also following nephrology, Dr. Keyes    Continue carvedilol, Procardia XL, valsartan-HCT    3/4.  GERD/belching.  Ongoing gastrointestinal complaints with relief by belching or by Tums.  He has had colonoscopy but no upper endoscopy work-up.    Referral placed to Dr. Dagoberto Gavin.    I spent 35 minutes caring for Jerald on this date of service. This time includes time spent by me in the following activities: reviewing tests, obtaining and/or reviewing a separately obtained history, performing a medically appropriate examination and/or evaluation, counseling and educating the patient/family/caregiver, ordering medications, tests, or procedures, referring and communicating with other health care professionals and documenting information in the medical record   Return in about 3 months (around 9/19/2023) for Recheck.      This document has been electronically signed by BEATRIS Alegre on June 19, 2023 13:08 CDT   Electronically signed by BEATRIS Alegre, 06/19/23, 1:08 PM CDT.

## 2023-08-03 ENCOUNTER — TELEPHONE (OUTPATIENT)
Dept: CARDIOLOGY | Facility: CLINIC | Age: 78
End: 2023-08-03
Payer: MEDICARE

## 2023-08-03 ENCOUNTER — OFFICE VISIT (OUTPATIENT)
Dept: SLEEP MEDICINE | Facility: HOSPITAL | Age: 78
End: 2023-08-03
Payer: MEDICARE

## 2023-08-03 VITALS
BODY MASS INDEX: 36.36 KG/M2 | DIASTOLIC BLOOD PRESSURE: 56 MMHG | WEIGHT: 254 LBS | HEART RATE: 55 BPM | SYSTOLIC BLOOD PRESSURE: 141 MMHG | OXYGEN SATURATION: 95 % | HEIGHT: 70 IN

## 2023-08-03 DIAGNOSIS — E66.01 MORBID OBESITY: ICD-10-CM

## 2023-08-03 DIAGNOSIS — I10 ESSENTIAL HYPERTENSION: Chronic | ICD-10-CM

## 2023-08-03 DIAGNOSIS — G47.33 OBSTRUCTIVE SLEEP APNEA: Primary | ICD-10-CM

## 2023-08-03 RX ORDER — ISOSORBIDE MONONITRATE 30 MG/1
30 TABLET, EXTENDED RELEASE ORAL EVERY MORNING
Qty: 90 TABLET | Refills: 1 | Status: SHIPPED | OUTPATIENT
Start: 2023-08-03

## 2023-08-04 ENCOUNTER — TELEPHONE (OUTPATIENT)
Dept: CARDIOLOGY | Facility: CLINIC | Age: 78
End: 2023-08-04
Payer: MEDICARE

## 2023-08-10 ENCOUNTER — OFFICE VISIT (OUTPATIENT)
Dept: FAMILY MEDICINE CLINIC | Facility: CLINIC | Age: 78
End: 2023-08-10
Payer: MEDICARE

## 2023-08-10 ENCOUNTER — LAB (OUTPATIENT)
Dept: LAB | Facility: HOSPITAL | Age: 78
End: 2023-08-10
Payer: MEDICARE

## 2023-08-10 VITALS
HEIGHT: 70 IN | OXYGEN SATURATION: 92 % | SYSTOLIC BLOOD PRESSURE: 170 MMHG | HEART RATE: 79 BPM | WEIGHT: 246.9 LBS | DIASTOLIC BLOOD PRESSURE: 60 MMHG | TEMPERATURE: 101.8 F | BODY MASS INDEX: 35.35 KG/M2

## 2023-08-10 DIAGNOSIS — E78.2 MIXED HYPERLIPIDEMIA: Chronic | ICD-10-CM

## 2023-08-10 DIAGNOSIS — I10 ESSENTIAL HYPERTENSION: Chronic | ICD-10-CM

## 2023-08-10 DIAGNOSIS — R50.9 FEVER, UNSPECIFIED FEVER CAUSE: ICD-10-CM

## 2023-08-10 DIAGNOSIS — Z12.5 ENCOUNTER FOR PROSTATE CANCER SCREENING: ICD-10-CM

## 2023-08-10 DIAGNOSIS — R50.9 FEVER, UNSPECIFIED FEVER CAUSE: Primary | ICD-10-CM

## 2023-08-10 LAB
ALBUMIN SERPL-MCNC: 3.4 G/DL (ref 3.5–5.2)
ALBUMIN/GLOB SERPL: 0.6 G/DL
ALP SERPL-CCNC: 58 U/L (ref 39–117)
ALT SERPL W P-5'-P-CCNC: 55 U/L (ref 1–41)
ANION GAP SERPL CALCULATED.3IONS-SCNC: 13 MMOL/L (ref 5–15)
AST SERPL-CCNC: 62 U/L (ref 1–40)
BACTERIA UR QL AUTO: ABNORMAL /HPF
BASOPHILS # BLD AUTO: 0.05 10*3/MM3 (ref 0–0.2)
BASOPHILS NFR BLD AUTO: 0.8 % (ref 0–1.5)
BILIRUB SERPL-MCNC: 0.6 MG/DL (ref 0–1.2)
BILIRUB UR QL STRIP: NEGATIVE
BUN SERPL-MCNC: 33 MG/DL (ref 8–23)
BUN/CREAT SERPL: 17.4 (ref 7–25)
CALCIUM SPEC-SCNC: 8.8 MG/DL (ref 8.6–10.5)
CHLORIDE SERPL-SCNC: 101 MMOL/L (ref 98–107)
CLARITY UR: CLEAR
CO2 SERPL-SCNC: 22 MMOL/L (ref 22–29)
COLOR UR: ABNORMAL
CREAT SERPL-MCNC: 1.9 MG/DL (ref 0.76–1.27)
DEPRECATED RDW RBC AUTO: 42.6 FL (ref 37–54)
EGFRCR SERPLBLD CKD-EPI 2021: 36.3 ML/MIN/1.73
EOSINOPHIL # BLD AUTO: 0.04 10*3/MM3 (ref 0–0.4)
EOSINOPHIL NFR BLD AUTO: 0.7 % (ref 0.3–6.2)
ERYTHROCYTE [DISTWIDTH] IN BLOOD BY AUTOMATED COUNT: 13.8 % (ref 12.3–15.4)
EXPIRATION DATE: NORMAL
FLUAV AG UPPER RESP QL IA.RAPID: NOT DETECTED
FLUBV AG UPPER RESP QL IA.RAPID: NOT DETECTED
GLOBULIN UR ELPH-MCNC: 5.3 GM/DL
GLUCOSE SERPL-MCNC: 91 MG/DL (ref 65–99)
GLUCOSE UR STRIP-MCNC: NEGATIVE MG/DL
HCT VFR BLD AUTO: 45.3 % (ref 37.5–51)
HGB BLD-MCNC: 15.9 G/DL (ref 13–17.7)
HGB UR QL STRIP.AUTO: ABNORMAL
HYALINE CASTS UR QL AUTO: ABNORMAL /LPF
IMM GRANULOCYTES # BLD AUTO: 0.01 10*3/MM3 (ref 0–0.05)
IMM GRANULOCYTES NFR BLD AUTO: 0.2 % (ref 0–0.5)
INTERNAL CONTROL: NORMAL
KETONES UR QL STRIP: ABNORMAL
LEUKOCYTE ESTERASE UR QL STRIP.AUTO: ABNORMAL
LYMPHOCYTES # BLD AUTO: 0.93 10*3/MM3 (ref 0.7–3.1)
LYMPHOCYTES NFR BLD AUTO: 15.6 % (ref 19.6–45.3)
Lab: NORMAL
MCH RBC QN AUTO: 29.8 PG (ref 26.6–33)
MCHC RBC AUTO-ENTMCNC: 35.1 G/DL (ref 31.5–35.7)
MCV RBC AUTO: 84.8 FL (ref 79–97)
MONOCYTES # BLD AUTO: 1.14 10*3/MM3 (ref 0.1–0.9)
MONOCYTES NFR BLD AUTO: 19.2 % (ref 5–12)
NEUTROPHILS NFR BLD AUTO: 3.78 10*3/MM3 (ref 1.7–7)
NEUTROPHILS NFR BLD AUTO: 63.5 % (ref 42.7–76)
NITRITE UR QL STRIP: NEGATIVE
NRBC BLD AUTO-RTO: 0 /100 WBC (ref 0–0.2)
PH UR STRIP.AUTO: <=5 [PH] (ref 5–9)
PLATELET # BLD AUTO: 186 10*3/MM3 (ref 140–450)
PMV BLD AUTO: 10 FL (ref 6–12)
POTASSIUM SERPL-SCNC: 3.5 MMOL/L (ref 3.5–5.2)
PROT SERPL-MCNC: 8.7 G/DL (ref 6–8.5)
PROT UR QL STRIP: ABNORMAL
RBC # BLD AUTO: 5.34 10*6/MM3 (ref 4.14–5.8)
RBC # UR STRIP: ABNORMAL /HPF
REF LAB TEST METHOD: ABNORMAL
SARS-COV-2 AG UPPER RESP QL IA.RAPID: NOT DETECTED
SODIUM SERPL-SCNC: 136 MMOL/L (ref 136–145)
SP GR UR STRIP: 1.02 (ref 1–1.03)
SQUAMOUS #/AREA URNS HPF: ABNORMAL /HPF
UROBILINOGEN UR QL STRIP: ABNORMAL
WBC # UR STRIP: ABNORMAL /HPF
WBC NRBC COR # BLD: 5.95 10*3/MM3 (ref 3.4–10.8)

## 2023-08-10 PROCEDURE — 3077F SYST BP >= 140 MM HG: CPT | Performed by: GENERAL PRACTICE

## 2023-08-10 PROCEDURE — 3078F DIAST BP <80 MM HG: CPT | Performed by: GENERAL PRACTICE

## 2023-08-10 PROCEDURE — 87428 SARSCOV & INF VIR A&B AG IA: CPT | Performed by: GENERAL PRACTICE

## 2023-08-10 PROCEDURE — 99213 OFFICE O/P EST LOW 20 MIN: CPT | Performed by: GENERAL PRACTICE

## 2023-08-10 PROCEDURE — G0103 PSA SCREENING: HCPCS

## 2023-08-10 PROCEDURE — 36415 COLL VENOUS BLD VENIPUNCTURE: CPT | Performed by: GENERAL PRACTICE

## 2023-08-10 PROCEDURE — 80061 LIPID PANEL: CPT

## 2023-08-10 PROCEDURE — 80053 COMPREHEN METABOLIC PANEL: CPT | Performed by: GENERAL PRACTICE

## 2023-08-10 PROCEDURE — 81001 URINALYSIS AUTO W/SCOPE: CPT

## 2023-08-10 PROCEDURE — 85025 COMPLETE CBC W/AUTO DIFF WBC: CPT | Performed by: GENERAL PRACTICE

## 2023-08-10 RX ORDER — CEFUROXIME AXETIL 500 MG/1
500 TABLET ORAL 2 TIMES DAILY
Qty: 20 TABLET | Refills: 0 | Status: SHIPPED | OUTPATIENT
Start: 2023-08-10

## 2023-08-10 NOTE — PROGRESS NOTES
Subjective   Jerald Bonilla is a 77 y.o. male.   Chief Complaint   Patient presents with    possible pneumonia    Fever     Fever   This is a new problem. The current episode started in the past 7 days. The problem occurs constantly. The maximum temperature noted was 101 to 101.9 F. Associated symptoms include coughing and a sore throat. Associated symptoms comments: Sob, . He has tried acetaminophen for the symptoms. The treatment provided moderate relief.    Thinks he might have pneumonia as this is how it started previously.    The following portions of the patient's history were reviewed and updated as appropriate: allergies, current medications, past social history and problem list.    Outpatient Medications Prior to Visit   Medication Sig Dispense Refill    allopurinol (ZYLOPRIM) 100 MG tablet TAKE 2 TABLETS BY MOUTH  DAILY 180 tablet 1    B Complex Vitamins (VITAMIN B COMPLEX PO) Take 1 tablet by mouth Daily.      carvedilol (COREG) 25 MG tablet TAKE 2 TABLETS BY MOUTH  TWICE DAILY WITH MEALS 360 tablet 3    cholecalciferol (VITAMIN D3) 1000 UNITS tablet Take 1 tablet by mouth Daily.      clindamycin (Clindagel) 1 % gel Apply  topically to the appropriate area as directed 2 (Two) Times a Day. 60 g 2    clopidogrel (PLAVIX) 75 MG tablet TAKE 1 TABLET BY MOUTH  DAILY 90 tablet 3    famotidine (PEPCID) 10 MG tablet Take 1 tablet by mouth Every Night.      hydrALAZINE (APRESOLINE) 50 MG tablet hydralazine 50 mg tablet      isosorbide mononitrate (IMDUR) 30 MG 24 hr tablet Take 1 tablet by mouth Every Morning. 90 tablet 1    mupirocin (BACTROBAN) 2 % ointment mupirocin 2 % topical ointment      NIFEdipine XL (PROCARDIA XL) 90 MG 24 hr tablet TAKE 2 TABLETS BY MOUTH AT  NIGHT 180 tablet 3    pravastatin (PRAVACHOL) 80 MG tablet TAKE 1 TABLET BY MOUTH AT  NIGHT 90 tablet 3    Respiratory Therapy Supplies (CareTouch 2 CPAP Hose ) misc       tamsulosin (FLOMAX) 0.4 MG capsule 24 hr capsule TAKE 1 CAPSULE BY  "MOUTH  DAILY (Patient taking differently: Take 1 capsule by mouth As Needed.) 90 capsule 3    valsartan-hydrochlorothiazide (DIOVAN-HCT) 320-25 MG per tablet TAKE 1 TABLET BY MOUTH  DAILY 90 tablet 3    Zinc 50 MG tablet Take 1 tablet by mouth Every Night.      tacrolimus (PROTOPIC) 0.1 % ointment APPLY THIN LAYER TO THE AFFECTED AREA TWICE DAILY FOR 2 WEEKS. STOP FOR 2 WEEKS. MAY REPEAT CYCLE AS NEEDED FOR FLARES. FOR SCALP. (Patient not taking: Reported on 8/10/2023)      tretinoin (RETIN-A) 0.025 % gel APPLY THIN LAYER TO AFFECTED AREA 2 TIMES WEEKLY AT BEDTIME FOR 2 WEEKS, THEN INCREASE TO 3 TIMES WEEKLY AT BEDTIME FOR 2 WEEKS, THEN INCREASE TO NIGHTLY AS TOLERATED (Patient not taking: Reported on 8/10/2023)       No facility-administered medications prior to visit.       Review of Systems   Constitutional:  Positive for fever.   HENT:  Positive for sore throat.    Respiratory:  Positive for cough.    I have reviewed 12 systems with patient. Findings were negative except what is noted below and/or in history of present illness.     Objective   Visit Vitals  /60   Pulse 79   Temp (!) 101.8 øF (38.8 øC) (Tympanic)   Ht 177.8 cm (70\")   Wt 112 kg (246 lb 14.4 oz)   SpO2 92%   BMI 35.43 kg/mý     Physical Exam  Vitals and nursing note reviewed.   Constitutional:       General: He is not in acute distress.     Appearance: He is well-developed.      Comments: Chills present   HENT:      Head: Normocephalic.      Nose: Nose normal.   Eyes:      General:         Right eye: No discharge.         Left eye: No discharge.      Conjunctiva/sclera: Conjunctivae normal.      Pupils: Pupils are equal, round, and reactive to light.   Neck:      Thyroid: No thyromegaly.   Cardiovascular:      Rate and Rhythm: Normal rate and regular rhythm.      Heart sounds: Normal heart sounds. No murmur heard.  Pulmonary:      Effort: Pulmonary effort is normal.      Breath sounds: Normal breath sounds.   Lymphadenopathy:      Cervical: " No cervical adenopathy.   Skin:     General: Skin is warm and dry.   Neurological:      Mental Status: He is alert and oriented to person, place, and time.       Notes brought forward are reviewed and updated if indicated.     Assessment & Plan   Problems Addressed this Visit    None  Visit Diagnoses       Fever, unspecified fever cause    -  Primary    Relevant Medications    cefuroxime (CEFTIN) 500 MG tablet    Other Relevant Orders    XR Chest 2 View (Completed)    CBC & Differential (Completed)    Comprehensive Metabolic Panel (Completed)    Urinalysis With Culture If Indicated - Urine, Clean Catch (Completed)    POCT SARS-CoV-2 Antigen CONSTANCE + Flu (Completed)          Diagnoses         Codes Comments    Fever, unspecified fever cause    -  Primary ICD-10-CM: R50.9  ICD-9-CM: 780.60            Will notify regarding results.  Start Ceftin.  Symptomatic treatment. Fever control, fluids, rest.  Recheck if not improving    New Medications Ordered This Visit   Medications    cefuroxime (CEFTIN) 500 MG tablet     Sig: Take 1 tablet by mouth 2 (Two) Times a Day.     Dispense:  20 tablet     Refill:  0     No follow-ups on file.        This document has been electronically signed by Annie Penny MD on August 10, 2023 17:26 CDT

## 2023-08-11 ENCOUNTER — TELEPHONE (OUTPATIENT)
Dept: FAMILY MEDICINE CLINIC | Facility: CLINIC | Age: 78
End: 2023-08-11
Payer: MEDICARE

## 2023-08-11 LAB
CHOLEST SERPL-MCNC: 149 MG/DL (ref 0–200)
HDLC SERPL-MCNC: 29 MG/DL (ref 40–60)
LDLC SERPL CALC-MCNC: 82 MG/DL (ref 0–100)
LDLC/HDLC SERPL: 2.57 {RATIO}
PSA SERPL-MCNC: 3.22 NG/ML (ref 0–4)
TRIGL SERPL-MCNC: 228 MG/DL (ref 0–150)
VLDLC SERPL-MCNC: 38 MG/DL (ref 5–40)

## 2023-08-18 DIAGNOSIS — I71.40 ABDOMINAL AORTIC ANEURYSM (AAA) WITHOUT RUPTURE, UNSPECIFIED PART: Primary | ICD-10-CM

## 2023-08-28 ENCOUNTER — TRANSCRIBE ORDERS (OUTPATIENT)
Dept: LAB | Facility: HOSPITAL | Age: 78
End: 2023-08-28
Payer: MEDICARE

## 2023-08-28 ENCOUNTER — LAB (OUTPATIENT)
Dept: LAB | Facility: HOSPITAL | Age: 78
End: 2023-08-28
Payer: MEDICARE

## 2023-08-28 DIAGNOSIS — K86.2 CYST OF PANCREAS: Primary | ICD-10-CM

## 2023-08-28 DIAGNOSIS — K86.2 CYST OF PANCREAS: ICD-10-CM

## 2023-08-28 LAB
ALBUMIN SERPL-MCNC: 3.9 G/DL (ref 3.5–5.2)
ALBUMIN/GLOB SERPL: 1.3 G/DL
ALP SERPL-CCNC: 46 U/L (ref 39–117)
ALT SERPL W P-5'-P-CCNC: 23 U/L (ref 1–41)
AMYLASE SERPL-CCNC: 100 U/L (ref 28–100)
ANION GAP SERPL CALCULATED.3IONS-SCNC: 11 MMOL/L (ref 5–15)
AST SERPL-CCNC: 15 U/L (ref 1–40)
BASOPHILS # BLD AUTO: 0.03 10*3/MM3 (ref 0–0.2)
BASOPHILS NFR BLD AUTO: 0.7 % (ref 0–1.5)
BILIRUB SERPL-MCNC: 0.4 MG/DL (ref 0–1.2)
BUN SERPL-MCNC: 24 MG/DL (ref 8–23)
BUN/CREAT SERPL: 16.7 (ref 7–25)
CALCIUM SPEC-SCNC: 9.5 MG/DL (ref 8.6–10.5)
CANCER AG19-9 SERPL-ACNC: 25.4 U/ML
CHLORIDE SERPL-SCNC: 109 MMOL/L (ref 98–107)
CO2 SERPL-SCNC: 22 MMOL/L (ref 22–29)
CREAT SERPL-MCNC: 1.44 MG/DL (ref 0.76–1.27)
DEPRECATED RDW RBC AUTO: 43.2 FL (ref 37–54)
EGFRCR SERPLBLD CKD-EPI 2021: 50.7 ML/MIN/1.73
EOSINOPHIL # BLD AUTO: 0.15 10*3/MM3 (ref 0–0.4)
EOSINOPHIL NFR BLD AUTO: 3.7 % (ref 0.3–6.2)
ERYTHROCYTE [DISTWIDTH] IN BLOOD BY AUTOMATED COUNT: 14.1 % (ref 12.3–15.4)
GLOBULIN UR ELPH-MCNC: 3.1 GM/DL
GLUCOSE SERPL-MCNC: 104 MG/DL (ref 65–99)
HCT VFR BLD AUTO: 42.6 % (ref 37.5–51)
HGB BLD-MCNC: 14.6 G/DL (ref 13–17.7)
IMM GRANULOCYTES # BLD AUTO: 0.02 10*3/MM3 (ref 0–0.05)
IMM GRANULOCYTES NFR BLD AUTO: 0.5 % (ref 0–0.5)
LIPASE SERPL-CCNC: 37 U/L (ref 13–60)
LYMPHOCYTES # BLD AUTO: 1.37 10*3/MM3 (ref 0.7–3.1)
LYMPHOCYTES NFR BLD AUTO: 33.6 % (ref 19.6–45.3)
MCH RBC QN AUTO: 29.4 PG (ref 26.6–33)
MCHC RBC AUTO-ENTMCNC: 34.3 G/DL (ref 31.5–35.7)
MCV RBC AUTO: 85.7 FL (ref 79–97)
MONOCYTES # BLD AUTO: 0.53 10*3/MM3 (ref 0.1–0.9)
MONOCYTES NFR BLD AUTO: 13 % (ref 5–12)
NEUTROPHILS NFR BLD AUTO: 1.98 10*3/MM3 (ref 1.7–7)
NEUTROPHILS NFR BLD AUTO: 48.5 % (ref 42.7–76)
NRBC BLD AUTO-RTO: 0 /100 WBC (ref 0–0.2)
PLATELET # BLD AUTO: 198 10*3/MM3 (ref 140–450)
PMV BLD AUTO: 9.7 FL (ref 6–12)
POTASSIUM SERPL-SCNC: 4.1 MMOL/L (ref 3.5–5.2)
PROT SERPL-MCNC: 7 G/DL (ref 6–8.5)
RBC # BLD AUTO: 4.97 10*6/MM3 (ref 4.14–5.8)
SODIUM SERPL-SCNC: 142 MMOL/L (ref 136–145)
WBC NRBC COR # BLD: 4.08 10*3/MM3 (ref 3.4–10.8)

## 2023-08-28 PROCEDURE — 82150 ASSAY OF AMYLASE: CPT

## 2023-08-28 PROCEDURE — 85025 COMPLETE CBC W/AUTO DIFF WBC: CPT

## 2023-08-28 PROCEDURE — 36415 COLL VENOUS BLD VENIPUNCTURE: CPT

## 2023-08-28 PROCEDURE — 80053 COMPREHEN METABOLIC PANEL: CPT

## 2023-08-28 PROCEDURE — 86301 IMMUNOASSAY TUMOR CA 19-9: CPT

## 2023-08-28 PROCEDURE — 83690 ASSAY OF LIPASE: CPT

## 2023-09-08 ENCOUNTER — OFFICE VISIT (OUTPATIENT)
Dept: FAMILY MEDICINE CLINIC | Facility: CLINIC | Age: 78
End: 2023-09-08
Payer: MEDICARE

## 2023-09-08 VITALS
HEART RATE: 50 BPM | DIASTOLIC BLOOD PRESSURE: 60 MMHG | WEIGHT: 247.9 LBS | HEIGHT: 70 IN | OXYGEN SATURATION: 96 % | SYSTOLIC BLOOD PRESSURE: 110 MMHG | BODY MASS INDEX: 35.49 KG/M2

## 2023-09-08 DIAGNOSIS — J18.9 PNEUMONIA DUE TO INFECTIOUS ORGANISM, UNSPECIFIED LATERALITY, UNSPECIFIED PART OF LUNG: ICD-10-CM

## 2023-09-08 DIAGNOSIS — Z00.00 MEDICARE ANNUAL WELLNESS VISIT, SUBSEQUENT: Primary | ICD-10-CM

## 2023-09-08 PROCEDURE — 1160F RVW MEDS BY RX/DR IN RCRD: CPT | Performed by: GENERAL PRACTICE

## 2023-09-08 PROCEDURE — 3074F SYST BP LT 130 MM HG: CPT | Performed by: GENERAL PRACTICE

## 2023-09-08 PROCEDURE — 3078F DIAST BP <80 MM HG: CPT | Performed by: GENERAL PRACTICE

## 2023-09-08 PROCEDURE — G0439 PPPS, SUBSEQ VISIT: HCPCS | Performed by: GENERAL PRACTICE

## 2023-09-08 PROCEDURE — 1159F MED LIST DOCD IN RCRD: CPT | Performed by: GENERAL PRACTICE

## 2023-09-08 NOTE — PROGRESS NOTES
The ABCs of the Annual Wellness Visit  Subsequent Medicare Wellness Visit    Chief Complaint   Patient presents with    Medicare Wellness-subsequent      Subjective    History of Present Illness:  Jerald Bonilla is a 77 y.o. male who presents for a Subsequent Medicare Wellness Visit. Recent pneumonia.    The following portions of the patient's history were reviewed and   updated as appropriate: allergies, current medications, past family history, past medical history, past social history, past surgical history, and problem list.    Compared to one year ago, the patient feels his physical   health is worse.    Compared to one year ago, the patient feels his mental   health is the same.    Recent Hospitalizations:  He was not admitted to the hospital during the last year.       Current Medical Providers:  Patient Care Team:  Annie Penny MD as PCP - General (Family Medicine)  Wali Servin MD as Consulting Physician (Nephrology)  Rebecca Maria APRN as Nurse Practitioner (Nurse Practitioner)    Outpatient Medications Prior to Visit   Medication Sig Dispense Refill    allopurinol (ZYLOPRIM) 100 MG tablet TAKE 2 TABLETS BY MOUTH  DAILY 180 tablet 1    B Complex Vitamins (VITAMIN B COMPLEX PO) Take 1 tablet by mouth Daily.      carvedilol (COREG) 25 MG tablet TAKE 2 TABLETS BY MOUTH  TWICE DAILY WITH MEALS 360 tablet 3    cholecalciferol (VITAMIN D3) 1000 UNITS tablet Take 1 tablet by mouth Daily.      clindamycin (Clindagel) 1 % gel Apply  topically to the appropriate area as directed 2 (Two) Times a Day. 60 g 2    clopidogrel (PLAVIX) 75 MG tablet TAKE 1 TABLET BY MOUTH  DAILY 90 tablet 3    famotidine (PEPCID) 10 MG tablet Take 1 tablet by mouth Every Night.      hydrALAZINE (APRESOLINE) 50 MG tablet hydralazine 50 mg tablet      isosorbide mononitrate (IMDUR) 30 MG 24 hr tablet Take 1 tablet by mouth Every Morning. 90 tablet 1    mupirocin (BACTROBAN) 2 % ointment mupirocin 2 % topical  ointment      NIFEdipine XL (PROCARDIA XL) 90 MG 24 hr tablet TAKE 2 TABLETS BY MOUTH AT  NIGHT 180 tablet 3    pravastatin (PRAVACHOL) 80 MG tablet TAKE 1 TABLET BY MOUTH AT  NIGHT 90 tablet 3    Respiratory Therapy Supplies (CareTouch 2 CPAP Hose ) misc       tamsulosin (FLOMAX) 0.4 MG capsule 24 hr capsule TAKE 1 CAPSULE BY MOUTH  DAILY (Patient taking differently: Take 1 capsule by mouth As Needed.) 90 capsule 3    valsartan-hydrochlorothiazide (DIOVAN-HCT) 320-25 MG per tablet TAKE 1 TABLET BY MOUTH  DAILY 90 tablet 3    Zinc 50 MG tablet Take 1 tablet by mouth Every Night.      cefuroxime (CEFTIN) 500 MG tablet Take 1 tablet by mouth 2 (Two) Times a Day. (Patient not taking: Reported on 9/8/2023) 20 tablet 0     No facility-administered medications prior to visit.       No opioid medication identified on active medication list. I have reviewed chart for other potential  high risk medication/s and harmful drug interactions in the elderly.        Aspirin is not on active medication list.  Aspirin use is not indicated based on review of current medical condition/s. Risk of harm outweighs potential benefits.  .    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  ACP discussion was held with the patient during this visit. Patient does not have an advance directive, information provided.          Objective    Vitals:    23 1042   BP: 110/60   Pulse: 50   SpO2: 96%   Weight: 112 kg (247 lb 14.4 oz)   Height: 177.8 cm (70\")   PainSc: 0-No pain     Estimated body mass index is 35.57 kg/m² as calculated from the following:    Height as of this encounter: 177.8 cm (70\").    Weight as of this encounter: 112 kg (247 lb 14.4 oz).           Does the patient have evidence of cognitive impairment? No    Physical Exam  Lab Results   Component Value Date    TRIG 228 (H) 08/10/2023    HDL 29 (L) 08/10/2023    LDL 82 08/10/2023    VLDL 38 08/10/2023            HEALTH RISK ASSESSMENT    Smoking Status:  Social History     Tobacco Use   Smoking Status Former    Types: Cigarettes    Quit date:     Years since quittin.7   Smokeless Tobacco Never     Alcohol Consumption:  Social History     Substance and Sexual Activity   Alcohol Use Yes    Comment: socially     Fall Risk Screen:    STEADI Fall Risk Assessment was completed, and patient is at MODERATE risk for falls. Assessment completed on:2023    Depression Screenin/8/2023    10:00 AM   PHQ-2/PHQ-9 Depression Screening   Little Interest or Pleasure in Doing Things 0-->not at all   Feeling Down, Depressed or Hopeless 1-->several days   PHQ-9: Brief Depression Severity Measure Score 1       Health Habits and Functional and Cognitive Screenin/8/2023    10:00 AM   Functional & Cognitive Status   Do you have difficulty preparing food and eating? No   Do you have difficulty bathing yourself, getting dressed or grooming yourself? No   Do you have difficulty using the toilet? No   Do you have difficulty moving around from place to place? No   Do you have trouble with steps or getting out of a bed or a chair? No   Current Diet Well Balanced Diet   Dental " Exam Up to date   Eye Exam Not up to date   Exercise (times per week) 0 times per week   Current Exercises Include No Regular Exercise   Do you need help using the phone?  No   Are you deaf or do you have serious difficulty hearing?  Yes   Do you need help to go to places out of walking distance? Yes   Do you need help shopping? No   Do you need help preparing meals?  No   Do you need help with housework?  No   Do you need help with laundry? No   Do you need help taking your medications? No   Do you need help managing money? No   Do you ever drive or ride in a car without wearing a seat belt? No   Have you felt unusual stress, anger or loneliness in the last month? Yes   Who do you live with? Spouse   If you need help, do you have trouble finding someone available to you? No   Have you been bothered in the last four weeks by sexual problems? No   Do you have difficulty concentrating, remembering or making decisions? Yes       Age-appropriate Screening Schedule:  Refer to the list below for future screening recommendations based on patient's age, sex and/or medical conditions. Orders for these recommended tests are listed in the plan section. The patient has been provided with a written plan.    Health Maintenance   Topic Date Due    COVID-19 Vaccine (6 - Pfizer series) 04/04/2023    ANNUAL WELLNESS VISIT  08/26/2023    INFLUENZA VACCINE  10/01/2023    COLORECTAL CANCER SCREENING  12/08/2023    LIPID PANEL  08/10/2024    BMI FOLLOWUP  09/08/2024    TDAP/TD VACCINES (3 - Td or Tdap) 05/16/2027    HEPATITIS C SCREENING  Completed    Orthopox/Monkeypox  Completed    Pneumococcal Vaccine 65+  Completed    AAA SCREEN (ONE-TIME)  Completed    ZOSTER VACCINE  Completed              Assessment & Plan   CMS Preventative Services Quick Reference  Risk Factors Identified During Encounter  Fall Risk-High or Moderate: Discussed Fall Prevention in the home  Immunizations Discussed/Encouraged: COVID19  The above risks/problems have  been discussed with the patient.  Follow up actions/plans if indicated are seen below in the Assessment/Plan Section.  Pertinent information has been shared with the patient in the After Visit Summary.    Diagnoses and all orders for this visit:    1. Medicare annual wellness visit, subsequent (Primary)    2. Pneumonia due to infectious organism, unspecified laterality, unspecified part of lung  -     XR Chest 2 View; Future        Follow Up:   Return in about 6 months (around 3/8/2024) for Recheck. Will notify regarding results.     An After Visit Summary and PPPS were made available to the patient.  Information has been scanned into chart. Discussed importance of taking medications as prescribed. Encouraged healthy eating habits with low fat, low salt choices and working towards maintaining a healthy weight. Recommended regular exercise if able as well as care to prevent falls including avoiding anything on the floor that they could slip or trip on such as throw rugs, making sure they have a bathmat to step onto when their feet are wet and having grab bars and railings where needed.

## 2023-09-08 NOTE — PATIENT INSTRUCTIONS
Medicare Wellness  Personal Prevention Plan of Service     Date of Office Visit:    Encounter Provider:  Annie Penny MD  Place of Service:  [facility] - [place]  Patient Name: Jerald Bonilla  :  1945    As part of the Medicare Wellness portion of your visit today, we are providing you with this personalized preventive plan of services (PPPS). This plan is based upon recommendations of the [place] Preventive Services Task Force (USPSTF) and the Advisory Committee on Immunization Practices (ACIP).    This lists the preventive care services that should be considered, and provides dates of when you are due. Items listed as completed are up-to-date and do not require any further intervention.    Health Maintenance   Topic Date Due    COVID-19 Vaccine (6 - Pfizer series) 2023    ANNUAL WELLNESS VISIT  2023    INFLUENZA VACCINE  10/01/2023    COLORECTAL CANCER SCREENING  2023    BMI FOLLOWUP  2024    LIPID PANEL  08/10/2024    TDAP/TD VACCINES (3 - Td or Tdap) 2027    HEPATITIS C SCREENING  Completed    Orthopox/Monkeypox  Completed    Pneumococcal Vaccine 65+  Completed    AAA SCREEN (ONE-TIME)  Completed    ZOSTER VACCINE  Completed       No orders of the defined types were placed in this encounter.      No follow-ups on file.        Calorie Counting for Weight Loss  Calories are units of energy. Your body needs a certain number of calories from food to keep going throughout the day. When you eat or drink more calories than your body needs, your body stores the extra calories mostly as fat. When you eat or drink fewer calories than your body needs, your body burns fat to get the energy it needs.  Calorie counting means keeping track of how many calories you eat and drink each day. Calorie counting can be helpful if you need to lose weight. If you eat fewer calories than your body needs, you should lose weight. Ask  your health care provider what a healthy weight is for you.  For calorie counting to work, you will need to eat the right number of calories each day to lose a healthy amount of weight per week. A dietitian can help you figure out how many calories you need in a day and will suggest ways to reach your calorie goal.  A healthy amount of weight to lose each week is usually 1-2 lb (0.5-0.9 kg). This usually means that your daily calorie intake should be reduced by 500-750 calories.  Eating 1,200-1,500 calories a day can help most women lose weight.  Eating 1,500-1,800 calories a day can help most men lose weight.  What do I need to know about calorie counting?  Work with your health care provider or dietitian to determine how many calories you should get each day. To meet your daily calorie goal, you will need to:  Find out how many calories are in each food that you would like to eat. Try to do this before you eat.  Decide how much of the food you plan to eat.  Keep a food log. Do this by writing down what you ate and how many calories it had.  To successfully lose weight, it is important to balance calorie counting with a healthy lifestyle that includes regular activity.  Where do I find calorie information?    The number of calories in a food can be found on a Nutrition Facts label. If a food does not have a Nutrition Facts label, try to look up the calories online or ask your dietitian for help.  Remember that calories are listed per serving. If you choose to have more than one serving of a food, you will have to multiply the calories per serving by the number of servings you plan to eat. For example, the label on a package of bread might say that a serving size is 1 slice and that there are 90 calories in a serving. If you eat 1 slice, you will have eaten 90 calories. If you eat 2 slices, you will have eaten 180 calories.  How do I keep a food log?  After each time that you eat, record the following in your food  log as soon as possible:  What you ate. Be sure to include toppings, sauces, and other extras on the food.  How much you ate. This can be measured in cups, ounces, or number of items.  How many calories were in each food and drink.  The total number of calories in the food you ate.  Keep your food log near you, such as in a pocket-sized notebook or on an lidia or website on your mobile phone. Some programs will calculate calories for you and show you how many calories you have left to meet your daily goal.  What are some portion-control tips?  Know how many calories are in a serving. This will help you know how many servings you can have of a certain food.  Use a measuring cup to measure serving sizes. You could also try weighing out portions on a kitchen scale. With time, you will be able to estimate serving sizes for some foods.  Take time to put servings of different foods on your favorite plates or in your favorite bowls and cups so you know what a serving looks like.  Try not to eat straight from a food's packaging, such as from a bag or box. Eating straight from the package makes it hard to see how much you are eating and can lead to overeating. Put the amount you would like to eat in a cup or on a plate to make sure you are eating the right portion.  Use smaller plates, glasses, and bowls for smaller portions and to prevent overeating.  Try not to multitask. For example, avoid watching TV or using your computer while eating. If it is time to eat, sit down at a table and enjoy your food. This will help you recognize when you are full. It will also help you be more mindful of what and how much you are eating.  What are tips for following this plan?  Reading food labels  Check the calorie count compared with the serving size. The serving size may be smaller than what you are used to eating.  Check the source of the calories. Try to choose foods that are high in protein, fiber, and vitamins, and low in saturated  fat, trans fat, and sodium.  Shopping  Read nutrition labels while you shop. This will help you make healthy decisions about which foods to buy.  Pay attention to nutrition labels for low-fat or fat-free foods. These foods sometimes have the same number of calories or more calories than the full-fat versions. They also often have added sugar, starch, or salt to make up for flavor that was removed with the fat.  Make a grocery list of lower-calorie foods and stick to it.  Cooking  Try to cook your favorite foods in a healthier way. For example, try baking instead of frying.  Use low-fat dairy products.  Meal planning  Use more fruits and vegetables. One-half of your plate should be fruits and vegetables.  Include lean proteins, such as chicken, turkey, and fish.  Lifestyle  Each week, aim to do one of the followin minutes of moderate exercise, such as walking.  75 minutes of vigorous exercise, such as running.  General information  Know how many calories are in the foods you eat most often. This will help you calculate calorie counts faster.  Find a way of tracking calories that works for you. Get creative. Try different apps or programs if writing down calories does not work for you.  What foods should I eat?    Eat nutritious foods. It is better to have a nutritious, high-calorie food, such as an avocado, than a food with few nutrients, such as a bag of potato chips.  Use your calories on foods and drinks that will fill you up and will not leave you hungry soon after eating.  Examples of foods that fill you up are nuts and nut butters, vegetables, lean proteins, and high-fiber foods such as whole grains. High-fiber foods are foods with more than 5 g of fiber per serving.  Pay attention to calories in drinks. Low-calorie drinks include water and unsweetened drinks.  The items listed above may not be a complete list of foods and beverages you can eat. Contact a dietitian for more information.  What foods  should I limit?  Limit foods or drinks that are not good sources of vitamins, minerals, or protein or that are high in unhealthy fats. These include:  Candy.  Other sweets.  Sodas, specialty coffee drinks, alcohol, and juice.  The items listed above may not be a complete list of foods and beverages you should avoid. Contact a dietitian for more information.  How do I count calories when eating out?  Pay attention to portions. Often, portions are much larger when eating out. Try these tips to keep portions smaller:  Consider sharing a meal instead of getting your own.  If you get your own meal, eat only half of it. Before you start eating, ask for a container and put half of your meal into it.  When available, consider ordering smaller portions from the menu instead of full portions.  Pay attention to your food and drink choices. Knowing the way food is cooked and what is included with the meal can help you eat fewer calories.  If calories are listed on the menu, choose the lower-calorie options.  Choose dishes that include vegetables, fruits, whole grains, low-fat dairy products, and lean proteins.  Choose items that are boiled, broiled, grilled, or steamed. Avoid items that are buttered, battered, fried, or served with cream sauce. Items labeled as crispy are usually fried, unless stated otherwise.  Choose water, low-fat milk, unsweetened iced tea, or other drinks without added sugar. If you want an alcoholic beverage, choose a lower-calorie option, such as a glass of wine or light beer.  Ask for dressings, sauces, and syrups on the side. These are usually high in calories, so you should limit the amount you eat.  If you want a salad, choose a garden salad and ask for grilled meats. Avoid extra toppings such as pickett, cheese, or fried items. Ask for the dressing on the side, or ask for olive oil and vinegar or lemon to use as dressing.  Estimate how many servings of a food you are given. Knowing serving sizes will  help you be aware of how much food you are eating at restaurants.  Where to find more information  Centers for Disease Control and Prevention: www.cdc.gov  [place] Department of Agriculture: myplate.gov  Summary  Calorie counting means keeping track of how many calories you eat and drink each day. If you eat fewer calories than your body needs, you should lose weight.  A healthy amount of weight to lose per week is usually 1-2 lb (0.5-0.9 kg). This usually means reducing your daily calorie intake by 500-750 calories.  The number of calories in a food can be found on a Nutrition Facts label. If a food does not have a Nutrition Facts label, try to look up the calories online or ask your dietitian for help.  Use smaller plates, glasses, and bowls for smaller portions and to prevent overeating.  Use your calories on foods and drinks that will fill you up and not leave you hungry shortly after a meal.  This information is not intended to replace advice given to you by your health care provider. Make sure you discuss any questions you have with your health care provider.  Document Revised: 01/28/2021 Document Reviewed: 01/28/2021  Elsevier Patient Education © 2023 [employer]

## 2023-09-11 ENCOUNTER — TELEPHONE (OUTPATIENT)
Dept: FAMILY MEDICINE CLINIC | Facility: CLINIC | Age: 78
End: 2023-09-11
Payer: MEDICARE

## 2023-09-11 NOTE — TELEPHONE ENCOUNTER
Patient called stating he missed a call Friday and was calling back for his xray results. Please call [phone]

## 2023-09-14 DIAGNOSIS — I10 ESSENTIAL HYPERTENSION: Chronic | ICD-10-CM

## 2023-09-14 RX ORDER — ISOSORBIDE MONONITRATE 30 MG/1
30 TABLET, EXTENDED RELEASE ORAL EVERY MORNING
Qty: 90 TABLET | Refills: 1 | Status: SHIPPED | OUTPATIENT
Start: 2023-09-14

## 2023-09-25 ENCOUNTER — OFFICE VISIT (OUTPATIENT)
Dept: CARDIOLOGY | Facility: CLINIC | Age: 78
End: 2023-09-25
Payer: MEDICARE

## 2023-09-25 VITALS
HEIGHT: 70 IN | HEART RATE: 54 BPM | DIASTOLIC BLOOD PRESSURE: 70 MMHG | WEIGHT: 247.9 LBS | SYSTOLIC BLOOD PRESSURE: 126 MMHG | OXYGEN SATURATION: 97 % | BODY MASS INDEX: 35.49 KG/M2

## 2023-09-25 DIAGNOSIS — R14.2 BELCHING: ICD-10-CM

## 2023-09-25 DIAGNOSIS — K21.9 GASTROESOPHAGEAL REFLUX DISEASE, UNSPECIFIED WHETHER ESOPHAGITIS PRESENT: ICD-10-CM

## 2023-09-25 DIAGNOSIS — I10 ESSENTIAL HYPERTENSION: Chronic | ICD-10-CM

## 2023-09-25 DIAGNOSIS — R07.89 CHEST PAIN, ATYPICAL: Primary | ICD-10-CM

## 2023-09-25 PROCEDURE — 93000 ELECTROCARDIOGRAM COMPLETE: CPT | Performed by: INTERNAL MEDICINE

## 2023-09-25 PROCEDURE — 3078F DIAST BP <80 MM HG: CPT | Performed by: NURSE PRACTITIONER

## 2023-09-25 PROCEDURE — 99214 OFFICE O/P EST MOD 30 MIN: CPT | Performed by: NURSE PRACTITIONER

## 2023-09-25 PROCEDURE — 3074F SYST BP LT 130 MM HG: CPT | Performed by: NURSE PRACTITIONER

## 2023-09-25 PROCEDURE — 1159F MED LIST DOCD IN RCRD: CPT | Performed by: NURSE PRACTITIONER

## 2023-09-25 PROCEDURE — 1160F RVW MEDS BY RX/DR IN RCRD: CPT | Performed by: NURSE PRACTITIONER

## 2023-09-25 RX ORDER — ISOSORBIDE MONONITRATE 30 MG/1
15 TABLET, EXTENDED RELEASE ORAL EVERY MORNING
Qty: 90 TABLET | Refills: 1 | Status: SHIPPED | OUTPATIENT
Start: 2023-09-25

## 2023-09-25 NOTE — PROGRESS NOTES
Chest Pain    Current providers  PCP: Dr. Penny  Nephrologist: Dr. Keyes  Surgeon: Dr. Hernandez   CT vascular: Dr. Yi    Chest Pain   Associated symptoms include abdominal pain (Same). Pertinent negatives include no cough, diaphoresis, dizziness, fever, malaise/fatigue, nausea, orthopnea, PND, shortness of breath or vomiting.   Pertinent negatives for past medical history include no muscle weakness.   Mr. Jerald Bonilla is a 76-year-old male with medical history of essential hypertension, mixed hyperlipidemia, GERD, CHRIS on CPAP, abdominal aortic aneurysm repair, gout, rheumatoid arthritis, hypertensive heart disease, obesity, and stage III CKD.     He has been referred to cardiology for chest pain.  He reports that chest pain started 3 months ago.  Denies associated symptoms shortness of breath, diaphoresis, nausea, or weakness.  He has history of abdominal aortic aneurysm that has been repaired.  He does cardiac risk factors of hypertension, hypercholesterol, obesity, being male, and sedentary.  He reports that he has never had this pain up until 3 months ago.  He reports past cardiac work-up years ago with negative nuclear stress testing.      Chest Pain   Pain location: lateral region  Pain quality: dull ache, varying intensity   Pain radiates to: Left shoulder and down arm.  Sometimes goes through to the back.    Onset quality: moderate  Duration: varies inlength of time. Longest episode about 15 min  Timing: sometimes daily and sometimes weekly  Progression: About the same  Chronicity: 3 months  Relieved by: Nothing, goes away on its own  Worsened by: Nothing  Associated symptoms: none    Upon looking at his medication list he is on good heart medications with carvedilol, nifedipine, aspirin, and pravastatin.  He reports a bad experience with a nuclear stress test and does not want this is an option as a cardiac work-up.  CTA was discussed as well however his creatinine is 1.49 and EGFR 50 he is getting  "contrast tomorrow for CT to evaluate pancrease.  He does not want to make a decision before speaking to Dr. Keyes. A CTA of coronaries can use 70 to 120 mL of contrast dye.  I agree with getting Dr. Keyes's thoughts on additional dye with a CTA.  He really does not want a nuclear myocardial stress test.    10/27/22 patient presents for follow-up regarding chest pain.  Myocardial nuclear stress test showed poor quality, unable to do CT attenuated images due to patient's claustrophobia.  There is a small size inferior defect without significant reversibility.  But could not rule out small sized inferior infarct.  No ischemia noted.  He tells he has only had 1 significant episode of chest pain that radiated to his left arm.  Chest pain is typically generalized to left chest and does not radiate.  Has not worsened and is denying shortness of air, diaphoresis, or nausea with it.  Does have fatigue daily.    2/14/23, he presents for follow-up regarding chest pain.  He is having more right side abdominal pain going across to left side.  Pain occurs after eating, belching improves pain.  He has not had,  what he calls typical chest pain, that is left-sided that goes down left arm in a while.  He is on maximum tolerated medications.  We discussed adding Ranexa today just for 2 weeks to see if this would help.  He wants me to discuss with Dr. Keyes.    3/14/23 he presents for follow-up for chest pain.  He believes Ranexa is helping.  He still has right upper abdominal pain that is relieved with belching.  Reports that he has not had \" typical\" chest pain in a while.  He does a lot for his wife, family members, and community.  He is trying to step back on some of these responsibilities.    6/2023, he presents for follow-up regarding chest pain.  He has not had chest pain.  Describes upper left-sided back pain that comes and goes at rest.  Not present with activity.  Also stating that Tums relieves that pain.  His symptoms " appear to be more GI related.  He has ongoing belching, abdominal pain- discomfort after eating.  Denies chest pain, diaphoresis or SOA on exertion.    He is wanting another medication besides Ranexa due to cost.  Ranexa has helped symptoms of chest pain.  We discussed isosorbide.    Today, he presents for follow-up.  He tells me that he has had pneumonia since his last visit and has completed antibiotic treatment.  Has occasional left-sided chest pain that is relieved by belching and movements.  Has seen Dr. Gavin who planned upper endoscopy.  However Mr. Bonilla's wife has been sick over the last 5 weeks and that has been postponed until she is better.    The 10-year ASCVD risk score (Samia ALMANZA, et al., 2019) is: 23.1%    Values used to calculate the score:      Age: 77 years      Sex: Male      Is Non- : Yes      Diabetic: No      Tobacco smoker: No      Systolic Blood Pressure: 126 mmHg      Is BP treated: Yes      HDL Cholesterol: 29 mg/dL      Total Cholesterol: 149 mg/dL    Cardiac Risk Factors:  Sedentary life style, Obesity and Male, family history, age    Past Medical History:   Diagnosis Date    Acne conglobata     Acute maxillary sinusitis     Allergies     Benign prostate hyperplasia     Blurring of visual image     Carbuncle     of infected sebaceous cyst on back    Cyst of pancreas     Dizziness and giddiness     Erectile dysfunction     Essential hypertension     seeing nephro    Gastroesophageal reflux disease     Gout     Hammer toe     Hearing loss     Hip pain     Hyperlipidemia     Low back pain     Malaise and fatigue     Need for prophylactic vaccination against Streptococcus pneumoniae (pneumococcus)     Nuclear senile cataract     Obesity     Osteoarthritis     Otalgia     Otitis externa     Pain in lower limb     Rheumatoid arthritis     Skin lesion     right calf    Sleep apnea     sleeps with c-pap    Stage 3 chronic kidney disease     Swelling of knee joint      Tinnitus     Upper respiratory infection      Past Surgical History:   Procedure Laterality Date    ABDOMINAL AORTIC ANEURYSM REPAIR WITH ENDOGRAFT N/A 2022    Procedure: ABDOMINAL AORTIC ANEURYSM REPAIR WITH ENDOGRAFT;  Surgeon: Unruly Yi MD;  Location: [facility] OR;  Service: Cardiothoracic;  Laterality: N/A;    COLONOSCOPY  2009    Colonoscopy, diagnostic (screening) 12298 (1)    diverticuli, roids     COLONOSCOPY N/A 2020    Procedure: COLONOSCOPY;  Surgeon: Dagoberto Gavin DO;  Location: [facility] ENDOSCOPY;  Service: Gastroenterology;  Laterality: N/A;  tattoo @ right colon     COLONOSCOPY N/A 2021    Procedure: COLONOSCOPY;  Surgeon: Dagoberto Gavin DO;  Location: [facility] ENDOSCOPY;  Service: Gastroenterology;  Laterality: N/A;    COLONOSCOPY N/A 2022    Procedure: COLONOSCOPY 10:00;  Surgeon: Dagoberto Gavin DO;  Location: [facility] ENDOSCOPY;  Service: Gastroenterology;  Laterality: N/A;    COLONOSCOPY N/A 2023    Procedure: COLONOSCOPY 9:30;  Surgeon: Dagoberto Gavin DO;  Location: [facility] ENDOSCOPY;  Service: Gastroenterology;  Laterality: N/A;    CYST REMOVAL Bilateral     cheeks    ENDOSCOPY N/A 2019    Procedure: ESOPHAGOGASTRODUODENOSCOPY possible dilation;  Surgeon: Jerald Nation MD;  Location: [facility] ENDOSCOPY;  Service: Gastroenterology    HERNIA REPAIR      umbilical    JOINT REPLACEMENT      PILONIDAL CYSTECTOMY N/A     HAYWARD    TOTAL HIP ARTHROPLASTY Left     WISDOM TOOTH EXTRACTION       Social History     Socioeconomic History    Marital status:    Tobacco Use    Smoking status: Former     Types: Cigarettes     Quit date: 2000     Years since quittin.7    Smokeless tobacco: Never   Vaping Use    Vaping Use: Never used   Substance and Sexual Activity    Alcohol use: Yes     Comment: socially    Drug use: Not Currently     Types: Marijuana     Comment: none in 1-2 years    Sexual activity: Defer     Family History   Problem  "Relation Age of Onset    Arthritis Mother     Hypertension Mother     Obesity Mother     Thyroid disease Mother     Mental illness Mother     Hypertension Father     Heart disease Father     Cancer Father     Diabetes Father     Kidney disease Father     Obesity Daughter     Obesity Daughter     No Known Problems Daughter     No Known Problems Son     Arthritis Maternal Grandmother      ALLERGIES:  Allergies   Allergen Reactions    Lisinopril Itching    Minoxidil Other (See Comments)     \"dont know\"     Review of Systems   Constitutional: Negative for diaphoresis, fever, malaise/fatigue and night sweats.   Cardiovascular:  Positive for chest pain (Relieved by belching and movement, atypical). Negative for dyspnea on exertion, leg swelling, orthopnea and paroxysmal nocturnal dyspnea.   Respiratory:  Negative for cough, shortness of breath and sleep disturbances due to breathing.    Endocrine: Negative for polydipsia and polyphagia.   Hematologic/Lymphatic: Negative for bleeding problem. Does not bruise/bleed easily.   Musculoskeletal:  Negative for falls and muscle weakness.   Gastrointestinal:  Positive for abdominal pain (Same) and heartburn (Same, unchanged, takes Tums with relief). Negative for nausea and vomiting.        Belching   Neurological:  Negative for dizziness and light-headedness.     Current Outpatient Medications   Medication Sig Dispense Refill    allopurinol (ZYLOPRIM) 100 MG tablet TAKE 2 TABLETS BY MOUTH  DAILY 180 tablet 1    B Complex Vitamins (VITAMIN B COMPLEX PO) Take 1 tablet by mouth Daily.      carvedilol (COREG) 25 MG tablet TAKE 2 TABLETS BY MOUTH  TWICE DAILY WITH MEALS 360 tablet 3    cholecalciferol (VITAMIN D3) 1000 UNITS tablet Take 1 tablet by mouth Daily.      clindamycin (Clindagel) 1 % gel Apply  topically to the appropriate area as directed 2 (Two) Times a Day. 60 g 2    clopidogrel (PLAVIX) 75 MG tablet TAKE 1 TABLET BY MOUTH  DAILY 90 tablet 3    famotidine (PEPCID) 10 MG " tablet Take 1 tablet by mouth Every Night.      isosorbide mononitrate (IMDUR) 30 MG 24 hr tablet Take 0.5 tablets by mouth Every Morning. 90 tablet 1    mupirocin (BACTROBAN) 2 % ointment mupirocin 2 % topical ointment      NIFEdipine XL (PROCARDIA XL) 90 MG 24 hr tablet TAKE 2 TABLETS BY MOUTH AT  NIGHT 180 tablet 3    pravastatin (PRAVACHOL) 80 MG tablet TAKE 1 TABLET BY MOUTH AT  NIGHT 90 tablet 3    Respiratory Therapy Supplies (CareTouch 2 CPAP Hose ) misc       tamsulosin (FLOMAX) 0.4 MG capsule 24 hr capsule TAKE 1 CAPSULE BY MOUTH  DAILY (Patient taking differently: Take 1 capsule by mouth As Needed.) 90 capsule 3    valsartan-hydrochlorothiazide (DIOVAN-HCT) 320-25 MG per tablet TAKE 1 TABLET BY MOUTH  DAILY 90 tablet 3    Zinc 50 MG tablet Take 1 tablet by mouth Every Night.       No current facility-administered medications for this visit.     OBJECTIVE:    Physical Exam:   Vitals reviewed.   Constitutional:       Appearance: Well-developed, well-groomed and not in distress. Obese. Not ill-appearing or diaphoretic.   Eyes:      General: Lids are normal. No scleral icterus.        Right eye: No discharge.         Left eye: No discharge.      Pupils: Pupils are equal, round, and reactive to light.   HENT:      Head: Normocephalic.    Mouth/Throat:      Lips: Pink.      Mouth: Mucous membranes are moist.   Neck:      Thyroid: No thyromegaly.      Vascular: No JVD. JVD normal.      Trachea: Trachea normal.   Pulmonary:      Effort: Pulmonary effort is normal.      Breath sounds: Normal breath sounds and air entry. No decreased breath sounds.   Chest:      Chest wall: Not tender to palpatation. No swelling.   Cardiovascular:      Normal rate. Regular rhythm. Normal S1 with normal intensity. Normal S2 with normal intensity.       Murmurs: There is no murmur.      No gallop.    Edema:     Peripheral edema absent.   Skin:     General: Skin is warm and dry.      Capillary Refill: Capillary refill takes less  "than 2 seconds.   Neurological:      Mental Status: Alert, oriented to person, place, and time and oriented to person, place and time.   Psychiatric:         Attention and Perception: Attention normal.         Mood and Affect: Mood normal.     Vitals:    09/25/23 0955   BP: 126/70   BP Location: Left arm   Patient Position: Sitting   Cuff Size: Adult   Pulse: 54   SpO2: 97%   Weight: 112 kg (247 lb 14.4 oz)   Height: 177.8 cm (70\")       DATA REVIEWED:   Results for orders placed in visit on 08/03/23    Adult Transthoracic Echo Complete w/ Color, Spectral and Contrast if Necessary Per Protocol    Interpretation Summary    Definity image enhancer in order to optimize the study    Left ventricular systolic function is normal. Calculated left ventricular EF = 67% Left ventricular ejection fraction appears to be 66 - 70%.    Left ventricular wall thickness is consistent with moderate to severe concentric hypertrophy.    Left ventricular diastolic function is consistent with (grade I) impaired relaxation.    The left atrial cavity is moderately dilated.    XR Chest 2 View    Result Date: 9/8/2023  Basilar infiltrate seen on the lateral view is unchanged.   CXR:     CT:     Labs: BMP, CBC, LIPID, TSH  Lab Results   Component Value Date    GLUCOSE 104 (H) 08/28/2023    CALCIUM 9.5 08/28/2023     08/28/2023    K 4.1 08/28/2023    CO2 22.0 08/28/2023     (H) 08/28/2023    BUN 24 (H) 08/28/2023    CREATININE 1.44 (H) 08/28/2023    EGFRIFAFRI 51 (L) 01/08/2022    BCR 16.7 08/28/2023    ANIONGAP 11.0 08/28/2023     Lab Results   Component Value Date    WBC 4.08 08/28/2023    HGB 14.6 08/28/2023    HCT 42.6 08/28/2023    MCV 85.7 08/28/2023     08/28/2023     Lab Results   Component Value Date    CHOL 149 08/10/2023    CHOL 148 08/22/2022    CHOL 162 07/14/2021     Lab Results   Component Value Date    TRIG 228 (H) 08/10/2023    TRIG 97 08/22/2022    TRIG 139 07/14/2021     Lab Results   Component Value Date "    HDL 29 (L) 08/10/2023    HDL 37 (L) 2022    HDL 45 2021     No components found for: LDLCALC  Lab Results   Component Value Date    LDL 82 08/10/2023    LDL 93 2022    LDL 92 2021     No results found for: HDLLDLRATIO  No components found for: CHOLHDL  Lab Results   Component Value Date    TSH 2.080 2021     No results found for: PROBNP    EK2022    EK2021    The following portions of the patient's history were reviewed and updated as appropriate: allergies, current medications, past family history, past medical history, past social history, past surgical history and problem list.  Old records reviewed and pertinent information is included in the above objective data.     ASSESSMENT/PLAN:     Diagnosis Plan   1. Chest pain, atypical  ECG 12 Lead      2. Essential hypertension  isosorbide mononitrate (IMDUR) 30 MG 24 hr tablet      3. Gastroesophageal reflux disease, unspecified whether esophagitis present        4. Belching          1.  Chest pain, atypical . Nuclear myocardial stress test that showed small size defect without significant reversibility.  But could not rule out small sized inferior infarct.      EKG today showing sinus bradycardia with first-degree AV block.  No change from last EKG    Denying typical chest pain.  Reports chest pain is left-sided and relieved by belching and by moving.  Belching and Tums resolves pain    Not able to afford Ranexa.  Doing well on Imdur 15 mg daily    Continue aspirin, carvedilol, and Plavix     No longer asa, now on clopidogrel per Dr. Yi     2.  Hypertension, essential.  Normotensive today.  Following nephrology, Dr. Keyes    Continue carvedilol, Procardia XL, valsartan-HCT, and Imdur    3/4.  GERD/belching.  Has seen Dr. Gavin.  Plans for upper endoscopy.  However this is postponed due to his wife's illness.  He plans on following up with the test.      He is still having ongoing chest discomfort that is  relieved by belching and movement.  Atypical for cardiac.      Continue recommendations made by Dr. Gavin.  He verbalized understanding.    I spent 30 minutes caring for Jerald on this date of service. This time includes time spent by me in the following activities: obtaining and/or reviewing a separately obtained history, performing a medically appropriate examination and/or evaluation, counseling and educating the patient/family/caregiver, ordering medications, tests, or procedures, and documenting information in the medical record   Return in about 6 months (around 3/25/2024) for Recheck.      This document has been electronically signed by BEATRIS Alegre on September 26, 2023 09:45 CDT   Electronically signed by BEATRIS Alegre, 09/26/23, 9:45 AM CDT.

## 2023-09-26 PROBLEM — R07.89 CHEST PAIN, ATYPICAL: Status: ACTIVE | Noted: 2023-09-26

## 2023-09-26 LAB
QT INTERVAL: 444 MS
QTC INTERVAL: 421 MS

## (undated) DEVICE — SUT MONOCRYL 4/0 PS2 27IN Y426H ETY426H

## (undated) DEVICE — DRAPE,SPLIT,BILATERAL,STERILE: Brand: MEDLINE

## (undated) DEVICE — SINGLE USE INJECTOR: Brand: SINGLE USE INJECTOR

## (undated) DEVICE — GLV SURG SENSICARE POLYISPRN W/ALOE PF LF 6.5 GRN STRL

## (undated) DEVICE — TRAP SXN POLYP QUICKCATCH LF

## (undated) DEVICE — STERILE POLYISOPRENE POWDER-FREE SURGICAL GLOVES WITH EMOLLIENT COATING: Brand: PROTEXIS

## (undated) DEVICE — CANN SMPL SOFTECH BIFLO ETCO2 A/M 7FT

## (undated) DEVICE — SUT PDS 3/0 SH 27IN DYED Z316H

## (undated) DEVICE — TOWEL,OR,DSP,ST,BLUE,DLX,8/PK,10PK/CS: Brand: MEDLINE

## (undated) DEVICE — BALN OCCL CODA2 .035 10F 46MM 140CM

## (undated) DEVICE — PROXIMATE SKIN STAPLERS (35 WIDE) CONTAINS 35 STAINLESS STEEL STAPLES (FIXED HEAD): Brand: PROXIMATE

## (undated) DEVICE — SINGLE-USE BIOPSY FORCEPS: Brand: RADIAL JAW 4

## (undated) DEVICE — RADIFOCUS GLIDECATH: Brand: GLIDECATH

## (undated) DEVICE — PERCLOSE PROGLIDE™ SUTURE-MEDIATED CLOSURE SYSTEM: Brand: PERCLOSE PROGLIDE™

## (undated) DEVICE — ADHS SKIN MASTISOL CAP 2OZ DISP

## (undated) DEVICE — PENCL ES MEGADINE EZ/CLEAN BUTN W/HOLSTR 10FT

## (undated) DEVICE — SUT VIC 3/0 TIES 18IN J110T

## (undated) DEVICE — Device: Brand: DISPOSABLE ELECTROSURGICAL SNARE

## (undated) DEVICE — RADIFOCUS GLIDEWIRE: Brand: GLIDEWIRE

## (undated) DEVICE — CORONARY ARTERY BYPASS GRAFT MARKERS, STAINLESS STEEL, DISTAL, WITHOUT HOLDER: Brand: ANASTOMARK CORONARY ARTERY BYPASS GRAFT MARKERS, STAINLESS STEEL, DISTAL

## (undated) DEVICE — CATH OMNI FLUSH 5FR

## (undated) DEVICE — MARKR SPOT ENDOSCOPIC LESION INJ

## (undated) DEVICE — ADHS LIQ MASTISOL 2/3ML

## (undated) DEVICE — GOWN,AURORA,NOREINF,RAGLAN,XL,STERILE: Brand: MEDLINE

## (undated) DEVICE — SUT PROLN CARDIO BV1 6/0 24IN 8805H

## (undated) DEVICE — PINNACLE INTRODUCER SHEATH: Brand: PINNACLE

## (undated) DEVICE — DRSNG SURESITE WNDW 4X4.5

## (undated) DEVICE — TBG HI PRESSURE 500PSI 20IN

## (undated) DEVICE — PATIENT RETURN ELECTRODE, SINGLE-USE, CONTACT QUALITY MONITORING, ADULT, WITH 9FT CORD, FOR PATIENTS WEIGING OVER 33LBS. (15KG): Brand: MEGADYNE

## (undated) DEVICE — GW LUNDRQ STR .035 4X260CM

## (undated) DEVICE — ADHS SKIN PREMIERPRO EXOFIN TOPICAL HI/VISC .5ML

## (undated) DEVICE — SUT PROLENE 7-0 BV175-7 24IN DB ETH8766H

## (undated) DEVICE — PART NUMBER 108260, VTI 8 MHZ DISPOSABLE DOPPLER PROBE, STRAIGHT, BOX: Brand: VTI 8 MHZ DISPOSABLE DOPPLER PROBE, STRAIGHT, BOX

## (undated) DEVICE — SUT PROLENE CARDIO C1D 6/0 24IN 8726H

## (undated) DEVICE — COUNT NDL FOAM STRIP W/MAG 60CT

## (undated) DEVICE — INTENDED USED TO PROTECT, TAG AND HELP LOCATED SUTURES DURING SURGERY: Brand: STERION®SUTURE AID BOOTIES

## (undated) DEVICE — ST CVR PROB PULLUP ULTRASND 5X48IN

## (undated) DEVICE — SUT VICRYL 4/0 SUTUPAK 18 J109T

## (undated) DEVICE — SYR POWER FOR PRESSURE INJ SYS 150ML

## (undated) DEVICE — DRP SURG UNIV BASIC BILAMINATE 53X77IN DISP

## (undated) DEVICE — PACK,UNIVERSAL,NO GOWNS: Brand: MEDLINE

## (undated) DEVICE — PREVENA PEEL & PLACE SYSTEM KIT- 13 CM: Brand: PREVENA™ PEEL & PLACE™

## (undated) DEVICE — KT INTRO MINISTICK MAX W/GW PALLADIUM ECHO 4F 21G 7CM

## (undated) DEVICE — SNAR VASC RETRV ENSNARE STD SYS 7F18X30MM 120CM

## (undated) DEVICE — SPNG LAP 18X18IN LF STRL PK/5

## (undated) DEVICE — PK VASC LF 60

## (undated) DEVICE — PK ANGIO LF 60

## (undated) DEVICE — TOTAL TRAY, 16FR 10ML SIL FOLEY, URN: Brand: MEDLINE

## (undated) DEVICE — ADAPT VLV HEMO ENSNARE POLY 8F 2.7MM BLU

## (undated) DEVICE — GLV RAD REDUC ESP SZ8

## (undated) DEVICE — FOGARTY - HYDRAGRIP SURGICAL - CLAMP INSERTS: Brand: FOGARTY SOFTJAW

## (undated) DEVICE — FIAPC® PROBE W/ FILTER 2200 C OD 2.3MM/6.9FR; L 2.2M/7.2FT: Brand: ERBE

## (undated) DEVICE — CVR SURG EQUIP BND RECTG 36X28

## (undated) DEVICE — PAD GRND REM POLYHESIVE A/ DISP

## (undated) DEVICE — SHEATH INTRO AFX SYS 17F

## (undated) DEVICE — DRSNG TELFA PAD NONADH STR 1S 3X4IN